# Patient Record
Sex: FEMALE | Race: WHITE | NOT HISPANIC OR LATINO | Employment: OTHER | URBAN - METROPOLITAN AREA
[De-identification: names, ages, dates, MRNs, and addresses within clinical notes are randomized per-mention and may not be internally consistent; named-entity substitution may affect disease eponyms.]

---

## 2024-01-01 ENCOUNTER — APPOINTMENT (INPATIENT)
Dept: RADIOLOGY | Facility: HOSPITAL | Age: 69
DRG: 023 | End: 2024-01-01
Payer: COMMERCIAL

## 2024-01-01 ENCOUNTER — APPOINTMENT (INPATIENT)
Dept: NON INVASIVE DIAGNOSTICS | Facility: HOSPITAL | Age: 69
DRG: 023 | End: 2024-01-01
Payer: COMMERCIAL

## 2024-01-01 ENCOUNTER — APPOINTMENT (INPATIENT)
Dept: NEUROLOGY | Facility: CLINIC | Age: 69
DRG: 023 | End: 2024-01-01
Payer: COMMERCIAL

## 2024-01-01 ENCOUNTER — HOSPITAL ENCOUNTER (INPATIENT)
Dept: RADIOLOGY | Facility: HOSPITAL | Age: 69
Discharge: HOME | DRG: 023 | End: 2024-12-06
Payer: COMMERCIAL

## 2024-01-01 ENCOUNTER — HOSPITAL ENCOUNTER (INPATIENT)
Dept: RADIOLOGY | Facility: HOSPITAL | Age: 69
LOS: 23 days | DRG: 023 | End: 2024-12-30
Attending: RADIOLOGY | Admitting: EMERGENCY MEDICINE
Payer: COMMERCIAL

## 2024-01-01 ENCOUNTER — APPOINTMENT (INPATIENT)
Dept: GASTROENTEROLOGY | Facility: HOSPITAL | Age: 69
DRG: 023 | End: 2024-01-01
Payer: COMMERCIAL

## 2024-01-01 VITALS
WEIGHT: 110 LBS | DIASTOLIC BLOOD PRESSURE: 67 MMHG | OXYGEN SATURATION: 98 % | BODY MASS INDEX: 18.78 KG/M2 | SYSTOLIC BLOOD PRESSURE: 117 MMHG | HEIGHT: 64 IN | HEART RATE: 24 BPM | TEMPERATURE: 95.7 F

## 2024-01-01 DIAGNOSIS — I63.411 CEREBROVASCULAR ACCIDENT (CVA) DUE TO EMBOLISM OF RIGHT MIDDLE CEREBRAL ARTERY (HCC): Primary | ICD-10-CM

## 2024-01-01 DIAGNOSIS — G93.40 ENCEPHALOPATHY, UNSPECIFIED TYPE: ICD-10-CM

## 2024-01-01 DIAGNOSIS — I63.9 STROKE (HCC): ICD-10-CM

## 2024-01-01 DIAGNOSIS — E87.20 ACIDOSIS: ICD-10-CM

## 2024-01-01 DIAGNOSIS — I46.9 CARDIAC ARREST (HCC): ICD-10-CM

## 2024-01-01 DIAGNOSIS — I63.9 CEREBROVASCULAR ACCIDENT (CVA), UNSPECIFIED MECHANISM (HCC): ICD-10-CM

## 2024-01-01 DIAGNOSIS — J90 PLEURAL EFFUSION: ICD-10-CM

## 2024-01-01 DIAGNOSIS — R79.89 ELEVATED TROPONIN: ICD-10-CM

## 2024-01-01 DIAGNOSIS — J96.01 ACUTE RESPIRATORY FAILURE WITH HYPOXIA (HCC): ICD-10-CM

## 2024-01-01 DIAGNOSIS — R50.9 FEVER: ICD-10-CM

## 2024-01-01 DIAGNOSIS — I26.99 ACUTE PULMONARY EMBOLISM (HCC): ICD-10-CM

## 2024-01-01 DIAGNOSIS — R94.31 EKG ABNORMALITY: ICD-10-CM

## 2024-01-01 LAB
2HR DELTA HS TROPONIN: -194 NG/L
2HR DELTA HS TROPONIN: -66 NG/L
2HR DELTA HS TROPONIN: 2345 NG/L
2HR DELTA HS TROPONIN: 28 NG/L
2HR DELTA HS TROPONIN: 37 NG/L
4HR DELTA HS TROPONIN: -44 NG/L
4HR DELTA HS TROPONIN: -48 NG/L
4HR DELTA HS TROPONIN: 424 NG/L
4HR DELTA HS TROPONIN: 4269 NG/L
4HR DELTA HS TROPONIN: 631 NG/L
4HR DELTA HS TROPONIN: 741 NG/L
ABO GROUP BLD BPU: NORMAL
ABO GROUP BLD: NORMAL
ALBUMIN SERPL BCG-MCNC: 2.3 G/DL (ref 3.5–5)
ALBUMIN SERPL BCG-MCNC: 2.6 G/DL (ref 3.5–5)
ALBUMIN SERPL BCG-MCNC: 2.7 G/DL (ref 3.5–5)
ALBUMIN SERPL BCG-MCNC: 2.8 G/DL (ref 3.5–5)
ALBUMIN SERPL BCG-MCNC: 2.9 G/DL (ref 3.5–5)
ALBUMIN SERPL BCG-MCNC: 3 G/DL (ref 3.5–5)
ALBUMIN SERPL BCG-MCNC: 3.1 G/DL (ref 3.5–5)
ALBUMIN SERPL BCG-MCNC: 3.2 G/DL (ref 3.5–5)
ALBUMIN SERPL BCG-MCNC: 3.3 G/DL (ref 3.5–5)
ALP SERPL-CCNC: 101 U/L (ref 34–104)
ALP SERPL-CCNC: 106 U/L (ref 34–104)
ALP SERPL-CCNC: 108 U/L (ref 34–104)
ALP SERPL-CCNC: 113 U/L (ref 34–104)
ALP SERPL-CCNC: 116 U/L (ref 34–104)
ALP SERPL-CCNC: 117 U/L (ref 34–104)
ALP SERPL-CCNC: 134 U/L (ref 34–104)
ALP SERPL-CCNC: 67 U/L (ref 34–104)
ALP SERPL-CCNC: 80 U/L (ref 34–104)
ALP SERPL-CCNC: 81 U/L (ref 34–104)
ALP SERPL-CCNC: 86 U/L (ref 34–104)
ALP SERPL-CCNC: 90 U/L (ref 34–104)
ALP SERPL-CCNC: 91 U/L (ref 34–104)
ALP SERPL-CCNC: 93 U/L (ref 34–104)
ALP SERPL-CCNC: 95 U/L (ref 34–104)
ALP SERPL-CCNC: 97 U/L (ref 34–104)
ALT SERPL W P-5'-P-CCNC: 163 U/L (ref 7–52)
ALT SERPL W P-5'-P-CCNC: 179 U/L (ref 7–52)
ALT SERPL W P-5'-P-CCNC: 226 U/L (ref 7–52)
ALT SERPL W P-5'-P-CCNC: 277 U/L (ref 7–52)
ALT SERPL W P-5'-P-CCNC: 302 U/L (ref 7–52)
ALT SERPL W P-5'-P-CCNC: 34 U/L (ref 7–52)
ALT SERPL W P-5'-P-CCNC: 363 U/L (ref 7–52)
ALT SERPL W P-5'-P-CCNC: 386 U/L (ref 7–52)
ALT SERPL W P-5'-P-CCNC: 41 U/L (ref 7–52)
ALT SERPL W P-5'-P-CCNC: 460 U/L (ref 7–52)
ALT SERPL W P-5'-P-CCNC: 511 U/L (ref 7–52)
ALT SERPL W P-5'-P-CCNC: 532 U/L (ref 7–52)
ALT SERPL W P-5'-P-CCNC: 705 U/L (ref 7–52)
ALT SERPL W P-5'-P-CCNC: 76 U/L (ref 7–52)
ALT SERPL W P-5'-P-CCNC: 92 U/L (ref 7–52)
ALT SERPL W P-5'-P-CCNC: 99 U/L (ref 7–52)
AMMONIA PLAS-SCNC: 33 UMOL/L (ref 18–72)
AMMONIA PLAS-SCNC: 35 UMOL/L (ref 18–72)
AMYLASE SERPL-CCNC: 26 IU/L (ref 29–103)
ANION GAP SERPL CALCULATED.3IONS-SCNC: 10 MMOL/L (ref 4–13)
ANION GAP SERPL CALCULATED.3IONS-SCNC: 10 MMOL/L (ref 4–13)
ANION GAP SERPL CALCULATED.3IONS-SCNC: 11 MMOL/L (ref 4–13)
ANION GAP SERPL CALCULATED.3IONS-SCNC: 12 MMOL/L (ref 4–13)
ANION GAP SERPL CALCULATED.3IONS-SCNC: 14 MMOL/L (ref 4–13)
ANION GAP SERPL CALCULATED.3IONS-SCNC: 15 MMOL/L (ref 4–13)
ANION GAP SERPL CALCULATED.3IONS-SCNC: 17 MMOL/L (ref 4–13)
ANION GAP SERPL CALCULATED.3IONS-SCNC: 3 MMOL/L (ref 4–13)
ANION GAP SERPL CALCULATED.3IONS-SCNC: 4 MMOL/L (ref 4–13)
ANION GAP SERPL CALCULATED.3IONS-SCNC: 5 MMOL/L (ref 4–13)
ANION GAP SERPL CALCULATED.3IONS-SCNC: 6 MMOL/L (ref 4–13)
ANION GAP SERPL CALCULATED.3IONS-SCNC: 7 MMOL/L (ref 4–13)
ANION GAP SERPL CALCULATED.3IONS-SCNC: 8 MMOL/L (ref 4–13)
ANION GAP SERPL CALCULATED.3IONS-SCNC: 9 MMOL/L (ref 4–13)
ANISOCYTOSIS BLD QL SMEAR: PRESENT
ANISOCYTOSIS BLD QL SMEAR: PRESENT
AORTIC ROOT: 3.1 CM
AORTIC VALVE MEAN VELOCITY: 8.4 M/S
APAP SERPL-MCNC: 4 UG/ML (ref 10–20)
APICAL FOUR CHAMBER EJECTION FRACTION: 48 %
APICAL FOUR CHAMBER EJECTION FRACTION: 54 %
APPEARANCE FLD: CLEAR
APTT PPP: 173 SECONDS (ref 23–34)
APTT PPP: 188 SECONDS (ref 23–34)
APTT PPP: 25 SECONDS (ref 23–34)
APTT PPP: 33 SECONDS (ref 23–34)
APTT PPP: 37 SECONDS (ref 23–34)
APTT PPP: 41 SECONDS (ref 23–34)
APTT PPP: 43 SECONDS (ref 23–34)
APTT PPP: 44 SECONDS (ref 23–34)
APTT PPP: 45 SECONDS (ref 23–34)
APTT PPP: 48 SECONDS (ref 23–34)
APTT PPP: 48 SECONDS (ref 23–34)
APTT PPP: 49 SECONDS (ref 23–34)
APTT PPP: 50 SECONDS (ref 23–34)
APTT PPP: 50 SECONDS (ref 23–34)
APTT PPP: 51 SECONDS (ref 23–34)
APTT PPP: 53 SECONDS (ref 23–34)
APTT PPP: 53 SECONDS (ref 23–34)
APTT PPP: 54 SECONDS (ref 23–34)
APTT PPP: 54 SECONDS (ref 23–34)
APTT PPP: 56 SECONDS (ref 23–34)
APTT PPP: 56 SECONDS (ref 23–34)
APTT PPP: 58 SECONDS (ref 23–34)
APTT PPP: 59 SECONDS (ref 23–34)
APTT PPP: 61 SECONDS (ref 23–34)
APTT PPP: 62 SECONDS (ref 23–34)
APTT PPP: 63 SECONDS (ref 23–34)
APTT PPP: 63 SECONDS (ref 23–34)
APTT PPP: 66 SECONDS (ref 23–34)
APTT PPP: 68 SECONDS (ref 23–34)
APTT PPP: 69 SECONDS (ref 23–34)
APTT PPP: 72 SECONDS (ref 23–34)
APTT PPP: 73 SECONDS (ref 23–34)
APTT PPP: 75 SECONDS (ref 23–34)
APTT PPP: 75 SECONDS (ref 23–34)
APTT PPP: 78 SECONDS (ref 23–34)
APTT PPP: 78 SECONDS (ref 23–34)
APTT PPP: 82 SECONDS (ref 23–34)
APTT PPP: 83 SECONDS (ref 23–34)
APTT PPP: 83 SECONDS (ref 23–34)
APTT PPP: 87 SECONDS (ref 23–34)
APTT PPP: 87 SECONDS (ref 23–34)
ARTERIAL PATENCY WRIST A: YES
ASCENDING AORTA: 2.8 CM
AST SERPL W P-5'-P-CCNC: 130 U/L (ref 13–39)
AST SERPL W P-5'-P-CCNC: 144 U/L (ref 13–39)
AST SERPL W P-5'-P-CCNC: 189 U/L (ref 13–39)
AST SERPL W P-5'-P-CCNC: 232 U/L (ref 13–39)
AST SERPL W P-5'-P-CCNC: 25 U/L (ref 13–39)
AST SERPL W P-5'-P-CCNC: 266 U/L (ref 13–39)
AST SERPL W P-5'-P-CCNC: 32 U/L (ref 13–39)
AST SERPL W P-5'-P-CCNC: 33 U/L (ref 13–39)
AST SERPL W P-5'-P-CCNC: 39 U/L (ref 13–39)
AST SERPL W P-5'-P-CCNC: 419 U/L (ref 13–39)
AST SERPL W P-5'-P-CCNC: 49 U/L (ref 13–39)
AST SERPL W P-5'-P-CCNC: 491 U/L (ref 13–39)
AST SERPL W P-5'-P-CCNC: 50 U/L (ref 13–39)
AST SERPL W P-5'-P-CCNC: 52 U/L (ref 13–39)
AST SERPL W P-5'-P-CCNC: 572 U/L (ref 13–39)
AST SERPL W P-5'-P-CCNC: 70 U/L (ref 13–39)
ATRIAL RATE: 107 BPM
ATRIAL RATE: 108 BPM
ATRIAL RATE: 112 BPM
ATRIAL RATE: 113 BPM
ATRIAL RATE: 119 BPM
ATRIAL RATE: 133 BPM
ATRIAL RATE: 133 BPM
ATRIAL RATE: 69 BPM
ATRIAL RATE: 70 BPM
ATRIAL RATE: 84 BPM
ATRIAL RATE: 86 BPM
ATRIAL RATE: 89 BPM
AV LVOT MEAN GRADIENT: 2 MMHG
AV LVOT PEAK GRADIENT: 3 MMHG
AV MEAN GRADIENT: 3 MMHG
AV PEAK GRADIENT: 7 MMHG
AV VELOCITY RATIO: 0.72
B-OH-BUTYR SERPL-MCNC: 0.63 MMOL/L (ref 0.02–0.27)
BACTERIA BLD CULT: NORMAL
BACTERIA BRONCH AEROBE CULT: ABNORMAL
BACTERIA SPEC BFLD CULT: NO GROWTH
BACTERIA SPT RESP CULT: ABNORMAL
BACTERIA SPT RESP CULT: ABNORMAL
BACTERIA UR QL AUTO: ABNORMAL /HPF
BASE EX.OXY STD BLDV CALC-SCNC: 63.5 % (ref 60–80)
BASE EXCESS BLDA CALC-SCNC: -0.1 MMOL/L
BASE EXCESS BLDA CALC-SCNC: -0.7 MMOL/L
BASE EXCESS BLDA CALC-SCNC: -1 MMOL/L
BASE EXCESS BLDA CALC-SCNC: -1.6 MMOL/L
BASE EXCESS BLDA CALC-SCNC: -1.7 MMOL/L
BASE EXCESS BLDA CALC-SCNC: -1.7 MMOL/L
BASE EXCESS BLDA CALC-SCNC: -2.2 MMOL/L
BASE EXCESS BLDA CALC-SCNC: -2.9 MMOL/L
BASE EXCESS BLDA CALC-SCNC: -3.6 MMOL/L
BASE EXCESS BLDA CALC-SCNC: -3.6 MMOL/L
BASE EXCESS BLDA CALC-SCNC: -4.1 MMOL/L
BASE EXCESS BLDA CALC-SCNC: -5.5 MMOL/L
BASE EXCESS BLDA CALC-SCNC: -5.6 MMOL/L
BASE EXCESS BLDA CALC-SCNC: -5.7 MMOL/L
BASE EXCESS BLDA CALC-SCNC: -5.7 MMOL/L
BASE EXCESS BLDA CALC-SCNC: -6 MMOL/L (ref -2–3)
BASE EXCESS BLDA CALC-SCNC: -6 MMOL/L (ref -2–3)
BASE EXCESS BLDA CALC-SCNC: -6.7 MMOL/L
BASE EXCESS BLDA CALC-SCNC: -7.2 MMOL/L
BASE EXCESS BLDA CALC-SCNC: 0.4 MMOL/L
BASE EXCESS BLDA CALC-SCNC: 0.7 MMOL/L
BASE EXCESS BLDA CALC-SCNC: 1 MMOL/L
BASE EXCESS BLDA CALC-SCNC: 1.9 MMOL/L
BASE EXCESS BLDA CALC-SCNC: 2 MMOL/L (ref -2–3)
BASE EXCESS BLDA CALC-SCNC: 2.2 MMOL/L
BASE EXCESS BLDA CALC-SCNC: 2.2 MMOL/L
BASE EXCESS BLDA CALC-SCNC: 2.9 MMOL/L
BASE EXCESS BLDV CALC-SCNC: 3.2 MMOL/L
BASOPHILS # BLD AUTO: 0.01 THOUSANDS/ÂΜL (ref 0–0.1)
BASOPHILS # BLD AUTO: 0.02 THOUSANDS/ÂΜL (ref 0–0.1)
BASOPHILS # BLD AUTO: 0.03 THOUSANDS/ÂΜL (ref 0–0.1)
BASOPHILS # BLD MANUAL: 0 THOUSAND/UL (ref 0–0.1)
BASOPHILS # BLD MANUAL: 0 THOUSAND/UL (ref 0–0.1)
BASOPHILS NFR BLD AUTO: 0 % (ref 0–1)
BASOPHILS NFR MAR MANUAL: 0 % (ref 0–1)
BASOPHILS NFR MAR MANUAL: 0 % (ref 0–1)
BILIRUB DIRECT SERPL-MCNC: 0.03 MG/DL (ref 0–0.2)
BILIRUB DIRECT SERPL-MCNC: 0.04 MG/DL (ref 0–0.2)
BILIRUB DIRECT SERPL-MCNC: 0.05 MG/DL (ref 0–0.2)
BILIRUB DIRECT SERPL-MCNC: 0.11 MG/DL (ref 0–0.2)
BILIRUB DIRECT SERPL-MCNC: 0.22 MG/DL (ref 0–0.2)
BILIRUB SERPL-MCNC: 0.34 MG/DL (ref 0.2–1)
BILIRUB SERPL-MCNC: 0.35 MG/DL (ref 0.2–1)
BILIRUB SERPL-MCNC: 0.38 MG/DL (ref 0.2–1)
BILIRUB SERPL-MCNC: 0.38 MG/DL (ref 0.2–1)
BILIRUB SERPL-MCNC: 0.39 MG/DL (ref 0.2–1)
BILIRUB SERPL-MCNC: 0.4 MG/DL (ref 0.2–1)
BILIRUB SERPL-MCNC: 0.42 MG/DL (ref 0.2–1)
BILIRUB SERPL-MCNC: 0.44 MG/DL (ref 0.2–1)
BILIRUB SERPL-MCNC: 0.44 MG/DL (ref 0.2–1)
BILIRUB SERPL-MCNC: 0.51 MG/DL (ref 0.2–1)
BILIRUB SERPL-MCNC: 0.54 MG/DL (ref 0.2–1)
BILIRUB SERPL-MCNC: 0.56 MG/DL (ref 0.2–1)
BILIRUB SERPL-MCNC: 0.59 MG/DL (ref 0.2–1)
BILIRUB SERPL-MCNC: 0.78 MG/DL (ref 0.2–1)
BILIRUB UR QL STRIP: NEGATIVE
BLD GP AB SCN SERPL QL: NEGATIVE
BNP SERPL-MCNC: 1164 PG/ML (ref 0–100)
BODY TEMPERATURE: 100.2 DEGREES FEHRENHEIT
BODY TEMPERATURE: 100.4 DEGREES FEHRENHEIT
BODY TEMPERATURE: 101.4 DEGREES FEHRENHEIT
BODY TEMPERATURE: 102.3 DEGREES FEHRENHEIT
BODY TEMPERATURE: 94.5 DEGREES FEHRENHEIT
BODY TEMPERATURE: 98.6 DEGREES FEHRENHEIT
BODY TEMPERATURE: 99 DEGREES FEHRENHEIT
BPU ID: NORMAL
BSA FOR ECHO PROCEDURE: 1.52 M2
BSA FOR ECHO PROCEDURE: 1.59 M2
BSA FOR ECHO PROCEDURE: 1.59 M2
BUDDING YEAST: PRESENT
BUDDING YEAST: PRESENT
BUN SERPL-MCNC: 11 MG/DL (ref 5–25)
BUN SERPL-MCNC: 12 MG/DL (ref 5–25)
BUN SERPL-MCNC: 15 MG/DL (ref 5–25)
BUN SERPL-MCNC: 16 MG/DL (ref 5–25)
BUN SERPL-MCNC: 17 MG/DL (ref 5–25)
BUN SERPL-MCNC: 19 MG/DL (ref 5–25)
BUN SERPL-MCNC: 19 MG/DL (ref 5–25)
BUN SERPL-MCNC: 20 MG/DL (ref 5–25)
BUN SERPL-MCNC: 21 MG/DL (ref 5–25)
BUN SERPL-MCNC: 22 MG/DL (ref 5–25)
BUN SERPL-MCNC: 23 MG/DL (ref 5–25)
BUN SERPL-MCNC: 24 MG/DL (ref 5–25)
BUN SERPL-MCNC: 24 MG/DL (ref 5–25)
BUN SERPL-MCNC: 25 MG/DL (ref 5–25)
BUN SERPL-MCNC: 25 MG/DL (ref 5–25)
BUN SERPL-MCNC: 26 MG/DL (ref 5–25)
BUN SERPL-MCNC: 27 MG/DL (ref 5–25)
BUN SERPL-MCNC: 27 MG/DL (ref 5–25)
BUN SERPL-MCNC: 32 MG/DL (ref 5–25)
BUN SERPL-MCNC: 33 MG/DL (ref 5–25)
BUN SERPL-MCNC: 33 MG/DL (ref 5–25)
BUN SERPL-MCNC: 35 MG/DL (ref 5–25)
BUN SERPL-MCNC: 38 MG/DL (ref 5–25)
BUN SERPL-MCNC: 40 MG/DL (ref 5–25)
BUN SERPL-MCNC: 43 MG/DL (ref 5–25)
BUN SERPL-MCNC: 45 MG/DL (ref 5–25)
BUN SERPL-MCNC: 45 MG/DL (ref 5–25)
BUN SERPL-MCNC: 5 MG/DL (ref 5–25)
BUN SERPL-MCNC: 50 MG/DL (ref 5–25)
BUN SERPL-MCNC: 51 MG/DL (ref 5–25)
BUN SERPL-MCNC: 52 MG/DL (ref 5–25)
BUN SERPL-MCNC: 54 MG/DL (ref 5–25)
BUN SERPL-MCNC: 55 MG/DL (ref 5–25)
BUN SERPL-MCNC: 57 MG/DL (ref 5–25)
BUN SERPL-MCNC: 58 MG/DL (ref 5–25)
BUN SERPL-MCNC: 6 MG/DL (ref 5–25)
BUN SERPL-MCNC: 6 MG/DL (ref 5–25)
BUN SERPL-MCNC: 61 MG/DL (ref 5–25)
BUN SERPL-MCNC: 65 MG/DL (ref 5–25)
BUN SERPL-MCNC: 65 MG/DL (ref 5–25)
BUN SERPL-MCNC: 7 MG/DL (ref 5–25)
BUN SERPL-MCNC: 7 MG/DL (ref 5–25)
CA-I BLD-SCNC: 0.99 MMOL/L (ref 1.12–1.32)
CA-I BLD-SCNC: 0.99 MMOL/L (ref 1.12–1.32)
CA-I BLD-SCNC: 1.01 MMOL/L (ref 1.12–1.32)
CA-I BLD-SCNC: 1.01 MMOL/L (ref 1.12–1.32)
CA-I BLD-SCNC: 1.05 MMOL/L (ref 1.12–1.32)
CA-I BLD-SCNC: 1.07 MMOL/L (ref 1.12–1.32)
CA-I BLD-SCNC: 1.12 MMOL/L (ref 1.12–1.32)
CA-I BLD-SCNC: 1.12 MMOL/L (ref 1.12–1.32)
CA-I BLD-SCNC: 1.15 MMOL/L (ref 1.12–1.32)
CA-I BLD-SCNC: 1.15 MMOL/L (ref 1.12–1.32)
CA-I BLD-SCNC: 1.16 MMOL/L (ref 1.12–1.32)
CA-I BLD-SCNC: 1.17 MMOL/L (ref 1.12–1.32)
CA-I BLD-SCNC: 1.29 MMOL/L (ref 1.12–1.32)
CA-I BLD-SCNC: 1.32 MMOL/L (ref 1.12–1.32)
CA-I BLD-SCNC: 1.55 MMOL/L (ref 1.12–1.32)
CA-I BLD-SCNC: 1.78 MMOL/L (ref 1.12–1.32)
CA-I BLD-SCNC: >2.2 MMOL/L (ref 1.12–1.32)
CALCIUM ALBUM COR SERPL-MCNC: 11.8 MG/DL (ref 8.3–10.1)
CALCIUM ALBUM COR SERPL-MCNC: 13.7 MG/DL (ref 8.3–10.1)
CALCIUM ALBUM COR SERPL-MCNC: 8.6 MG/DL (ref 8.3–10.1)
CALCIUM ALBUM COR SERPL-MCNC: 8.7 MG/DL (ref 8.3–10.1)
CALCIUM ALBUM COR SERPL-MCNC: 9.1 MG/DL (ref 8.3–10.1)
CALCIUM ALBUM COR SERPL-MCNC: 9.3 MG/DL (ref 8.3–10.1)
CALCIUM ALBUM COR SERPL-MCNC: 9.4 MG/DL (ref 8.3–10.1)
CALCIUM ALBUM COR SERPL-MCNC: 9.5 MG/DL (ref 8.3–10.1)
CALCIUM ALBUM COR SERPL-MCNC: 9.5 MG/DL (ref 8.3–10.1)
CALCIUM SERPL-MCNC: 11.1 MG/DL (ref 8.4–10.2)
CALCIUM SERPL-MCNC: 12.3 MG/DL (ref 8.4–10.2)
CALCIUM SERPL-MCNC: 7.2 MG/DL (ref 8.4–10.2)
CALCIUM SERPL-MCNC: 7.5 MG/DL (ref 8.4–10.2)
CALCIUM SERPL-MCNC: 7.6 MG/DL (ref 8.4–10.2)
CALCIUM SERPL-MCNC: 7.7 MG/DL (ref 8.4–10.2)
CALCIUM SERPL-MCNC: 7.8 MG/DL (ref 8.4–10.2)
CALCIUM SERPL-MCNC: 7.9 MG/DL (ref 8.4–10.2)
CALCIUM SERPL-MCNC: 8 MG/DL (ref 8.4–10.2)
CALCIUM SERPL-MCNC: 8.1 MG/DL (ref 8.4–10.2)
CALCIUM SERPL-MCNC: 8.2 MG/DL (ref 8.4–10.2)
CALCIUM SERPL-MCNC: 8.3 MG/DL (ref 8.4–10.2)
CALCIUM SERPL-MCNC: 8.5 MG/DL (ref 8.4–10.2)
CALCIUM SERPL-MCNC: 8.5 MG/DL (ref 8.4–10.2)
CALCIUM SERPL-MCNC: 8.6 MG/DL (ref 8.4–10.2)
CALCIUM SERPL-MCNC: 8.7 MG/DL (ref 8.4–10.2)
CALCIUM SERPL-MCNC: 8.7 MG/DL (ref 8.4–10.2)
CALCIUM SERPL-MCNC: 8.8 MG/DL (ref 8.4–10.2)
CALCIUM SERPL-MCNC: 8.8 MG/DL (ref 8.4–10.2)
CALCIUM SERPL-MCNC: 8.9 MG/DL (ref 8.4–10.2)
CALCIUM SERPL-MCNC: 9.1 MG/DL (ref 8.4–10.2)
CALCIUM SERPL-MCNC: 9.2 MG/DL (ref 8.4–10.2)
CALCIUM SERPL-MCNC: 9.3 MG/DL (ref 8.4–10.2)
CARDIAC TROPONIN I PNL SERPL HS: 1034 NG/L (ref ?–50)
CARDIAC TROPONIN I PNL SERPL HS: 1052 NG/L (ref ?–50)
CARDIAC TROPONIN I PNL SERPL HS: 1100 NG/L (ref ?–50)
CARDIAC TROPONIN I PNL SERPL HS: 169 NG/L (ref ?–50)
CARDIAC TROPONIN I PNL SERPL HS: 1733 NG/L (ref ?–50)
CARDIAC TROPONIN I PNL SERPL HS: 1761 NG/L (ref ?–50)
CARDIAC TROPONIN I PNL SERPL HS: 213 NG/L (ref ?–50)
CARDIAC TROPONIN I PNL SERPL HS: 2157 NG/L (ref ?–50)
CARDIAC TROPONIN I PNL SERPL HS: 250 NG/L (ref ?–50)
CARDIAC TROPONIN I PNL SERPL HS: 5149 NG/L (ref ?–50)
CARDIAC TROPONIN I PNL SERPL HS: 5343 NG/L (ref ?–50)
CARDIAC TROPONIN I PNL SERPL HS: 5481 NG/L (ref 8–18)
CARDIAC TROPONIN I PNL SERPL HS: 6084 NG/L (ref ?–50)
CARDIAC TROPONIN I PNL SERPL HS: 8519 NG/L (ref ?–50)
CARDIAC TROPONIN I PNL SERPL HS: 9109 NG/L (ref ?–50)
CARDIAC TROPONIN I PNL SERPL HS: 9740 NG/L (ref ?–50)
CARDIAC TROPONIN I PNL SERPL HS: ABNORMAL NG/L (ref 8–18)
CARDIAC TROPONIN I PNL SERPL HS: ABNORMAL NG/L (ref ?–50)
CARDIAC TROPONIN I PNL SERPL HS: ABNORMAL NG/L (ref ?–50)
CHLORIDE SERPL-SCNC: 106 MMOL/L (ref 96–108)
CHLORIDE SERPL-SCNC: 107 MMOL/L (ref 96–108)
CHLORIDE SERPL-SCNC: 109 MMOL/L (ref 96–108)
CHLORIDE SERPL-SCNC: 110 MMOL/L (ref 96–108)
CHLORIDE SERPL-SCNC: 111 MMOL/L (ref 96–108)
CHLORIDE SERPL-SCNC: 113 MMOL/L (ref 96–108)
CHLORIDE SERPL-SCNC: 114 MMOL/L (ref 96–108)
CHLORIDE SERPL-SCNC: 114 MMOL/L (ref 96–108)
CHLORIDE SERPL-SCNC: 115 MMOL/L (ref 96–108)
CHLORIDE SERPL-SCNC: 116 MMOL/L (ref 96–108)
CHLORIDE SERPL-SCNC: 117 MMOL/L (ref 96–108)
CHLORIDE SERPL-SCNC: 118 MMOL/L (ref 96–108)
CHLORIDE SERPL-SCNC: 119 MMOL/L (ref 96–108)
CHLORIDE SERPL-SCNC: 119 MMOL/L (ref 96–108)
CHLORIDE SERPL-SCNC: 120 MMOL/L (ref 96–108)
CHLORIDE SERPL-SCNC: 121 MMOL/L (ref 96–108)
CHLORIDE SERPL-SCNC: 123 MMOL/L (ref 96–108)
CHLORIDE SERPL-SCNC: 123 MMOL/L (ref 96–108)
CHLORIDE SERPL-SCNC: 124 MMOL/L (ref 96–108)
CHLORIDE SERPL-SCNC: 125 MMOL/L (ref 96–108)
CHLORIDE SERPL-SCNC: 126 MMOL/L (ref 96–108)
CHLORIDE SERPL-SCNC: 127 MMOL/L (ref 96–108)
CHLORIDE SERPL-SCNC: 128 MMOL/L (ref 96–108)
CHLORIDE SERPL-SCNC: 128 MMOL/L (ref 96–108)
CHLORIDE SERPL-SCNC: 130 MMOL/L (ref 96–108)
CHLORIDE SERPL-SCNC: 130 MMOL/L (ref 96–108)
CHLORIDE SERPL-SCNC: 131 MMOL/L (ref 96–108)
CHLORIDE SERPL-SCNC: 132 MMOL/L (ref 96–108)
CHLORIDE SERPL-SCNC: 134 MMOL/L (ref 96–108)
CHOLEST SERPL-MCNC: 140 MG/DL (ref ?–200)
CK SERPL-CCNC: 698 U/L (ref 26–192)
CLARITY UR: ABNORMAL
CLARITY UR: ABNORMAL
CLARITY UR: CLEAR
CO2 SERPL-SCNC: 18 MMOL/L (ref 21–32)
CO2 SERPL-SCNC: 19 MMOL/L (ref 21–32)
CO2 SERPL-SCNC: 20 MMOL/L (ref 21–32)
CO2 SERPL-SCNC: 21 MMOL/L (ref 21–32)
CO2 SERPL-SCNC: 22 MMOL/L (ref 21–32)
CO2 SERPL-SCNC: 23 MMOL/L (ref 21–32)
CO2 SERPL-SCNC: 24 MMOL/L (ref 21–32)
CO2 SERPL-SCNC: 25 MMOL/L (ref 21–32)
CO2 SERPL-SCNC: 26 MMOL/L (ref 21–32)
CO2 SERPL-SCNC: 26 MMOL/L (ref 21–32)
CO2 SERPL-SCNC: 27 MMOL/L (ref 21–32)
CO2 SERPL-SCNC: 27 MMOL/L (ref 21–32)
CO2 SERPL-SCNC: 28 MMOL/L (ref 21–32)
CO2 SERPL-SCNC: 30 MMOL/L (ref 21–32)
CO2 SERPL-SCNC: 31 MMOL/L (ref 21–32)
CO2 SERPL-SCNC: 32 MMOL/L (ref 21–32)
CO2 SERPL-SCNC: 32 MMOL/L (ref 21–32)
COLOR FLD: YELLOW
COLOR UR: ABNORMAL
COLOR UR: ABNORMAL
COLOR UR: YELLOW
CREAT SERPL-MCNC: 0.39 MG/DL (ref 0.6–1.3)
CREAT SERPL-MCNC: 0.4 MG/DL (ref 0.6–1.3)
CREAT SERPL-MCNC: 0.41 MG/DL (ref 0.6–1.3)
CREAT SERPL-MCNC: 0.43 MG/DL (ref 0.6–1.3)
CREAT SERPL-MCNC: 0.45 MG/DL (ref 0.6–1.3)
CREAT SERPL-MCNC: 0.46 MG/DL (ref 0.6–1.3)
CREAT SERPL-MCNC: 0.46 MG/DL (ref 0.6–1.3)
CREAT SERPL-MCNC: 0.48 MG/DL (ref 0.6–1.3)
CREAT SERPL-MCNC: 0.48 MG/DL (ref 0.6–1.3)
CREAT SERPL-MCNC: 0.49 MG/DL (ref 0.6–1.3)
CREAT SERPL-MCNC: 0.51 MG/DL (ref 0.6–1.3)
CREAT SERPL-MCNC: 0.54 MG/DL (ref 0.6–1.3)
CREAT SERPL-MCNC: 0.54 MG/DL (ref 0.6–1.3)
CREAT SERPL-MCNC: 0.57 MG/DL (ref 0.6–1.3)
CREAT SERPL-MCNC: 0.57 MG/DL (ref 0.6–1.3)
CREAT SERPL-MCNC: 0.58 MG/DL (ref 0.6–1.3)
CREAT SERPL-MCNC: 0.59 MG/DL (ref 0.6–1.3)
CREAT SERPL-MCNC: 0.6 MG/DL (ref 0.6–1.3)
CREAT SERPL-MCNC: 0.6 MG/DL (ref 0.6–1.3)
CREAT SERPL-MCNC: 0.61 MG/DL (ref 0.6–1.3)
CREAT SERPL-MCNC: 0.62 MG/DL (ref 0.6–1.3)
CREAT SERPL-MCNC: 0.63 MG/DL (ref 0.6–1.3)
CREAT SERPL-MCNC: 0.64 MG/DL (ref 0.6–1.3)
CREAT SERPL-MCNC: 0.64 MG/DL (ref 0.6–1.3)
CREAT SERPL-MCNC: 0.65 MG/DL (ref 0.6–1.3)
CREAT SERPL-MCNC: 0.66 MG/DL (ref 0.6–1.3)
CREAT SERPL-MCNC: 0.66 MG/DL (ref 0.6–1.3)
CREAT SERPL-MCNC: 0.68 MG/DL (ref 0.6–1.3)
CREAT SERPL-MCNC: 0.72 MG/DL (ref 0.6–1.3)
CREAT SERPL-MCNC: 0.75 MG/DL (ref 0.6–1.3)
CREAT SERPL-MCNC: 0.76 MG/DL (ref 0.6–1.3)
CREAT SERPL-MCNC: 0.76 MG/DL (ref 0.6–1.3)
CREAT SERPL-MCNC: 0.77 MG/DL (ref 0.6–1.3)
CREAT SERPL-MCNC: 0.77 MG/DL (ref 0.6–1.3)
CREAT SERPL-MCNC: 0.81 MG/DL (ref 0.6–1.3)
CREAT SERPL-MCNC: 0.86 MG/DL (ref 0.6–1.3)
CREAT SERPL-MCNC: 0.89 MG/DL (ref 0.6–1.3)
CREAT SERPL-MCNC: 0.92 MG/DL (ref 0.6–1.3)
CREAT SERPL-MCNC: 0.94 MG/DL (ref 0.6–1.3)
CREAT SERPL-MCNC: 1.05 MG/DL (ref 0.6–1.3)
CREAT SERPL-MCNC: 1.05 MG/DL (ref 0.6–1.3)
CREAT SERPL-MCNC: 1.09 MG/DL (ref 0.6–1.3)
CREAT SERPL-MCNC: 1.13 MG/DL (ref 0.6–1.3)
CREAT SERPL-MCNC: 1.13 MG/DL (ref 0.6–1.3)
CREAT SERPL-MCNC: 1.16 MG/DL (ref 0.6–1.3)
CREAT SERPL-MCNC: 1.16 MG/DL (ref 0.6–1.3)
CREAT SERPL-MCNC: 1.17 MG/DL (ref 0.6–1.3)
CREAT SERPL-MCNC: 1.21 MG/DL (ref 0.6–1.3)
CROSSMATCH: NORMAL
DOP CALC AO PEAK VEL: 1.28 M/S
DOP CALC AO VTI: 27.74 CM
DOP CALC LVOT PEAK VEL VTI: 21.27 CM
DOP CALC LVOT PEAK VEL: 0.92 M/S
E WAVE DECELERATION TIME: 132 MS
E WAVE DECELERATION TIME: 211 MS
E/A RATIO: 0.75
E/A RATIO: 1.15
EOSINOPHIL # BLD AUTO: 0 THOUSAND/ÂΜL (ref 0–0.61)
EOSINOPHIL # BLD AUTO: 0.01 THOUSAND/ÂΜL (ref 0–0.61)
EOSINOPHIL # BLD AUTO: 0.01 THOUSAND/ÂΜL (ref 0–0.61)
EOSINOPHIL # BLD AUTO: 0.02 THOUSAND/ÂΜL (ref 0–0.61)
EOSINOPHIL # BLD AUTO: 0.02 THOUSAND/ÂΜL (ref 0–0.61)
EOSINOPHIL # BLD AUTO: 0.03 THOUSAND/ÂΜL (ref 0–0.61)
EOSINOPHIL # BLD AUTO: 0.03 THOUSAND/ÂΜL (ref 0–0.61)
EOSINOPHIL # BLD AUTO: 0.05 THOUSAND/ÂΜL (ref 0–0.61)
EOSINOPHIL # BLD AUTO: 0.07 THOUSAND/ÂΜL (ref 0–0.61)
EOSINOPHIL # BLD AUTO: 0.09 THOUSAND/ÂΜL (ref 0–0.61)
EOSINOPHIL # BLD AUTO: 0.09 THOUSAND/ÂΜL (ref 0–0.61)
EOSINOPHIL # BLD AUTO: 0.11 THOUSAND/ÂΜL (ref 0–0.61)
EOSINOPHIL # BLD AUTO: 0.12 THOUSAND/ÂΜL (ref 0–0.61)
EOSINOPHIL # BLD AUTO: 0.15 THOUSAND/ÂΜL (ref 0–0.61)
EOSINOPHIL # BLD AUTO: 0.16 THOUSAND/ÂΜL (ref 0–0.61)
EOSINOPHIL # BLD AUTO: 0.24 THOUSAND/ÂΜL (ref 0–0.61)
EOSINOPHIL # BLD MANUAL: 0 THOUSAND/UL (ref 0–0.4)
EOSINOPHIL # BLD MANUAL: 0.56 THOUSAND/UL (ref 0–0.4)
EOSINOPHIL NFR BLD AUTO: 0 % (ref 0–6)
EOSINOPHIL NFR BLD AUTO: 1 % (ref 0–6)
EOSINOPHIL NFR BLD AUTO: 2 % (ref 0–6)
EOSINOPHIL NFR BLD MANUAL: 0 % (ref 0–6)
EOSINOPHIL NFR BLD MANUAL: 3 % (ref 0–6)
ERYTHROCYTE [DISTWIDTH] IN BLOOD BY AUTOMATED COUNT: 13.8 % (ref 11.6–15.1)
ERYTHROCYTE [DISTWIDTH] IN BLOOD BY AUTOMATED COUNT: 14.4 % (ref 11.6–15.1)
ERYTHROCYTE [DISTWIDTH] IN BLOOD BY AUTOMATED COUNT: 14.4 % (ref 11.6–15.1)
ERYTHROCYTE [DISTWIDTH] IN BLOOD BY AUTOMATED COUNT: 14.5 % (ref 11.6–15.1)
ERYTHROCYTE [DISTWIDTH] IN BLOOD BY AUTOMATED COUNT: 14.6 % (ref 11.6–15.1)
ERYTHROCYTE [DISTWIDTH] IN BLOOD BY AUTOMATED COUNT: 14.7 % (ref 11.6–15.1)
ERYTHROCYTE [DISTWIDTH] IN BLOOD BY AUTOMATED COUNT: 15 % (ref 11.6–15.1)
ERYTHROCYTE [DISTWIDTH] IN BLOOD BY AUTOMATED COUNT: 15.9 % (ref 11.6–15.1)
ERYTHROCYTE [DISTWIDTH] IN BLOOD BY AUTOMATED COUNT: 15.9 % (ref 11.6–15.1)
ERYTHROCYTE [DISTWIDTH] IN BLOOD BY AUTOMATED COUNT: 16 % (ref 11.6–15.1)
ERYTHROCYTE [DISTWIDTH] IN BLOOD BY AUTOMATED COUNT: 16 % (ref 11.6–15.1)
ERYTHROCYTE [DISTWIDTH] IN BLOOD BY AUTOMATED COUNT: 18.3 % (ref 11.6–15.1)
ERYTHROCYTE [DISTWIDTH] IN BLOOD BY AUTOMATED COUNT: 18.3 % (ref 11.6–15.1)
ERYTHROCYTE [DISTWIDTH] IN BLOOD BY AUTOMATED COUNT: 18.9 % (ref 11.6–15.1)
ERYTHROCYTE [DISTWIDTH] IN BLOOD BY AUTOMATED COUNT: 19.2 % (ref 11.6–15.1)
ERYTHROCYTE [DISTWIDTH] IN BLOOD BY AUTOMATED COUNT: 19.6 % (ref 11.6–15.1)
ERYTHROCYTE [DISTWIDTH] IN BLOOD BY AUTOMATED COUNT: 20.3 % (ref 11.6–15.1)
EST. AVERAGE GLUCOSE BLD GHB EST-MCNC: 105 MG/DL
FIBRINOGEN PPP-MCNC: 440 MG/DL (ref 206–523)
FIO2 GAS DIL.REBREATH: 100 L
FIO2 GAS DIL.REBREATH: 100 L
FIO2 GAS DIL.REBREATH: 44 L
FLUAV RNA RESP QL NAA+PROBE: NEGATIVE
FLUBV RNA RESP QL NAA+PROBE: NEGATIVE
FRACTIONAL SHORTENING: 20 (ref 28–44)
GFR SERPL CREATININE-BSD FRML MDRD: 101 ML/MIN/1.73SQ M
GFR SERPL CREATININE-BSD FRML MDRD: 103 ML/MIN/1.73SQ M
GFR SERPL CREATININE-BSD FRML MDRD: 103 ML/MIN/1.73SQ M
GFR SERPL CREATININE-BSD FRML MDRD: 104 ML/MIN/1.73SQ M
GFR SERPL CREATININE-BSD FRML MDRD: 105 ML/MIN/1.73SQ M
GFR SERPL CREATININE-BSD FRML MDRD: 107 ML/MIN/1.73SQ M
GFR SERPL CREATININE-BSD FRML MDRD: 108 ML/MIN/1.73SQ M
GFR SERPL CREATININE-BSD FRML MDRD: 109 ML/MIN/1.73SQ M
GFR SERPL CREATININE-BSD FRML MDRD: 46 ML/MIN/1.73SQ M
GFR SERPL CREATININE-BSD FRML MDRD: 48 ML/MIN/1.73SQ M
GFR SERPL CREATININE-BSD FRML MDRD: 50 ML/MIN/1.73SQ M
GFR SERPL CREATININE-BSD FRML MDRD: 50 ML/MIN/1.73SQ M
GFR SERPL CREATININE-BSD FRML MDRD: 52 ML/MIN/1.73SQ M
GFR SERPL CREATININE-BSD FRML MDRD: 55 ML/MIN/1.73SQ M
GFR SERPL CREATININE-BSD FRML MDRD: 55 ML/MIN/1.73SQ M
GFR SERPL CREATININE-BSD FRML MDRD: 62 ML/MIN/1.73SQ M
GFR SERPL CREATININE-BSD FRML MDRD: 64 ML/MIN/1.73SQ M
GFR SERPL CREATININE-BSD FRML MDRD: 67 ML/MIN/1.73SQ M
GFR SERPL CREATININE-BSD FRML MDRD: 70 ML/MIN/1.73SQ M
GFR SERPL CREATININE-BSD FRML MDRD: 75 ML/MIN/1.73SQ M
GFR SERPL CREATININE-BSD FRML MDRD: 80 ML/MIN/1.73SQ M
GFR SERPL CREATININE-BSD FRML MDRD: 80 ML/MIN/1.73SQ M
GFR SERPL CREATININE-BSD FRML MDRD: 81 ML/MIN/1.73SQ M
GFR SERPL CREATININE-BSD FRML MDRD: 81 ML/MIN/1.73SQ M
GFR SERPL CREATININE-BSD FRML MDRD: 82 ML/MIN/1.73SQ M
GFR SERPL CREATININE-BSD FRML MDRD: 86 ML/MIN/1.73SQ M
GFR SERPL CREATININE-BSD FRML MDRD: 90 ML/MIN/1.73SQ M
GFR SERPL CREATININE-BSD FRML MDRD: 91 ML/MIN/1.73SQ M
GFR SERPL CREATININE-BSD FRML MDRD: 91 ML/MIN/1.73SQ M
GFR SERPL CREATININE-BSD FRML MDRD: 92 ML/MIN/1.73SQ M
GFR SERPL CREATININE-BSD FRML MDRD: 93 ML/MIN/1.73SQ M
GFR SERPL CREATININE-BSD FRML MDRD: 94 ML/MIN/1.73SQ M
GFR SERPL CREATININE-BSD FRML MDRD: 95 ML/MIN/1.73SQ M
GFR SERPL CREATININE-BSD FRML MDRD: 95 ML/MIN/1.73SQ M
GFR SERPL CREATININE-BSD FRML MDRD: 96 ML/MIN/1.73SQ M
GFR SERPL CREATININE-BSD FRML MDRD: 96 ML/MIN/1.73SQ M
GFR SERPL CREATININE-BSD FRML MDRD: 97 ML/MIN/1.73SQ M
GFR SERPL CREATININE-BSD FRML MDRD: 97 ML/MIN/1.73SQ M
GFR SERPL CREATININE-BSD FRML MDRD: 99 ML/MIN/1.73SQ M
GGT SERPL-CCNC: 76 U/L (ref 9–64)
GLUCOSE FLD-MCNC: 184 MG/DL
GLUCOSE SERPL-MCNC: 100 MG/DL (ref 65–140)
GLUCOSE SERPL-MCNC: 101 MG/DL (ref 65–140)
GLUCOSE SERPL-MCNC: 104 MG/DL (ref 65–140)
GLUCOSE SERPL-MCNC: 106 MG/DL (ref 65–140)
GLUCOSE SERPL-MCNC: 106 MG/DL (ref 65–140)
GLUCOSE SERPL-MCNC: 107 MG/DL (ref 65–140)
GLUCOSE SERPL-MCNC: 110 MG/DL (ref 65–140)
GLUCOSE SERPL-MCNC: 110 MG/DL (ref 65–140)
GLUCOSE SERPL-MCNC: 112 MG/DL (ref 65–140)
GLUCOSE SERPL-MCNC: 114 MG/DL (ref 65–140)
GLUCOSE SERPL-MCNC: 116 MG/DL (ref 65–140)
GLUCOSE SERPL-MCNC: 116 MG/DL (ref 65–140)
GLUCOSE SERPL-MCNC: 117 MG/DL (ref 65–140)
GLUCOSE SERPL-MCNC: 118 MG/DL (ref 65–140)
GLUCOSE SERPL-MCNC: 119 MG/DL (ref 65–140)
GLUCOSE SERPL-MCNC: 120 MG/DL (ref 65–140)
GLUCOSE SERPL-MCNC: 120 MG/DL (ref 65–140)
GLUCOSE SERPL-MCNC: 121 MG/DL (ref 65–140)
GLUCOSE SERPL-MCNC: 124 MG/DL (ref 65–140)
GLUCOSE SERPL-MCNC: 125 MG/DL (ref 65–140)
GLUCOSE SERPL-MCNC: 126 MG/DL (ref 65–140)
GLUCOSE SERPL-MCNC: 127 MG/DL (ref 65–140)
GLUCOSE SERPL-MCNC: 128 MG/DL (ref 65–140)
GLUCOSE SERPL-MCNC: 129 MG/DL (ref 65–140)
GLUCOSE SERPL-MCNC: 129 MG/DL (ref 65–140)
GLUCOSE SERPL-MCNC: 131 MG/DL (ref 65–140)
GLUCOSE SERPL-MCNC: 132 MG/DL (ref 65–140)
GLUCOSE SERPL-MCNC: 133 MG/DL (ref 65–140)
GLUCOSE SERPL-MCNC: 133 MG/DL (ref 65–140)
GLUCOSE SERPL-MCNC: 134 MG/DL (ref 65–140)
GLUCOSE SERPL-MCNC: 134 MG/DL (ref 65–140)
GLUCOSE SERPL-MCNC: 135 MG/DL (ref 65–140)
GLUCOSE SERPL-MCNC: 136 MG/DL (ref 65–140)
GLUCOSE SERPL-MCNC: 136 MG/DL (ref 65–140)
GLUCOSE SERPL-MCNC: 137 MG/DL (ref 65–140)
GLUCOSE SERPL-MCNC: 138 MG/DL (ref 65–140)
GLUCOSE SERPL-MCNC: 139 MG/DL (ref 65–140)
GLUCOSE SERPL-MCNC: 140 MG/DL (ref 65–140)
GLUCOSE SERPL-MCNC: 140 MG/DL (ref 65–140)
GLUCOSE SERPL-MCNC: 141 MG/DL (ref 65–140)
GLUCOSE SERPL-MCNC: 142 MG/DL (ref 65–140)
GLUCOSE SERPL-MCNC: 142 MG/DL (ref 65–140)
GLUCOSE SERPL-MCNC: 143 MG/DL (ref 65–140)
GLUCOSE SERPL-MCNC: 143 MG/DL (ref 65–140)
GLUCOSE SERPL-MCNC: 144 MG/DL (ref 65–140)
GLUCOSE SERPL-MCNC: 145 MG/DL (ref 65–140)
GLUCOSE SERPL-MCNC: 146 MG/DL (ref 65–140)
GLUCOSE SERPL-MCNC: 147 MG/DL (ref 65–140)
GLUCOSE SERPL-MCNC: 147 MG/DL (ref 65–140)
GLUCOSE SERPL-MCNC: 148 MG/DL (ref 65–140)
GLUCOSE SERPL-MCNC: 149 MG/DL (ref 65–140)
GLUCOSE SERPL-MCNC: 149 MG/DL (ref 65–140)
GLUCOSE SERPL-MCNC: 150 MG/DL (ref 65–140)
GLUCOSE SERPL-MCNC: 151 MG/DL (ref 65–140)
GLUCOSE SERPL-MCNC: 151 MG/DL (ref 65–140)
GLUCOSE SERPL-MCNC: 152 MG/DL (ref 65–140)
GLUCOSE SERPL-MCNC: 154 MG/DL (ref 65–140)
GLUCOSE SERPL-MCNC: 154 MG/DL (ref 65–140)
GLUCOSE SERPL-MCNC: 156 MG/DL (ref 65–140)
GLUCOSE SERPL-MCNC: 156 MG/DL (ref 65–140)
GLUCOSE SERPL-MCNC: 157 MG/DL (ref 65–140)
GLUCOSE SERPL-MCNC: 157 MG/DL (ref 65–140)
GLUCOSE SERPL-MCNC: 159 MG/DL (ref 65–140)
GLUCOSE SERPL-MCNC: 159 MG/DL (ref 65–140)
GLUCOSE SERPL-MCNC: 160 MG/DL (ref 65–140)
GLUCOSE SERPL-MCNC: 160 MG/DL (ref 65–140)
GLUCOSE SERPL-MCNC: 161 MG/DL (ref 65–140)
GLUCOSE SERPL-MCNC: 161 MG/DL (ref 65–140)
GLUCOSE SERPL-MCNC: 162 MG/DL (ref 65–140)
GLUCOSE SERPL-MCNC: 163 MG/DL (ref 65–140)
GLUCOSE SERPL-MCNC: 165 MG/DL (ref 65–140)
GLUCOSE SERPL-MCNC: 168 MG/DL (ref 65–140)
GLUCOSE SERPL-MCNC: 168 MG/DL (ref 65–140)
GLUCOSE SERPL-MCNC: 169 MG/DL (ref 65–140)
GLUCOSE SERPL-MCNC: 170 MG/DL (ref 65–140)
GLUCOSE SERPL-MCNC: 172 MG/DL (ref 65–140)
GLUCOSE SERPL-MCNC: 173 MG/DL (ref 65–140)
GLUCOSE SERPL-MCNC: 176 MG/DL (ref 65–140)
GLUCOSE SERPL-MCNC: 178 MG/DL (ref 65–140)
GLUCOSE SERPL-MCNC: 179 MG/DL (ref 65–140)
GLUCOSE SERPL-MCNC: 180 MG/DL (ref 65–140)
GLUCOSE SERPL-MCNC: 184 MG/DL (ref 65–140)
GLUCOSE SERPL-MCNC: 185 MG/DL (ref 65–140)
GLUCOSE SERPL-MCNC: 186 MG/DL (ref 65–140)
GLUCOSE SERPL-MCNC: 186 MG/DL (ref 65–140)
GLUCOSE SERPL-MCNC: 187 MG/DL (ref 65–140)
GLUCOSE SERPL-MCNC: 188 MG/DL (ref 65–140)
GLUCOSE SERPL-MCNC: 195 MG/DL (ref 65–140)
GLUCOSE SERPL-MCNC: 196 MG/DL (ref 65–140)
GLUCOSE SERPL-MCNC: 196 MG/DL (ref 65–140)
GLUCOSE SERPL-MCNC: 197 MG/DL (ref 65–140)
GLUCOSE SERPL-MCNC: 203 MG/DL (ref 65–140)
GLUCOSE SERPL-MCNC: 208 MG/DL (ref 65–140)
GLUCOSE SERPL-MCNC: 212 MG/DL (ref 65–140)
GLUCOSE SERPL-MCNC: 221 MG/DL (ref 65–140)
GLUCOSE SERPL-MCNC: 222 MG/DL (ref 65–140)
GLUCOSE SERPL-MCNC: 223 MG/DL (ref 65–140)
GLUCOSE SERPL-MCNC: 229 MG/DL (ref 65–140)
GLUCOSE SERPL-MCNC: 237 MG/DL (ref 65–140)
GLUCOSE SERPL-MCNC: 342 MG/DL (ref 65–140)
GLUCOSE SERPL-MCNC: 498 MG/DL (ref 65–140)
GLUCOSE SERPL-MCNC: 91 MG/DL (ref 65–140)
GLUCOSE SERPL-MCNC: 92 MG/DL (ref 65–140)
GLUCOSE SERPL-MCNC: 95 MG/DL (ref 65–140)
GLUCOSE SERPL-MCNC: 99 MG/DL (ref 65–140)
GLUCOSE UR STRIP-MCNC: NEGATIVE MG/DL
GRAM STN SPEC: ABNORMAL
GRAM STN SPEC: NORMAL
GRAM STN SPEC: NORMAL
HBA1C MFR BLD: 5.3 %
HCO3 BLDA-SCNC: 16 MMOL/L (ref 22–28)
HCO3 BLDA-SCNC: 16.7 MMOL/L (ref 22–28)
HCO3 BLDA-SCNC: 17.6 MMOL/L (ref 22–28)
HCO3 BLDA-SCNC: 18.1 MMOL/L (ref 22–28)
HCO3 BLDA-SCNC: 18.3 MMOL/L (ref 22–28)
HCO3 BLDA-SCNC: 19.3 MMOL/L (ref 22–28)
HCO3 BLDA-SCNC: 19.4 MMOL/L (ref 22–28)
HCO3 BLDA-SCNC: 19.7 MMOL/L (ref 22–28)
HCO3 BLDA-SCNC: 20.3 MMOL/L (ref 22–28)
HCO3 BLDA-SCNC: 20.9 MMOL/L (ref 22–28)
HCO3 BLDA-SCNC: 21.4 MMOL/L (ref 22–28)
HCO3 BLDA-SCNC: 21.4 MMOL/L (ref 22–28)
HCO3 BLDA-SCNC: 21.7 MMOL/L (ref 22–28)
HCO3 BLDA-SCNC: 22 MMOL/L (ref 22–28)
HCO3 BLDA-SCNC: 22.3 MMOL/L (ref 22–28)
HCO3 BLDA-SCNC: 22.6 MMOL/L (ref 22–28)
HCO3 BLDA-SCNC: 22.8 MMOL/L (ref 22–28)
HCO3 BLDA-SCNC: 23 MMOL/L (ref 22–28)
HCO3 BLDA-SCNC: 23.2 MMOL/L (ref 22–28)
HCO3 BLDA-SCNC: 23.4 MMOL/L (ref 22–28)
HCO3 BLDA-SCNC: 23.8 MMOL/L (ref 22–28)
HCO3 BLDA-SCNC: 24.8 MMOL/L (ref 22–28)
HCO3 BLDA-SCNC: 26 MMOL/L (ref 22–28)
HCO3 BLDA-SCNC: 27.2 MMOL/L (ref 22–28)
HCO3 BLDA-SCNC: 29.6 MMOL/L (ref 24–30)
HCO3 BLDV-SCNC: 27.5 MMOL/L (ref 24–30)
HCT VFR BLD AUTO: 21.8 % (ref 34.8–46.1)
HCT VFR BLD AUTO: 22.5 % (ref 34.8–46.1)
HCT VFR BLD AUTO: 22.6 % (ref 34.8–46.1)
HCT VFR BLD AUTO: 22.9 % (ref 34.8–46.1)
HCT VFR BLD AUTO: 24.4 % (ref 34.8–46.1)
HCT VFR BLD AUTO: 25.2 % (ref 34.8–46.1)
HCT VFR BLD AUTO: 25.6 % (ref 34.8–46.1)
HCT VFR BLD AUTO: 25.7 % (ref 34.8–46.1)
HCT VFR BLD AUTO: 26 % (ref 34.8–46.1)
HCT VFR BLD AUTO: 26.1 % (ref 34.8–46.1)
HCT VFR BLD AUTO: 26.5 % (ref 34.8–46.1)
HCT VFR BLD AUTO: 26.8 % (ref 34.8–46.1)
HCT VFR BLD AUTO: 27 % (ref 34.8–46.1)
HCT VFR BLD AUTO: 27.1 % (ref 34.8–46.1)
HCT VFR BLD AUTO: 27.1 % (ref 34.8–46.1)
HCT VFR BLD AUTO: 27.5 % (ref 34.8–46.1)
HCT VFR BLD AUTO: 28.6 % (ref 34.8–46.1)
HCT VFR BLD AUTO: 28.9 % (ref 34.8–46.1)
HCT VFR BLD AUTO: 29 % (ref 34.8–46.1)
HCT VFR BLD AUTO: 29.4 % (ref 34.8–46.1)
HCT VFR BLD AUTO: 29.6 % (ref 34.8–46.1)
HCT VFR BLD AUTO: 29.9 % (ref 34.8–46.1)
HCT VFR BLD AUTO: 30.2 % (ref 34.8–46.1)
HCT VFR BLD AUTO: 32 % (ref 34.8–46.1)
HCT VFR BLD AUTO: 32.7 % (ref 34.8–46.1)
HCT VFR BLD AUTO: 34 % (ref 34.8–46.1)
HCT VFR BLD AUTO: 34.3 % (ref 34.8–46.1)
HCT VFR BLD CALC: 20 % (ref 34.8–46.1)
HCT VFR BLD CALC: 22 % (ref 34.8–46.1)
HCT VFR BLD CALC: 23 % (ref 34.8–46.1)
HCT VFR BLD CALC: 26 % (ref 34.8–46.1)
HDLC SERPL-MCNC: 42 MG/DL
HELMET CELLS BLD QL SMEAR: PRESENT
HGB BLD-MCNC: 10.2 G/DL (ref 11.5–15.4)
HGB BLD-MCNC: 10.3 G/DL (ref 11.5–15.4)
HGB BLD-MCNC: 10.9 G/DL (ref 11.5–15.4)
HGB BLD-MCNC: 11.1 G/DL (ref 11.5–15.4)
HGB BLD-MCNC: 7 G/DL (ref 11.5–15.4)
HGB BLD-MCNC: 7.4 G/DL (ref 11.5–15.4)
HGB BLD-MCNC: 7.7 G/DL (ref 11.5–15.4)
HGB BLD-MCNC: 7.9 G/DL (ref 11.5–15.4)
HGB BLD-MCNC: 8 G/DL (ref 11.5–15.4)
HGB BLD-MCNC: 8.3 G/DL (ref 11.5–15.4)
HGB BLD-MCNC: 8.4 G/DL (ref 11.5–15.4)
HGB BLD-MCNC: 8.5 G/DL (ref 11.5–15.4)
HGB BLD-MCNC: 8.6 G/DL (ref 11.5–15.4)
HGB BLD-MCNC: 8.7 G/DL (ref 11.5–15.4)
HGB BLD-MCNC: 8.7 G/DL (ref 11.5–15.4)
HGB BLD-MCNC: 8.8 G/DL (ref 11.5–15.4)
HGB BLD-MCNC: 8.9 G/DL (ref 11.5–15.4)
HGB BLD-MCNC: 9.2 G/DL (ref 11.5–15.4)
HGB BLD-MCNC: 9.2 G/DL (ref 11.5–15.4)
HGB BLD-MCNC: 9.4 G/DL (ref 11.5–15.4)
HGB BLD-MCNC: 9.7 G/DL (ref 11.5–15.4)
HGB BLDA-MCNC: 6.8 G/DL (ref 11.5–15.4)
HGB BLDA-MCNC: 7.5 G/DL (ref 11.5–15.4)
HGB BLDA-MCNC: 7.8 G/DL (ref 11.5–15.4)
HGB BLDA-MCNC: 8.8 G/DL (ref 11.5–15.4)
HGB UR QL STRIP.AUTO: ABNORMAL
HISTIOCYTES NFR FLD: 6 %
HIV 1+2 AB+HIV1 P24 AG SERPL QL IA: NORMAL
HIV 2 AB SERPL QL IA: NORMAL
HIV1 AB SERPL QL IA: NORMAL
HIV1 P24 AG SERPL QL IA: NORMAL
HOROWITZ INDEX BLDA+IHG-RTO: 100 MM[HG]
HOROWITZ INDEX BLDA+IHG-RTO: 40 MM[HG]
HOROWITZ INDEX BLDA+IHG-RTO: 40 MM[HG]
HOROWITZ INDEX BLDA+IHG-RTO: 45 MM[HG]
HOROWITZ INDEX BLDA+IHG-RTO: 50 MM[HG]
IMM GRANULOCYTES # BLD AUTO: 0.06 THOUSAND/UL (ref 0–0.2)
IMM GRANULOCYTES # BLD AUTO: 0.08 THOUSAND/UL (ref 0–0.2)
IMM GRANULOCYTES # BLD AUTO: 0.08 THOUSAND/UL (ref 0–0.2)
IMM GRANULOCYTES # BLD AUTO: 0.1 THOUSAND/UL (ref 0–0.2)
IMM GRANULOCYTES # BLD AUTO: 0.12 THOUSAND/UL (ref 0–0.2)
IMM GRANULOCYTES # BLD AUTO: 0.12 THOUSAND/UL (ref 0–0.2)
IMM GRANULOCYTES # BLD AUTO: 0.13 THOUSAND/UL (ref 0–0.2)
IMM GRANULOCYTES # BLD AUTO: 0.16 THOUSAND/UL (ref 0–0.2)
IMM GRANULOCYTES # BLD AUTO: 0.17 THOUSAND/UL (ref 0–0.2)
IMM GRANULOCYTES # BLD AUTO: 0.18 THOUSAND/UL (ref 0–0.2)
IMM GRANULOCYTES # BLD AUTO: 0.19 THOUSAND/UL (ref 0–0.2)
IMM GRANULOCYTES # BLD AUTO: 0.2 THOUSAND/UL (ref 0–0.2)
IMM GRANULOCYTES # BLD AUTO: 0.23 THOUSAND/UL (ref 0–0.2)
IMM GRANULOCYTES # BLD AUTO: 0.27 THOUSAND/UL (ref 0–0.2)
IMM GRANULOCYTES # BLD AUTO: 0.33 THOUSAND/UL (ref 0–0.2)
IMM GRANULOCYTES # BLD AUTO: 0.38 THOUSAND/UL (ref 0–0.2)
IMM GRANULOCYTES NFR BLD AUTO: 1 % (ref 0–2)
IMM GRANULOCYTES NFR BLD AUTO: 2 % (ref 0–2)
INR PPP: 1.2 (ref 0.85–1.19)
INR PPP: 1.63 (ref 0.85–1.19)
INR PPP: 1.66 (ref 0.85–1.19)
INTERVENTRICULAR SEPTUM IN DIASTOLE (PARASTERNAL SHORT AXIS VIEW): 0.9 CM
INTERVENTRICULAR SEPTUM: 0.9 CM (ref 0.6–1.1)
KETONES UR STRIP-MCNC: ABNORMAL MG/DL
KETONES UR STRIP-MCNC: NEGATIVE MG/DL
KETONES UR STRIP-MCNC: NEGATIVE MG/DL
LAAS-AP2: 16.6 CM2
LAAS-AP4: 17.5 CM2
LACTATE SERPL-SCNC: 1.6 MMOL/L (ref 0.5–2)
LACTATE SERPL-SCNC: 13 MMOL/L (ref 0.5–2)
LACTATE SERPL-SCNC: 5.4 MMOL/L (ref 0.5–2)
LACTATE SERPL-SCNC: 5.9 MMOL/L (ref 0.5–2)
LACTATE SERPL-SCNC: 6.6 MMOL/L (ref 0.5–2)
LDH FLD L TO P-CCNC: 141 U/L
LDLC SERPL CALC-MCNC: 76 MG/DL (ref 0–100)
LEFT ATRIUM SIZE: 3.2 CM
LEFT ATRIUM VOLUME (MOD BIPLANE): 46 ML
LEFT ATRIUM VOLUME INDEX (MOD BIPLANE): 28.8 ML/M2
LEFT INTERNAL DIMENSION IN SYSTOLE: 3.5 CM (ref 2.1–4)
LEFT VENTRICULAR INTERNAL DIMENSION IN DIASTOLE: 4.4 CM (ref 3.5–6)
LEFT VENTRICULAR POSTERIOR WALL IN END DIASTOLE: 0.9 CM
LEFT VENTRICULAR STROKE VOLUME: 34 ML
LEUKOCYTE ESTERASE UR QL STRIP: ABNORMAL
LEUKOCYTE ESTERASE UR QL STRIP: ABNORMAL
LEUKOCYTE ESTERASE UR QL STRIP: NEGATIVE
LEVETIRACETAM SERPL-MCNC: 19.8 UG/ML (ref 12–46)
LG PLATELETS BLD QL SMEAR: PRESENT
LIPASE SERPL-CCNC: 18 U/L (ref 11–82)
LIPASE SERPL-CCNC: 71 U/L (ref 11–82)
LVSV (TEICH): 34 ML
LYMPHOCYTES # BLD AUTO: 0.99 THOUSANDS/ÂΜL (ref 0.6–4.47)
LYMPHOCYTES # BLD AUTO: 1.03 THOUSANDS/ÂΜL (ref 0.6–4.47)
LYMPHOCYTES # BLD AUTO: 1.23 THOUSANDS/ÂΜL (ref 0.6–4.47)
LYMPHOCYTES # BLD AUTO: 1.36 THOUSANDS/ÂΜL (ref 0.6–4.47)
LYMPHOCYTES # BLD AUTO: 1.37 THOUSANDS/ÂΜL (ref 0.6–4.47)
LYMPHOCYTES # BLD AUTO: 1.39 THOUSANDS/ÂΜL (ref 0.6–4.47)
LYMPHOCYTES # BLD AUTO: 1.43 THOUSANDS/ÂΜL (ref 0.6–4.47)
LYMPHOCYTES # BLD AUTO: 1.45 THOUSANDS/ÂΜL (ref 0.6–4.47)
LYMPHOCYTES # BLD AUTO: 1.46 THOUSANDS/ÂΜL (ref 0.6–4.47)
LYMPHOCYTES # BLD AUTO: 1.56 THOUSANDS/ÂΜL (ref 0.6–4.47)
LYMPHOCYTES # BLD AUTO: 1.61 THOUSANDS/ÂΜL (ref 0.6–4.47)
LYMPHOCYTES # BLD AUTO: 1.71 THOUSANDS/ÂΜL (ref 0.6–4.47)
LYMPHOCYTES # BLD AUTO: 1.94 THOUSANDS/ÂΜL (ref 0.6–4.47)
LYMPHOCYTES # BLD AUTO: 1.96 THOUSANDS/ÂΜL (ref 0.6–4.47)
LYMPHOCYTES # BLD AUTO: 2.52 THOUSAND/UL (ref 0.6–4.47)
LYMPHOCYTES # BLD AUTO: 2.61 THOUSANDS/ÂΜL (ref 0.6–4.47)
LYMPHOCYTES # BLD AUTO: 37 % (ref 14–44)
LYMPHOCYTES # BLD AUTO: 4 % (ref 14–44)
LYMPHOCYTES # BLD AUTO: 6.88 THOUSAND/UL (ref 0.6–4.47)
LYMPHOCYTES NFR BLD AUTO: 10 % (ref 14–44)
LYMPHOCYTES NFR BLD AUTO: 10 % (ref 14–44)
LYMPHOCYTES NFR BLD AUTO: 11 % (ref 14–44)
LYMPHOCYTES NFR BLD AUTO: 11 % (ref 14–44)
LYMPHOCYTES NFR BLD AUTO: 38 %
LYMPHOCYTES NFR BLD AUTO: 5 % (ref 14–44)
LYMPHOCYTES NFR BLD AUTO: 6 % (ref 14–44)
LYMPHOCYTES NFR BLD AUTO: 6 % (ref 14–44)
LYMPHOCYTES NFR BLD AUTO: 7 % (ref 14–44)
LYMPHOCYTES NFR BLD AUTO: 8 % (ref 14–44)
LYMPHOCYTES NFR BLD AUTO: 9 % (ref 14–44)
MACROCYTES BLD QL AUTO: PRESENT
MAGNESIUM SERPL-MCNC: 1.7 MG/DL (ref 1.9–2.7)
MAGNESIUM SERPL-MCNC: 1.9 MG/DL (ref 1.9–2.7)
MAGNESIUM SERPL-MCNC: 2 MG/DL (ref 1.9–2.7)
MAGNESIUM SERPL-MCNC: 2.1 MG/DL (ref 1.9–2.7)
MAGNESIUM SERPL-MCNC: 2.3 MG/DL (ref 1.9–2.7)
MAGNESIUM SERPL-MCNC: 2.4 MG/DL (ref 1.9–2.7)
MAGNESIUM SERPL-MCNC: 2.4 MG/DL (ref 1.9–2.7)
MAGNESIUM SERPL-MCNC: 2.5 MG/DL (ref 1.9–2.7)
MAGNESIUM SERPL-MCNC: 2.6 MG/DL (ref 1.9–2.7)
MAGNESIUM SERPL-MCNC: 2.6 MG/DL (ref 1.9–2.7)
MAGNESIUM SERPL-MCNC: 2.8 MG/DL (ref 1.9–2.7)
MAGNESIUM SERPL-MCNC: 3.3 MG/DL (ref 1.9–2.7)
MAGNESIUM SERPL-MCNC: 3.7 MG/DL (ref 1.9–2.7)
MCH RBC QN AUTO: 30.6 PG (ref 26.8–34.3)
MCH RBC QN AUTO: 30.7 PG (ref 26.8–34.3)
MCH RBC QN AUTO: 30.7 PG (ref 26.8–34.3)
MCH RBC QN AUTO: 30.9 PG (ref 26.8–34.3)
MCH RBC QN AUTO: 31 PG (ref 26.8–34.3)
MCH RBC QN AUTO: 31.1 PG (ref 26.8–34.3)
MCH RBC QN AUTO: 31.2 PG (ref 26.8–34.3)
MCH RBC QN AUTO: 31.3 PG (ref 26.8–34.3)
MCH RBC QN AUTO: 31.4 PG (ref 26.8–34.3)
MCH RBC QN AUTO: 31.4 PG (ref 26.8–34.3)
MCH RBC QN AUTO: 31.5 PG (ref 26.8–34.3)
MCH RBC QN AUTO: 31.6 PG (ref 26.8–34.3)
MCH RBC QN AUTO: 31.6 PG (ref 26.8–34.3)
MCH RBC QN AUTO: 31.7 PG (ref 26.8–34.3)
MCH RBC QN AUTO: 32 PG (ref 26.8–34.3)
MCHC RBC AUTO-ENTMCNC: 29.5 G/DL (ref 31.4–37.4)
MCHC RBC AUTO-ENTMCNC: 29.5 G/DL (ref 31.4–37.4)
MCHC RBC AUTO-ENTMCNC: 29.7 G/DL (ref 31.4–37.4)
MCHC RBC AUTO-ENTMCNC: 30.4 G/DL (ref 31.4–37.4)
MCHC RBC AUTO-ENTMCNC: 30.9 G/DL (ref 31.4–37.4)
MCHC RBC AUTO-ENTMCNC: 31 G/DL (ref 31.4–37.4)
MCHC RBC AUTO-ENTMCNC: 31.1 G/DL (ref 31.4–37.4)
MCHC RBC AUTO-ENTMCNC: 31.3 G/DL (ref 31.4–37.4)
MCHC RBC AUTO-ENTMCNC: 31.7 G/DL (ref 31.4–37.4)
MCHC RBC AUTO-ENTMCNC: 32 G/DL (ref 31.4–37.4)
MCHC RBC AUTO-ENTMCNC: 32.1 G/DL (ref 31.4–37.4)
MCHC RBC AUTO-ENTMCNC: 32.2 G/DL (ref 31.4–37.4)
MCHC RBC AUTO-ENTMCNC: 32.2 G/DL (ref 31.4–37.4)
MCHC RBC AUTO-ENTMCNC: 32.3 G/DL (ref 31.4–37.4)
MCHC RBC AUTO-ENTMCNC: 32.3 G/DL (ref 31.4–37.4)
MCHC RBC AUTO-ENTMCNC: 32.4 G/DL (ref 31.4–37.4)
MCHC RBC AUTO-ENTMCNC: 32.4 G/DL (ref 31.4–37.4)
MCHC RBC AUTO-ENTMCNC: 32.5 G/DL (ref 31.4–37.4)
MCHC RBC AUTO-ENTMCNC: 32.8 G/DL (ref 31.4–37.4)
MCHC RBC AUTO-ENTMCNC: 33.1 G/DL (ref 31.4–37.4)
MCHC RBC AUTO-ENTMCNC: 33.2 G/DL (ref 31.4–37.4)
MCHC RBC AUTO-ENTMCNC: 34.1 G/DL (ref 31.4–37.4)
MCV RBC AUTO: 101 FL (ref 82–98)
MCV RBC AUTO: 102 FL (ref 82–98)
MCV RBC AUTO: 103 FL (ref 82–98)
MCV RBC AUTO: 104 FL (ref 82–98)
MCV RBC AUTO: 105 FL (ref 82–98)
MCV RBC AUTO: 108 FL (ref 82–98)
MCV RBC AUTO: 91 FL (ref 82–98)
MCV RBC AUTO: 94 FL (ref 82–98)
MCV RBC AUTO: 95 FL (ref 82–98)
MCV RBC AUTO: 95 FL (ref 82–98)
MCV RBC AUTO: 96 FL (ref 82–98)
MCV RBC AUTO: 97 FL (ref 82–98)
MCV RBC AUTO: 98 FL (ref 82–98)
MCV RBC AUTO: 99 FL (ref 82–98)
MCV RBC AUTO: 99 FL (ref 82–98)
METAMYELOCYTE ABSOLUTE CT: 0.37 THOUSAND/UL (ref 0–0.1)
METAMYELOCYTES NFR BLD MANUAL: 2 % (ref 0–1)
MONO+MESO NFR FLD MANUAL: 32 %
MONOCYTES # BLD AUTO: 0.62 THOUSAND/ÂΜL (ref 0.17–1.22)
MONOCYTES # BLD AUTO: 0.74 THOUSAND/UL (ref 0–1.22)
MONOCYTES # BLD AUTO: 0.9 THOUSAND/ÂΜL (ref 0.17–1.22)
MONOCYTES # BLD AUTO: 0.98 THOUSAND/ÂΜL (ref 0.17–1.22)
MONOCYTES # BLD AUTO: 1.05 THOUSAND/ÂΜL (ref 0.17–1.22)
MONOCYTES # BLD AUTO: 1.06 THOUSAND/ÂΜL (ref 0.17–1.22)
MONOCYTES # BLD AUTO: 1.07 THOUSAND/ÂΜL (ref 0.17–1.22)
MONOCYTES # BLD AUTO: 1.08 THOUSAND/ÂΜL (ref 0.17–1.22)
MONOCYTES # BLD AUTO: 1.13 THOUSAND/ÂΜL (ref 0.17–1.22)
MONOCYTES # BLD AUTO: 1.16 THOUSAND/ÂΜL (ref 0.17–1.22)
MONOCYTES # BLD AUTO: 1.29 THOUSAND/ÂΜL (ref 0.17–1.22)
MONOCYTES # BLD AUTO: 1.31 THOUSAND/ÂΜL (ref 0.17–1.22)
MONOCYTES # BLD AUTO: 1.32 THOUSAND/ÂΜL (ref 0.17–1.22)
MONOCYTES # BLD AUTO: 1.33 THOUSAND/ÂΜL (ref 0.17–1.22)
MONOCYTES # BLD AUTO: 1.35 THOUSAND/ÂΜL (ref 0.17–1.22)
MONOCYTES # BLD AUTO: 1.58 THOUSAND/ÂΜL (ref 0.17–1.22)
MONOCYTES # BLD AUTO: 1.61 THOUSAND/ÂΜL (ref 0.17–1.22)
MONOCYTES # BLD AUTO: 1.62 THOUSAND/ÂΜL (ref 0.17–1.22)
MONOCYTES # BLD AUTO: 1.63 THOUSAND/ÂΜL (ref 0.17–1.22)
MONOCYTES # BLD AUTO: 2.52 THOUSAND/UL (ref 0–1.22)
MONOCYTES NFR BLD AUTO: 10 % (ref 4–12)
MONOCYTES NFR BLD AUTO: 4 % (ref 4–12)
MONOCYTES NFR BLD AUTO: 4 % (ref 4–12)
MONOCYTES NFR BLD AUTO: 5 % (ref 4–12)
MONOCYTES NFR BLD AUTO: 5 % (ref 4–12)
MONOCYTES NFR BLD AUTO: 6 % (ref 4–12)
MONOCYTES NFR BLD AUTO: 7 % (ref 4–12)
MONOCYTES NFR BLD AUTO: 8 % (ref 4–12)
MONOCYTES NFR BLD AUTO: 8 % (ref 4–12)
MONOCYTES NFR BLD AUTO: 9 % (ref 4–12)
MONOCYTES NFR BLD: 4 % (ref 4–12)
MONOCYTES NFR BLD: 6 % (ref 4–12)
MRSA NOSE QL CULT: NORMAL
MUCOUS THREADS UR QL AUTO: ABNORMAL
MV E'TISSUE VEL-SEP: 5 CM/S
MV E'TISSUE VEL-SEP: 8 CM/S
MV PEAK A VEL: 0.73 M/S
MV PEAK A VEL: 0.95 M/S
MV PEAK E VEL: 109 CM/S
MV PEAK E VEL: 55 CM/S
MV STENOSIS PRESSURE HALF TIME: 38 MS
MV STENOSIS PRESSURE HALF TIME: 61 MS
MV VALVE AREA P 1/2 METHOD: 3.6
MV VALVE AREA P 1/2 METHOD: 5.79
NASAL CANNULA: 3
NEUTROPHILS # BLD AUTO: 10.04 THOUSANDS/ÂΜL (ref 1.85–7.62)
NEUTROPHILS # BLD AUTO: 11.05 THOUSANDS/ÂΜL (ref 1.85–7.62)
NEUTROPHILS # BLD AUTO: 11.77 THOUSANDS/ÂΜL (ref 1.85–7.62)
NEUTROPHILS # BLD AUTO: 12.19 THOUSANDS/ÂΜL (ref 1.85–7.62)
NEUTROPHILS # BLD AUTO: 13.01 THOUSANDS/ÂΜL (ref 1.85–7.62)
NEUTROPHILS # BLD AUTO: 13.22 THOUSANDS/ÂΜL (ref 1.85–7.62)
NEUTROPHILS # BLD AUTO: 15.15 THOUSANDS/ÂΜL (ref 1.85–7.62)
NEUTROPHILS # BLD AUTO: 15.64 THOUSANDS/ÂΜL (ref 1.85–7.62)
NEUTROPHILS # BLD AUTO: 15.68 THOUSANDS/ÂΜL (ref 1.85–7.62)
NEUTROPHILS # BLD AUTO: 16.05 THOUSANDS/ÂΜL (ref 1.85–7.62)
NEUTROPHILS # BLD AUTO: 16.13 THOUSANDS/ÂΜL (ref 1.85–7.62)
NEUTROPHILS # BLD AUTO: 16.58 THOUSANDS/ÂΜL (ref 1.85–7.62)
NEUTROPHILS # BLD AUTO: 17.06 THOUSANDS/ÂΜL (ref 1.85–7.62)
NEUTROPHILS # BLD AUTO: 19.94 THOUSANDS/ÂΜL (ref 1.85–7.62)
NEUTROPHILS # BLD AUTO: 20.01 THOUSANDS/ÂΜL (ref 1.85–7.62)
NEUTROPHILS # BLD AUTO: 20.31 THOUSANDS/ÂΜL (ref 1.85–7.62)
NEUTROPHILS # BLD AUTO: 21.51 THOUSANDS/ÂΜL (ref 1.85–7.62)
NEUTROPHILS # BLD AUTO: 22.6 THOUSANDS/ÂΜL (ref 1.85–7.62)
NEUTROPHILS # BLD MANUAL: 10.04 THOUSAND/UL (ref 1.85–7.62)
NEUTROPHILS # BLD MANUAL: 37.02 THOUSAND/UL (ref 1.85–7.62)
NEUTS BAND NFR BLD MANUAL: 2 % (ref 0–8)
NEUTS BAND NFR BLD MANUAL: 5 % (ref 0–8)
NEUTS SEG NFR BLD AUTO: 24 %
NEUTS SEG NFR BLD AUTO: 52 % (ref 43–75)
NEUTS SEG NFR BLD AUTO: 80 % (ref 43–75)
NEUTS SEG NFR BLD AUTO: 81 % (ref 43–75)
NEUTS SEG NFR BLD AUTO: 82 % (ref 43–75)
NEUTS SEG NFR BLD AUTO: 83 % (ref 43–75)
NEUTS SEG NFR BLD AUTO: 84 % (ref 43–75)
NEUTS SEG NFR BLD AUTO: 84 % (ref 43–75)
NEUTS SEG NFR BLD AUTO: 85 % (ref 43–75)
NEUTS SEG NFR BLD AUTO: 85 % (ref 43–75)
NEUTS SEG NFR BLD AUTO: 86 % (ref 43–75)
NEUTS SEG NFR BLD AUTO: 88 % (ref 43–75)
NEUTS SEG NFR BLD AUTO: 88 % (ref 43–75)
NITRITE UR QL STRIP: NEGATIVE
NON VENT- BIPAP: ABNORMAL
NON-SQ EPI CELLS URNS QL MICRO: ABNORMAL /HPF
NRBC BLD AUTO-RTO: 0 /100 WBCS
NRBC BLD AUTO-RTO: 2 /100 WBC (ref 0–2)
O2 CT BLDA-SCNC: 10.7 ML/DL (ref 16–23)
O2 CT BLDA-SCNC: 10.8 ML/DL (ref 16–23)
O2 CT BLDA-SCNC: 10.9 ML/DL (ref 16–23)
O2 CT BLDA-SCNC: 11 ML/DL (ref 16–23)
O2 CT BLDA-SCNC: 11.2 ML/DL (ref 16–23)
O2 CT BLDA-SCNC: 11.5 ML/DL (ref 16–23)
O2 CT BLDA-SCNC: 11.5 ML/DL (ref 16–23)
O2 CT BLDA-SCNC: 11.7 ML/DL (ref 16–23)
O2 CT BLDA-SCNC: 11.7 ML/DL (ref 16–23)
O2 CT BLDA-SCNC: 11.8 ML/DL (ref 16–23)
O2 CT BLDA-SCNC: 11.9 ML/DL (ref 16–23)
O2 CT BLDA-SCNC: 11.9 ML/DL (ref 16–23)
O2 CT BLDA-SCNC: 12.1 ML/DL (ref 16–23)
O2 CT BLDA-SCNC: 12.6 ML/DL (ref 16–23)
O2 CT BLDA-SCNC: 12.6 ML/DL (ref 16–23)
O2 CT BLDA-SCNC: 12.7 ML/DL (ref 16–23)
O2 CT BLDA-SCNC: 12.9 ML/DL (ref 16–23)
O2 CT BLDA-SCNC: 13.1 ML/DL (ref 16–23)
O2 CT BLDA-SCNC: 14.2 ML/DL (ref 16–23)
O2 CT BLDA-SCNC: 14.3 ML/DL (ref 16–23)
O2 CT BLDA-SCNC: 15.8 ML/DL (ref 16–23)
O2 CT BLDA-SCNC: 17.3 ML/DL (ref 16–23)
O2 CT BLDV-SCNC: 6.7 ML/DL
OSMOLALITY UR/SERPL-RTO: 302 MMOL/KG (ref 282–298)
OSMOLALITY UR/SERPL-RTO: 302 MMOL/KG (ref 282–298)
OSMOLALITY UR/SERPL-RTO: 309 MMOL/KG (ref 282–298)
OSMOLALITY UR/SERPL-RTO: 316 MMOL/KG (ref 282–298)
OSMOLALITY UR/SERPL-RTO: 324 MMOL/KG (ref 282–298)
OSMOLALITY UR/SERPL-RTO: 329 MMOL/KG (ref 282–298)
OSMOLALITY UR/SERPL-RTO: 329 MMOL/KG (ref 282–298)
OSMOLALITY UR/SERPL-RTO: 330 MMOL/KG (ref 282–298)
OSMOLALITY UR/SERPL-RTO: 330 MMOL/KG (ref 282–298)
OSMOLALITY UR/SERPL-RTO: 331 MMOL/KG (ref 282–298)
OSMOLALITY UR/SERPL-RTO: 332 MMOL/KG (ref 282–298)
OSMOLALITY UR/SERPL-RTO: 332 MMOL/KG (ref 282–298)
OSMOLALITY UR/SERPL-RTO: 333 MMOL/KG (ref 282–298)
OSMOLALITY UR/SERPL-RTO: 333 MMOL/KG (ref 282–298)
OSMOLALITY UR/SERPL-RTO: 335 MMOL/KG (ref 282–298)
OSMOLALITY UR/SERPL-RTO: 336 MMOL/KG (ref 282–298)
OSMOLALITY UR/SERPL-RTO: 336 MMOL/KG (ref 282–298)
OSMOLALITY UR/SERPL-RTO: 337 MMOL/KG (ref 282–298)
OSMOLALITY UR/SERPL-RTO: 337 MMOL/KG (ref 282–298)
OSMOLALITY UR/SERPL-RTO: 338 MMOL/KG (ref 282–298)
OSMOLALITY UR/SERPL-RTO: 339 MMOL/KG (ref 282–298)
OSMOLALITY UR/SERPL-RTO: 340 MMOL/KG (ref 282–298)
OSMOLALITY UR/SERPL-RTO: 342 MMOL/KG (ref 282–298)
OSMOLALITY UR/SERPL-RTO: 343 MMOL/KG (ref 282–298)
OSMOLALITY UR/SERPL-RTO: 344 MMOL/KG (ref 282–298)
OSMOLALITY UR/SERPL-RTO: 347 MMOL/KG (ref 282–298)
OSMOLALITY UR: 328 MMOL/KG (ref 250–900)
OXYHGB MFR BLDA: 92.1 % (ref 94–97)
OXYHGB MFR BLDA: 93.5 % (ref 94–97)
OXYHGB MFR BLDA: 94.1 % (ref 94–97)
OXYHGB MFR BLDA: 94.3 % (ref 94–97)
OXYHGB MFR BLDA: 94.9 % (ref 94–97)
OXYHGB MFR BLDA: 95.4 % (ref 94–97)
OXYHGB MFR BLDA: 95.6 % (ref 94–97)
OXYHGB MFR BLDA: 96.9 % (ref 94–97)
OXYHGB MFR BLDA: 97.2 % (ref 94–97)
OXYHGB MFR BLDA: 97.3 % (ref 94–97)
OXYHGB MFR BLDA: 97.7 % (ref 94–97)
OXYHGB MFR BLDA: 97.7 % (ref 94–97)
OXYHGB MFR BLDA: 97.8 % (ref 94–97)
OXYHGB MFR BLDA: 97.8 % (ref 94–97)
OXYHGB MFR BLDA: 97.9 % (ref 94–97)
OXYHGB MFR BLDA: 98 % (ref 94–97)
OXYHGB MFR BLDA: 98.2 % (ref 94–97)
OXYHGB MFR BLDA: 98.3 % (ref 94–97)
OXYHGB MFR BLDA: 98.4 % (ref 94–97)
OXYHGB MFR BLDA: 98.5 % (ref 94–97)
OXYHGB MFR BLDA: 98.8 % (ref 94–97)
P AXIS: 62 DEGREES
P AXIS: 62 DEGREES
P AXIS: 66 DEGREES
P AXIS: 72 DEGREES
P AXIS: 72 DEGREES
P AXIS: 74 DEGREES
P AXIS: 76 DEGREES
P AXIS: 81 DEGREES
P AXIS: 88 DEGREES
PCO2 BLD: 21 MMOL/L (ref 21–32)
PCO2 BLD: 25 MMOL/L (ref 21–32)
PCO2 BLD: 32 MMOL/L (ref 21–32)
PCO2 BLD: 332 MM HG
PCO2 BLD: 39 MM HG (ref 36–44)
PCO2 BLD: 65.6 MM HG (ref 36–44)
PCO2 BLD: 71.6 MM HG (ref 42–50)
PCO2 BLD: >103 MM HG (ref 42–50)
PCO2 BLDA: 24.2 MM HG (ref 36–44)
PCO2 BLDA: 24.7 MM HG (ref 36–44)
PCO2 BLDA: 25.5 MM HG (ref 36–44)
PCO2 BLDA: 26.1 MM HG (ref 36–44)
PCO2 BLDA: 26.6 MM HG (ref 36–44)
PCO2 BLDA: 26.9 MM HG (ref 36–44)
PCO2 BLDA: 28.5 MM HG (ref 36–44)
PCO2 BLDA: 28.8 MM HG (ref 36–44)
PCO2 BLDA: 29.1 MM HG (ref 36–44)
PCO2 BLDA: 29.4 MM HG (ref 36–44)
PCO2 BLDA: 30 MM HG (ref 36–44)
PCO2 BLDA: 30.2 MM HG (ref 36–44)
PCO2 BLDA: 30.8 MM HG (ref 36–44)
PCO2 BLDA: 31.4 MM HG (ref 36–44)
PCO2 BLDA: 31.5 MM HG (ref 36–44)
PCO2 BLDA: 32.6 MM HG (ref 36–44)
PCO2 BLDA: 33.2 MM HG (ref 36–44)
PCO2 BLDA: 34 MM HG (ref 36–44)
PCO2 BLDA: 35.2 MM HG (ref 36–44)
PCO2 BLDA: 37.5 MM HG (ref 36–44)
PCO2 BLDA: 38.5 MM HG (ref 36–44)
PCO2 BLDA: 39 MM HG
PCO2 BLDA: 45.9 MM HG (ref 36–44)
PCO2 BLDA: 46.5 MM HG (ref 36–44)
PCO2 BLDA: 50.1 MM HG (ref 36–44)
PCO2 BLDV: 40.7 MM HG (ref 42–50)
PCO2 TEMP ADJ BLDA: 27.1 MM HG (ref 36–44)
PCO2 TEMP ADJ BLDA: 30 MM HG (ref 36–44)
PCO2 TEMP ADJ BLDA: 35.5 MM HG (ref 36–44)
PCO2 TEMP ADJ BLDA: 50.1 MM HG (ref 36–44)
PEEP RESPIRATORY: 8 CM[H2O]
PH BLD: 6.96 [PH] (ref 7.3–7.4)
PH BLD: 7.15 [PH] (ref 7.35–7.45)
PH BLD: 7.22 [PH] (ref 7.3–7.4)
PH BLD: 7.25 [PH] (ref 7.35–7.45)
PH BLD: 7.3 [PH]
PH BLD: 7.3 [PH] (ref 7.35–7.45)
PH BLD: 7.42 [PH] (ref 7.35–7.45)
PH BLD: 7.43 [PH] (ref 7.35–7.45)
PH BLD: 7.44 [PH] (ref 7.35–7.45)
PH BLDA: 7.25 [PH] (ref 7.35–7.45)
PH BLDA: 7.27 [PH] (ref 7.35–7.45)
PH BLDA: 7.39 [PH] (ref 7.35–7.45)
PH BLDA: 7.39 [PH] (ref 7.35–7.45)
PH BLDA: 7.4 [PH] (ref 7.35–7.45)
PH BLDA: 7.4 [PH] (ref 7.35–7.45)
PH BLDA: 7.42 [PH] (ref 7.35–7.45)
PH BLDA: 7.43 [PH] (ref 7.35–7.45)
PH BLDA: 7.44 [PH] (ref 7.35–7.45)
PH BLDA: 7.44 [PH] (ref 7.35–7.45)
PH BLDA: 7.45 [PH] (ref 7.35–7.45)
PH BLDA: 7.45 [PH] (ref 7.35–7.45)
PH BLDA: 7.46 [PH] (ref 7.35–7.45)
PH BLDA: 7.47 [PH] (ref 7.35–7.45)
PH BLDA: 7.49 [PH] (ref 7.35–7.45)
PH BLDA: 7.5 [PH] (ref 7.35–7.45)
PH BLDA: 7.5 [PH] (ref 7.35–7.45)
PH BLDA: 7.51 [PH] (ref 7.35–7.45)
PH BLDA: 7.51 [PH] (ref 7.35–7.45)
PH BLDA: 7.53 [PH] (ref 7.35–7.45)
PH BLDA: 7.57 [PH] (ref 7.35–7.45)
PH BLDA: 7.57 [PH] (ref 7.35–7.45)
PH BLDV: 7.45 [PH] (ref 7.3–7.4)
PH BODY FLUID: 7.5
PH UR STRIP.AUTO: 5 [PH]
PH UR STRIP.AUTO: 6 [PH]
PH UR STRIP.AUTO: 6.5 [PH]
PHOSPHATE SERPL-MCNC: 1.8 MG/DL (ref 2.3–4.1)
PHOSPHATE SERPL-MCNC: 2.2 MG/DL (ref 2.3–4.1)
PHOSPHATE SERPL-MCNC: 2.3 MG/DL (ref 2.3–4.1)
PHOSPHATE SERPL-MCNC: 2.3 MG/DL (ref 2.3–4.1)
PHOSPHATE SERPL-MCNC: 2.5 MG/DL (ref 2.3–4.1)
PHOSPHATE SERPL-MCNC: 2.7 MG/DL (ref 2.3–4.1)
PHOSPHATE SERPL-MCNC: 2.8 MG/DL (ref 2.3–4.1)
PHOSPHATE SERPL-MCNC: 2.8 MG/DL (ref 2.3–4.1)
PHOSPHATE SERPL-MCNC: 2.9 MG/DL (ref 2.3–4.1)
PHOSPHATE SERPL-MCNC: 2.9 MG/DL (ref 2.3–4.1)
PHOSPHATE SERPL-MCNC: 3.2 MG/DL (ref 2.3–4.1)
PHOSPHATE SERPL-MCNC: 3.3 MG/DL (ref 2.3–4.1)
PHOSPHATE SERPL-MCNC: 3.5 MG/DL (ref 2.3–4.1)
PHOSPHATE SERPL-MCNC: 3.5 MG/DL (ref 2.3–4.1)
PHOSPHATE SERPL-MCNC: 3.7 MG/DL (ref 2.3–4.1)
PHOSPHATE SERPL-MCNC: 3.8 MG/DL (ref 2.3–4.1)
PHOSPHATE SERPL-MCNC: 3.9 MG/DL (ref 2.3–4.1)
PHOSPHATE SERPL-MCNC: 4.1 MG/DL (ref 2.3–4.1)
PHOSPHATE SERPL-MCNC: 4.2 MG/DL (ref 2.3–4.1)
PHOSPHATE SERPL-MCNC: 5.3 MG/DL (ref 2.3–4.1)
PHOSPHATE SERPL-MCNC: 5.7 MG/DL (ref 2.3–4.1)
PHOSPHATE SERPL-MCNC: 5.7 MG/DL (ref 2.3–4.1)
PHOSPHATE SERPL-MCNC: 5.8 MG/DL (ref 2.3–4.1)
PHOSPHATE SERPL-MCNC: 6.1 MG/DL (ref 2.3–4.1)
PHOSPHATE SERPL-MCNC: 6.1 MG/DL (ref 2.3–4.1)
PHOSPHATE SERPL-MCNC: 6.4 MG/DL (ref 2.3–4.1)
PHOSPHATE SERPL-MCNC: 6.9 MG/DL (ref 2.3–4.1)
PHOSPHATE SERPL-MCNC: 7 MG/DL (ref 2.3–4.1)
PHOSPHATE SERPL-MCNC: 9.8 MG/DL (ref 2.3–4.1)
PLATELET # BLD AUTO: 146 THOUSANDS/UL (ref 149–390)
PLATELET # BLD AUTO: 157 THOUSANDS/UL (ref 149–390)
PLATELET # BLD AUTO: 158 THOUSANDS/UL (ref 149–390)
PLATELET # BLD AUTO: 159 THOUSANDS/UL (ref 149–390)
PLATELET # BLD AUTO: 159 THOUSANDS/UL (ref 149–390)
PLATELET # BLD AUTO: 168 THOUSANDS/UL (ref 149–390)
PLATELET # BLD AUTO: 170 THOUSANDS/UL (ref 149–390)
PLATELET # BLD AUTO: 172 THOUSANDS/UL (ref 149–390)
PLATELET # BLD AUTO: 176 THOUSANDS/UL (ref 149–390)
PLATELET # BLD AUTO: 180 THOUSANDS/UL (ref 149–390)
PLATELET # BLD AUTO: 182 THOUSANDS/UL (ref 149–390)
PLATELET # BLD AUTO: 183 THOUSANDS/UL (ref 149–390)
PLATELET # BLD AUTO: 189 THOUSANDS/UL (ref 149–390)
PLATELET # BLD AUTO: 193 THOUSANDS/UL (ref 149–390)
PLATELET # BLD AUTO: 194 THOUSANDS/UL (ref 149–390)
PLATELET # BLD AUTO: 195 THOUSANDS/UL (ref 149–390)
PLATELET # BLD AUTO: 197 THOUSANDS/UL (ref 149–390)
PLATELET # BLD AUTO: 198 THOUSANDS/UL (ref 149–390)
PLATELET # BLD AUTO: 198 THOUSANDS/UL (ref 149–390)
PLATELET # BLD AUTO: 200 THOUSANDS/UL (ref 149–390)
PLATELET # BLD AUTO: 210 THOUSANDS/UL (ref 149–390)
PLATELET # BLD AUTO: 220 THOUSANDS/UL (ref 149–390)
PLATELET # BLD AUTO: 221 THOUSANDS/UL (ref 149–390)
PLATELET # BLD AUTO: 232 THOUSANDS/UL (ref 149–390)
PLATELET # BLD AUTO: 276 THOUSANDS/UL (ref 149–390)
PLATELET # BLD AUTO: 278 THOUSANDS/UL (ref 149–390)
PLATELET BLD QL SMEAR: ADEQUATE
PLATELET BLD QL SMEAR: ADEQUATE
PMV BLD AUTO: 11.3 FL (ref 8.9–12.7)
PMV BLD AUTO: 11.5 FL (ref 8.9–12.7)
PMV BLD AUTO: 11.9 FL (ref 8.9–12.7)
PMV BLD AUTO: 12.1 FL (ref 8.9–12.7)
PMV BLD AUTO: 12.4 FL (ref 8.9–12.7)
PMV BLD AUTO: 12.5 FL (ref 8.9–12.7)
PMV BLD AUTO: 12.5 FL (ref 8.9–12.7)
PMV BLD AUTO: 12.6 FL (ref 8.9–12.7)
PMV BLD AUTO: 12.7 FL (ref 8.9–12.7)
PMV BLD AUTO: 12.9 FL (ref 8.9–12.7)
PMV BLD AUTO: 12.9 FL (ref 8.9–12.7)
PMV BLD AUTO: 13 FL (ref 8.9–12.7)
PMV BLD AUTO: 13 FL (ref 8.9–12.7)
PMV BLD AUTO: 13.1 FL (ref 8.9–12.7)
PMV BLD AUTO: 13.1 FL (ref 8.9–12.7)
PMV BLD AUTO: 13.3 FL (ref 8.9–12.7)
PMV BLD AUTO: 13.3 FL (ref 8.9–12.7)
PMV BLD AUTO: 13.4 FL (ref 8.9–12.7)
PMV BLD AUTO: 13.5 FL (ref 8.9–12.7)
PMV BLD AUTO: 13.6 FL (ref 8.9–12.7)
PMV BLD AUTO: 13.8 FL (ref 8.9–12.7)
PMV BLD AUTO: 14.4 FL (ref 8.9–12.7)
PO2 BLD: 113.2 MM HG (ref 75–129)
PO2 BLD: 129.4 MM HG (ref 75–129)
PO2 BLD: 141.8 MM HG (ref 75–129)
PO2 BLD: 332 MM HG (ref 75–129)
PO2 BLD: 46 MM HG (ref 35–45)
PO2 BLD: 60 MM HG (ref 35–45)
PO2 BLD: 60 MM HG (ref 75–129)
PO2 BLD: 86.2 MM HG (ref 75–129)
PO2 BLDA: 103.5 MM HG (ref 75–129)
PO2 BLDA: 113.2 MM HG (ref 75–129)
PO2 BLDA: 115.9 MM HG (ref 75–129)
PO2 BLDA: 121.2 MM HG (ref 75–129)
PO2 BLDA: 123.7 MM HG (ref 75–129)
PO2 BLDA: 124.5 MM HG (ref 75–129)
PO2 BLDA: 135.5 MM HG (ref 75–129)
PO2 BLDA: 136.5 MM HG (ref 75–129)
PO2 BLDA: 138 MM HG (ref 75–129)
PO2 BLDA: 138.5 MM HG (ref 75–129)
PO2 BLDA: 139.8 MM HG (ref 75–129)
PO2 BLDA: 140.3 MM HG (ref 75–129)
PO2 BLDA: 141.9 MM HG (ref 75–129)
PO2 BLDA: 143.9 MM HG (ref 75–129)
PO2 BLDA: 146.8 MM HG (ref 75–129)
PO2 BLDA: 158 MM HG (ref 75–129)
PO2 BLDA: 185.6 MM HG (ref 75–129)
PO2 BLDA: 287.5 MM HG (ref 75–129)
PO2 BLDA: 69.2 MM HG (ref 75–129)
PO2 BLDA: 72.2 MM HG (ref 75–129)
PO2 BLDA: 74.6 MM HG (ref 75–129)
PO2 BLDA: 76.6 MM HG (ref 75–129)
PO2 BLDA: 84.1 MM HG (ref 75–129)
PO2 BLDA: 85.1 MM HG (ref 75–129)
PO2 BLDV: 36 MM HG (ref 35–45)
POIKILOCYTOSIS BLD QL SMEAR: PRESENT
POLYCHROMASIA BLD QL SMEAR: PRESENT
POTASSIUM BLD-SCNC: 3.3 MMOL/L (ref 3.5–5.3)
POTASSIUM BLD-SCNC: 4.2 MMOL/L (ref 3.5–5.3)
POTASSIUM BLD-SCNC: 4.4 MMOL/L (ref 3.5–5.3)
POTASSIUM BLD-SCNC: 7.8 MMOL/L (ref 3.5–5.3)
POTASSIUM SERPL-SCNC: 2.8 MMOL/L (ref 3.5–5.3)
POTASSIUM SERPL-SCNC: 2.9 MMOL/L (ref 3.5–5.3)
POTASSIUM SERPL-SCNC: 2.9 MMOL/L (ref 3.5–5.3)
POTASSIUM SERPL-SCNC: 3.2 MMOL/L (ref 3.5–5.3)
POTASSIUM SERPL-SCNC: 3.3 MMOL/L (ref 3.5–5.3)
POTASSIUM SERPL-SCNC: 3.4 MMOL/L (ref 3.5–5.3)
POTASSIUM SERPL-SCNC: 3.5 MMOL/L (ref 3.5–5.3)
POTASSIUM SERPL-SCNC: 3.6 MMOL/L (ref 3.5–5.3)
POTASSIUM SERPL-SCNC: 3.7 MMOL/L (ref 3.5–5.3)
POTASSIUM SERPL-SCNC: 3.8 MMOL/L (ref 3.5–5.3)
POTASSIUM SERPL-SCNC: 3.9 MMOL/L (ref 3.5–5.3)
POTASSIUM SERPL-SCNC: 4 MMOL/L (ref 3.5–5.3)
POTASSIUM SERPL-SCNC: 4.2 MMOL/L (ref 3.5–5.3)
POTASSIUM SERPL-SCNC: 4.3 MMOL/L (ref 3.5–5.3)
POTASSIUM SERPL-SCNC: 4.3 MMOL/L (ref 3.5–5.3)
POTASSIUM SERPL-SCNC: 4.4 MMOL/L (ref 3.5–5.3)
POTASSIUM SERPL-SCNC: 5.2 MMOL/L (ref 3.5–5.3)
POTASSIUM SERPL-SCNC: 5.4 MMOL/L (ref 3.5–5.3)
PR INTERVAL: 0 MS
PR INTERVAL: 0 MS
PR INTERVAL: 108 MS
PR INTERVAL: 108 MS
PR INTERVAL: 117 MS
PR INTERVAL: 125 MS
PR INTERVAL: 125 MS
PR INTERVAL: 129 MS
PR INTERVAL: 129 MS
PR INTERVAL: 138 MS
PR INTERVAL: 142 MS
PR INTERVAL: 703 MS
PRESSURE CONTROL: 18
PROCALCITONIN SERPL-MCNC: 0.07 NG/ML
PROCALCITONIN SERPL-MCNC: 0.1 NG/ML
PROT FLD-MCNC: <3 G/DL
PROT SERPL-MCNC: 5.2 G/DL (ref 6.4–8.4)
PROT SERPL-MCNC: 5.3 G/DL (ref 6.4–8.4)
PROT SERPL-MCNC: 5.3 G/DL (ref 6.4–8.4)
PROT SERPL-MCNC: 5.4 G/DL (ref 6.4–8.4)
PROT SERPL-MCNC: 5.5 G/DL (ref 6.4–8.4)
PROT SERPL-MCNC: 5.8 G/DL (ref 6.4–8.4)
PROT SERPL-MCNC: 5.8 G/DL (ref 6.4–8.4)
PROT SERPL-MCNC: 5.9 G/DL (ref 6.4–8.4)
PROT SERPL-MCNC: 6 G/DL (ref 6.4–8.4)
PROT SERPL-MCNC: 6.1 G/DL (ref 6.4–8.4)
PROT SERPL-MCNC: 6.8 G/DL (ref 6.4–8.4)
PROT SERPL-MCNC: 7 G/DL (ref 6.4–8.4)
PROT UR STRIP-MCNC: ABNORMAL MG/DL
PROT UR STRIP-MCNC: ABNORMAL MG/DL
PROT UR STRIP-MCNC: NEGATIVE MG/DL
PROTHROMBIN TIME: 15.4 SECONDS (ref 12.3–15)
PROTHROMBIN TIME: 19.5 SECONDS (ref 12.3–15)
PROTHROMBIN TIME: 19.8 SECONDS (ref 12.3–15)
QRS AXIS: -2 DEGREES
QRS AXIS: 113 DEGREES
QRS AXIS: 32 DEGREES
QRS AXIS: 50 DEGREES
QRS AXIS: 64 DEGREES
QRS AXIS: 65 DEGREES
QRS AXIS: 69 DEGREES
QRS AXIS: 69 DEGREES
QRS AXIS: 72 DEGREES
QRS AXIS: 87 DEGREES
QRS AXIS: 89 DEGREES
QRS AXIS: 89 DEGREES
QRSD INTERVAL: 75 MS
QRSD INTERVAL: 75 MS
QRSD INTERVAL: 79 MS
QRSD INTERVAL: 83 MS
QRSD INTERVAL: 92 MS
QT INTERVAL: 279 MS
QT INTERVAL: 283 MS
QT INTERVAL: 304 MS
QT INTERVAL: 317 MS
QT INTERVAL: 333 MS
QT INTERVAL: 346 MS
QT INTERVAL: 367 MS
QT INTERVAL: 379 MS
QT INTERVAL: 392 MS
QT INTERVAL: 408 MS
QT INTERVAL: 463 MS
QT INTERVAL: 513 MS
QTC INTERVAL: 388 MS
QTC INTERVAL: 406 MS
QTC INTERVAL: 406 MS
QTC INTERVAL: 415 MS
QTC INTERVAL: 425 MS
QTC INTERVAL: 464 MS
QTC INTERVAL: 473 MS
QTC INTERVAL: 488 MS
QTC INTERVAL: 497 MS
QTC INTERVAL: 517 MS
QTC INTERVAL: 520 MS
QTC INTERVAL: 554 MS
RA PRESSURE ESTIMATED: 5 MMHG
RA PRESSURE ESTIMATED: 8 MMHG
RBC # BLD AUTO: 2.22 MILLION/UL (ref 3.81–5.12)
RBC # BLD AUTO: 2.23 MILLION/UL (ref 3.81–5.12)
RBC # BLD AUTO: 2.28 MILLION/UL (ref 3.81–5.12)
RBC # BLD AUTO: 2.37 MILLION/UL (ref 3.81–5.12)
RBC # BLD AUTO: 2.49 MILLION/UL (ref 3.81–5.12)
RBC # BLD AUTO: 2.5 MILLION/UL (ref 3.81–5.12)
RBC # BLD AUTO: 2.51 MILLION/UL (ref 3.81–5.12)
RBC # BLD AUTO: 2.54 MILLION/UL (ref 3.81–5.12)
RBC # BLD AUTO: 2.55 MILLION/UL (ref 3.81–5.12)
RBC # BLD AUTO: 2.63 MILLION/UL (ref 3.81–5.12)
RBC # BLD AUTO: 2.7 MILLION/UL (ref 3.81–5.12)
RBC # BLD AUTO: 2.72 MILLION/UL (ref 3.81–5.12)
RBC # BLD AUTO: 2.76 MILLION/UL (ref 3.81–5.12)
RBC # BLD AUTO: 2.79 MILLION/UL (ref 3.81–5.12)
RBC # BLD AUTO: 2.82 MILLION/UL (ref 3.81–5.12)
RBC # BLD AUTO: 2.84 MILLION/UL (ref 3.81–5.12)
RBC # BLD AUTO: 3 MILLION/UL (ref 3.81–5.12)
RBC # BLD AUTO: 3.01 MILLION/UL (ref 3.81–5.12)
RBC # BLD AUTO: 3.04 MILLION/UL (ref 3.81–5.12)
RBC # BLD AUTO: 3.07 MILLION/UL (ref 3.81–5.12)
RBC # BLD AUTO: 3.12 MILLION/UL (ref 3.81–5.12)
RBC # BLD AUTO: 3.12 MILLION/UL (ref 3.81–5.12)
RBC # BLD AUTO: 3.28 MILLION/UL (ref 3.81–5.12)
RBC # BLD AUTO: 3.3 MILLION/UL (ref 3.81–5.12)
RBC # BLD AUTO: 3.44 MILLION/UL (ref 3.81–5.12)
RBC # BLD AUTO: 3.57 MILLION/UL (ref 3.81–5.12)
RBC #/AREA URNS AUTO: ABNORMAL /HPF
RBC MORPH BLD: PRESENT
RBC MORPH BLD: PRESENT
RH BLD: POSITIVE
RIGHT ATRIAL 2D VOLUME: 39 ML
RIGHT ATRIUM AREA SYSTOLE A4C: 15.7 CM2
RIGHT VENTRICLE ID DIMENSION: 3 CM
RIGHT VENTRICLE ID DIMENSION: 3.7 CM
RSV RNA RESP QL NAA+PROBE: NEGATIVE
RV PSP: 46 MMHG
S PNEUM AG UR QL: NEGATIVE
SAO2 % BLD FROM PO2: 100 % (ref 60–85)
SAO2 % BLD FROM PO2: 82 % (ref 60–85)
SAO2 % BLD FROM PO2: 84 % (ref 60–85)
SARS-COV-2 RNA RESP QL NAA+PROBE: NEGATIVE
SITE: NORMAL
SL CV LEFT ATRIUM LENGTH A2C: 4.9 CM
SL CV LV EF: 50
SL CV LV EF: 55
SL CV PED ECHO LEFT VENTRICLE DIASTOLIC VOLUME (MOD BIPLANE) 2D: 87 ML
SL CV PED ECHO LEFT VENTRICLE SYSTOLIC VOLUME (MOD BIPLANE) 2D: 52 ML
SODIUM BLD-SCNC: 142 MMOL/L (ref 136–145)
SODIUM BLD-SCNC: 142 MMOL/L (ref 136–145)
SODIUM BLD-SCNC: 147 MMOL/L (ref 136–145)
SODIUM BLD-SCNC: 148 MMOL/L (ref 136–145)
SODIUM SERPL-SCNC: 139 MMOL/L (ref 135–147)
SODIUM SERPL-SCNC: 140 MMOL/L (ref 135–147)
SODIUM SERPL-SCNC: 141 MMOL/L (ref 135–147)
SODIUM SERPL-SCNC: 143 MMOL/L (ref 135–147)
SODIUM SERPL-SCNC: 144 MMOL/L (ref 135–147)
SODIUM SERPL-SCNC: 145 MMOL/L (ref 135–147)
SODIUM SERPL-SCNC: 145 MMOL/L (ref 135–147)
SODIUM SERPL-SCNC: 146 MMOL/L (ref 135–147)
SODIUM SERPL-SCNC: 146 MMOL/L (ref 135–147)
SODIUM SERPL-SCNC: 147 MMOL/L (ref 135–147)
SODIUM SERPL-SCNC: 147 MMOL/L (ref 135–147)
SODIUM SERPL-SCNC: 148 MMOL/L (ref 135–147)
SODIUM SERPL-SCNC: 149 MMOL/L (ref 135–147)
SODIUM SERPL-SCNC: 150 MMOL/L (ref 135–147)
SODIUM SERPL-SCNC: 150 MMOL/L (ref 135–147)
SODIUM SERPL-SCNC: 151 MMOL/L (ref 135–147)
SODIUM SERPL-SCNC: 152 MMOL/L (ref 135–147)
SODIUM SERPL-SCNC: 153 MMOL/L (ref 135–147)
SODIUM SERPL-SCNC: 153 MMOL/L (ref 135–147)
SODIUM SERPL-SCNC: 154 MMOL/L (ref 135–147)
SODIUM SERPL-SCNC: 155 MMOL/L (ref 135–147)
SODIUM SERPL-SCNC: 156 MMOL/L (ref 135–147)
SODIUM SERPL-SCNC: 157 MMOL/L (ref 135–147)
SODIUM SERPL-SCNC: 158 MMOL/L (ref 135–147)
SODIUM SERPL-SCNC: 158 MMOL/L (ref 135–147)
SODIUM SERPL-SCNC: 160 MMOL/L (ref 135–147)
SODIUM SERPL-SCNC: 160 MMOL/L (ref 135–147)
SODIUM SERPL-SCNC: 162 MMOL/L (ref 135–147)
SP GR UR STRIP.AUTO: 1.01 (ref 1–1.03)
SP GR UR STRIP.AUTO: 1.03 (ref 1–1.03)
SP GR UR STRIP.AUTO: >=1.05 (ref 1–1.03)
SPECIMEN EXPIRATION DATE: NORMAL
SPECIMEN SOURCE: ABNORMAL
T WAVE AXIS: -14 DEGREES
T WAVE AXIS: -39 DEGREES
T WAVE AXIS: 151 DEGREES
T WAVE AXIS: 164 DEGREES
T WAVE AXIS: 175 DEGREES
T WAVE AXIS: 22 DEGREES
T WAVE AXIS: 229 DEGREES
T WAVE AXIS: 43 DEGREES
T WAVE AXIS: 63 DEGREES
T WAVE AXIS: 64 DEGREES
T WAVE AXIS: 70 DEGREES
T WAVE AXIS: 96 DEGREES
TOTAL CELLS COUNTED SPEC: 100
TOTAL NUCLEATED CELL COUNT: 81 /UL
TOTAL PROTEIN FLUID: 2.1 G/DL
TR MAX PG: 30 MMHG
TR MAX PG: 38 MMHG
TR PEAK VELOCITY: 2.7 M/S
TR PEAK VELOCITY: 3.1 M/S
TRANS CELLS #/AREA URNS HPF: PRESENT /[HPF]
TRICUSPID ANNULAR PLANE SYSTOLIC EXCURSION: 1.9 CM
TRICUSPID ANNULAR PLANE SYSTOLIC EXCURSION: 2.2 CM
TRICUSPID VALVE PEAK REGURGITATION VELOCITY: 2.73 M/S
TRICUSPID VALVE PEAK REGURGITATION VELOCITY: 3.07 M/S
TRIGL FLD-MCNC: 34 MG/DL
TRIGL SERPL-MCNC: 110 MG/DL (ref ?–150)
UNIT DISPENSE STATUS: NORMAL
UNIT PRODUCT CODE: NORMAL
UNIT PRODUCT VOLUME: 350 ML
UNIT RH: NORMAL
URATE CRY URNS QL MICRO: ABNORMAL /HPF
UROBILINOGEN UR STRIP-ACNC: 2 MG/DL
UROBILINOGEN UR STRIP-ACNC: <2 MG/DL
UROBILINOGEN UR STRIP-ACNC: <2 MG/DL
VANCOMYCIN SERPL-MCNC: 21.3 UG/ML (ref 10–20)
VANCOMYCIN SERPL-MCNC: 24 UG/ML (ref 10–20)
VANCOMYCIN SERPL-MCNC: 28.8 UG/ML (ref 10–20)
VANCOMYCIN SERPL-MCNC: 9 UG/ML (ref 10–20)
VANCOMYCIN SERPL-MCNC: 9.2 UG/ML (ref 10–20)
VANCOMYCIN SERPL-MCNC: 9.7 UG/ML (ref 10–20)
VANCOMYCIN SERPL-MCNC: 9.9 UG/ML (ref 10–20)
VARIANT LYMPHS # BLD AUTO: 2 %
VENT AC: 12
VENT AC: 20
VENT AC: 26
VENT BIPAP FIO2: 40 %
VENT- AC: AC
VENTRICULAR RATE: 107 BPM
VENTRICULAR RATE: 108 BPM
VENTRICULAR RATE: 112 BPM
VENTRICULAR RATE: 113 BPM
VENTRICULAR RATE: 113 BPM
VENTRICULAR RATE: 119 BPM
VENTRICULAR RATE: 133 BPM
VENTRICULAR RATE: 69 BPM
VENTRICULAR RATE: 70 BPM
VENTRICULAR RATE: 84 BPM
VENTRICULAR RATE: 86 BPM
VENTRICULAR RATE: 89 BPM
VT SETTING VENT: 400 ML
WBC # BLD AUTO: 10.01 THOUSAND/UL (ref 4.31–10.16)
WBC # BLD AUTO: 12.51 THOUSAND/UL (ref 4.31–10.16)
WBC # BLD AUTO: 13.63 THOUSAND/UL (ref 4.31–10.16)
WBC # BLD AUTO: 14.56 THOUSAND/UL (ref 4.31–10.16)
WBC # BLD AUTO: 14.85 THOUSAND/UL (ref 4.31–10.16)
WBC # BLD AUTO: 15.41 THOUSAND/UL (ref 4.31–10.16)
WBC # BLD AUTO: 16.02 THOUSAND/UL (ref 4.31–10.16)
WBC # BLD AUTO: 18.18 THOUSAND/UL (ref 4.31–10.16)
WBC # BLD AUTO: 18.31 THOUSAND/UL (ref 4.31–10.16)
WBC # BLD AUTO: 18.33 THOUSAND/UL (ref 4.31–10.16)
WBC # BLD AUTO: 18.56 THOUSAND/UL (ref 4.31–10.16)
WBC # BLD AUTO: 18.59 THOUSAND/UL (ref 4.31–10.16)
WBC # BLD AUTO: 18.66 THOUSAND/UL (ref 4.31–10.16)
WBC # BLD AUTO: 19.28 THOUSAND/UL (ref 4.31–10.16)
WBC # BLD AUTO: 19.51 THOUSAND/UL (ref 4.31–10.16)
WBC # BLD AUTO: 19.75 THOUSAND/UL (ref 4.31–10.16)
WBC # BLD AUTO: 20.64 THOUSAND/UL (ref 4.31–10.16)
WBC # BLD AUTO: 23.27 THOUSAND/UL (ref 4.31–10.16)
WBC # BLD AUTO: 23.49 THOUSAND/UL (ref 4.31–10.16)
WBC # BLD AUTO: 24.57 THOUSAND/UL (ref 4.31–10.16)
WBC # BLD AUTO: 24.75 THOUSAND/UL (ref 4.31–10.16)
WBC # BLD AUTO: 26.55 THOUSAND/UL (ref 4.31–10.16)
WBC # BLD AUTO: 29.14 THOUSAND/UL (ref 4.31–10.16)
WBC # BLD AUTO: 30.02 THOUSAND/UL (ref 4.31–10.16)
WBC # BLD AUTO: 33.78 THOUSAND/UL (ref 4.31–10.16)
WBC # BLD AUTO: 42.07 THOUSAND/UL (ref 4.31–10.16)
WBC #/AREA URNS AUTO: ABNORMAL /HPF

## 2024-01-01 PROCEDURE — 93010 ELECTROCARDIOGRAM REPORT: CPT | Performed by: INTERNAL MEDICINE

## 2024-01-01 PROCEDURE — 93970 EXTREMITY STUDY: CPT

## 2024-01-01 PROCEDURE — 94669 MECHANICAL CHEST WALL OSCILL: CPT

## 2024-01-01 PROCEDURE — 94664 DEMO&/EVAL PT USE INHALER: CPT | Performed by: SOCIAL WORKER

## 2024-01-01 PROCEDURE — 93325 DOPPLER ECHO COLOR FLOW MAPG: CPT | Performed by: INTERNAL MEDICINE

## 2024-01-01 PROCEDURE — 95715 VEEG EA 12-26HR INTMT MNTR: CPT

## 2024-01-01 PROCEDURE — 85007 BL SMEAR W/DIFF WBC COUNT: CPT | Performed by: INTERNAL MEDICINE

## 2024-01-01 PROCEDURE — 82330 ASSAY OF CALCIUM: CPT

## 2024-01-01 PROCEDURE — 82330 ASSAY OF CALCIUM: CPT | Performed by: STUDENT IN AN ORGANIZED HEALTH CARE EDUCATION/TRAINING PROGRAM

## 2024-01-01 PROCEDURE — 94640 AIRWAY INHALATION TREATMENT: CPT

## 2024-01-01 PROCEDURE — 94760 N-INVAS EAR/PLS OXIMETRY 1: CPT

## 2024-01-01 PROCEDURE — 80053 COMPREHEN METABOLIC PANEL: CPT | Performed by: STUDENT IN AN ORGANIZED HEALTH CARE EDUCATION/TRAINING PROGRAM

## 2024-01-01 PROCEDURE — 82805 BLOOD GASES W/O2 SATURATION: CPT | Performed by: PSYCHIATRY & NEUROLOGY

## 2024-01-01 PROCEDURE — 84100 ASSAY OF PHOSPHORUS: CPT

## 2024-01-01 PROCEDURE — 83930 ASSAY OF BLOOD OSMOLALITY: CPT

## 2024-01-01 PROCEDURE — 82805 BLOOD GASES W/O2 SATURATION: CPT

## 2024-01-01 PROCEDURE — 85730 THROMBOPLASTIN TIME PARTIAL: CPT | Performed by: STUDENT IN AN ORGANIZED HEALTH CARE EDUCATION/TRAINING PROGRAM

## 2024-01-01 PROCEDURE — 94664 DEMO&/EVAL PT USE INHALER: CPT

## 2024-01-01 PROCEDURE — 36620 INSERTION CATHETER ARTERY: CPT | Performed by: INTERNAL MEDICINE

## 2024-01-01 PROCEDURE — 80048 BASIC METABOLIC PNL TOTAL CA: CPT

## 2024-01-01 PROCEDURE — 99291 CRITICAL CARE FIRST HOUR: CPT | Performed by: INTERNAL MEDICINE

## 2024-01-01 PROCEDURE — 85730 THROMBOPLASTIN TIME PARTIAL: CPT | Performed by: INTERNAL MEDICINE

## 2024-01-01 PROCEDURE — 93010 ELECTROCARDIOGRAM REPORT: CPT | Performed by: STUDENT IN AN ORGANIZED HEALTH CARE EDUCATION/TRAINING PROGRAM

## 2024-01-01 PROCEDURE — 99291 CRITICAL CARE FIRST HOUR: CPT | Performed by: EMERGENCY MEDICINE

## 2024-01-01 PROCEDURE — 94669 MECHANICAL CHEST WALL OSCILL: CPT | Performed by: SOCIAL WORKER

## 2024-01-01 PROCEDURE — 99024 POSTOP FOLLOW-UP VISIT: CPT | Performed by: RADIOLOGY

## 2024-01-01 PROCEDURE — 94003 VENT MGMT INPAT SUBQ DAY: CPT

## 2024-01-01 PROCEDURE — 85730 THROMBOPLASTIN TIME PARTIAL: CPT | Performed by: EMERGENCY MEDICINE

## 2024-01-01 PROCEDURE — 83735 ASSAY OF MAGNESIUM: CPT

## 2024-01-01 PROCEDURE — 85025 COMPLETE CBC W/AUTO DIFF WBC: CPT

## 2024-01-01 PROCEDURE — 81001 URINALYSIS AUTO W/SCOPE: CPT

## 2024-01-01 PROCEDURE — 80143 DRUG ASSAY ACETAMINOPHEN: CPT

## 2024-01-01 PROCEDURE — 93308 TTE F-UP OR LMTD: CPT

## 2024-01-01 PROCEDURE — 71045 X-RAY EXAM CHEST 1 VIEW: CPT

## 2024-01-01 PROCEDURE — B41FYZZ FLUOROSCOPY OF RIGHT LOWER EXTREMITY ARTERIES USING OTHER CONTRAST: ICD-10-PCS | Performed by: RADIOLOGY

## 2024-01-01 PROCEDURE — 82948 REAGENT STRIP/BLOOD GLUCOSE: CPT

## 2024-01-01 PROCEDURE — 82805 BLOOD GASES W/O2 SATURATION: CPT | Performed by: PHYSICIAN ASSISTANT

## 2024-01-01 PROCEDURE — NC001 PR NO CHARGE: Performed by: PSYCHIATRY & NEUROLOGY

## 2024-01-01 PROCEDURE — 85610 PROTHROMBIN TIME: CPT

## 2024-01-01 PROCEDURE — 85025 COMPLETE CBC W/AUTO DIFF WBC: CPT | Performed by: STUDENT IN AN ORGANIZED HEALTH CARE EDUCATION/TRAINING PROGRAM

## 2024-01-01 PROCEDURE — 80048 BASIC METABOLIC PNL TOTAL CA: CPT | Performed by: STUDENT IN AN ORGANIZED HEALTH CARE EDUCATION/TRAINING PROGRAM

## 2024-01-01 PROCEDURE — 93306 TTE W/DOPPLER COMPLETE: CPT

## 2024-01-01 PROCEDURE — 80053 COMPREHEN METABOLIC PANEL: CPT

## 2024-01-01 PROCEDURE — 97112 NEUROMUSCULAR REEDUCATION: CPT

## 2024-01-01 PROCEDURE — 70450 CT HEAD/BRAIN W/O DYE: CPT

## 2024-01-01 PROCEDURE — 82140 ASSAY OF AMMONIA: CPT

## 2024-01-01 PROCEDURE — 95720 EEG PHY/QHP EA INCR W/VEEG: CPT | Performed by: PSYCHIATRY & NEUROLOGY

## 2024-01-01 PROCEDURE — 85730 THROMBOPLASTIN TIME PARTIAL: CPT | Performed by: FAMILY MEDICINE

## 2024-01-01 PROCEDURE — 86850 RBC ANTIBODY SCREEN: CPT

## 2024-01-01 PROCEDURE — 80053 COMPREHEN METABOLIC PANEL: CPT | Performed by: INTERNAL MEDICINE

## 2024-01-01 PROCEDURE — 80202 ASSAY OF VANCOMYCIN: CPT | Performed by: EMERGENCY MEDICINE

## 2024-01-01 PROCEDURE — 86850 RBC ANTIBODY SCREEN: CPT | Performed by: EMERGENCY MEDICINE

## 2024-01-01 PROCEDURE — 83735 ASSAY OF MAGNESIUM: CPT | Performed by: INTERNAL MEDICINE

## 2024-01-01 PROCEDURE — 03HY32Z INSERTION OF MONITORING DEVICE INTO UPPER ARTERY, PERCUTANEOUS APPROACH: ICD-10-PCS | Performed by: ANESTHESIOLOGY

## 2024-01-01 PROCEDURE — 93321 DOPPLER ECHO F-UP/LMTD STD: CPT | Performed by: INTERNAL MEDICINE

## 2024-01-01 PROCEDURE — 85730 THROMBOPLASTIN TIME PARTIAL: CPT

## 2024-01-01 PROCEDURE — 05HY33Z INSERTION OF INFUSION DEVICE INTO UPPER VEIN, PERCUTANEOUS APPROACH: ICD-10-PCS | Performed by: INTERNAL MEDICINE

## 2024-01-01 PROCEDURE — 93005 ELECTROCARDIOGRAM TRACING: CPT

## 2024-01-01 PROCEDURE — 99221 1ST HOSP IP/OBS SF/LOW 40: CPT

## 2024-01-01 PROCEDURE — 4A133J1 MONITORING OF ARTERIAL PULSE, PERIPHERAL, PERCUTANEOUS APPROACH: ICD-10-PCS | Performed by: INTERNAL MEDICINE

## 2024-01-01 PROCEDURE — 0BH17EZ INSERTION OF ENDOTRACHEAL AIRWAY INTO TRACHEA, VIA NATURAL OR ARTIFICIAL OPENING: ICD-10-PCS | Performed by: INTERNAL MEDICINE

## 2024-01-01 PROCEDURE — 80202 ASSAY OF VANCOMYCIN: CPT | Performed by: INTERNAL MEDICINE

## 2024-01-01 PROCEDURE — 87040 BLOOD CULTURE FOR BACTERIA: CPT | Performed by: PSYCHIATRY & NEUROLOGY

## 2024-01-01 PROCEDURE — 85027 COMPLETE CBC AUTOMATED: CPT | Performed by: INTERNAL MEDICINE

## 2024-01-01 PROCEDURE — 85014 HEMATOCRIT: CPT

## 2024-01-01 PROCEDURE — 37215 TRANSCATH STENT CCA W/EPS: CPT

## 2024-01-01 PROCEDURE — 93970 EXTREMITY STUDY: CPT | Performed by: SURGERY

## 2024-01-01 PROCEDURE — 95720 EEG PHY/QHP EA INCR W/VEEG: CPT | Performed by: STUDENT IN AN ORGANIZED HEALTH CARE EDUCATION/TRAINING PROGRAM

## 2024-01-01 PROCEDURE — 99233 SBSQ HOSP IP/OBS HIGH 50: CPT | Performed by: STUDENT IN AN ORGANIZED HEALTH CARE EDUCATION/TRAINING PROGRAM

## 2024-01-01 PROCEDURE — 88305 TISSUE EXAM BY PATHOLOGIST: CPT | Performed by: STUDENT IN AN ORGANIZED HEALTH CARE EDUCATION/TRAINING PROGRAM

## 2024-01-01 PROCEDURE — 02HV33Z INSERTION OF INFUSION DEVICE INTO SUPERIOR VENA CAVA, PERCUTANEOUS APPROACH: ICD-10-PCS | Performed by: RADIOLOGY

## 2024-01-01 PROCEDURE — 82805 BLOOD GASES W/O2 SATURATION: CPT | Performed by: STUDENT IN AN ORGANIZED HEALTH CARE EDUCATION/TRAINING PROGRAM

## 2024-01-01 PROCEDURE — NC001 PR NO CHARGE

## 2024-01-01 PROCEDURE — 83690 ASSAY OF LIPASE: CPT | Performed by: PSYCHIATRY & NEUROLOGY

## 2024-01-01 PROCEDURE — 84484 ASSAY OF TROPONIN QUANT: CPT | Performed by: PSYCHIATRY & NEUROLOGY

## 2024-01-01 PROCEDURE — 84132 ASSAY OF SERUM POTASSIUM: CPT

## 2024-01-01 PROCEDURE — 84478 ASSAY OF TRIGLYCERIDES: CPT

## 2024-01-01 PROCEDURE — 99223 1ST HOSP IP/OBS HIGH 75: CPT | Performed by: PSYCHIATRY & NEUROLOGY

## 2024-01-01 PROCEDURE — 99233 SBSQ HOSP IP/OBS HIGH 50: CPT | Performed by: PSYCHIATRY & NEUROLOGY

## 2024-01-01 PROCEDURE — NC001 PR NO CHARGE: Performed by: NURSE PRACTITIONER

## 2024-01-01 PROCEDURE — B316YZZ FLUOROSCOPY OF RIGHT INTERNAL CAROTID ARTERY USING OTHER CONTRAST: ICD-10-PCS | Performed by: RADIOLOGY

## 2024-01-01 PROCEDURE — 0B9B8ZX DRAINAGE OF LEFT LOWER LOBE BRONCHUS, VIA NATURAL OR ARTIFICIAL OPENING ENDOSCOPIC, DIAGNOSTIC: ICD-10-PCS | Performed by: EMERGENCY MEDICINE

## 2024-01-01 PROCEDURE — 36223 PLACE CATH CAROTID/INOM ART: CPT

## 2024-01-01 PROCEDURE — 84484 ASSAY OF TROPONIN QUANT: CPT

## 2024-01-01 PROCEDURE — 76937 US GUIDE VASCULAR ACCESS: CPT | Performed by: RADIOLOGY

## 2024-01-01 PROCEDURE — 83986 ASSAY PH BODY FLUID NOS: CPT

## 2024-01-01 PROCEDURE — 97530 THERAPEUTIC ACTIVITIES: CPT

## 2024-01-01 PROCEDURE — 97535 SELF CARE MNGMENT TRAINING: CPT

## 2024-01-01 PROCEDURE — 93325 DOPPLER ECHO COLOR FLOW MAPG: CPT

## 2024-01-01 PROCEDURE — 05PYX3Z REMOVAL OF INFUSION DEVICE FROM UPPER VEIN, EXTERNAL APPROACH: ICD-10-PCS | Performed by: RADIOLOGY

## 2024-01-01 PROCEDURE — 82803 BLOOD GASES ANY COMBINATION: CPT

## 2024-01-01 PROCEDURE — C1887 CATHETER, GUIDING: HCPCS

## 2024-01-01 PROCEDURE — 94640 AIRWAY INHALATION TREATMENT: CPT | Performed by: SOCIAL WORKER

## 2024-01-01 PROCEDURE — 99223 1ST HOSP IP/OBS HIGH 75: CPT | Performed by: RADIOLOGY

## 2024-01-01 PROCEDURE — C1894 INTRO/SHEATH, NON-LASER: HCPCS

## 2024-01-01 PROCEDURE — 82977 ASSAY OF GGT: CPT

## 2024-01-01 PROCEDURE — 85027 COMPLETE CBC AUTOMATED: CPT

## 2024-01-01 PROCEDURE — 74177 CT ABD & PELVIS W/CONTRAST: CPT

## 2024-01-01 PROCEDURE — 83520 IMMUNOASSAY QUANT NOS NONAB: CPT

## 2024-01-01 PROCEDURE — 84157 ASSAY OF PROTEIN OTHER: CPT

## 2024-01-01 PROCEDURE — 86923 COMPATIBILITY TEST ELECTRIC: CPT

## 2024-01-01 PROCEDURE — 83935 ASSAY OF URINE OSMOLALITY: CPT | Performed by: EMERGENCY MEDICINE

## 2024-01-01 PROCEDURE — 85384 FIBRINOGEN ACTIVITY: CPT | Performed by: INTERNAL MEDICINE

## 2024-01-01 PROCEDURE — 82140 ASSAY OF AMMONIA: CPT | Performed by: PSYCHIATRY & NEUROLOGY

## 2024-01-01 PROCEDURE — 4A133J1 MONITORING OF ARTERIAL PULSE, PERIPHERAL, PERCUTANEOUS APPROACH: ICD-10-PCS | Performed by: ANESTHESIOLOGY

## 2024-01-01 PROCEDURE — 99233 SBSQ HOSP IP/OBS HIGH 50: CPT | Performed by: INTERNAL MEDICINE

## 2024-01-01 PROCEDURE — 99232 SBSQ HOSP IP/OBS MODERATE 35: CPT | Performed by: STUDENT IN AN ORGANIZED HEALTH CARE EDUCATION/TRAINING PROGRAM

## 2024-01-01 PROCEDURE — 87205 SMEAR GRAM STAIN: CPT | Performed by: INTERNAL MEDICINE

## 2024-01-01 PROCEDURE — 74176 CT ABD & PELVIS W/O CONTRAST: CPT

## 2024-01-01 PROCEDURE — 99232 SBSQ HOSP IP/OBS MODERATE 35: CPT | Performed by: FAMILY MEDICINE

## 2024-01-01 PROCEDURE — 87040 BLOOD CULTURE FOR BACTERIA: CPT

## 2024-01-01 PROCEDURE — NC001 PR NO CHARGE: Performed by: EMERGENCY MEDICINE

## 2024-01-01 PROCEDURE — 87070 CULTURE OTHR SPECIMN AEROBIC: CPT | Performed by: INTERNAL MEDICINE

## 2024-01-01 PROCEDURE — 92526 ORAL FUNCTION THERAPY: CPT

## 2024-01-01 PROCEDURE — 82805 BLOOD GASES W/O2 SATURATION: CPT | Performed by: NURSE PRACTITIONER

## 2024-01-01 PROCEDURE — 84295 ASSAY OF SERUM SODIUM: CPT

## 2024-01-01 PROCEDURE — 30233N1 TRANSFUSION OF NONAUTOLOGOUS RED BLOOD CELLS INTO PERIPHERAL VEIN, PERCUTANEOUS APPROACH: ICD-10-PCS | Performed by: PSYCHIATRY & NEUROLOGY

## 2024-01-01 PROCEDURE — 86901 BLOOD TYPING SEROLOGIC RH(D): CPT

## 2024-01-01 PROCEDURE — 32555 ASPIRATE PLEURA W/ IMAGING: CPT | Performed by: PSYCHIATRY & NEUROLOGY

## 2024-01-01 PROCEDURE — 83930 ASSAY OF BLOOD OSMOLALITY: CPT | Performed by: ANESTHESIOLOGY

## 2024-01-01 PROCEDURE — 83690 ASSAY OF LIPASE: CPT

## 2024-01-01 PROCEDURE — NC001 PR NO CHARGE: Performed by: REGISTERED NURSE

## 2024-01-01 PROCEDURE — 99291 CRITICAL CARE FIRST HOUR: CPT | Performed by: PSYCHIATRY & NEUROLOGY

## 2024-01-01 PROCEDURE — 80202 ASSAY OF VANCOMYCIN: CPT

## 2024-01-01 PROCEDURE — 87205 SMEAR GRAM STAIN: CPT

## 2024-01-01 PROCEDURE — 99233 SBSQ HOSP IP/OBS HIGH 50: CPT | Performed by: RADIOLOGY

## 2024-01-01 PROCEDURE — 82550 ASSAY OF CK (CPK): CPT

## 2024-01-01 PROCEDURE — 037H3DZ DILATION OF RIGHT COMMON CAROTID ARTERY WITH INTRALUMINAL DEVICE, PERCUTANEOUS APPROACH: ICD-10-PCS | Performed by: RADIOLOGY

## 2024-01-01 PROCEDURE — 83735 ASSAY OF MAGNESIUM: CPT | Performed by: STUDENT IN AN ORGANIZED HEALTH CARE EDUCATION/TRAINING PROGRAM

## 2024-01-01 PROCEDURE — 93308 TTE F-UP OR LMTD: CPT | Performed by: INTERNAL MEDICINE

## 2024-01-01 PROCEDURE — 82150 ASSAY OF AMYLASE: CPT | Performed by: PSYCHIATRY & NEUROLOGY

## 2024-01-01 PROCEDURE — 83930 ASSAY OF BLOOD OSMOLALITY: CPT | Performed by: EMERGENCY MEDICINE

## 2024-01-01 PROCEDURE — 76937 US GUIDE VASCULAR ACCESS: CPT | Performed by: INTERNAL MEDICINE

## 2024-01-01 PROCEDURE — 82805 BLOOD GASES W/O2 SATURATION: CPT | Performed by: INTERNAL MEDICINE

## 2024-01-01 PROCEDURE — C1769 GUIDE WIRE: HCPCS

## 2024-01-01 PROCEDURE — NC001 PR NO CHARGE: Performed by: ANESTHESIOLOGY

## 2024-01-01 PROCEDURE — 36556 INSERT NON-TUNNEL CV CATH: CPT | Performed by: INTERNAL MEDICINE

## 2024-01-01 PROCEDURE — 86900 BLOOD TYPING SEROLOGIC ABO: CPT | Performed by: EMERGENCY MEDICINE

## 2024-01-01 PROCEDURE — 80076 HEPATIC FUNCTION PANEL: CPT

## 2024-01-01 PROCEDURE — 83880 ASSAY OF NATRIURETIC PEPTIDE: CPT

## 2024-01-01 PROCEDURE — NC001 PR NO CHARGE: Performed by: RADIOLOGY

## 2024-01-01 PROCEDURE — 82945 GLUCOSE OTHER FLUID: CPT

## 2024-01-01 PROCEDURE — 82330 ASSAY OF CALCIUM: CPT | Performed by: INTERNAL MEDICINE

## 2024-01-01 PROCEDURE — 85730 THROMBOPLASTIN TIME PARTIAL: CPT | Performed by: NURSE PRACTITIONER

## 2024-01-01 PROCEDURE — 83036 HEMOGLOBIN GLYCOSYLATED A1C: CPT

## 2024-01-01 PROCEDURE — 81001 URINALYSIS AUTO W/SCOPE: CPT | Performed by: PSYCHIATRY & NEUROLOGY

## 2024-01-01 PROCEDURE — 36584 COMPL RPLCMT PICC RS&I: CPT | Performed by: RADIOLOGY

## 2024-01-01 PROCEDURE — 95700 EEG CONT REC W/VID EEG TECH: CPT

## 2024-01-01 PROCEDURE — 74230 X-RAY XM SWLNG FUNCJ C+: CPT

## 2024-01-01 PROCEDURE — P9016 RBC LEUKOCYTES REDUCED: HCPCS

## 2024-01-01 PROCEDURE — 99223 1ST HOSP IP/OBS HIGH 75: CPT

## 2024-01-01 PROCEDURE — 93306 TTE W/DOPPLER COMPLETE: CPT | Performed by: INTERNAL MEDICINE

## 2024-01-01 PROCEDURE — 88112 CYTOPATH CELL ENHANCE TECH: CPT | Performed by: STUDENT IN AN ORGANIZED HEALTH CARE EDUCATION/TRAINING PROGRAM

## 2024-01-01 PROCEDURE — 85730 THROMBOPLASTIN TIME PARTIAL: CPT | Performed by: PHYSICIAN ASSISTANT

## 2024-01-01 PROCEDURE — 37216 TRANSCATH STENT CCA W/O EPS: CPT | Performed by: RADIOLOGY

## 2024-01-01 PROCEDURE — 70551 MRI BRAIN STEM W/O DYE: CPT

## 2024-01-01 PROCEDURE — 84100 ASSAY OF PHOSPHORUS: CPT | Performed by: INTERNAL MEDICINE

## 2024-01-01 PROCEDURE — 30233N1 TRANSFUSION OF NONAUTOLOGOUS RED BLOOD CELLS INTO PERIPHERAL VEIN, PERCUTANEOUS APPROACH: ICD-10-PCS | Performed by: EMERGENCY MEDICINE

## 2024-01-01 PROCEDURE — 83605 ASSAY OF LACTIC ACID: CPT

## 2024-01-01 PROCEDURE — 99232 SBSQ HOSP IP/OBS MODERATE 35: CPT | Performed by: INTERNAL MEDICINE

## 2024-01-01 PROCEDURE — 82947 ASSAY GLUCOSE BLOOD QUANT: CPT

## 2024-01-01 PROCEDURE — 84145 PROCALCITONIN (PCT): CPT

## 2024-01-01 PROCEDURE — 93321 DOPPLER ECHO F-UP/LMTD STD: CPT

## 2024-01-01 PROCEDURE — 5A12012 PERFORMANCE OF CARDIAC OUTPUT, SINGLE, MANUAL: ICD-10-PCS | Performed by: INTERNAL MEDICINE

## 2024-01-01 PROCEDURE — 36215 PLACE CATHETER IN ARTERY: CPT

## 2024-01-01 PROCEDURE — 89051 BODY FLUID CELL COUNT: CPT

## 2024-01-01 PROCEDURE — 4A133B1 MONITORING OF ARTERIAL PRESSURE, PERIPHERAL, PERCUTANEOUS APPROACH: ICD-10-PCS | Performed by: INTERNAL MEDICINE

## 2024-01-01 PROCEDURE — 80202 ASSAY OF VANCOMYCIN: CPT | Performed by: PSYCHIATRY & NEUROLOGY

## 2024-01-01 PROCEDURE — 82010 KETONE BODYS QUAN: CPT

## 2024-01-01 PROCEDURE — 87186 SC STD MICRODIL/AGAR DIL: CPT | Performed by: PSYCHIATRY & NEUROLOGY

## 2024-01-01 PROCEDURE — B315YZZ FLUOROSCOPY OF BILATERAL COMMON CAROTID ARTERIES USING OTHER CONTRAST: ICD-10-PCS | Performed by: RADIOLOGY

## 2024-01-01 PROCEDURE — 85027 COMPLETE CBC AUTOMATED: CPT | Performed by: EMERGENCY MEDICINE

## 2024-01-01 PROCEDURE — 94002 VENT MGMT INPAT INIT DAY: CPT

## 2024-01-01 PROCEDURE — 83605 ASSAY OF LACTIC ACID: CPT | Performed by: INTERNAL MEDICINE

## 2024-01-01 PROCEDURE — 97163 PT EVAL HIGH COMPLEX 45 MIN: CPT

## 2024-01-01 PROCEDURE — 94668 MNPJ CHEST WALL SBSQ: CPT

## 2024-01-01 PROCEDURE — 0042T HB CT PERFUSION W/CONTRAST CBF: CPT

## 2024-01-01 PROCEDURE — C1751 CATH, INF, PER/CENT/MIDLINE: HCPCS

## 2024-01-01 PROCEDURE — 86901 BLOOD TYPING SEROLOGIC RH(D): CPT | Performed by: EMERGENCY MEDICINE

## 2024-01-01 PROCEDURE — 93308 TTE F-UP OR LMTD: CPT | Performed by: EMERGENCY MEDICINE

## 2024-01-01 PROCEDURE — 84484 ASSAY OF TROPONIN QUANT: CPT | Performed by: REGISTERED NURSE

## 2024-01-01 PROCEDURE — 84145 PROCALCITONIN (PCT): CPT | Performed by: FAMILY MEDICINE

## 2024-01-01 PROCEDURE — 87205 SMEAR GRAM STAIN: CPT | Performed by: PSYCHIATRY & NEUROLOGY

## 2024-01-01 PROCEDURE — 03CG3ZZ EXTIRPATION OF MATTER FROM INTRACRANIAL ARTERY, PERCUTANEOUS APPROACH: ICD-10-PCS | Performed by: RADIOLOGY

## 2024-01-01 PROCEDURE — 87106 FUNGI IDENTIFICATION YEAST: CPT | Performed by: INTERNAL MEDICINE

## 2024-01-01 PROCEDURE — 37216 TRANSCATH STENT CCA W/O EPS: CPT

## 2024-01-01 PROCEDURE — 87081 CULTURE SCREEN ONLY: CPT

## 2024-01-01 PROCEDURE — C1760 CLOSURE DEV, VASC: HCPCS

## 2024-01-01 PROCEDURE — 80061 LIPID PANEL: CPT

## 2024-01-01 PROCEDURE — 86900 BLOOD TYPING SEROLOGIC ABO: CPT

## 2024-01-01 PROCEDURE — 03HY32Z INSERTION OF MONITORING DEVICE INTO UPPER ARTERY, PERCUTANEOUS APPROACH: ICD-10-PCS | Performed by: INTERNAL MEDICINE

## 2024-01-01 PROCEDURE — 85018 HEMOGLOBIN: CPT

## 2024-01-01 PROCEDURE — 92610 EVALUATE SWALLOWING FUNCTION: CPT

## 2024-01-01 PROCEDURE — 80048 BASIC METABOLIC PNL TOTAL CA: CPT | Performed by: EMERGENCY MEDICINE

## 2024-01-01 PROCEDURE — 80048 BASIC METABOLIC PNL TOTAL CA: CPT | Performed by: FAMILY MEDICINE

## 2024-01-01 PROCEDURE — 04HY32Z INSERTION OF MONITORING DEVICE INTO LOWER ARTERY, PERCUTANEOUS APPROACH: ICD-10-PCS | Performed by: INTERNAL MEDICINE

## 2024-01-01 PROCEDURE — 92611 MOTION FLUOROSCOPY/SWALLOW: CPT

## 2024-01-01 PROCEDURE — 36569 INSJ PICC 5 YR+ W/O IMAGING: CPT

## 2024-01-01 PROCEDURE — 71250 CT THORAX DX C-: CPT

## 2024-01-01 PROCEDURE — 78601 BRAIN IMAGE W/FLOW < 4 VIEWS: CPT

## 2024-01-01 PROCEDURE — 81001 URINALYSIS AUTO W/SCOPE: CPT | Performed by: EMERGENCY MEDICINE

## 2024-01-01 PROCEDURE — 05HY33Z INSERTION OF INFUSION DEVICE INTO UPPER VEIN, PERCUTANEOUS APPROACH: ICD-10-PCS | Performed by: ANESTHESIOLOGY

## 2024-01-01 PROCEDURE — 4A133B1 MONITORING OF ARTERIAL PRESSURE, PERIPHERAL, PERCUTANEOUS APPROACH: ICD-10-PCS | Performed by: ANESTHESIOLOGY

## 2024-01-01 PROCEDURE — 70496 CT ANGIOGRAPHY HEAD: CPT

## 2024-01-01 PROCEDURE — 94660 CPAP INITIATION&MGMT: CPT

## 2024-01-01 PROCEDURE — 0W993ZX DRAINAGE OF RIGHT PLEURAL CAVITY, PERCUTANEOUS APPROACH, DIAGNOSTIC: ICD-10-PCS | Performed by: PSYCHIATRY & NEUROLOGY

## 2024-01-01 PROCEDURE — 76937 US GUIDE VASCULAR ACCESS: CPT

## 2024-01-01 PROCEDURE — 36556 INSERT NON-TUNNEL CV CATH: CPT | Performed by: ANESTHESIOLOGY

## 2024-01-01 PROCEDURE — 31500 INSERT EMERGENCY AIRWAY: CPT | Performed by: INTERNAL MEDICINE

## 2024-01-01 PROCEDURE — 84100 ASSAY OF PHOSPHORUS: CPT | Performed by: STUDENT IN AN ORGANIZED HEALTH CARE EDUCATION/TRAINING PROGRAM

## 2024-01-01 PROCEDURE — 0241U HB NFCT DS VIR RESP RNA 4 TRGT: CPT | Performed by: PSYCHIATRY & NEUROLOGY

## 2024-01-01 PROCEDURE — 5A1955Z RESPIRATORY VENTILATION, GREATER THAN 96 CONSECUTIVE HOURS: ICD-10-PCS | Performed by: INTERNAL MEDICINE

## 2024-01-01 PROCEDURE — 71260 CT THORAX DX C+: CPT

## 2024-01-01 PROCEDURE — 85025 COMPLETE CBC W/AUTO DIFF WBC: CPT | Performed by: FAMILY MEDICINE

## 2024-01-01 PROCEDURE — 87070 CULTURE OTHR SPECIMN AEROBIC: CPT

## 2024-01-01 PROCEDURE — 84484 ASSAY OF TROPONIN QUANT: CPT | Performed by: PHYSICIAN ASSISTANT

## 2024-01-01 PROCEDURE — 83615 LACTATE (LD) (LDH) ENZYME: CPT

## 2024-01-01 PROCEDURE — 99223 1ST HOSP IP/OBS HIGH 75: CPT | Performed by: INTERNAL MEDICINE

## 2024-01-01 PROCEDURE — 85610 PROTHROMBIN TIME: CPT | Performed by: INTERNAL MEDICINE

## 2024-01-01 PROCEDURE — NC001 PR NO CHARGE: Performed by: INTERNAL MEDICINE

## 2024-01-01 PROCEDURE — 99291 CRITICAL CARE FIRST HOUR: CPT | Performed by: ANESTHESIOLOGY

## 2024-01-01 PROCEDURE — 02HV33Z INSERTION OF INFUSION DEVICE INTO SUPERIOR VENA CAVA, PERCUTANEOUS APPROACH: ICD-10-PCS | Performed by: PSYCHIATRY & NEUROLOGY

## 2024-01-01 PROCEDURE — 87070 CULTURE OTHR SPECIMN AEROBIC: CPT | Performed by: PSYCHIATRY & NEUROLOGY

## 2024-01-01 PROCEDURE — 31645 BRNCHSC W/THER ASPIR 1ST: CPT | Performed by: EMERGENCY MEDICINE

## 2024-01-01 PROCEDURE — NC001 PR NO CHARGE: Performed by: PHYSICIAN ASSISTANT

## 2024-01-01 PROCEDURE — 80048 BASIC METABOLIC PNL TOTAL CA: CPT | Performed by: INTERNAL MEDICINE

## 2024-01-01 PROCEDURE — 70498 CT ANGIOGRAPHY NECK: CPT

## 2024-01-01 PROCEDURE — 71275 CT ANGIOGRAPHY CHEST: CPT

## 2024-01-01 PROCEDURE — 84484 ASSAY OF TROPONIN QUANT: CPT | Performed by: INTERNAL MEDICINE

## 2024-01-01 PROCEDURE — 87147 CULTURE TYPE IMMUNOLOGIC: CPT | Performed by: PSYCHIATRY & NEUROLOGY

## 2024-01-01 PROCEDURE — 02HV33Z INSERTION OF INFUSION DEVICE INTO SUPERIOR VENA CAVA, PERCUTANEOUS APPROACH: ICD-10-PCS | Performed by: INTERNAL MEDICINE

## 2024-01-01 PROCEDURE — 87389 HIV-1 AG W/HIV-1&-2 AB AG IA: CPT

## 2024-01-01 PROCEDURE — A9521 TC99M EXAMETAZIME: HCPCS

## 2024-01-01 PROCEDURE — 97167 OT EVAL HIGH COMPLEX 60 MIN: CPT

## 2024-01-01 PROCEDURE — 99238 HOSP IP/OBS DSCHRG MGMT 30/<: CPT | Performed by: NURSE PRACTITIONER

## 2024-01-01 PROCEDURE — 80076 HEPATIC FUNCTION PANEL: CPT | Performed by: EMERGENCY MEDICINE

## 2024-01-01 PROCEDURE — 61645 PERQ ART M-THROMBECT &/NFS: CPT | Performed by: RADIOLOGY

## 2024-01-01 PROCEDURE — C1876 STENT, NON-COA/NON-COV W/DEL: HCPCS

## 2024-01-01 PROCEDURE — 87449 NOS EACH ORGANISM AG IA: CPT | Performed by: INTERNAL MEDICINE

## 2024-01-01 PROCEDURE — 76512 OPH US DX B-SCAN: CPT | Performed by: EMERGENCY MEDICINE

## 2024-01-01 PROCEDURE — 06HY33Z INSERTION OF INFUSION DEVICE INTO LOWER VEIN, PERCUTANEOUS APPROACH: ICD-10-PCS | Performed by: INTERNAL MEDICINE

## 2024-01-01 RX ORDER — FENTANYL CITRATE/PF 50 MCG/ML
SYRINGE (ML) INJECTION
Status: COMPLETED
Start: 2024-01-01 | End: 2024-01-01

## 2024-01-01 RX ORDER — POTASSIUM CHLORIDE 20MEQ/15ML
40 LIQUID (ML) ORAL ONCE
Status: DISCONTINUED | OUTPATIENT
Start: 2024-01-01 | End: 2024-01-01

## 2024-01-01 RX ORDER — ATORVASTATIN CALCIUM 40 MG/1
40 TABLET, FILM COATED ORAL EVERY EVENING
Status: DISCONTINUED | OUTPATIENT
Start: 2024-01-01 | End: 2024-01-01

## 2024-01-01 RX ORDER — SODIUM CHLORIDE, SODIUM GLUCONATE, SODIUM ACETATE, POTASSIUM CHLORIDE, MAGNESIUM CHLORIDE, SODIUM PHOSPHATE, DIBASIC, AND POTASSIUM PHOSPHATE .53; .5; .37; .037; .03; .012; .00082 G/100ML; G/100ML; G/100ML; G/100ML; G/100ML; G/100ML; G/100ML
1000 INJECTION, SOLUTION INTRAVENOUS ONCE
Status: COMPLETED | OUTPATIENT
Start: 2024-01-01 | End: 2024-01-01

## 2024-01-01 RX ORDER — BUSPIRONE HYDROCHLORIDE 5 MG/1
7.5 TABLET ORAL 2 TIMES DAILY PRN
Status: DISCONTINUED | OUTPATIENT
Start: 2024-01-01 | End: 2024-01-01

## 2024-01-01 RX ORDER — POTASSIUM CHLORIDE 14.9 MG/ML
20 INJECTION INTRAVENOUS
Status: DISCONTINUED | OUTPATIENT
Start: 2024-01-01 | End: 2024-01-01

## 2024-01-01 RX ORDER — ACETAMINOPHEN 10 MG/ML
1000 INJECTION, SOLUTION INTRAVENOUS ONCE
Status: COMPLETED | OUTPATIENT
Start: 2024-01-01 | End: 2024-01-01

## 2024-01-01 RX ORDER — METOPROLOL TARTRATE 1 MG/ML
10 INJECTION, SOLUTION INTRAVENOUS ONCE
Status: COMPLETED | OUTPATIENT
Start: 2024-01-01 | End: 2024-01-01

## 2024-01-01 RX ORDER — VANCOMYCIN HYDROCHLORIDE 1 G/200ML
20 INJECTION, SOLUTION INTRAVENOUS EVERY 8 HOURS
Status: DISCONTINUED | OUTPATIENT
Start: 2024-01-01 | End: 2024-01-01

## 2024-01-01 RX ORDER — PHENYLEPHRINE HYDROCHLORIDE 10 MG/ML
INJECTION INTRAVENOUS
Status: DISPENSED
Start: 2024-01-01 | End: 2024-01-01

## 2024-01-01 RX ORDER — VANCOMYCIN HYDROCHLORIDE 750 MG/150ML
750 INJECTION, SOLUTION INTRAVENOUS EVERY 12 HOURS
Status: DISCONTINUED | OUTPATIENT
Start: 2024-01-01 | End: 2024-01-01

## 2024-01-01 RX ORDER — IPRATROPIUM BROMIDE AND ALBUTEROL SULFATE 2.5; .5 MG/3ML; MG/3ML
3 SOLUTION RESPIRATORY (INHALATION)
Status: DISCONTINUED | OUTPATIENT
Start: 2024-01-01 | End: 2024-01-01

## 2024-01-01 RX ORDER — MAGNESIUM SULFATE HEPTAHYDRATE 40 MG/ML
2 INJECTION, SOLUTION INTRAVENOUS ONCE
Status: COMPLETED | OUTPATIENT
Start: 2024-01-01 | End: 2024-01-01

## 2024-01-01 RX ORDER — PROPOFOL 10 MG/ML
5-50 INJECTION, EMULSION INTRAVENOUS
Status: DISCONTINUED | OUTPATIENT
Start: 2024-01-01 | End: 2024-01-01

## 2024-01-01 RX ORDER — ATORVASTATIN CALCIUM 20 MG/1
20 TABLET, FILM COATED ORAL EVERY EVENING
Status: DISCONTINUED | OUTPATIENT
Start: 2024-01-01 | End: 2024-01-01

## 2024-01-01 RX ORDER — LORAZEPAM 2 MG/ML
INJECTION INTRAMUSCULAR
Status: COMPLETED
Start: 2024-01-01 | End: 2024-01-01

## 2024-01-01 RX ORDER — ACETAMINOPHEN 160 MG/5ML
650 SUSPENSION ORAL ONCE
Status: COMPLETED | OUTPATIENT
Start: 2024-01-01 | End: 2024-01-01

## 2024-01-01 RX ORDER — POTASSIUM CHLORIDE 20MEQ/15ML
40 LIQUID (ML) ORAL ONCE
Status: COMPLETED | OUTPATIENT
Start: 2024-01-01 | End: 2024-01-01

## 2024-01-01 RX ORDER — DEXTROSE MONOHYDRATE 25 G/50ML
25 INJECTION, SOLUTION INTRAVENOUS ONCE
Status: COMPLETED | OUTPATIENT
Start: 2024-01-01 | End: 2024-01-01

## 2024-01-01 RX ORDER — EPTIFIBATIDE 0.75 MG/ML
1 INJECTION, SOLUTION INTRAVENOUS CONTINUOUS
Status: DISCONTINUED | OUTPATIENT
Start: 2024-01-01 | End: 2024-01-01

## 2024-01-01 RX ORDER — HEPARIN SODIUM 10000 [USP'U]/100ML
3-20 INJECTION, SOLUTION INTRAVENOUS
Status: DISCONTINUED | OUTPATIENT
Start: 2024-01-01 | End: 2024-01-01

## 2024-01-01 RX ORDER — NOREPINEPHRINE BITARTRATE 1 MG/ML
INJECTION, SOLUTION INTRAVENOUS
Status: COMPLETED
Start: 2024-01-01 | End: 2024-01-01

## 2024-01-01 RX ORDER — DIVALPROEX SODIUM 500 MG/1
500 TABLET, DELAYED RELEASE ORAL EVERY 12 HOURS SCHEDULED
Status: DISCONTINUED | OUTPATIENT
Start: 2024-01-01 | End: 2024-01-01

## 2024-01-01 RX ORDER — VANCOMYCIN HYDROCHLORIDE 750 MG/150ML
750 INJECTION, SOLUTION INTRAVENOUS ONCE
Status: COMPLETED | OUTPATIENT
Start: 2024-01-01 | End: 2024-01-01

## 2024-01-01 RX ORDER — FENTANYL CITRATE 50 UG/ML
25 INJECTION, SOLUTION INTRAMUSCULAR; INTRAVENOUS ONCE
Refills: 0 | Status: COMPLETED | OUTPATIENT
Start: 2024-01-01 | End: 2024-01-01

## 2024-01-01 RX ORDER — FUROSEMIDE 10 MG/ML
40 INJECTION INTRAMUSCULAR; INTRAVENOUS ONCE
Status: COMPLETED | OUTPATIENT
Start: 2024-01-01 | End: 2024-01-01

## 2024-01-01 RX ORDER — ACETAMINOPHEN 160 MG/5ML
650 SUSPENSION ORAL EVERY 6 HOURS PRN
Status: DISCONTINUED | OUTPATIENT
Start: 2024-01-01 | End: 2024-01-01

## 2024-01-01 RX ORDER — POTASSIUM CHLORIDE 14.9 MG/ML
20 INJECTION INTRAVENOUS ONCE
Status: COMPLETED | OUTPATIENT
Start: 2024-01-01 | End: 2024-01-01

## 2024-01-01 RX ORDER — LORAZEPAM 2 MG/ML
2 INJECTION INTRAMUSCULAR ONCE
Status: COMPLETED | OUTPATIENT
Start: 2024-01-01 | End: 2024-01-01

## 2024-01-01 RX ORDER — AMOXICILLIN 250 MG
2 CAPSULE ORAL 2 TIMES DAILY
Status: DISCONTINUED | OUTPATIENT
Start: 2024-01-01 | End: 2024-01-01

## 2024-01-01 RX ORDER — FENTANYL CITRATE 50 UG/ML
100 INJECTION, SOLUTION INTRAMUSCULAR; INTRAVENOUS ONCE
Refills: 0 | Status: COMPLETED | OUTPATIENT
Start: 2024-01-01 | End: 2024-01-01

## 2024-01-01 RX ORDER — DESMOPRESSIN ACETATE 4 UG/ML
1 INJECTION, SOLUTION INTRAVENOUS; SUBCUTANEOUS ONCE
Status: COMPLETED | OUTPATIENT
Start: 2024-01-01 | End: 2024-01-01

## 2024-01-01 RX ORDER — ACETAMINOPHEN 160 MG/5ML
650 SUSPENSION ORAL ONCE
Status: DISCONTINUED | OUTPATIENT
Start: 2024-01-01 | End: 2024-01-01

## 2024-01-01 RX ORDER — HEPARIN SODIUM 5000 [USP'U]/ML
5000 INJECTION, SOLUTION INTRAVENOUS; SUBCUTANEOUS EVERY 8 HOURS SCHEDULED
Status: DISCONTINUED | OUTPATIENT
Start: 2024-01-01 | End: 2024-01-01

## 2024-01-01 RX ORDER — LEVETIRACETAM 500 MG/5ML
1500 INJECTION, SOLUTION, CONCENTRATE INTRAVENOUS EVERY 12 HOURS SCHEDULED
Status: DISCONTINUED | OUTPATIENT
Start: 2024-01-01 | End: 2024-01-01

## 2024-01-01 RX ORDER — POTASSIUM CHLORIDE 14.9 MG/ML
20 INJECTION INTRAVENOUS ONCE
Status: DISCONTINUED | OUTPATIENT
Start: 2024-01-01 | End: 2024-01-01

## 2024-01-01 RX ORDER — QUETIAPINE FUMARATE 25 MG/1
50 TABLET, FILM COATED ORAL 2 TIMES DAILY
Status: DISCONTINUED | OUTPATIENT
Start: 2024-01-01 | End: 2024-01-01

## 2024-01-01 RX ORDER — EPTIFIBATIDE 2 MG/ML
INJECTION, SOLUTION INTRAVENOUS
Status: COMPLETED | OUTPATIENT
Start: 2024-01-01 | End: 2024-01-01

## 2024-01-01 RX ORDER — DEXAMETHASONE SODIUM PHOSPHATE 10 MG/ML
10 INJECTION, SOLUTION INTRAMUSCULAR; INTRAVENOUS ONCE
Status: COMPLETED | OUTPATIENT
Start: 2024-01-01 | End: 2024-01-01

## 2024-01-01 RX ORDER — MODAFINIL 100 MG/1
100 TABLET ORAL DAILY
Status: DISCONTINUED | OUTPATIENT
Start: 2024-01-01 | End: 2024-01-01

## 2024-01-01 RX ORDER — POTASSIUM CHLORIDE 20MEQ/15ML
40 LIQUID (ML) ORAL EVERY 6 HOURS
Status: COMPLETED | OUTPATIENT
Start: 2024-01-01 | End: 2024-01-01

## 2024-01-01 RX ORDER — FUROSEMIDE 10 MG/ML
40 INJECTION INTRAMUSCULAR; INTRAVENOUS
Status: DISCONTINUED | OUTPATIENT
Start: 2024-01-01 | End: 2024-01-01

## 2024-01-01 RX ORDER — LABETALOL HYDROCHLORIDE 5 MG/ML
INJECTION, SOLUTION INTRAVENOUS
Status: COMPLETED
Start: 2024-01-01 | End: 2024-01-01

## 2024-01-01 RX ORDER — LEVETIRACETAM 500 MG/1
500 TABLET ORAL EVERY 12 HOURS SCHEDULED
Status: DISCONTINUED | OUTPATIENT
Start: 2024-01-01 | End: 2024-01-01

## 2024-01-01 RX ORDER — LEVETIRACETAM 500 MG/5ML
500 INJECTION, SOLUTION, CONCENTRATE INTRAVENOUS EVERY 12 HOURS SCHEDULED
Status: DISCONTINUED | OUTPATIENT
Start: 2024-01-01 | End: 2024-01-01

## 2024-01-01 RX ORDER — VANCOMYCIN HYDROCHLORIDE 750 MG/150ML
15 INJECTION, SOLUTION INTRAVENOUS DAILY PRN
Status: DISCONTINUED | OUTPATIENT
Start: 2024-01-01 | End: 2024-01-01

## 2024-01-01 RX ORDER — INSULIN LISPRO 100 [IU]/ML
1-5 INJECTION, SOLUTION INTRAVENOUS; SUBCUTANEOUS EVERY 6 HOURS SCHEDULED
Status: DISCONTINUED | OUTPATIENT
Start: 2024-01-01 | End: 2024-01-01

## 2024-01-01 RX ORDER — ALBUTEROL SULFATE 0.83 MG/ML
2.5 SOLUTION RESPIRATORY (INHALATION) EVERY 4 HOURS PRN
Status: DISCONTINUED | OUTPATIENT
Start: 2024-01-01 | End: 2024-01-01

## 2024-01-01 RX ORDER — MIDODRINE HYDROCHLORIDE 5 MG/1
5 TABLET ORAL
Status: DISCONTINUED | OUTPATIENT
Start: 2024-01-01 | End: 2024-01-01

## 2024-01-01 RX ORDER — LEVETIRACETAM 500 MG/5ML
1000 INJECTION, SOLUTION, CONCENTRATE INTRAVENOUS EVERY 12 HOURS SCHEDULED
Status: DISCONTINUED | OUTPATIENT
Start: 2024-01-01 | End: 2024-01-01

## 2024-01-01 RX ORDER — FUROSEMIDE 10 MG/ML
40 INJECTION INTRAMUSCULAR; INTRAVENOUS ONCE
Status: DISCONTINUED | OUTPATIENT
Start: 2024-01-01 | End: 2024-01-01

## 2024-01-01 RX ORDER — VANCOMYCIN HYDROCHLORIDE 750 MG/150ML
15 INJECTION, SOLUTION INTRAVENOUS ONCE
Status: COMPLETED | OUTPATIENT
Start: 2024-01-01 | End: 2024-01-01

## 2024-01-01 RX ORDER — SERTRALINE HYDROCHLORIDE 20 MG/ML
50 SOLUTION ORAL DAILY
Status: DISCONTINUED | OUTPATIENT
Start: 2024-01-01 | End: 2024-01-01

## 2024-01-01 RX ORDER — QUETIAPINE FUMARATE 25 MG/1
25 TABLET, FILM COATED ORAL 2 TIMES DAILY
Status: DISCONTINUED | OUTPATIENT
Start: 2024-01-01 | End: 2024-01-01

## 2024-01-01 RX ORDER — CLONAZEPAM 0.5 MG/1
0.5 TABLET ORAL DAILY PRN
Status: DISCONTINUED | OUTPATIENT
Start: 2024-01-01 | End: 2024-01-01

## 2024-01-01 RX ORDER — HEPARIN SODIUM 10000 [USP'U]/100ML
3-24 INJECTION, SOLUTION INTRAVENOUS
Status: DISCONTINUED | OUTPATIENT
Start: 2024-01-01 | End: 2024-01-01

## 2024-01-01 RX ORDER — DEXMEDETOMIDINE HYDROCHLORIDE 4 UG/ML
.1-.7 INJECTION, SOLUTION INTRAVENOUS
Status: DISCONTINUED | OUTPATIENT
Start: 2024-01-01 | End: 2024-01-01

## 2024-01-01 RX ORDER — EPTIFIBATIDE 0.75 MG/ML
INJECTION, SOLUTION INTRAVENOUS
Status: COMPLETED | OUTPATIENT
Start: 2024-01-01 | End: 2024-01-01

## 2024-01-01 RX ORDER — DEXTROSE MONOHYDRATE 50 MG/ML
100 INJECTION, SOLUTION INTRAVENOUS CONTINUOUS
Status: DISCONTINUED | OUTPATIENT
Start: 2024-01-01 | End: 2024-01-01

## 2024-01-01 RX ORDER — BROMOCRIPTINE MESYLATE 2.5 MG/1
2.5 TABLET ORAL 2 TIMES DAILY
Status: DISCONTINUED | OUTPATIENT
Start: 2024-01-01 | End: 2024-01-01

## 2024-01-01 RX ORDER — FENTANYL CITRATE 50 UG/ML
50 INJECTION, SOLUTION INTRAMUSCULAR; INTRAVENOUS ONCE
Refills: 0 | Status: COMPLETED | OUTPATIENT
Start: 2024-01-01 | End: 2024-01-01

## 2024-01-01 RX ORDER — HYDROMORPHONE HCL/PF 1 MG/ML
1 SYRINGE (ML) INJECTION ONCE
Status: COMPLETED | OUTPATIENT
Start: 2024-01-01 | End: 2024-01-01

## 2024-01-01 RX ORDER — AMIODARONE HYDROCHLORIDE 150 MG/3ML
INJECTION, SOLUTION INTRAVENOUS CODE/TRAUMA/SEDATION MEDICATION
Status: COMPLETED | OUTPATIENT
Start: 2024-01-01 | End: 2024-01-01

## 2024-01-01 RX ORDER — ASPIRIN 81 MG/1
81 TABLET, CHEWABLE ORAL DAILY
Status: DISCONTINUED | OUTPATIENT
Start: 2024-01-01 | End: 2024-01-01

## 2024-01-01 RX ORDER — FENTANYL CITRATE 50 UG/ML
50 INJECTION, SOLUTION INTRAMUSCULAR; INTRAVENOUS EVERY 4 HOURS PRN
Refills: 0 | Status: DISCONTINUED | OUTPATIENT
Start: 2024-01-01 | End: 2024-01-01

## 2024-01-01 RX ORDER — FENTANYL CITRATE 50 UG/ML
50 INJECTION, SOLUTION INTRAMUSCULAR; INTRAVENOUS
Refills: 0 | Status: DISCONTINUED | OUTPATIENT
Start: 2024-01-01 | End: 2024-01-01

## 2024-01-01 RX ORDER — SODIUM CHLORIDE, SODIUM GLUCONATE, SODIUM ACETATE, POTASSIUM CHLORIDE, MAGNESIUM CHLORIDE, SODIUM PHOSPHATE, DIBASIC, AND POTASSIUM PHOSPHATE .53; .5; .37; .037; .03; .012; .00082 G/100ML; G/100ML; G/100ML; G/100ML; G/100ML; G/100ML; G/100ML
500 INJECTION, SOLUTION INTRAVENOUS ONCE
Status: DISCONTINUED | OUTPATIENT
Start: 2024-01-01 | End: 2024-01-01

## 2024-01-01 RX ORDER — CALCIUM CHLORIDE 100 MG/ML
SYRINGE (ML) INTRAVENOUS CODE/TRAUMA/SEDATION MEDICATION
Status: COMPLETED | OUTPATIENT
Start: 2024-01-01 | End: 2024-01-01

## 2024-01-01 RX ORDER — IODIXANOL 320 MG/ML
200 INJECTION, SOLUTION INTRAVASCULAR
Status: COMPLETED | OUTPATIENT
Start: 2024-01-01 | End: 2024-01-01

## 2024-01-01 RX ORDER — ROCURONIUM BROMIDE 10 MG/ML
50 INJECTION, SOLUTION INTRAVENOUS ONCE
Status: COMPLETED | OUTPATIENT
Start: 2024-01-01 | End: 2024-01-01

## 2024-01-01 RX ORDER — BROMOCRIPTINE MESYLATE 2.5 MG/1
5 TABLET ORAL EVERY 6 HOURS
Status: DISCONTINUED | OUTPATIENT
Start: 2024-01-01 | End: 2024-01-01

## 2024-01-01 RX ORDER — LABETALOL HYDROCHLORIDE 5 MG/ML
10 INJECTION, SOLUTION INTRAVENOUS EVERY 4 HOURS
Status: DISCONTINUED | OUTPATIENT
Start: 2024-01-01 | End: 2024-01-01

## 2024-01-01 RX ORDER — SODIUM CHLORIDE 9 MG/ML
100 INJECTION, SOLUTION INTRAVENOUS CONTINUOUS
Status: DISCONTINUED | OUTPATIENT
Start: 2024-01-01 | End: 2024-01-01

## 2024-01-01 RX ORDER — HEPARIN SODIUM 10000 [USP'U]/100ML
3-15 INJECTION, SOLUTION INTRAVENOUS
Status: DISCONTINUED | OUTPATIENT
Start: 2024-01-01 | End: 2024-01-01

## 2024-01-01 RX ORDER — FUROSEMIDE 40 MG/1
40 TABLET ORAL DAILY PRN
Status: DISCONTINUED | OUTPATIENT
Start: 2024-01-01 | End: 2024-01-01

## 2024-01-01 RX ORDER — CEFAZOLIN SODIUM 2 G/50ML
2000 SOLUTION INTRAVENOUS EVERY 8 HOURS
Status: DISCONTINUED | OUTPATIENT
Start: 2024-01-01 | End: 2024-01-01

## 2024-01-01 RX ORDER — LEVALBUTEROL INHALATION SOLUTION 1.25 MG/3ML
1.25 SOLUTION RESPIRATORY (INHALATION)
Status: DISCONTINUED | OUTPATIENT
Start: 2024-01-01 | End: 2024-01-01

## 2024-01-01 RX ORDER — ACETAMINOPHEN 160 MG/5ML
650 SUSPENSION ORAL EVERY 6 HOURS
Status: DISCONTINUED | OUTPATIENT
Start: 2024-01-01 | End: 2024-01-01

## 2024-01-01 RX ORDER — HYDRALAZINE HYDROCHLORIDE 20 MG/ML
10 INJECTION INTRAMUSCULAR; INTRAVENOUS EVERY 4 HOURS PRN
Status: DISCONTINUED | OUTPATIENT
Start: 2024-01-01 | End: 2024-01-01

## 2024-01-01 RX ORDER — POTASSIUM CHLORIDE 20MEQ/15ML
40 LIQUID (ML) ORAL 2 TIMES DAILY
Status: COMPLETED | OUTPATIENT
Start: 2024-01-01 | End: 2024-01-01

## 2024-01-01 RX ORDER — BROMOCRIPTINE MESYLATE 2.5 MG/1
7.5 TABLET ORAL EVERY 6 HOURS
Status: DISCONTINUED | OUTPATIENT
Start: 2024-01-01 | End: 2024-01-01

## 2024-01-01 RX ORDER — SODIUM CHLORIDE, SODIUM GLUCONATE, SODIUM ACETATE, POTASSIUM CHLORIDE, MAGNESIUM CHLORIDE, SODIUM PHOSPHATE, DIBASIC, AND POTASSIUM PHOSPHATE .53; .5; .37; .037; .03; .012; .00082 G/100ML; G/100ML; G/100ML; G/100ML; G/100ML; G/100ML; G/100ML
500 INJECTION, SOLUTION INTRAVENOUS ONCE
Status: COMPLETED | OUTPATIENT
Start: 2024-01-01 | End: 2024-01-01

## 2024-01-01 RX ORDER — DEXMEDETOMIDINE HYDROCHLORIDE 4 UG/ML
.1-.4 INJECTION, SOLUTION INTRAVENOUS
Status: DISCONTINUED | OUTPATIENT
Start: 2024-01-01 | End: 2024-01-01

## 2024-01-01 RX ORDER — 3% SODIUM CHLORIDE 3 G/100ML
30 INJECTION, SOLUTION INTRAVENOUS CONTINUOUS
Status: DISCONTINUED | OUTPATIENT
Start: 2024-01-01 | End: 2024-01-01

## 2024-01-01 RX ORDER — POLYETHYLENE GLYCOL 3350 17 G/17G
17 POWDER, FOR SOLUTION ORAL DAILY
Status: DISCONTINUED | OUTPATIENT
Start: 2024-01-01 | End: 2024-01-01

## 2024-01-01 RX ORDER — DEXTROSE MONOHYDRATE 25 G/50ML
INJECTION, SOLUTION INTRAVENOUS
Status: COMPLETED
Start: 2024-01-01 | End: 2024-01-01

## 2024-01-01 RX ORDER — POTASSIUM CHLORIDE 29.8 MG/ML
40 INJECTION INTRAVENOUS ONCE
Status: COMPLETED | OUTPATIENT
Start: 2024-01-01 | End: 2024-01-01

## 2024-01-01 RX ORDER — MODAFINIL 100 MG/1
200 TABLET ORAL DAILY
Status: COMPLETED | OUTPATIENT
Start: 2024-01-01 | End: 2024-01-01

## 2024-01-01 RX ORDER — BISACODYL 10 MG
10 SUPPOSITORY, RECTAL RECTAL ONCE
Status: DISCONTINUED | OUTPATIENT
Start: 2024-01-01 | End: 2024-01-01

## 2024-01-01 RX ORDER — NOREPINEPHRINE BITARTRATE 1 MG/ML
INJECTION, SOLUTION INTRAVENOUS
Status: DISPENSED
Start: 2024-01-01 | End: 2024-01-01

## 2024-01-01 RX ORDER — CALCIUM CHLORIDE 100 MG/ML
INJECTION INTRAVENOUS; INTRAVENTRICULAR
Status: DISPENSED
Start: 2024-01-01 | End: 2024-01-01

## 2024-01-01 RX ORDER — ALBUMIN HUMAN 50 G/1000ML
SOLUTION INTRAVENOUS
Status: COMPLETED
Start: 2024-01-01 | End: 2024-01-01

## 2024-01-01 RX ORDER — ALBUMIN HUMAN 50 G/1000ML
25 SOLUTION INTRAVENOUS ONCE
Status: COMPLETED | OUTPATIENT
Start: 2024-01-01 | End: 2024-01-01

## 2024-01-01 RX ORDER — FUROSEMIDE 40 MG/1
40 TABLET ORAL DAILY
Status: DISCONTINUED | OUTPATIENT
Start: 2024-01-01 | End: 2024-01-01

## 2024-01-01 RX ORDER — EPINEPHRINE 0.1 MG/ML
INJECTION INTRAVENOUS CODE/TRAUMA/SEDATION MEDICATION
Status: COMPLETED | OUTPATIENT
Start: 2024-01-01 | End: 2024-01-01

## 2024-01-01 RX ORDER — LEVETIRACETAM 500 MG/5ML
500 INJECTION, SOLUTION, CONCENTRATE INTRAVENOUS ONCE
Status: COMPLETED | OUTPATIENT
Start: 2024-01-01 | End: 2024-01-01

## 2024-01-01 RX ORDER — SODIUM CHLORIDE 3 G/100ML
250 INJECTION, SOLUTION INTRAVENOUS ONCE
Status: COMPLETED | OUTPATIENT
Start: 2024-01-01 | End: 2024-01-01

## 2024-01-01 RX ORDER — FUROSEMIDE 10 MG/ML
40 INJECTION INTRAMUSCULAR; INTRAVENOUS EVERY 12 HOURS
Status: DISCONTINUED | OUTPATIENT
Start: 2024-01-01 | End: 2024-01-01

## 2024-01-01 RX ORDER — ALBUMIN HUMAN 50 G/1000ML
12.5 SOLUTION INTRAVENOUS ONCE
Status: COMPLETED | OUTPATIENT
Start: 2024-01-01 | End: 2024-01-01

## 2024-01-01 RX ORDER — SODIUM CHLORIDE 234 MG/ML
30 INJECTION, SOLUTION INTRAVENOUS ONCE
Status: COMPLETED | OUTPATIENT
Start: 2024-01-01 | End: 2024-01-01

## 2024-01-01 RX ORDER — ACETAMINOPHEN 325 MG/1
975 TABLET ORAL EVERY 6 HOURS PRN
Status: DISCONTINUED | OUTPATIENT
Start: 2024-01-01 | End: 2024-01-01

## 2024-01-01 RX ORDER — NICARDIPINE HYDROCHLORIDE 2.5 MG/ML
INJECTION INTRAVENOUS
Status: DISPENSED
Start: 2024-01-01 | End: 2024-01-01

## 2024-01-01 RX ORDER — SEVELAMER HYDROCHLORIDE 800 MG/1
800 TABLET, FILM COATED ORAL ONCE
Status: DISCONTINUED | OUTPATIENT
Start: 2024-01-01 | End: 2024-01-01

## 2024-01-01 RX ORDER — CHLORHEXIDINE GLUCONATE ORAL RINSE 1.2 MG/ML
15 SOLUTION DENTAL EVERY 12 HOURS SCHEDULED
Status: DISCONTINUED | OUTPATIENT
Start: 2024-01-01 | End: 2024-01-01

## 2024-01-01 RX ORDER — METOPROLOL TARTRATE 1 MG/ML
5 INJECTION, SOLUTION INTRAVENOUS EVERY 8 HOURS
Status: CANCELLED | OUTPATIENT
Start: 2024-01-01

## 2024-01-01 RX ORDER — MIDAZOLAM HYDROCHLORIDE 2 MG/2ML
2 INJECTION, SOLUTION INTRAMUSCULAR; INTRAVENOUS ONCE
Status: DISCONTINUED | OUTPATIENT
Start: 2024-01-01 | End: 2024-01-01

## 2024-01-01 RX ORDER — POTASSIUM CHLORIDE 1500 MG/1
40 TABLET, EXTENDED RELEASE ORAL ONCE
Status: DISCONTINUED | OUTPATIENT
Start: 2024-01-01 | End: 2024-01-01

## 2024-01-01 RX ORDER — HYDRALAZINE HYDROCHLORIDE 20 MG/ML
5 INJECTION INTRAMUSCULAR; INTRAVENOUS EVERY 4 HOURS PRN
Status: DISCONTINUED | OUTPATIENT
Start: 2024-01-01 | End: 2024-01-01

## 2024-01-01 RX ORDER — MAGNESIUM SULFATE HEPTAHYDRATE 40 MG/ML
4 INJECTION, SOLUTION INTRAVENOUS ONCE
Status: COMPLETED | OUTPATIENT
Start: 2024-01-01 | End: 2024-01-01

## 2024-01-01 RX ORDER — SODIUM CHLORIDE FOR INHALATION 3 %
4 VIAL, NEBULIZER (ML) INHALATION
Status: DISCONTINUED | OUTPATIENT
Start: 2024-01-01 | End: 2024-01-01

## 2024-01-01 RX ORDER — INSULIN LISPRO 100 [IU]/ML
1-6 INJECTION, SOLUTION INTRAVENOUS; SUBCUTANEOUS EVERY 6 HOURS SCHEDULED
Status: DISCONTINUED | OUTPATIENT
Start: 2024-01-01 | End: 2024-01-01

## 2024-01-01 RX ORDER — LABETALOL HYDROCHLORIDE 5 MG/ML
10 INJECTION, SOLUTION INTRAVENOUS EVERY 4 HOURS PRN
Status: DISCONTINUED | OUTPATIENT
Start: 2024-01-01 | End: 2024-01-01

## 2024-01-01 RX ORDER — ACETAMINOPHEN 650 MG/1
650 SUPPOSITORY RECTAL ONCE
Status: COMPLETED | OUTPATIENT
Start: 2024-01-01 | End: 2024-01-01

## 2024-01-01 RX ORDER — METOPROLOL TARTRATE 1 MG/ML
INJECTION, SOLUTION INTRAVENOUS
Status: COMPLETED
Start: 2024-01-01 | End: 2024-01-01

## 2024-01-01 RX ORDER — POTASSIUM CHLORIDE 29.8 MG/ML
40 INJECTION INTRAVENOUS EVERY 4 HOURS
Status: COMPLETED | OUTPATIENT
Start: 2024-01-01 | End: 2024-01-01

## 2024-01-01 RX ORDER — LEVETIRACETAM 500 MG/5ML
2000 INJECTION, SOLUTION, CONCENTRATE INTRAVENOUS ONCE
Status: COMPLETED | OUTPATIENT
Start: 2024-01-01 | End: 2024-01-01

## 2024-01-01 RX ORDER — LEVETIRACETAM 100 MG/ML
1000 SOLUTION ORAL EVERY 12 HOURS SCHEDULED
Status: DISCONTINUED | OUTPATIENT
Start: 2024-01-01 | End: 2024-01-01

## 2024-01-01 RX ORDER — VANCOMYCIN HYDROCHLORIDE 1 G/200ML
1000 INJECTION, SOLUTION INTRAVENOUS EVERY 12 HOURS
Status: DISCONTINUED | OUTPATIENT
Start: 2024-01-01 | End: 2024-01-01

## 2024-01-01 RX ORDER — SODIUM CHLORIDE FOR INHALATION 0.9 %
3 VIAL, NEBULIZER (ML) INHALATION ONCE
Status: COMPLETED | OUTPATIENT
Start: 2024-01-01 | End: 2024-01-01

## 2024-01-01 RX ORDER — FUROSEMIDE 10 MG/ML
40 INJECTION INTRAMUSCULAR; INTRAVENOUS DAILY
Status: DISCONTINUED | OUTPATIENT
Start: 2024-01-01 | End: 2024-01-01

## 2024-01-01 RX ORDER — POTASSIUM CHLORIDE 14.9 MG/ML
20 INJECTION INTRAVENOUS 2 TIMES DAILY
Status: DISPENSED | OUTPATIENT
Start: 2024-01-01 | End: 2024-01-01

## 2024-01-01 RX ORDER — MINERAL OIL AND PETROLATUM 150; 830 MG/G; MG/G
OINTMENT OPHTHALMIC
Status: DISCONTINUED | OUTPATIENT
Start: 2024-01-01 | End: 2024-01-01

## 2024-01-01 RX ORDER — SODIUM CHLORIDE, SODIUM GLUCONATE, SODIUM ACETATE, POTASSIUM CHLORIDE, MAGNESIUM CHLORIDE, SODIUM PHOSPHATE, DIBASIC, AND POTASSIUM PHOSPHATE .53; .5; .37; .037; .03; .012; .00082 G/100ML; G/100ML; G/100ML; G/100ML; G/100ML; G/100ML; G/100ML
1000 INJECTION, SOLUTION INTRAVENOUS ONCE
Status: DISCONTINUED | OUTPATIENT
Start: 2024-01-01 | End: 2024-01-01

## 2024-01-01 RX ORDER — SODIUM BICARBONATE 84 MG/ML
INJECTION, SOLUTION INTRAVENOUS CODE/TRAUMA/SEDATION MEDICATION
Status: COMPLETED | OUTPATIENT
Start: 2024-01-01 | End: 2024-01-01

## 2024-01-01 RX ORDER — BISACODYL 10 MG
10 SUPPOSITORY, RECTAL RECTAL DAILY PRN
Status: DISCONTINUED | OUTPATIENT
Start: 2024-01-01 | End: 2024-01-01

## 2024-01-01 RX ORDER — FENTANYL CITRATE 50 UG/ML
25 INJECTION, SOLUTION INTRAMUSCULAR; INTRAVENOUS EVERY 2 HOUR PRN
Refills: 0 | Status: DISCONTINUED | OUTPATIENT
Start: 2024-01-01 | End: 2024-01-01

## 2024-01-01 RX ORDER — CALCIUM GLUCONATE 20 MG/ML
2 INJECTION, SOLUTION INTRAVENOUS ONCE
Status: COMPLETED | OUTPATIENT
Start: 2024-01-01 | End: 2024-01-01

## 2024-01-01 RX ORDER — FENTANYL CITRATE 50 UG/ML
50 INJECTION, SOLUTION INTRAMUSCULAR; INTRAVENOUS EVERY 2 HOUR PRN
Refills: 0 | Status: DISCONTINUED | OUTPATIENT
Start: 2024-01-01 | End: 2024-01-01

## 2024-01-01 RX ORDER — AMOXICILLIN 250 MG
1 CAPSULE ORAL
Status: DISCONTINUED | OUTPATIENT
Start: 2024-01-01 | End: 2024-01-01

## 2024-01-01 RX ORDER — POTASSIUM CHLORIDE 14.9 MG/ML
20 INJECTION INTRAVENOUS
Status: COMPLETED | OUTPATIENT
Start: 2024-01-01 | End: 2024-01-01

## 2024-01-01 RX ORDER — VALPROIC ACID 250 MG/5ML
250 SOLUTION ORAL EVERY 6 HOURS SCHEDULED
Status: COMPLETED | OUTPATIENT
Start: 2024-01-01 | End: 2024-01-01

## 2024-01-01 RX ORDER — LORAZEPAM 2 MG/ML
0.5 INJECTION INTRAMUSCULAR ONCE
Status: COMPLETED | OUTPATIENT
Start: 2024-01-01 | End: 2024-01-01

## 2024-01-01 RX ORDER — ACETAMINOPHEN 325 MG/1
975 TABLET ORAL EVERY 6 HOURS
Status: DISCONTINUED | OUTPATIENT
Start: 2024-01-01 | End: 2024-01-01

## 2024-01-01 RX ORDER — DEXTROSE MONOHYDRATE AND SODIUM CHLORIDE 5; .45 G/100ML; G/100ML
50 INJECTION, SOLUTION INTRAVENOUS CONTINUOUS
Status: DISCONTINUED | OUTPATIENT
Start: 2024-01-01 | End: 2024-01-01

## 2024-01-01 RX ORDER — MIDODRINE HYDROCHLORIDE 5 MG/1
5 TABLET ORAL EVERY 8 HOURS
Status: DISCONTINUED | OUTPATIENT
Start: 2024-01-01 | End: 2024-01-01

## 2024-01-01 RX ADMIN — VANCOMYCIN HYDROCHLORIDE 750 MG: 750 INJECTION, SOLUTION INTRAVENOUS at 09:02

## 2024-01-01 RX ADMIN — SEVELAMER HYDROCHLORIDE 800 MG: 800 TABLET ORAL at 00:52

## 2024-01-01 RX ADMIN — SODIUM BICARBONATE 50 MEQ: 84 INJECTION, SOLUTION INTRAVENOUS at 13:29

## 2024-01-01 RX ADMIN — SENNOSIDES AND DOCUSATE SODIUM 2 TABLET: 50; 8.6 TABLET ORAL at 17:33

## 2024-01-01 RX ADMIN — CHLORHEXIDINE GLUCONATE 0.12% ORAL RINSE 15 ML: 1.2 LIQUID ORAL at 20:10

## 2024-01-01 RX ADMIN — CEFAZOLIN SODIUM 2000 MG: 2 SOLUTION INTRAVENOUS at 17:06

## 2024-01-01 RX ADMIN — CEFAZOLIN SODIUM 2000 MG: 2 SOLUTION INTRAVENOUS at 09:10

## 2024-01-01 RX ADMIN — SODIUM CHLORIDE 3 UNITS/HR: 9 INJECTION, SOLUTION INTRAVENOUS at 18:22

## 2024-01-01 RX ADMIN — PHENYLEPHRINE HYDROCHLORIDE 110 MCG/MIN: 50 INJECTION INTRAVENOUS at 16:33

## 2024-01-01 RX ADMIN — FUROSEMIDE 40 MG: 10 INJECTION, SOLUTION INTRAMUSCULAR; INTRAVENOUS at 09:28

## 2024-01-01 RX ADMIN — SENNOSIDES AND DOCUSATE SODIUM 2 TABLET: 50; 8.6 TABLET ORAL at 18:18

## 2024-01-01 RX ADMIN — HYDROMORPHONE HYDROCHLORIDE 1 MG: 1 INJECTION, SOLUTION INTRAMUSCULAR; INTRAVENOUS; SUBCUTANEOUS at 16:32

## 2024-01-01 RX ADMIN — ISODIUM CHLORIDE 3 ML: 0.03 SOLUTION RESPIRATORY (INHALATION) at 10:09

## 2024-01-01 RX ADMIN — MINERAL OIL AND WHITE PETROLATUM: 150; 830 OINTMENT OPHTHALMIC at 21:56

## 2024-01-01 RX ADMIN — CHLORHEXIDINE GLUCONATE 0.12% ORAL RINSE 15 ML: 1.2 LIQUID ORAL at 20:13

## 2024-01-01 RX ADMIN — MODAFINIL 100 MG: 100 TABLET ORAL at 09:09

## 2024-01-01 RX ADMIN — HEPARIN SODIUM 18 UNITS/KG/HR: 10000 INJECTION, SOLUTION INTRAVENOUS at 21:20

## 2024-01-01 RX ADMIN — CHLORHEXIDINE GLUCONATE 0.12% ORAL RINSE 15 ML: 1.2 LIQUID ORAL at 20:31

## 2024-01-01 RX ADMIN — SODIUM CHLORIDE SOLN NEBU 3% 4 ML: 3 NEBU SOLN at 07:59

## 2024-01-01 RX ADMIN — SENNOSIDES AND DOCUSATE SODIUM 2 TABLET: 50; 8.6 TABLET ORAL at 17:19

## 2024-01-01 RX ADMIN — HEPARIN SODIUM 18 UNITS/KG/HR: 10000 INJECTION, SOLUTION INTRAVENOUS at 03:02

## 2024-01-01 RX ADMIN — Medication 6 MG: at 21:00

## 2024-01-01 RX ADMIN — VALPROIC ACID 250 MG: 500 SOLUTION ORAL at 17:06

## 2024-01-01 RX ADMIN — SODIUM CHLORIDE, SODIUM GLUCONATE, SODIUM ACETATE, POTASSIUM CHLORIDE, MAGNESIUM CHLORIDE, SODIUM PHOSPHATE, DIBASIC, AND POTASSIUM PHOSPHATE 1000 ML: .53; .5; .37; .037; .03; .012; .00082 INJECTION, SOLUTION INTRAVENOUS at 19:25

## 2024-01-01 RX ADMIN — IPRATROPIUM BROMIDE 0.5 MG: 0.5 SOLUTION RESPIRATORY (INHALATION) at 07:41

## 2024-01-01 RX ADMIN — HEPARIN SODIUM 19 UNITS/KG/HR: 10000 INJECTION, SOLUTION INTRAVENOUS at 15:28

## 2024-01-01 RX ADMIN — ATORVASTATIN CALCIUM 20 MG: 20 TABLET, FILM COATED ORAL at 17:33

## 2024-01-01 RX ADMIN — PIPERACILLIN SODIUM AND TAZOBACTAM SODIUM 4.5 G: 36; 4.5 INJECTION, POWDER, LYOPHILIZED, FOR SOLUTION INTRAVENOUS at 22:11

## 2024-01-01 RX ADMIN — MODAFINIL 100 MG: 100 TABLET ORAL at 09:10

## 2024-01-01 RX ADMIN — PIPERACILLIN SODIUM AND TAZOBACTAM SODIUM 4.5 G: 36; 4.5 INJECTION, POWDER, LYOPHILIZED, FOR SOLUTION INTRAVENOUS at 17:50

## 2024-01-01 RX ADMIN — HEPARIN SODIUM 15 UNITS/KG/HR: 10000 INJECTION, SOLUTION INTRAVENOUS at 17:30

## 2024-01-01 RX ADMIN — DEXMEDETOMIDINE HYDROCHLORIDE 0.5 MCG/KG/HR: 400 INJECTION INTRAVENOUS at 01:52

## 2024-01-01 RX ADMIN — DEXMEDETOMIDINE HYDROCHLORIDE 0.5 MCG/KG/HR: 400 INJECTION INTRAVENOUS at 12:21

## 2024-01-01 RX ADMIN — IOHEXOL 85 ML: 350 INJECTION, SOLUTION INTRAVENOUS at 09:26

## 2024-01-01 RX ADMIN — CALCIUM CHLORIDE 1 G: 100 INJECTION INTRAVENOUS; INTRAVENTRICULAR at 13:56

## 2024-01-01 RX ADMIN — SERTRALINE HYDROCHLORIDE 50 MG: 50 TABLET ORAL at 08:25

## 2024-01-01 RX ADMIN — LEVALBUTEROL HYDROCHLORIDE 1.25 MG: 1.25 SOLUTION RESPIRATORY (INHALATION) at 19:57

## 2024-01-01 RX ADMIN — POTASSIUM CHLORIDE 40 MEQ: 20 SOLUTION ORAL at 17:07

## 2024-01-01 RX ADMIN — ACETAMINOPHEN 650 MG: 650 SUSPENSION ORAL at 21:15

## 2024-01-01 RX ADMIN — LORAZEPAM 2 MG: 2 INJECTION INTRAMUSCULAR; INTRAVENOUS at 12:52

## 2024-01-01 RX ADMIN — LEVALBUTEROL HYDROCHLORIDE 1.25 MG: 1.25 SOLUTION RESPIRATORY (INHALATION) at 14:23

## 2024-01-01 RX ADMIN — LEVALBUTEROL HYDROCHLORIDE 1.25 MG: 1.25 SOLUTION RESPIRATORY (INHALATION) at 13:01

## 2024-01-01 RX ADMIN — FENTANYL CITRATE 25 MCG: 50 INJECTION INTRAMUSCULAR; INTRAVENOUS at 14:26

## 2024-01-01 RX ADMIN — QUETIAPINE FUMARATE 50 MG: 25 TABLET ORAL at 18:18

## 2024-01-01 RX ADMIN — VASOPRESSIN 0.04 UNITS/MIN: 20 INJECTION INTRAVENOUS at 23:07

## 2024-01-01 RX ADMIN — FENTANYL CITRATE 50 MCG: 50 INJECTION INTRAMUSCULAR; INTRAVENOUS at 18:22

## 2024-01-01 RX ADMIN — POTASSIUM CHLORIDE 40 MEQ: 20 SOLUTION ORAL at 15:53

## 2024-01-01 RX ADMIN — Medication 6 MG: at 21:18

## 2024-01-01 RX ADMIN — CALCIUM GLUCONATE 3 G: 98 INJECTION, SOLUTION INTRAVENOUS at 12:00

## 2024-01-01 RX ADMIN — VALPROIC ACID 250 MG: 500 SOLUTION ORAL at 00:13

## 2024-01-01 RX ADMIN — POTASSIUM CHLORIDE 40 MEQ: 20 SOLUTION ORAL at 12:01

## 2024-01-01 RX ADMIN — POTASSIUM & SODIUM PHOSPHATES POWDER PACK 280-160-250 MG 2 PACKET: 280-160-250 PACK at 08:12

## 2024-01-01 RX ADMIN — FENTANYL CITRATE 25 MCG: 50 INJECTION, SOLUTION INTRAMUSCULAR; INTRAVENOUS at 13:09

## 2024-01-01 RX ADMIN — FUROSEMIDE 40 MG: 10 INJECTION, SOLUTION INTRAMUSCULAR; INTRAVENOUS at 20:41

## 2024-01-01 RX ADMIN — CHLORHEXIDINE GLUCONATE 0.12% ORAL RINSE 15 ML: 1.2 LIQUID ORAL at 08:03

## 2024-01-01 RX ADMIN — PIPERACILLIN SODIUM AND TAZOBACTAM SODIUM 4.5 G: 36; 4.5 INJECTION, POWDER, LYOPHILIZED, FOR SOLUTION INTRAVENOUS at 12:12

## 2024-01-01 RX ADMIN — SENNOSIDES AND DOCUSATE SODIUM 2 TABLET: 50; 8.6 TABLET ORAL at 10:31

## 2024-01-01 RX ADMIN — PIPERACILLIN SODIUM AND TAZOBACTAM SODIUM 4.5 G: 36; 4.5 INJECTION, POWDER, LYOPHILIZED, FOR SOLUTION INTRAVENOUS at 02:16

## 2024-01-01 RX ADMIN — LEVALBUTEROL HYDROCHLORIDE 1.25 MG: 1.25 SOLUTION RESPIRATORY (INHALATION) at 20:14

## 2024-01-01 RX ADMIN — PHENYLEPHRINE HYDROCHLORIDE 50 MCG/MIN: 50 INJECTION INTRAVENOUS at 21:31

## 2024-01-01 RX ADMIN — POTASSIUM CHLORIDE 40 MEQ: 29.8 INJECTION, SOLUTION INTRAVENOUS at 05:29

## 2024-01-01 RX ADMIN — SODIUM CHLORIDE SOLN NEBU 3% 4 ML: 3 NEBU SOLN at 01:03

## 2024-01-01 RX ADMIN — ATORVASTATIN CALCIUM 20 MG: 20 TABLET, FILM COATED ORAL at 17:15

## 2024-01-01 RX ADMIN — FENTANYL CITRATE 50 MCG: 50 INJECTION INTRAMUSCULAR; INTRAVENOUS at 16:07

## 2024-01-01 RX ADMIN — MAGNESIUM SULFATE HEPTAHYDRATE 2 G: 40 INJECTION, SOLUTION INTRAVENOUS at 11:24

## 2024-01-01 RX ADMIN — MIDODRINE HYDROCHLORIDE 5 MG: 5 TABLET ORAL at 16:05

## 2024-01-01 RX ADMIN — ALBUTEROL SULFATE 2.5 MG: 2.5 SOLUTION RESPIRATORY (INHALATION) at 00:10

## 2024-01-01 RX ADMIN — POLYETHYLENE GLYCOL 3350 17 G: 17 POWDER, FOR SOLUTION ORAL at 08:08

## 2024-01-01 RX ADMIN — Medication 12.5 MG: at 08:31

## 2024-01-01 RX ADMIN — Medication 750 MG: at 08:58

## 2024-01-01 RX ADMIN — PIPERACILLIN SODIUM AND TAZOBACTAM SODIUM 4.5 G: 36; 4.5 INJECTION, POWDER, LYOPHILIZED, FOR SOLUTION INTRAVENOUS at 17:20

## 2024-01-01 RX ADMIN — POTASSIUM CHLORIDE 40 MEQ: 20 SOLUTION ORAL at 21:14

## 2024-01-01 RX ADMIN — EPINEPHRINE 5 MCG/MIN: 1 INJECTION INTRAMUSCULAR; INTRAVENOUS; SUBCUTANEOUS at 03:02

## 2024-01-01 RX ADMIN — CHLORHEXIDINE GLUCONATE 0.12% ORAL RINSE 15 ML: 1.2 LIQUID ORAL at 08:06

## 2024-01-01 RX ADMIN — LEVETIRACETAM 1000 MG: 100 INJECTION, SOLUTION INTRAVENOUS at 08:03

## 2024-01-01 RX ADMIN — HEPARIN SODIUM 8 UNITS/KG/HR: 10000 INJECTION, SOLUTION INTRAVENOUS at 10:46

## 2024-01-01 RX ADMIN — ACETAMINOPHEN 650 MG: 650 SUSPENSION ORAL at 05:00

## 2024-01-01 RX ADMIN — FENTANYL CITRATE 50 MCG: 50 INJECTION, SOLUTION INTRAMUSCULAR; INTRAVENOUS at 16:07

## 2024-01-01 RX ADMIN — PROPOFOL 15 MCG/KG/MIN: 10 INJECTION, EMULSION INTRAVENOUS at 01:47

## 2024-01-01 RX ADMIN — CHLORHEXIDINE GLUCONATE 0.12% ORAL RINSE 15 ML: 1.2 LIQUID ORAL at 21:38

## 2024-01-01 RX ADMIN — EPINEPHRINE 1 MG: 0.1 INJECTION INTRAVENOUS at 13:30

## 2024-01-01 RX ADMIN — LEVETIRACETAM 500 MG: 100 INJECTION, SOLUTION INTRAVENOUS at 15:52

## 2024-01-01 RX ADMIN — MINERAL OIL AND WHITE PETROLATUM: 150; 830 OINTMENT OPHTHALMIC at 20:32

## 2024-01-01 RX ADMIN — LEVALBUTEROL HYDROCHLORIDE 1.25 MG: 1.25 SOLUTION RESPIRATORY (INHALATION) at 07:09

## 2024-01-01 RX ADMIN — SODIUM CHLORIDE 100 ML/HR: 0.9 INJECTION, SOLUTION INTRAVENOUS at 02:44

## 2024-01-01 RX ADMIN — ATORVASTATIN CALCIUM 20 MG: 20 TABLET, FILM COATED ORAL at 18:22

## 2024-01-01 RX ADMIN — CHLORHEXIDINE GLUCONATE 0.12% ORAL RINSE 15 ML: 1.2 LIQUID ORAL at 08:07

## 2024-01-01 RX ADMIN — SENNOSIDES AND DOCUSATE SODIUM 2 TABLET: 50; 8.6 TABLET ORAL at 08:38

## 2024-01-01 RX ADMIN — LEVETIRACETAM 500 MG: 100 INJECTION, SOLUTION INTRAVENOUS at 09:40

## 2024-01-01 RX ADMIN — MIDAZOLAM 4 MG/HR: 5 INJECTION INTRAMUSCULAR; INTRAVENOUS at 03:06

## 2024-01-01 RX ADMIN — BROMOCRIPTINE MESYLATE 5 MG: 2.5 TABLET ORAL at 00:45

## 2024-01-01 RX ADMIN — SODIUM PHOSPHATE, MONOBASIC, MONOHYDRATE AND SODIUM PHOSPHATE, DIBASIC, ANHYDROUS 12 MMOL: 142; 276 INJECTION, SOLUTION INTRAVENOUS at 11:52

## 2024-01-01 RX ADMIN — POTASSIUM CHLORIDE 40 MEQ: 20 SOLUTION ORAL at 10:47

## 2024-01-01 RX ADMIN — SERTRALINE HYDROCHLORIDE 50 MG: 20 SOLUTION ORAL at 09:26

## 2024-01-01 RX ADMIN — HEPARIN SODIUM 22 UNITS/KG/HR: 10000 INJECTION, SOLUTION INTRAVENOUS at 10:42

## 2024-01-01 RX ADMIN — CEFAZOLIN SODIUM 2000 MG: 2 SOLUTION INTRAVENOUS at 23:36

## 2024-01-01 RX ADMIN — EPINEPHRINE 1 MG: 0.1 INJECTION INTRAVENOUS at 13:26

## 2024-01-01 RX ADMIN — CHLORHEXIDINE GLUCONATE 0.12% ORAL RINSE 15 ML: 1.2 LIQUID ORAL at 15:33

## 2024-01-01 RX ADMIN — VANCOMYCIN HYDROCHLORIDE 1250 MG: 10 INJECTION, POWDER, LYOPHILIZED, FOR SOLUTION INTRAVENOUS at 09:26

## 2024-01-01 RX ADMIN — HEPARIN SODIUM 5000 UNITS: 5000 INJECTION, SOLUTION INTRAVENOUS; SUBCUTANEOUS at 05:31

## 2024-01-01 RX ADMIN — NOREPINEPHRINE BITARTRATE 7 MCG/MIN: 1 INJECTION, SOLUTION, CONCENTRATE INTRAVENOUS at 11:16

## 2024-01-01 RX ADMIN — CALCIUM CHLORIDE 1 G: 100 INJECTION INTRAVENOUS; INTRAVENTRICULAR at 14:05

## 2024-01-01 RX ADMIN — FUROSEMIDE 40 MG: 10 INJECTION, SOLUTION INTRAMUSCULAR; INTRAVENOUS at 00:47

## 2024-01-01 RX ADMIN — PHENYLEPHRINE HYDROCHLORIDE 40 MCG/MIN: 50 INJECTION INTRAVENOUS at 05:05

## 2024-01-01 RX ADMIN — POLYETHYLENE GLYCOL 3350 17 G: 17 POWDER, FOR SOLUTION ORAL at 08:22

## 2024-01-01 RX ADMIN — EPINEPHRINE 1 MG: 0.1 INJECTION INTRAVENOUS at 13:33

## 2024-01-01 RX ADMIN — VALPROIC ACID 250 MG: 500 SOLUTION ORAL at 05:43

## 2024-01-01 RX ADMIN — BROMOCRIPTINE MESYLATE 2.5 MG: 2.5 TABLET ORAL at 17:44

## 2024-01-01 RX ADMIN — PROPOFOL 30 MCG/KG/MIN: 10 INJECTION, EMULSION INTRAVENOUS at 10:10

## 2024-01-01 RX ADMIN — IPRATROPIUM BROMIDE 0.5 MG: 0.5 SOLUTION RESPIRATORY (INHALATION) at 07:11

## 2024-01-01 RX ADMIN — SENNOSIDES AND DOCUSATE SODIUM 2 TABLET: 50; 8.6 TABLET ORAL at 08:09

## 2024-01-01 RX ADMIN — SODIUM CHLORIDE SOLN NEBU 3% 4 ML: 3 NEBU SOLN at 13:15

## 2024-01-01 RX ADMIN — VASOPRESSIN 0.04 UNITS/MIN: 20 INJECTION INTRAVENOUS at 21:30

## 2024-01-01 RX ADMIN — SODIUM CHLORIDE SOLN NEBU 3% 4 ML: 3 NEBU SOLN at 07:24

## 2024-01-01 RX ADMIN — NOREPINEPHRINE BITARTRATE 7 MCG/MIN: 1 INJECTION, SOLUTION, CONCENTRATE INTRAVENOUS at 04:47

## 2024-01-01 RX ADMIN — ACETAMINOPHEN 650 MG: 650 SUPPOSITORY RECTAL at 00:51

## 2024-01-01 RX ADMIN — FUROSEMIDE 40 MG: 10 INJECTION, SOLUTION INTRAMUSCULAR; INTRAVENOUS at 16:34

## 2024-01-01 RX ADMIN — LEVETIRACETAM 500 MG: 100 INJECTION, SOLUTION INTRAVENOUS at 20:59

## 2024-01-01 RX ADMIN — RACEPINEPHRINE HYDROCHLORIDE 0.5 ML: 11.25 SOLUTION RESPIRATORY (INHALATION) at 10:08

## 2024-01-01 RX ADMIN — NOREPINEPHRINE BITARTRATE 10 MCG/MIN: 1 INJECTION, SOLUTION, CONCENTRATE INTRAVENOUS at 19:15

## 2024-01-01 RX ADMIN — ASPIRIN 81 MG CHEWABLE TABLET 81 MG: 81 TABLET CHEWABLE at 08:02

## 2024-01-01 RX ADMIN — ATORVASTATIN CALCIUM 20 MG: 20 TABLET, FILM COATED ORAL at 18:33

## 2024-01-01 RX ADMIN — ASPIRIN 81 MG CHEWABLE TABLET 81 MG: 81 TABLET CHEWABLE at 08:05

## 2024-01-01 RX ADMIN — ASPIRIN 81 MG CHEWABLE TABLET 81 MG: 81 TABLET CHEWABLE at 08:38

## 2024-01-01 RX ADMIN — PIPERACILLIN SODIUM AND TAZOBACTAM SODIUM 4.5 G: 36; 4.5 INJECTION, POWDER, LYOPHILIZED, FOR SOLUTION INTRAVENOUS at 21:03

## 2024-01-01 RX ADMIN — MINERAL OIL AND WHITE PETROLATUM: 150; 830 OINTMENT OPHTHALMIC at 22:27

## 2024-01-01 RX ADMIN — SODIUM CHLORIDE 30 ML/HR: 3 INJECTION, SOLUTION INTRAVENOUS at 20:01

## 2024-01-01 RX ADMIN — SODIUM CHLORIDE, SODIUM GLUCONATE, SODIUM ACETATE, POTASSIUM CHLORIDE, MAGNESIUM CHLORIDE, SODIUM PHOSPHATE, DIBASIC, AND POTASSIUM PHOSPHATE 1000 ML: .53; .5; .37; .037; .03; .012; .00082 INJECTION, SOLUTION INTRAVENOUS at 18:40

## 2024-01-01 RX ADMIN — IPRATROPIUM BROMIDE 0.5 MG: 0.5 SOLUTION RESPIRATORY (INHALATION) at 20:14

## 2024-01-01 RX ADMIN — FUROSEMIDE 40 MG: 10 INJECTION, SOLUTION INTRAMUSCULAR; INTRAVENOUS at 14:23

## 2024-01-01 RX ADMIN — POTASSIUM CHLORIDE 40 MEQ: 20 SOLUTION ORAL at 08:12

## 2024-01-01 RX ADMIN — MIDODRINE HYDROCHLORIDE 5 MG: 5 TABLET ORAL at 01:34

## 2024-01-01 RX ADMIN — ACETAMINOPHEN 650 MG: 650 SUSPENSION ORAL at 08:09

## 2024-01-01 RX ADMIN — SODIUM BICARBONATE 100 MEQ: 84 INJECTION, SOLUTION INTRAVENOUS at 13:52

## 2024-01-01 RX ADMIN — DEXMEDETOMIDINE HYDROCHLORIDE 0.2 MCG/KG/HR: 400 INJECTION INTRAVENOUS at 08:26

## 2024-01-01 RX ADMIN — INSULIN LISPRO 1 UNITS: 100 INJECTION, SOLUTION INTRAVENOUS; SUBCUTANEOUS at 11:40

## 2024-01-01 RX ADMIN — VASOPRESSIN 0.04 UNITS/MIN: 20 INJECTION INTRAVENOUS at 06:56

## 2024-01-01 RX ADMIN — FENTANYL CITRATE 50 MCG: 50 INJECTION INTRAMUSCULAR; INTRAVENOUS at 04:19

## 2024-01-01 RX ADMIN — SODIUM CHLORIDE SOLN NEBU 3% 4 ML: 3 NEBU SOLN at 02:57

## 2024-01-01 RX ADMIN — Medication 750 MG: at 03:59

## 2024-01-01 RX ADMIN — SERTRALINE HYDROCHLORIDE 50 MG: 50 TABLET ORAL at 09:10

## 2024-01-01 RX ADMIN — SODIUM CHLORIDE SOLN NEBU 3% 4 ML: 3 NEBU SOLN at 08:11

## 2024-01-01 RX ADMIN — EPINEPHRINE 1 MG: 0.1 INJECTION INTRAVENOUS at 13:36

## 2024-01-01 RX ADMIN — VALPROIC ACID 250 MG: 500 SOLUTION ORAL at 11:09

## 2024-01-01 RX ADMIN — SENNOSIDES AND DOCUSATE SODIUM 2 TABLET: 50; 8.6 TABLET ORAL at 09:11

## 2024-01-01 RX ADMIN — MIDODRINE HYDROCHLORIDE 5 MG: 5 TABLET ORAL at 10:47

## 2024-01-01 RX ADMIN — SENNOSIDES AND DOCUSATE SODIUM 1 TABLET: 50; 8.6 TABLET ORAL at 21:27

## 2024-01-01 RX ADMIN — PIPERACILLIN SODIUM AND TAZOBACTAM SODIUM 4.5 G: 36; 4.5 INJECTION, POWDER, LYOPHILIZED, FOR SOLUTION INTRAVENOUS at 21:24

## 2024-01-01 RX ADMIN — SERTRALINE HYDROCHLORIDE 50 MG: 50 TABLET ORAL at 10:30

## 2024-01-01 RX ADMIN — ATORVASTATIN CALCIUM 40 MG: 40 TABLET, FILM COATED ORAL at 17:49

## 2024-01-01 RX ADMIN — MINERAL OIL AND WHITE PETROLATUM: 150; 830 OINTMENT OPHTHALMIC at 21:19

## 2024-01-01 RX ADMIN — CHLORHEXIDINE GLUCONATE 0.12% ORAL RINSE 15 ML: 1.2 LIQUID ORAL at 08:02

## 2024-01-01 RX ADMIN — CALCIUM GLUCONATE 3 G: 98 INJECTION, SOLUTION INTRAVENOUS at 13:56

## 2024-01-01 RX ADMIN — DEXTROSE 150 MG: 50 INJECTION, SOLUTION INTRAVENOUS at 20:41

## 2024-01-01 RX ADMIN — DEXMEDETOMIDINE HYDROCHLORIDE 0.7 MCG/KG/HR: 400 INJECTION INTRAVENOUS at 04:47

## 2024-01-01 RX ADMIN — CHLORHEXIDINE GLUCONATE 0.12% ORAL RINSE 15 ML: 1.2 LIQUID ORAL at 21:47

## 2024-01-01 RX ADMIN — NOREPINEPHRINE BITARTRATE 18 MCG/MIN: 1 INJECTION, SOLUTION, CONCENTRATE INTRAVENOUS at 05:15

## 2024-01-01 RX ADMIN — ASPIRIN 81 MG CHEWABLE TABLET 81 MG: 81 TABLET CHEWABLE at 09:10

## 2024-01-01 RX ADMIN — SODIUM BICARBONATE 100 MEQ: 84 INJECTION, SOLUTION INTRAVENOUS at 13:37

## 2024-01-01 RX ADMIN — LEVALBUTEROL HYDROCHLORIDE 1.25 MG: 1.25 SOLUTION RESPIRATORY (INHALATION) at 19:07

## 2024-01-01 RX ADMIN — LORAZEPAM 2 MG: 2 INJECTION INTRAMUSCULAR; INTRAVENOUS at 15:28

## 2024-01-01 RX ADMIN — SODIUM CHLORIDE 50 ML/HR: 3 INJECTION, SOLUTION INTRAVENOUS at 09:45

## 2024-01-01 RX ADMIN — PROPOFOL 45 MCG/KG/MIN: 10 INJECTION, EMULSION INTRAVENOUS at 05:36

## 2024-01-01 RX ADMIN — IPRATROPIUM BROMIDE 0.5 MG: 0.5 SOLUTION RESPIRATORY (INHALATION) at 07:10

## 2024-01-01 RX ADMIN — SODIUM CHLORIDE SOLN NEBU 3% 4 ML: 3 NEBU SOLN at 14:48

## 2024-01-01 RX ADMIN — MIDODRINE HYDROCHLORIDE 5 MG: 5 TABLET ORAL at 10:45

## 2024-01-01 RX ADMIN — ALBUTEROL SULFATE 2.5 MG: 2.5 SOLUTION RESPIRATORY (INHALATION) at 02:57

## 2024-01-01 RX ADMIN — PIPERACILLIN SODIUM AND TAZOBACTAM SODIUM 4.5 G: 36; 4.5 INJECTION, POWDER, LYOPHILIZED, FOR SOLUTION INTRAVENOUS at 05:11

## 2024-01-01 RX ADMIN — Medication 6 MG: at 21:50

## 2024-01-01 RX ADMIN — INSULIN LISPRO 1 UNITS: 100 INJECTION, SOLUTION INTRAVENOUS; SUBCUTANEOUS at 01:52

## 2024-01-01 RX ADMIN — FENTANYL CITRATE 100 MCG: 50 INJECTION INTRAMUSCULAR; INTRAVENOUS at 17:32

## 2024-01-01 RX ADMIN — IPRATROPIUM BROMIDE 0.5 MG: 0.5 SOLUTION RESPIRATORY (INHALATION) at 13:28

## 2024-01-01 RX ADMIN — SODIUM CHLORIDE 30 ML: 4 INJECTION, SOLUTION, CONCENTRATE INTRAVENOUS at 18:26

## 2024-01-01 RX ADMIN — ASPIRIN 81 MG CHEWABLE TABLET 81 MG: 81 TABLET CHEWABLE at 08:03

## 2024-01-01 RX ADMIN — INSULIN LISPRO 1 UNITS: 100 INJECTION, SOLUTION INTRAVENOUS; SUBCUTANEOUS at 18:06

## 2024-01-01 RX ADMIN — POTASSIUM CHLORIDE 40 MEQ: 29.8 INJECTION, SOLUTION INTRAVENOUS at 15:10

## 2024-01-01 RX ADMIN — PIPERACILLIN SODIUM AND TAZOBACTAM SODIUM 4.5 G: 36; 4.5 INJECTION, POWDER, LYOPHILIZED, FOR SOLUTION INTRAVENOUS at 18:25

## 2024-01-01 RX ADMIN — NOREPINEPHRINE BITARTRATE 13 MCG/MIN: 1 INJECTION, SOLUTION, CONCENTRATE INTRAVENOUS at 23:57

## 2024-01-01 RX ADMIN — LEVETIRACETAM 1000 MG: 100 SOLUTION ORAL at 09:24

## 2024-01-01 RX ADMIN — BROMOCRIPTINE MESYLATE 7.5 MG: 2.5 TABLET ORAL at 01:03

## 2024-01-01 RX ADMIN — LORAZEPAM 2 MG: 2 INJECTION INTRAMUSCULAR at 14:00

## 2024-01-01 RX ADMIN — BROMOCRIPTINE MESYLATE 7.5 MG: 2.5 TABLET ORAL at 06:06

## 2024-01-01 RX ADMIN — INSULIN LISPRO 1 UNITS: 100 INJECTION, SOLUTION INTRAVENOUS; SUBCUTANEOUS at 12:09

## 2024-01-01 RX ADMIN — CHLORHEXIDINE GLUCONATE 0.12% ORAL RINSE 15 ML: 1.2 LIQUID ORAL at 21:19

## 2024-01-01 RX ADMIN — MINERAL OIL AND WHITE PETROLATUM: 150; 830 OINTMENT OPHTHALMIC at 20:41

## 2024-01-01 RX ADMIN — SENNOSIDES AND DOCUSATE SODIUM 2 TABLET: 50; 8.6 TABLET ORAL at 09:10

## 2024-01-01 RX ADMIN — MIDODRINE HYDROCHLORIDE 5 MG: 5 TABLET ORAL at 23:36

## 2024-01-01 RX ADMIN — CHLORHEXIDINE GLUCONATE 0.12% ORAL RINSE 15 ML: 1.2 LIQUID ORAL at 22:26

## 2024-01-01 RX ADMIN — NOREPINEPHRINE BITARTRATE 17 MCG/MIN: 1 INJECTION, SOLUTION, CONCENTRATE INTRAVENOUS at 12:02

## 2024-01-01 RX ADMIN — HEPARIN SODIUM 18 UNITS/KG/HR: 10000 INJECTION, SOLUTION INTRAVENOUS at 01:28

## 2024-01-01 RX ADMIN — ASPIRIN 81 MG CHEWABLE TABLET 81 MG: 81 TABLET CHEWABLE at 08:34

## 2024-01-01 RX ADMIN — PIPERACILLIN SODIUM AND TAZOBACTAM SODIUM 4.5 G: 36; 4.5 INJECTION, POWDER, LYOPHILIZED, FOR SOLUTION INTRAVENOUS at 10:38

## 2024-01-01 RX ADMIN — LEVALBUTEROL HYDROCHLORIDE 1.25 MG: 1.25 SOLUTION RESPIRATORY (INHALATION) at 13:49

## 2024-01-01 RX ADMIN — SENNOSIDES AND DOCUSATE SODIUM 2 TABLET: 50; 8.6 TABLET ORAL at 09:09

## 2024-01-01 RX ADMIN — DEXMEDETOMIDINE HYDROCHLORIDE 0.2 MCG/KG/HR: 400 INJECTION INTRAVENOUS at 16:49

## 2024-01-01 RX ADMIN — NOREPINEPHRINE BITARTRATE 4 MG: 1 INJECTION, SOLUTION, CONCENTRATE INTRAVENOUS at 14:56

## 2024-01-01 RX ADMIN — FENTANYL CITRATE 25 MCG: 50 INJECTION INTRAMUSCULAR; INTRAVENOUS at 12:15

## 2024-01-01 RX ADMIN — ROCURONIUM 50 MG: 50 INJECTION, SOLUTION INTRAVENOUS at 14:37

## 2024-01-01 RX ADMIN — CEFAZOLIN SODIUM 2000 MG: 2 SOLUTION INTRAVENOUS at 17:07

## 2024-01-01 RX ADMIN — INSULIN LISPRO 1 UNITS: 100 INJECTION, SOLUTION INTRAVENOUS; SUBCUTANEOUS at 11:32

## 2024-01-01 RX ADMIN — ATORVASTATIN CALCIUM 20 MG: 20 TABLET, FILM COATED ORAL at 18:18

## 2024-01-01 RX ADMIN — LEVETIRACETAM 500 MG: 100 INJECTION, SOLUTION INTRAVENOUS at 20:43

## 2024-01-01 RX ADMIN — DEXMEDETOMIDINE HYDROCHLORIDE 0.5 MCG/KG/HR: 400 INJECTION INTRAVENOUS at 14:37

## 2024-01-01 RX ADMIN — BROMOCRIPTINE MESYLATE 5 MG: 2.5 TABLET ORAL at 12:55

## 2024-01-01 RX ADMIN — VALPROIC ACID 250 MG: 500 SOLUTION ORAL at 00:18

## 2024-01-01 RX ADMIN — POTASSIUM CHLORIDE 20 MEQ: 14.9 INJECTION, SOLUTION INTRAVENOUS at 15:44

## 2024-01-01 RX ADMIN — IPRATROPIUM BROMIDE 0.5 MG: 0.5 SOLUTION RESPIRATORY (INHALATION) at 19:57

## 2024-01-01 RX ADMIN — IPRATROPIUM BROMIDE 0.5 MG: 0.5 SOLUTION RESPIRATORY (INHALATION) at 07:55

## 2024-01-01 RX ADMIN — ASPIRIN 81 MG CHEWABLE TABLET 81 MG: 81 TABLET CHEWABLE at 08:09

## 2024-01-01 RX ADMIN — SODIUM CHLORIDE SOLN NEBU 3% 4 ML: 3 NEBU SOLN at 14:00

## 2024-01-01 RX ADMIN — VASOPRESSIN 0.04 UNITS/MIN: 20 INJECTION INTRAVENOUS at 14:18

## 2024-01-01 RX ADMIN — NOREPINEPHRINE BITARTRATE 18 MCG/MIN: 1 INJECTION, SOLUTION, CONCENTRATE INTRAVENOUS at 20:10

## 2024-01-01 RX ADMIN — CHLORHEXIDINE GLUCONATE 0.12% ORAL RINSE 15 ML: 1.2 LIQUID ORAL at 08:26

## 2024-01-01 RX ADMIN — Medication 750 MG: at 15:38

## 2024-01-01 RX ADMIN — SODIUM CHLORIDE SOLN NEBU 3% 4 ML: 3 NEBU SOLN at 19:22

## 2024-01-01 RX ADMIN — POTASSIUM CHLORIDE 40 MEQ: 20 SOLUTION ORAL at 08:26

## 2024-01-01 RX ADMIN — PIPERACILLIN SODIUM AND TAZOBACTAM SODIUM 4.5 G: 36; 4.5 INJECTION, POWDER, LYOPHILIZED, FOR SOLUTION INTRAVENOUS at 05:43

## 2024-01-01 RX ADMIN — LEVETIRACETAM 1500 MG: 100 INJECTION, SOLUTION INTRAVENOUS at 21:17

## 2024-01-01 RX ADMIN — DEXTROSE MONOHYDRATE 50 ML: 25 INJECTION, SOLUTION INTRAVENOUS at 14:11

## 2024-01-01 RX ADMIN — SODIUM CHLORIDE SOLN NEBU 3% 4 ML: 3 NEBU SOLN at 14:04

## 2024-01-01 RX ADMIN — SODIUM CHLORIDE SOLN NEBU 3% 4 ML: 3 NEBU SOLN at 13:56

## 2024-01-01 RX ADMIN — SODIUM CHLORIDE SOLN NEBU 3% 4 ML: 3 NEBU SOLN at 01:01

## 2024-01-01 RX ADMIN — MIDAZOLAM 1 MG/HR: 5 INJECTION INTRAMUSCULAR; INTRAVENOUS at 09:55

## 2024-01-01 RX ADMIN — ACETAMINOPHEN 650 MG: 650 SUSPENSION ORAL at 10:31

## 2024-01-01 RX ADMIN — LORAZEPAM 2 MG: 2 INJECTION INTRAMUSCULAR; INTRAVENOUS at 00:55

## 2024-01-01 RX ADMIN — POTASSIUM CHLORIDE 40 MEQ: 20 SOLUTION ORAL at 11:24

## 2024-01-01 RX ADMIN — LORAZEPAM 2 MG: 2 INJECTION INTRAMUSCULAR at 09:20

## 2024-01-01 RX ADMIN — ATORVASTATIN CALCIUM 20 MG: 20 TABLET, FILM COATED ORAL at 17:07

## 2024-01-01 RX ADMIN — PROPOFOL 5 MCG/KG/MIN: 10 INJECTION, EMULSION INTRAVENOUS at 21:50

## 2024-01-01 RX ADMIN — DESMOPRESSIN ACETATE 1 MCG: 4 SOLUTION INTRAVENOUS at 13:36

## 2024-01-01 RX ADMIN — NOREPINEPHRINE BITARTRATE 5 MCG/MIN: 1 INJECTION, SOLUTION, CONCENTRATE INTRAVENOUS at 22:23

## 2024-01-01 RX ADMIN — EPINEPHRINE 2 MCG/MIN: 1 INJECTION INTRAMUSCULAR; INTRAVENOUS; SUBCUTANEOUS at 16:47

## 2024-01-01 RX ADMIN — LEVETIRACETAM 500 MG: 100 INJECTION, SOLUTION INTRAVENOUS at 20:42

## 2024-01-01 RX ADMIN — ATORVASTATIN CALCIUM 20 MG: 20 TABLET, FILM COATED ORAL at 17:55

## 2024-01-01 RX ADMIN — HEPARIN SODIUM 17 UNITS/KG/HR: 10000 INJECTION, SOLUTION INTRAVENOUS at 21:00

## 2024-01-01 RX ADMIN — ALBUTEROL SULFATE 2.5 MG: 2.5 SOLUTION RESPIRATORY (INHALATION) at 19:51

## 2024-01-01 RX ADMIN — SERTRALINE HYDROCHLORIDE 50 MG: 50 TABLET ORAL at 08:09

## 2024-01-01 RX ADMIN — NOREPINEPHRINE BITARTRATE 5 MCG/MIN: 1 INJECTION, SOLUTION, CONCENTRATE INTRAVENOUS at 21:55

## 2024-01-01 RX ADMIN — IOHEXOL 75 ML: 350 INJECTION, SOLUTION INTRAVENOUS at 13:06

## 2024-01-01 RX ADMIN — SODIUM CHLORIDE 50 ML/HR: 3 INJECTION, SOLUTION INTRAVENOUS at 23:24

## 2024-01-01 RX ADMIN — HEPARIN SODIUM 5000 UNITS: 5000 INJECTION, SOLUTION INTRAVENOUS; SUBCUTANEOUS at 11:17

## 2024-01-01 RX ADMIN — SENNOSIDES AND DOCUSATE SODIUM 2 TABLET: 50; 8.6 TABLET ORAL at 08:03

## 2024-01-01 RX ADMIN — INSULIN LISPRO 1 UNITS: 100 INJECTION, SOLUTION INTRAVENOUS; SUBCUTANEOUS at 00:24

## 2024-01-01 RX ADMIN — POTASSIUM CHLORIDE 40 MEQ: 20 SOLUTION ORAL at 11:12

## 2024-01-01 RX ADMIN — LORAZEPAM 2 MG: 2 INJECTION INTRAMUSCULAR; INTRAVENOUS at 09:45

## 2024-01-01 RX ADMIN — ALBUMIN HUMAN 25 G: 50 SOLUTION INTRAVENOUS at 15:20

## 2024-01-01 RX ADMIN — PIPERACILLIN SODIUM AND TAZOBACTAM SODIUM 4.5 G: 36; 4.5 INJECTION, POWDER, LYOPHILIZED, FOR SOLUTION INTRAVENOUS at 05:07

## 2024-01-01 RX ADMIN — POTASSIUM CHLORIDE 20 MEQ: 14.9 INJECTION, SOLUTION INTRAVENOUS at 08:22

## 2024-01-01 RX ADMIN — ROCURONIUM 50 MG: 50 INJECTION, SOLUTION INTRAVENOUS at 17:32

## 2024-01-01 RX ADMIN — SODIUM CHLORIDE SOLN NEBU 3% 4 ML: 3 NEBU SOLN at 20:30

## 2024-01-01 RX ADMIN — FUROSEMIDE 40 MG: 10 INJECTION, SOLUTION INTRAMUSCULAR; INTRAVENOUS at 11:58

## 2024-01-01 RX ADMIN — SEVELAMER HYDROCHLORIDE 800 MG: 800 TABLET ORAL at 09:48

## 2024-01-01 RX ADMIN — BROMOCRIPTINE MESYLATE 5 MG: 2.5 TABLET ORAL at 12:38

## 2024-01-01 RX ADMIN — POLYETHYLENE GLYCOL 3350 17 G: 17 POWDER, FOR SOLUTION ORAL at 09:10

## 2024-01-01 RX ADMIN — HEPARIN SODIUM 17 UNITS/KG/HR: 10000 INJECTION, SOLUTION INTRAVENOUS at 13:30

## 2024-01-01 RX ADMIN — INSULIN LISPRO 1 UNITS: 100 INJECTION, SOLUTION INTRAVENOUS; SUBCUTANEOUS at 00:13

## 2024-01-01 RX ADMIN — LORAZEPAM 2 MG: 2 INJECTION INTRAMUSCULAR; INTRAVENOUS at 04:26

## 2024-01-01 RX ADMIN — CHLORHEXIDINE GLUCONATE 0.12% ORAL RINSE 15 ML: 1.2 LIQUID ORAL at 22:21

## 2024-01-01 RX ADMIN — ACETAMINOPHEN 650 MG: 650 SUSPENSION ORAL at 14:44

## 2024-01-01 RX ADMIN — CHLORHEXIDINE GLUCONATE 0.12% ORAL RINSE 15 ML: 1.2 LIQUID ORAL at 08:09

## 2024-01-01 RX ADMIN — CHLORHEXIDINE GLUCONATE 0.12% ORAL RINSE 15 ML: 1.2 LIQUID ORAL at 21:27

## 2024-01-01 RX ADMIN — CHLORHEXIDINE GLUCONATE 0.12% ORAL RINSE 15 ML: 1.2 LIQUID ORAL at 09:24

## 2024-01-01 RX ADMIN — CHLORHEXIDINE GLUCONATE 0.12% ORAL RINSE 15 ML: 1.2 LIQUID ORAL at 08:25

## 2024-01-01 RX ADMIN — NOREPINEPHRINE BITARTRATE 5 MCG/MIN: 1 INJECTION, SOLUTION, CONCENTRATE INTRAVENOUS at 23:07

## 2024-01-01 RX ADMIN — CHLORHEXIDINE GLUCONATE 0.12% ORAL RINSE 15 ML: 1.2 LIQUID ORAL at 08:05

## 2024-01-01 RX ADMIN — BROMOCRIPTINE MESYLATE 7.5 MG: 2.5 TABLET ORAL at 00:44

## 2024-01-01 RX ADMIN — CHLORHEXIDINE GLUCONATE 0.12% ORAL RINSE 15 ML: 1.2 LIQUID ORAL at 23:33

## 2024-01-01 RX ADMIN — SODIUM CHLORIDE SOLN NEBU 3% 4 ML: 3 NEBU SOLN at 19:55

## 2024-01-01 RX ADMIN — SERTRALINE HYDROCHLORIDE 50 MG: 50 TABLET ORAL at 09:09

## 2024-01-01 RX ADMIN — LEVETIRACETAM 1000 MG: 100 INJECTION, SOLUTION INTRAVENOUS at 09:24

## 2024-01-01 RX ADMIN — SERTRALINE HYDROCHLORIDE 50 MG: 50 TABLET ORAL at 08:31

## 2024-01-01 RX ADMIN — VASOPRESSIN 0.04 UNITS/MIN: 20 INJECTION INTRAVENOUS at 17:48

## 2024-01-01 RX ADMIN — CHLORHEXIDINE GLUCONATE 0.12% ORAL RINSE 15 ML: 1.2 LIQUID ORAL at 08:12

## 2024-01-01 RX ADMIN — AMIODARONE HYDROCHLORIDE 0.5 MG/MIN: 50 INJECTION, SOLUTION INTRAVENOUS at 02:53

## 2024-01-01 RX ADMIN — ALBUTEROL SULFATE 2.5 MG: 2.5 SOLUTION RESPIRATORY (INHALATION) at 00:59

## 2024-01-01 RX ADMIN — Medication 12.5 MG: at 09:09

## 2024-01-01 RX ADMIN — ACETAMINOPHEN 650 MG: 650 SUSPENSION ORAL at 03:15

## 2024-01-01 RX ADMIN — PIPERACILLIN SODIUM AND TAZOBACTAM SODIUM 4.5 G: 36; 4.5 INJECTION, POWDER, LYOPHILIZED, FOR SOLUTION INTRAVENOUS at 23:39

## 2024-01-01 RX ADMIN — ATORVASTATIN CALCIUM 20 MG: 20 TABLET, FILM COATED ORAL at 17:19

## 2024-01-01 RX ADMIN — SODIUM CHLORIDE SOLN NEBU 3% 4 ML: 3 NEBU SOLN at 20:41

## 2024-01-01 RX ADMIN — HEPARIN SODIUM 18 UNITS/KG/HR: 10000 INJECTION, SOLUTION INTRAVENOUS at 20:43

## 2024-01-01 RX ADMIN — SERTRALINE HYDROCHLORIDE 50 MG: 50 TABLET ORAL at 08:38

## 2024-01-01 RX ADMIN — IPRATROPIUM BROMIDE 0.5 MG: 0.5 SOLUTION RESPIRATORY (INHALATION) at 01:02

## 2024-01-01 RX ADMIN — LORAZEPAM 2 MG: 2 INJECTION INTRAMUSCULAR at 09:34

## 2024-01-01 RX ADMIN — INSULIN LISPRO 1 UNITS: 100 INJECTION, SOLUTION INTRAVENOUS; SUBCUTANEOUS at 05:58

## 2024-01-01 RX ADMIN — SODIUM CHLORIDE 50 ML/HR: 3 INJECTION, SOLUTION INTRAVENOUS at 15:35

## 2024-01-01 RX ADMIN — HEPARIN SODIUM 15 UNITS/KG/HR: 10000 INJECTION, SOLUTION INTRAVENOUS at 09:26

## 2024-01-01 RX ADMIN — LEVETIRACETAM 1000 MG: 100 INJECTION, SOLUTION INTRAVENOUS at 20:31

## 2024-01-01 RX ADMIN — ASPIRIN 81 MG CHEWABLE TABLET 81 MG: 81 TABLET CHEWABLE at 08:21

## 2024-01-01 RX ADMIN — VALPROIC ACID 250 MG: 500 SOLUTION ORAL at 17:18

## 2024-01-01 RX ADMIN — NOREPINEPHRINE BITARTRATE 5 MCG/MIN: 1 INJECTION, SOLUTION, CONCENTRATE INTRAVENOUS at 11:31

## 2024-01-01 RX ADMIN — Medication 6 MG: at 20:10

## 2024-01-01 RX ADMIN — SENNOSIDES AND DOCUSATE SODIUM 2 TABLET: 50; 8.6 TABLET ORAL at 10:30

## 2024-01-01 RX ADMIN — ACETAMINOPHEN 1000 MG: 10 INJECTION INTRAVENOUS at 00:46

## 2024-01-01 RX ADMIN — MIDODRINE HYDROCHLORIDE 5 MG: 5 TABLET ORAL at 10:37

## 2024-01-01 RX ADMIN — EPTIFIBATIDE 1 MCG/KG/MIN: 0.75 INJECTION INTRAVENOUS at 01:37

## 2024-01-01 RX ADMIN — EPTIFIBATIDE 4842 MCG: 2 INJECTION INTRAVENOUS at 22:53

## 2024-01-01 RX ADMIN — IPRATROPIUM BROMIDE 0.5 MG: 0.5 SOLUTION RESPIRATORY (INHALATION) at 13:49

## 2024-01-01 RX ADMIN — FENTANYL CITRATE 25 MCG: 50 INJECTION INTRAMUSCULAR; INTRAVENOUS at 13:09

## 2024-01-01 RX ADMIN — POTASSIUM CHLORIDE 40 MEQ: 20 SOLUTION ORAL at 08:25

## 2024-01-01 RX ADMIN — VANCOMYCIN HYDROCHLORIDE 1000 MG: 1 INJECTION, SOLUTION INTRAVENOUS at 20:27

## 2024-01-01 RX ADMIN — LEVETIRACETAM 500 MG: 100 INJECTION, SOLUTION INTRAVENOUS at 22:26

## 2024-01-01 RX ADMIN — DESMOPRESSIN ACETATE 21.6 MCG: 4 SOLUTION INTRAVENOUS at 10:28

## 2024-01-01 RX ADMIN — SERTRALINE HYDROCHLORIDE 50 MG: 50 TABLET ORAL at 09:28

## 2024-01-01 RX ADMIN — LORAZEPAM 2 MG: 2 INJECTION INTRAMUSCULAR at 00:55

## 2024-01-01 RX ADMIN — DEXTROSE AND SODIUM CHLORIDE 100 ML/HR: 5; .45 INJECTION, SOLUTION INTRAVENOUS at 00:17

## 2024-01-01 RX ADMIN — INSULIN HUMAN 10 UNITS: 100 INJECTION, SOLUTION PARENTERAL at 14:11

## 2024-01-01 RX ADMIN — CHLORHEXIDINE GLUCONATE 0.12% ORAL RINSE 15 ML: 1.2 LIQUID ORAL at 08:21

## 2024-01-01 RX ADMIN — DEXTROSE 100 ML/HR: 5 SOLUTION INTRAVENOUS at 13:24

## 2024-01-01 RX ADMIN — LEVETIRACETAM 1000 MG: 100 SOLUTION ORAL at 07:41

## 2024-01-01 RX ADMIN — HEPARIN SODIUM 18 UNITS/KG/HR: 10000 INJECTION, SOLUTION INTRAVENOUS at 21:31

## 2024-01-01 RX ADMIN — RACEPINEPHRINE HYDROCHLORIDE: 11.25 SOLUTION RESPIRATORY (INHALATION) at 10:07

## 2024-01-01 RX ADMIN — NOREPINEPHRINE BITARTRATE: 1 INJECTION, SOLUTION, CONCENTRATE INTRAVENOUS at 01:26

## 2024-01-01 RX ADMIN — LEVETIRACETAM 1000 MG: 100 SOLUTION ORAL at 22:00

## 2024-01-01 RX ADMIN — LORAZEPAM 0.5 MG: 2 INJECTION INTRAMUSCULAR; INTRAVENOUS at 04:45

## 2024-01-01 RX ADMIN — ACETAMINOPHEN 650 MG: 650 SUSPENSION ORAL at 18:35

## 2024-01-01 RX ADMIN — POTASSIUM CHLORIDE 40 MEQ: 20 SOLUTION ORAL at 13:07

## 2024-01-01 RX ADMIN — PIPERACILLIN SODIUM AND TAZOBACTAM SODIUM 4.5 G: 36; 4.5 INJECTION, POWDER, LYOPHILIZED, FOR SOLUTION INTRAVENOUS at 09:48

## 2024-01-01 RX ADMIN — MIDODRINE HYDROCHLORIDE 5 MG: 5 TABLET ORAL at 17:07

## 2024-01-01 RX ADMIN — SENNOSIDES AND DOCUSATE SODIUM 2 TABLET: 50; 8.6 TABLET ORAL at 17:15

## 2024-01-01 RX ADMIN — POTASSIUM CHLORIDE 40 MEQ: 20 SOLUTION ORAL at 09:30

## 2024-01-01 RX ADMIN — SODIUM CHLORIDE 250 ML: 3 INJECTION, SOLUTION INTRAVENOUS at 10:42

## 2024-01-01 RX ADMIN — IPRATROPIUM BROMIDE AND ALBUTEROL SULFATE 3 ML: .5; 3 SOLUTION RESPIRATORY (INHALATION) at 19:36

## 2024-01-01 RX ADMIN — MIDODRINE HYDROCHLORIDE 5 MG: 5 TABLET ORAL at 09:29

## 2024-01-01 RX ADMIN — LEVALBUTEROL HYDROCHLORIDE 1.25 MG: 1.25 SOLUTION RESPIRATORY (INHALATION) at 19:28

## 2024-01-01 RX ADMIN — VASOPRESSIN 0.04 UNITS/MIN: 20 INJECTION INTRAVENOUS at 16:05

## 2024-01-01 RX ADMIN — FENTANYL CITRATE 25 MCG: 50 INJECTION INTRAMUSCULAR; INTRAVENOUS at 20:42

## 2024-01-01 RX ADMIN — ASPIRIN 81 MG CHEWABLE TABLET 81 MG: 81 TABLET CHEWABLE at 08:31

## 2024-01-01 RX ADMIN — IPRATROPIUM BROMIDE 0.5 MG: 0.5 SOLUTION RESPIRATORY (INHALATION) at 13:31

## 2024-01-01 RX ADMIN — SODIUM BICARBONATE 50 MEQ: 84 INJECTION, SOLUTION INTRAVENOUS at 14:06

## 2024-01-01 RX ADMIN — SENNOSIDES AND DOCUSATE SODIUM 2 TABLET: 50; 8.6 TABLET ORAL at 17:07

## 2024-01-01 RX ADMIN — POLYETHYLENE GLYCOL 3350 17 G: 17 POWDER, FOR SOLUTION ORAL at 08:03

## 2024-01-01 RX ADMIN — MODAFINIL 100 MG: 100 TABLET ORAL at 09:11

## 2024-01-01 RX ADMIN — POTASSIUM CHLORIDE 40 MEQ: 20 SOLUTION ORAL at 14:49

## 2024-01-01 RX ADMIN — FENTANYL CITRATE 25 MCG: 50 INJECTION INTRAMUSCULAR; INTRAVENOUS at 03:45

## 2024-01-01 RX ADMIN — BROMOCRIPTINE MESYLATE 7.5 MG: 2.5 TABLET ORAL at 18:00

## 2024-01-01 RX ADMIN — NOREPINEPHRINE BITARTRATE 5 MCG/MIN: 1 INJECTION, SOLUTION, CONCENTRATE INTRAVENOUS at 02:42

## 2024-01-01 RX ADMIN — SERTRALINE HYDROCHLORIDE 50 MG: 20 SOLUTION ORAL at 08:07

## 2024-01-01 RX ADMIN — CEFAZOLIN SODIUM 2000 MG: 2 SOLUTION INTRAVENOUS at 09:28

## 2024-01-01 RX ADMIN — CALCIUM CHLORIDE 1 G: 100 INJECTION INTRAVENOUS; INTRAVENTRICULAR at 13:29

## 2024-01-01 RX ADMIN — FENTANYL CITRATE 50 MCG: 50 INJECTION INTRAMUSCULAR; INTRAVENOUS at 17:31

## 2024-01-01 RX ADMIN — CHLORHEXIDINE GLUCONATE 0.12% ORAL RINSE 15 ML: 1.2 LIQUID ORAL at 21:55

## 2024-01-01 RX ADMIN — MINERAL OIL AND WHITE PETROLATUM: 150; 830 OINTMENT OPHTHALMIC at 22:21

## 2024-01-01 RX ADMIN — MINERAL OIL AND WHITE PETROLATUM 1 APPLICATION: 150; 830 OINTMENT OPHTHALMIC at 21:38

## 2024-01-01 RX ADMIN — PIPERACILLIN SODIUM AND TAZOBACTAM SODIUM 4.5 G: 36; 4.5 INJECTION, POWDER, LYOPHILIZED, FOR SOLUTION INTRAVENOUS at 14:23

## 2024-01-01 RX ADMIN — SENNOSIDES AND DOCUSATE SODIUM 2 TABLET: 50; 8.6 TABLET ORAL at 08:02

## 2024-01-01 RX ADMIN — ALBUTEROL SULFATE 2.5 MG: 2.5 SOLUTION RESPIRATORY (INHALATION) at 19:22

## 2024-01-01 RX ADMIN — SERTRALINE HYDROCHLORIDE 50 MG: 50 TABLET ORAL at 08:06

## 2024-01-01 RX ADMIN — CHLORHEXIDINE GLUCONATE 0.12% ORAL RINSE 15 ML: 1.2 LIQUID ORAL at 08:38

## 2024-01-01 RX ADMIN — SODIUM CHLORIDE SOLN NEBU 3% 4 ML: 3 NEBU SOLN at 14:31

## 2024-01-01 RX ADMIN — PIPERACILLIN SODIUM AND TAZOBACTAM SODIUM 4.5 G: 36; 4.5 INJECTION, POWDER, LYOPHILIZED, FOR SOLUTION INTRAVENOUS at 02:07

## 2024-01-01 RX ADMIN — FENTANYL CITRATE 50 MCG: 50 INJECTION, SOLUTION INTRAMUSCULAR; INTRAVENOUS at 14:58

## 2024-01-01 RX ADMIN — SODIUM CHLORIDE SOLN NEBU 3% 4 ML: 3 NEBU SOLN at 07:41

## 2024-01-01 RX ADMIN — LORAZEPAM 2 MG: 2 INJECTION INTRAMUSCULAR; INTRAVENOUS at 07:27

## 2024-01-01 RX ADMIN — CEFAZOLIN SODIUM 2000 MG: 2 SOLUTION INTRAVENOUS at 01:34

## 2024-01-01 RX ADMIN — MAGNESIUM SULFATE HEPTAHYDRATE 4 G: 40 INJECTION, SOLUTION INTRAVENOUS at 08:26

## 2024-01-01 RX ADMIN — IOHEXOL 65 ML: 350 INJECTION, SOLUTION INTRAVENOUS at 04:28

## 2024-01-01 RX ADMIN — CHLORHEXIDINE GLUCONATE 0.12% ORAL RINSE 15 ML: 1.2 LIQUID ORAL at 07:40

## 2024-01-01 RX ADMIN — NOREPINEPHRINE BITARTRATE 22 MCG/MIN: 1 INJECTION, SOLUTION, CONCENTRATE INTRAVENOUS at 16:57

## 2024-01-01 RX ADMIN — IPRATROPIUM BROMIDE 0.5 MG: 0.5 SOLUTION RESPIRATORY (INHALATION) at 14:23

## 2024-01-01 RX ADMIN — LEVETIRACETAM 500 MG: 100 INJECTION, SOLUTION INTRAVENOUS at 08:02

## 2024-01-01 RX ADMIN — SERTRALINE HYDROCHLORIDE 50 MG: 20 SOLUTION ORAL at 08:03

## 2024-01-01 RX ADMIN — LORAZEPAM 2 MG: 2 INJECTION INTRAMUSCULAR; INTRAVENOUS at 15:30

## 2024-01-01 RX ADMIN — POLYETHYLENE GLYCOL 3350 17 G: 17 POWDER, FOR SOLUTION ORAL at 09:09

## 2024-01-01 RX ADMIN — DEXTROSE 300 MG: 50 INJECTION, SOLUTION INTRAVENOUS at 18:47

## 2024-01-01 RX ADMIN — NOREPINEPHRINE BITARTRATE 18 MCG/MIN: 1 INJECTION, SOLUTION, CONCENTRATE INTRAVENOUS at 21:53

## 2024-01-01 RX ADMIN — PIPERACILLIN SODIUM AND TAZOBACTAM SODIUM 4.5 G: 36; 4.5 INJECTION, POWDER, LYOPHILIZED, FOR SOLUTION INTRAVENOUS at 05:58

## 2024-01-01 RX ADMIN — CHLORHEXIDINE GLUCONATE 0.12% ORAL RINSE 15 ML: 1.2 LIQUID ORAL at 21:09

## 2024-01-01 RX ADMIN — LEVETIRACETAM 500 MG: 100 INJECTION, SOLUTION INTRAVENOUS at 08:07

## 2024-01-01 RX ADMIN — ASPIRIN 81 MG CHEWABLE TABLET 81 MG: 81 TABLET CHEWABLE at 15:33

## 2024-01-01 RX ADMIN — SODIUM CHLORIDE 500 ML: 0.9 INJECTION, SOLUTION INTRAVENOUS at 16:14

## 2024-01-01 RX ADMIN — CHLORHEXIDINE GLUCONATE 0.12% ORAL RINSE 15 ML: 1.2 LIQUID ORAL at 08:34

## 2024-01-01 RX ADMIN — NOREPINEPHRINE BITARTRATE 7 MCG/MIN: 1 INJECTION, SOLUTION, CONCENTRATE INTRAVENOUS at 01:32

## 2024-01-01 RX ADMIN — DEXMEDETOMIDINE HYDROCHLORIDE 0.7 MCG/KG/HR: 400 INJECTION INTRAVENOUS at 20:27

## 2024-01-01 RX ADMIN — POTASSIUM CHLORIDE 40 MEQ: 29.8 INJECTION, SOLUTION INTRAVENOUS at 20:13

## 2024-01-01 RX ADMIN — NOREPINEPHRINE BITARTRATE 20 MCG/MIN: 1 INJECTION, SOLUTION, CONCENTRATE INTRAVENOUS at 01:26

## 2024-01-01 RX ADMIN — LEVALBUTEROL HYDROCHLORIDE 1.25 MG: 1.25 SOLUTION RESPIRATORY (INHALATION) at 01:02

## 2024-01-01 RX ADMIN — HEPARIN SODIUM 22 UNITS/KG/HR: 10000 INJECTION, SOLUTION INTRAVENOUS at 18:35

## 2024-01-01 RX ADMIN — BROMOCRIPTINE MESYLATE 5 MG: 2.5 TABLET ORAL at 18:13

## 2024-01-01 RX ADMIN — ACETAMINOPHEN 650 MG: 650 SUSPENSION ORAL at 04:40

## 2024-01-01 RX ADMIN — ASPIRIN 81 MG CHEWABLE TABLET 81 MG: 81 TABLET CHEWABLE at 09:28

## 2024-01-01 RX ADMIN — ACETAMINOPHEN 650 MG: 650 SUSPENSION ORAL at 09:01

## 2024-01-01 RX ADMIN — MIDODRINE HYDROCHLORIDE 5 MG: 5 TABLET ORAL at 08:25

## 2024-01-01 RX ADMIN — LORAZEPAM 2 MG: 2 INJECTION INTRAMUSCULAR; INTRAVENOUS at 09:34

## 2024-01-01 RX ADMIN — PIPERACILLIN SODIUM AND TAZOBACTAM SODIUM 4.5 G: 36; 4.5 INJECTION, POWDER, LYOPHILIZED, FOR SOLUTION INTRAVENOUS at 18:35

## 2024-01-01 RX ADMIN — CHLORHEXIDINE GLUCONATE 0.12% ORAL RINSE 15 ML: 1.2 LIQUID ORAL at 09:22

## 2024-01-01 RX ADMIN — SODIUM CHLORIDE, SODIUM GLUCONATE, SODIUM ACETATE, POTASSIUM CHLORIDE, MAGNESIUM CHLORIDE, SODIUM PHOSPHATE, DIBASIC, AND POTASSIUM PHOSPHATE 500 ML: .53; .5; .37; .037; .03; .012; .00082 INJECTION, SOLUTION INTRAVENOUS at 14:26

## 2024-01-01 RX ADMIN — SODIUM CHLORIDE SOLN NEBU 3% 4 ML: 3 NEBU SOLN at 14:02

## 2024-01-01 RX ADMIN — METOPROLOL TARTRATE 10 MG: 1 INJECTION, SOLUTION INTRAVENOUS at 16:53

## 2024-01-01 RX ADMIN — MINERAL OIL AND WHITE PETROLATUM: 150; 830 OINTMENT OPHTHALMIC at 21:17

## 2024-01-01 RX ADMIN — QUETIAPINE FUMARATE 50 MG: 25 TABLET ORAL at 08:07

## 2024-01-01 RX ADMIN — HEPARIN SODIUM 19 UNITS/KG/HR: 10000 INJECTION, SOLUTION INTRAVENOUS at 15:20

## 2024-01-01 RX ADMIN — BROMOCRIPTINE MESYLATE 5 MG: 2.5 TABLET ORAL at 00:07

## 2024-01-01 RX ADMIN — LEVETIRACETAM 500 MG: 100 INJECTION, SOLUTION INTRAVENOUS at 09:55

## 2024-01-01 RX ADMIN — CHLORHEXIDINE GLUCONATE 0.12% ORAL RINSE 15 ML: 1.2 LIQUID ORAL at 09:11

## 2024-01-01 RX ADMIN — NOREPINEPHRINE BITARTRATE 10 MCG/MIN: 1 INJECTION, SOLUTION, CONCENTRATE INTRAVENOUS at 09:44

## 2024-01-01 RX ADMIN — NOREPINEPHRINE BITARTRATE 4 MG: 1 INJECTION, SOLUTION, CONCENTRATE INTRAVENOUS at 04:40

## 2024-01-01 RX ADMIN — DEXMEDETOMIDINE HYDROCHLORIDE 0.2 MCG/KG/HR: 400 INJECTION INTRAVENOUS at 05:10

## 2024-01-01 RX ADMIN — VANCOMYCIN HYDROCHLORIDE 750 MG: 750 INJECTION, SOLUTION INTRAVENOUS at 20:31

## 2024-01-01 RX ADMIN — FUROSEMIDE 40 MG: 10 INJECTION, SOLUTION INTRAMUSCULAR; INTRAVENOUS at 13:34

## 2024-01-01 RX ADMIN — LEVALBUTEROL HYDROCHLORIDE 1.25 MG: 1.25 SOLUTION RESPIRATORY (INHALATION) at 19:37

## 2024-01-01 RX ADMIN — SEVELAMER HYDROCHLORIDE 800 MG: 800 TABLET ORAL at 03:00

## 2024-01-01 RX ADMIN — POTASSIUM CHLORIDE 20 MEQ: 14.9 INJECTION, SOLUTION INTRAVENOUS at 02:48

## 2024-01-01 RX ADMIN — BROMOCRIPTINE MESYLATE 5 MG: 2.5 TABLET ORAL at 06:15

## 2024-01-01 RX ADMIN — NOREPINEPHRINE BITARTRATE 7 MCG/MIN: 1 INJECTION, SOLUTION, CONCENTRATE INTRAVENOUS at 04:46

## 2024-01-01 RX ADMIN — SODIUM CHLORIDE SOLN NEBU 3% 4 ML: 3 NEBU SOLN at 00:35

## 2024-01-01 RX ADMIN — BROMOCRIPTINE MESYLATE 7.5 MG: 2.5 TABLET ORAL at 11:12

## 2024-01-01 RX ADMIN — NOREPINEPHRINE BITARTRATE 3 MCG/MIN: 1 INJECTION, SOLUTION, CONCENTRATE INTRAVENOUS at 18:02

## 2024-01-01 RX ADMIN — VALPROIC ACID 250 MG: 500 SOLUTION ORAL at 10:45

## 2024-01-01 RX ADMIN — ASPIRIN 81 MG CHEWABLE TABLET 81 MG: 81 TABLET CHEWABLE at 08:25

## 2024-01-01 RX ADMIN — HEPARIN SODIUM 15 UNITS/KG/HR: 10000 INJECTION, SOLUTION INTRAVENOUS at 17:31

## 2024-01-01 RX ADMIN — Medication 12.5 MG: at 21:48

## 2024-01-01 RX ADMIN — SODIUM CHLORIDE SOLN NEBU 3% 4 ML: 3 NEBU SOLN at 20:00

## 2024-01-01 RX ADMIN — LEVALBUTEROL HYDROCHLORIDE 1.25 MG: 1.25 SOLUTION RESPIRATORY (INHALATION) at 13:31

## 2024-01-01 RX ADMIN — SODIUM CHLORIDE 30 ML/HR: 3 INJECTION, SOLUTION INTRAVENOUS at 11:11

## 2024-01-01 RX ADMIN — NOREPINEPHRINE BITARTRATE 15 MCG/MIN: 1 INJECTION, SOLUTION, CONCENTRATE INTRAVENOUS at 14:07

## 2024-01-01 RX ADMIN — LORAZEPAM 2 MG: 2 INJECTION INTRAMUSCULAR at 09:45

## 2024-01-01 RX ADMIN — SODIUM CHLORIDE SOLN NEBU 3% 4 ML: 3 NEBU SOLN at 13:22

## 2024-01-01 RX ADMIN — BROMOCRIPTINE MESYLATE 7.5 MG: 2.5 TABLET ORAL at 06:23

## 2024-01-01 RX ADMIN — VASOPRESSIN 0.04 UNITS/MIN: 20 INJECTION INTRAVENOUS at 01:12

## 2024-01-01 RX ADMIN — FENTANYL CITRATE 25 MCG: 50 INJECTION INTRAMUSCULAR; INTRAVENOUS at 02:01

## 2024-01-01 RX ADMIN — MAGNESIUM SULFATE HEPTAHYDRATE 2 G: 40 INJECTION, SOLUTION INTRAVENOUS at 10:31

## 2024-01-01 RX ADMIN — SODIUM CHLORIDE SOLN NEBU 3% 4 ML: 3 NEBU SOLN at 19:23

## 2024-01-01 RX ADMIN — HEPARIN SODIUM 16 UNITS/KG/HR: 10000 INJECTION, SOLUTION INTRAVENOUS at 19:44

## 2024-01-01 RX ADMIN — ACETAMINOPHEN 1000 MG: 10 INJECTION INTRAVENOUS at 18:03

## 2024-01-01 RX ADMIN — POTASSIUM CHLORIDE 20 MEQ: 14.9 INJECTION, SOLUTION INTRAVENOUS at 10:14

## 2024-01-01 RX ADMIN — SODIUM CHLORIDE SOLN NEBU 3% 4 ML: 3 NEBU SOLN at 03:04

## 2024-01-01 RX ADMIN — BROMOCRIPTINE MESYLATE 7.5 MG: 2.5 TABLET ORAL at 17:46

## 2024-01-01 RX ADMIN — PIPERACILLIN SODIUM AND TAZOBACTAM SODIUM 4.5 G: 36; 4.5 INJECTION, POWDER, LYOPHILIZED, FOR SOLUTION INTRAVENOUS at 21:12

## 2024-01-01 RX ADMIN — DEXAMETHASONE SODIUM PHOSPHATE 10 MG: 10 INJECTION, SOLUTION INTRAMUSCULAR; INTRAVENOUS at 12:49

## 2024-01-01 RX ADMIN — SERTRALINE HYDROCHLORIDE 50 MG: 20 SOLUTION ORAL at 12:12

## 2024-01-01 RX ADMIN — ACETAMINOPHEN 650 MG: 650 SUSPENSION ORAL at 21:02

## 2024-01-01 RX ADMIN — PIPERACILLIN SODIUM AND TAZOBACTAM SODIUM 4.5 G: 36; 4.5 INJECTION, POWDER, LYOPHILIZED, FOR SOLUTION INTRAVENOUS at 18:05

## 2024-01-01 RX ADMIN — ACETAMINOPHEN 1000 MG: 1000 INJECTION, SOLUTION INTRAVENOUS at 14:13

## 2024-01-01 RX ADMIN — LEVALBUTEROL HYDROCHLORIDE 1.25 MG: 1.25 SOLUTION RESPIRATORY (INHALATION) at 07:11

## 2024-01-01 RX ADMIN — ALBUMIN (HUMAN) 12.5 G: 12.5 INJECTION, SOLUTION INTRAVENOUS at 02:33

## 2024-01-01 RX ADMIN — NOREPINEPHRINE BITARTRATE 6 MCG/MIN: 1 INJECTION, SOLUTION, CONCENTRATE INTRAVENOUS at 16:29

## 2024-01-01 RX ADMIN — POTASSIUM CHLORIDE 40 MEQ: 20 SOLUTION ORAL at 01:49

## 2024-01-01 RX ADMIN — Medication 6 MG: at 21:02

## 2024-01-01 RX ADMIN — POTASSIUM CHLORIDE 20 MEQ: 14.9 INJECTION, SOLUTION INTRAVENOUS at 14:49

## 2024-01-01 RX ADMIN — SODIUM CHLORIDE 30 ML: 4 INJECTION, SOLUTION, CONCENTRATE INTRAVENOUS at 20:07

## 2024-01-01 RX ADMIN — INSULIN LISPRO 1 UNITS: 100 INJECTION, SOLUTION INTRAVENOUS; SUBCUTANEOUS at 17:54

## 2024-01-01 RX ADMIN — LEVETIRACETAM 1500 MG: 100 INJECTION, SOLUTION INTRAVENOUS at 21:55

## 2024-01-01 RX ADMIN — PHENYLEPHRINE HYDROCHLORIDE 70 MCG/MIN: 50 INJECTION INTRAVENOUS at 15:45

## 2024-01-01 RX ADMIN — FENTANYL CITRATE 50 MCG: 50 INJECTION INTRAMUSCULAR; INTRAVENOUS at 10:18

## 2024-01-01 RX ADMIN — CHLORHEXIDINE GLUCONATE 0.12% ORAL RINSE 15 ML: 1.2 LIQUID ORAL at 09:29

## 2024-01-01 RX ADMIN — VALPROIC ACID 250 MG: 500 SOLUTION ORAL at 17:59

## 2024-01-01 RX ADMIN — LEVETIRACETAM 1000 MG: 100 SOLUTION ORAL at 21:38

## 2024-01-01 RX ADMIN — PIPERACILLIN SODIUM AND TAZOBACTAM SODIUM 4.5 G: 36; 4.5 INJECTION, POWDER, LYOPHILIZED, FOR SOLUTION INTRAVENOUS at 11:24

## 2024-01-01 RX ADMIN — SODIUM BICARBONATE 50 MEQ: 84 INJECTION INTRAVENOUS at 17:31

## 2024-01-01 RX ADMIN — NOREPINEPHRINE BITARTRATE 16 MCG/MIN: 1 INJECTION, SOLUTION, CONCENTRATE INTRAVENOUS at 10:01

## 2024-01-01 RX ADMIN — HEPARIN SODIUM 5000 UNITS: 5000 INJECTION, SOLUTION INTRAVENOUS; SUBCUTANEOUS at 05:54

## 2024-01-01 RX ADMIN — ALBUMIN (HUMAN) 25 G: 12.5 INJECTION, SOLUTION INTRAVENOUS at 15:20

## 2024-01-01 RX ADMIN — MODAFINIL 100 MG: 100 TABLET ORAL at 08:31

## 2024-01-01 RX ADMIN — SODIUM CHLORIDE SOLN NEBU 3% 4 ML: 3 NEBU SOLN at 14:25

## 2024-01-01 RX ADMIN — POTASSIUM CHLORIDE 40 MEQ: 20 SOLUTION ORAL at 09:23

## 2024-01-01 RX ADMIN — INSULIN LISPRO 1 UNITS: 100 INJECTION, SOLUTION INTRAVENOUS; SUBCUTANEOUS at 12:29

## 2024-01-01 RX ADMIN — CHLORHEXIDINE GLUCONATE 0.12% ORAL RINSE 15 ML: 1.2 LIQUID ORAL at 20:41

## 2024-01-01 RX ADMIN — SERTRALINE HYDROCHLORIDE 50 MG: 20 SOLUTION ORAL at 08:12

## 2024-01-01 RX ADMIN — CHLORHEXIDINE GLUCONATE 0.12% ORAL RINSE 15 ML: 1.2 LIQUID ORAL at 09:10

## 2024-01-01 RX ADMIN — LORAZEPAM 0.5 MG: 2 INJECTION INTRAMUSCULAR at 04:45

## 2024-01-01 RX ADMIN — CHLORHEXIDINE GLUCONATE 0.12% ORAL RINSE 15 ML: 1.2 LIQUID ORAL at 20:32

## 2024-01-01 RX ADMIN — POTASSIUM CHLORIDE 40 MEQ: 29.8 INJECTION, SOLUTION INTRAVENOUS at 18:29

## 2024-01-01 RX ADMIN — BROMOCRIPTINE MESYLATE 7.5 MG: 2.5 TABLET ORAL at 00:42

## 2024-01-01 RX ADMIN — VANCOMYCIN HYDROCHLORIDE 750 MG: 750 INJECTION, SOLUTION INTRAVENOUS at 09:28

## 2024-01-01 RX ADMIN — MAGNESIUM SULFATE HEPTAHYDRATE 2 G: 40 INJECTION, SOLUTION INTRAVENOUS at 10:16

## 2024-01-01 RX ADMIN — ATORVASTATIN CALCIUM 20 MG: 20 TABLET, FILM COATED ORAL at 17:06

## 2024-01-01 RX ADMIN — LEVETIRACETAM 500 MG: 100 INJECTION, SOLUTION INTRAVENOUS at 08:38

## 2024-01-01 RX ADMIN — CHLORHEXIDINE GLUCONATE 0.12% ORAL RINSE 15 ML: 1.2 LIQUID ORAL at 21:01

## 2024-01-01 RX ADMIN — CALCIUM GLUCONATE 2 G: 20 INJECTION, SOLUTION INTRAVENOUS at 08:12

## 2024-01-01 RX ADMIN — MIDODRINE HYDROCHLORIDE 5 MG: 5 TABLET ORAL at 17:06

## 2024-01-01 RX ADMIN — EPINEPHRINE 10 MCG/MIN: 1 INJECTION INTRAMUSCULAR; INTRAVENOUS; SUBCUTANEOUS at 14:05

## 2024-01-01 RX ADMIN — HEPARIN SODIUM 5000 UNITS: 5000 INJECTION, SOLUTION INTRAVENOUS; SUBCUTANEOUS at 22:12

## 2024-01-01 RX ADMIN — VANCOMYCIN HYDROCHLORIDE 1500 MG: 10 INJECTION, POWDER, LYOPHILIZED, FOR SOLUTION INTRAVENOUS at 19:22

## 2024-01-01 RX ADMIN — NOREPINEPHRINE BITARTRATE 15 MCG/MIN: 1 INJECTION, SOLUTION, CONCENTRATE INTRAVENOUS at 00:10

## 2024-01-01 RX ADMIN — SENNOSIDES AND DOCUSATE SODIUM 2 TABLET: 50; 8.6 TABLET ORAL at 17:55

## 2024-01-01 RX ADMIN — LORAZEPAM 2 MG: 2 INJECTION INTRAMUSCULAR at 15:30

## 2024-01-01 RX ADMIN — ACETAMINOPHEN 650 MG: 650 SUSPENSION ORAL at 00:05

## 2024-01-01 RX ADMIN — SODIUM CHLORIDE SOLN NEBU 3% 4 ML: 3 NEBU SOLN at 09:05

## 2024-01-01 RX ADMIN — LABETALOL HYDROCHLORIDE 10 MG: 5 INJECTION, SOLUTION INTRAVENOUS at 00:53

## 2024-01-01 RX ADMIN — BROMOCRIPTINE MESYLATE 5 MG: 2.5 TABLET ORAL at 05:45

## 2024-01-01 RX ADMIN — POTASSIUM CHLORIDE 20 MEQ: 14.9 INJECTION, SOLUTION INTRAVENOUS at 16:58

## 2024-01-01 RX ADMIN — IPRATROPIUM BROMIDE 0.5 MG: 0.5 SOLUTION RESPIRATORY (INHALATION) at 19:37

## 2024-01-01 RX ADMIN — POTASSIUM CHLORIDE 40 MEQ: 29.8 INJECTION, SOLUTION INTRAVENOUS at 21:29

## 2024-01-01 RX ADMIN — FUROSEMIDE 40 MG: 10 INJECTION, SOLUTION INTRAMUSCULAR; INTRAVENOUS at 08:25

## 2024-01-01 RX ADMIN — Medication 6 MG: at 20:13

## 2024-01-01 RX ADMIN — MINERAL OIL AND WHITE PETROLATUM: 150; 830 OINTMENT OPHTHALMIC at 21:27

## 2024-01-01 RX ADMIN — DEXMEDETOMIDINE HYDROCHLORIDE 0.3 MCG/KG/HR: 400 INJECTION INTRAVENOUS at 11:15

## 2024-01-01 RX ADMIN — MIDAZOLAM 4 MG/HR: 5 INJECTION INTRAMUSCULAR; INTRAVENOUS at 14:38

## 2024-01-01 RX ADMIN — CHLORHEXIDINE GLUCONATE 0.12% ORAL RINSE 15 ML: 1.2 LIQUID ORAL at 21:50

## 2024-01-01 RX ADMIN — POTASSIUM CHLORIDE 40 MEQ: 20 SOLUTION ORAL at 09:28

## 2024-01-01 RX ADMIN — VALPROIC ACID 250 MG: 500 SOLUTION ORAL at 12:01

## 2024-01-01 RX ADMIN — POTASSIUM CHLORIDE 40 MEQ: 20 SOLUTION ORAL at 05:00

## 2024-01-01 RX ADMIN — LORAZEPAM 2 MG: 2 INJECTION INTRAMUSCULAR at 07:27

## 2024-01-01 RX ADMIN — LEVETIRACETAM 500 MG: 500 TABLET, FILM COATED ORAL at 20:31

## 2024-01-01 RX ADMIN — HEPARIN SODIUM 12 UNITS/KG/HR: 10000 INJECTION, SOLUTION INTRAVENOUS at 05:40

## 2024-01-01 RX ADMIN — QUETIAPINE 25 MG: 25 TABLET ORAL at 10:30

## 2024-01-01 RX ADMIN — VALPROIC ACID 250 MG: 500 SOLUTION ORAL at 05:57

## 2024-01-01 RX ADMIN — Medication 40 MEQ: at 03:10

## 2024-01-01 RX ADMIN — PIPERACILLIN SODIUM AND TAZOBACTAM SODIUM 4.5 G: 36; 4.5 INJECTION, POWDER, LYOPHILIZED, FOR SOLUTION INTRAVENOUS at 19:59

## 2024-01-01 RX ADMIN — IPRATROPIUM BROMIDE 0.5 MG: 0.5 SOLUTION RESPIRATORY (INHALATION) at 19:07

## 2024-01-01 RX ADMIN — SODIUM PHOSPHATE, MONOBASIC, MONOHYDRATE AND SODIUM PHOSPHATE, DIBASIC, ANHYDROUS 21 MMOL: 142; 276 INJECTION, SOLUTION INTRAVENOUS at 12:24

## 2024-01-01 RX ADMIN — NOREPINEPHRINE BITARTRATE 6 MCG/MIN: 1 INJECTION, SOLUTION, CONCENTRATE INTRAVENOUS at 12:57

## 2024-01-01 RX ADMIN — Medication 750 MG: at 21:28

## 2024-01-01 RX ADMIN — AMIODARONE HYDROCHLORIDE 300 MG: 50 INJECTION, SOLUTION INTRAVENOUS at 13:33

## 2024-01-01 RX ADMIN — LEVETIRACETAM 1500 MG: 100 INJECTION, SOLUTION INTRAVENOUS at 08:05

## 2024-01-01 RX ADMIN — VASOPRESSIN 0.04 UNITS/MIN: 20 INJECTION INTRAVENOUS at 05:52

## 2024-01-01 RX ADMIN — VASOPRESSIN 0.04 UNITS/MIN: 20 INJECTION INTRAVENOUS at 14:12

## 2024-01-01 RX ADMIN — ACETAMINOPHEN 650 MG: 650 SUSPENSION ORAL at 16:16

## 2024-01-01 RX ADMIN — IPRATROPIUM BROMIDE 0.5 MG: 0.5 SOLUTION RESPIRATORY (INHALATION) at 13:01

## 2024-01-01 RX ADMIN — PIPERACILLIN SODIUM AND TAZOBACTAM SODIUM 4.5 G: 36; 4.5 INJECTION, POWDER, LYOPHILIZED, FOR SOLUTION INTRAVENOUS at 18:18

## 2024-01-01 RX ADMIN — ATORVASTATIN CALCIUM 20 MG: 20 TABLET, FILM COATED ORAL at 17:57

## 2024-01-01 RX ADMIN — PIPERACILLIN SODIUM AND TAZOBACTAM SODIUM 4.5 G: 36; 4.5 INJECTION, POWDER, LYOPHILIZED, FOR SOLUTION INTRAVENOUS at 01:55

## 2024-01-01 RX ADMIN — CHLORHEXIDINE GLUCONATE 0.12% ORAL RINSE 15 ML: 1.2 LIQUID ORAL at 20:44

## 2024-01-01 RX ADMIN — POTASSIUM CHLORIDE 20 MEQ: 14.9 INJECTION, SOLUTION INTRAVENOUS at 20:25

## 2024-01-01 RX ADMIN — CHLORHEXIDINE GLUCONATE 0.12% ORAL RINSE 15 ML: 1.2 LIQUID ORAL at 08:31

## 2024-01-01 RX ADMIN — DEXMEDETOMIDINE HYDROCHLORIDE 0.7 MCG/KG/HR: 400 INJECTION INTRAVENOUS at 19:33

## 2024-01-01 RX ADMIN — MODAFINIL 200 MG: 100 TABLET ORAL at 10:36

## 2024-01-01 RX ADMIN — ATORVASTATIN CALCIUM 20 MG: 20 TABLET, FILM COATED ORAL at 18:35

## 2024-01-01 RX ADMIN — ALBUMIN (HUMAN) 25 G: 12.5 INJECTION, SOLUTION INTRAVENOUS at 10:43

## 2024-01-01 RX ADMIN — VANCOMYCIN HYDROCHLORIDE 1000 MG: 1 INJECTION, SOLUTION INTRAVENOUS at 09:01

## 2024-01-01 RX ADMIN — CHLORHEXIDINE GLUCONATE 0.12% ORAL RINSE 15 ML: 1.2 LIQUID ORAL at 11:45

## 2024-01-01 RX ADMIN — SODIUM CHLORIDE SOLN NEBU 3% 4 ML: 3 NEBU SOLN at 07:20

## 2024-01-01 RX ADMIN — VANCOMYCIN HYDROCHLORIDE 750 MG: 750 INJECTION, SOLUTION INTRAVENOUS at 08:15

## 2024-01-01 RX ADMIN — SODIUM CHLORIDE SOLN NEBU 3% 4 ML: 3 NEBU SOLN at 00:13

## 2024-01-01 RX ADMIN — EPTIFIBATIDE 1 MCG/KG/MIN: 0.75 INJECTION INTRAVENOUS at 22:53

## 2024-01-01 RX ADMIN — SERTRALINE HYDROCHLORIDE 50 MG: 50 TABLET ORAL at 09:11

## 2024-01-01 RX ADMIN — PROPOFOL 50 MCG/KG/MIN: 10 INJECTION, EMULSION INTRAVENOUS at 13:47

## 2024-01-01 RX ADMIN — MIDODRINE HYDROCHLORIDE 5 MG: 5 TABLET ORAL at 16:34

## 2024-01-01 RX ADMIN — SODIUM CHLORIDE SOLN NEBU 3% 4 ML: 3 NEBU SOLN at 00:11

## 2024-01-01 RX ADMIN — NOREPINEPHRINE BITARTRATE 19 MCG/MIN: 1 INJECTION, SOLUTION, CONCENTRATE INTRAVENOUS at 15:42

## 2024-01-01 RX ADMIN — CHLORHEXIDINE GLUCONATE 0.12% ORAL RINSE 15 ML: 1.2 LIQUID ORAL at 21:29

## 2024-01-01 RX ADMIN — FUROSEMIDE 40 MG: 10 INJECTION, SOLUTION INTRAMUSCULAR; INTRAVENOUS at 09:58

## 2024-01-01 RX ADMIN — LEVETIRACETAM 2000 MG: 100 INJECTION, SOLUTION INTRAVENOUS at 15:38

## 2024-01-01 RX ADMIN — LEVETIRACETAM 1000 MG: 100 SOLUTION ORAL at 21:27

## 2024-01-01 RX ADMIN — ASPIRIN 81 MG CHEWABLE TABLET 81 MG: 81 TABLET CHEWABLE at 09:09

## 2024-01-01 RX ADMIN — FUROSEMIDE 40 MG: 10 INJECTION, SOLUTION INTRAMUSCULAR; INTRAVENOUS at 09:11

## 2024-01-01 RX ADMIN — LORAZEPAM 2 MG: 2 INJECTION INTRAMUSCULAR; INTRAVENOUS at 09:20

## 2024-01-01 RX ADMIN — ASPIRIN 81 MG CHEWABLE TABLET 81 MG: 81 TABLET CHEWABLE at 09:22

## 2024-01-01 RX ADMIN — ASPIRIN 81 MG CHEWABLE TABLET 81 MG: 81 TABLET CHEWABLE at 09:11

## 2024-01-01 RX ADMIN — POTASSIUM CHLORIDE 20 MEQ: 14.9 INJECTION, SOLUTION INTRAVENOUS at 17:16

## 2024-01-01 RX ADMIN — LEVETIRACETAM 1500 MG: 100 INJECTION, SOLUTION INTRAVENOUS at 08:26

## 2024-01-01 RX ADMIN — LEVETIRACETAM 500 MG: 100 INJECTION, SOLUTION INTRAVENOUS at 08:34

## 2024-01-01 RX ADMIN — HEPARIN SODIUM 11 UNITS/KG/HR: 10000 INJECTION, SOLUTION INTRAVENOUS at 06:08

## 2024-01-01 RX ADMIN — VANCOMYCIN HYDROCHLORIDE 750 MG: 750 INJECTION, SOLUTION INTRAVENOUS at 01:31

## 2024-01-01 RX ADMIN — DEXMEDETOMIDINE HYDROCHLORIDE 0.7 MCG/KG/HR: 400 INJECTION INTRAVENOUS at 05:41

## 2024-01-01 RX ADMIN — POTASSIUM PHOSPHATE, MONOBASIC POTASSIUM PHOSPHATE, DIBASIC 30 MMOL: 224; 236 INJECTION, SOLUTION, CONCENTRATE INTRAVENOUS at 11:15

## 2024-01-01 RX ADMIN — CHLORHEXIDINE GLUCONATE 0.12% ORAL RINSE 15 ML: 1.2 LIQUID ORAL at 20:42

## 2024-01-01 RX ADMIN — METOROPROLOL TARTRATE 10 MG: 5 INJECTION, SOLUTION INTRAVENOUS at 16:53

## 2024-01-01 RX ADMIN — IPRATROPIUM BROMIDE 0.5 MG: 0.5 SOLUTION RESPIRATORY (INHALATION) at 07:09

## 2024-01-01 RX ADMIN — SODIUM CHLORIDE SOLN NEBU 3% 4 ML: 3 NEBU SOLN at 19:51

## 2024-01-01 RX ADMIN — VALPROIC ACID 250 MG: 500 SOLUTION ORAL at 23:37

## 2024-01-01 RX ADMIN — EPINEPHRINE 1 MG: 0.1 INJECTION INTRAVENOUS at 14:04

## 2024-01-01 RX ADMIN — SENNOSIDES AND DOCUSATE SODIUM 2 TABLET: 50; 8.6 TABLET ORAL at 17:49

## 2024-01-01 RX ADMIN — Medication 12.5 MG: at 21:18

## 2024-01-01 RX ADMIN — PIPERACILLIN SODIUM AND TAZOBACTAM SODIUM 4.5 G: 36; 4.5 INJECTION, POWDER, LYOPHILIZED, FOR SOLUTION INTRAVENOUS at 14:24

## 2024-01-01 RX ADMIN — CALCIUM GLUCONATE 3 G: 98 INJECTION, SOLUTION INTRAVENOUS at 17:42

## 2024-01-01 RX ADMIN — ATORVASTATIN CALCIUM 20 MG: 20 TABLET, FILM COATED ORAL at 18:28

## 2024-01-01 RX ADMIN — POTASSIUM CHLORIDE 40 MEQ: 20 SOLUTION ORAL at 09:16

## 2024-01-01 RX ADMIN — VASOPRESSIN 0.04 UNITS/MIN: 20 INJECTION INTRAVENOUS at 09:41

## 2024-01-01 RX ADMIN — ACETAMINOPHEN 650 MG: 650 SUSPENSION ORAL at 11:46

## 2024-01-01 RX ADMIN — NOREPINEPHRINE BITARTRATE 16 MCG/MIN: 1 INJECTION, SOLUTION, CONCENTRATE INTRAVENOUS at 05:52

## 2024-01-01 RX ADMIN — CHLORHEXIDINE GLUCONATE 0.12% ORAL RINSE 15 ML: 1.2 LIQUID ORAL at 20:59

## 2024-01-01 RX ADMIN — CHLORHEXIDINE GLUCONATE 0.12% ORAL RINSE 15 ML: 1.2 LIQUID ORAL at 09:09

## 2024-01-01 RX ADMIN — NOREPINEPHRINE BITARTRATE 15 MCG/MIN: 1 INJECTION, SOLUTION, CONCENTRATE INTRAVENOUS at 07:33

## 2024-01-01 RX ADMIN — ACETAMINOPHEN 650 MG: 650 SUSPENSION ORAL at 22:26

## 2024-01-01 RX ADMIN — BROMOCRIPTINE MESYLATE 7.5 MG: 2.5 TABLET ORAL at 17:06

## 2024-01-01 RX ADMIN — BROMOCRIPTINE MESYLATE 7.5 MG: 2.5 TABLET ORAL at 13:08

## 2024-01-01 RX ADMIN — LORAZEPAM 2 MG: 2 INJECTION INTRAMUSCULAR at 04:26

## 2024-01-01 RX ADMIN — FENTANYL CITRATE 50 MCG: 50 INJECTION INTRAMUSCULAR; INTRAVENOUS at 16:16

## 2024-01-01 RX ADMIN — NOREPINEPHRINE BITARTRATE 4 MG: 1 INJECTION, SOLUTION, CONCENTRATE INTRAVENOUS at 19:16

## 2024-01-01 RX ADMIN — SODIUM CHLORIDE SOLN NEBU 3% 4 ML: 3 NEBU SOLN at 01:16

## 2024-01-01 RX ADMIN — LORAZEPAM 2 MG: 2 INJECTION INTRAMUSCULAR; INTRAVENOUS at 14:00

## 2024-01-01 RX ADMIN — SODIUM CHLORIDE SOLN NEBU 3% 4 ML: 3 NEBU SOLN at 01:22

## 2024-01-01 RX ADMIN — INSULIN LISPRO 1 UNITS: 100 INJECTION, SOLUTION INTRAVENOUS; SUBCUTANEOUS at 01:35

## 2024-01-01 RX ADMIN — Medication 750 MG: at 16:00

## 2024-01-01 RX ADMIN — HEPARIN SODIUM 5000 UNITS: 5000 INJECTION, SOLUTION INTRAVENOUS; SUBCUTANEOUS at 21:02

## 2024-01-01 RX ADMIN — PIPERACILLIN SODIUM AND TAZOBACTAM SODIUM 4.5 G: 36; 4.5 INJECTION, POWDER, LYOPHILIZED, FOR SOLUTION INTRAVENOUS at 05:52

## 2024-01-01 RX ADMIN — VALPROIC ACID 250 MG: 500 SOLUTION ORAL at 05:31

## 2024-01-01 RX ADMIN — FENTANYL CITRATE 25 MCG: 50 INJECTION INTRAMUSCULAR; INTRAVENOUS at 05:58

## 2024-01-01 RX ADMIN — VANCOMYCIN HYDROCHLORIDE 1250 MG: 10 INJECTION, POWDER, LYOPHILIZED, FOR SOLUTION INTRAVENOUS at 19:00

## 2024-01-01 RX ADMIN — CLONAZEPAM 0.5 MG: 0.5 TABLET ORAL at 08:38

## 2024-01-01 RX ADMIN — PROPOFOL 45 MCG/KG/MIN: 10 INJECTION, EMULSION INTRAVENOUS at 20:12

## 2024-01-01 RX ADMIN — VASOPRESSIN 0.04 UNITS/MIN: 20 INJECTION INTRAVENOUS at 16:29

## 2024-01-01 RX ADMIN — PIPERACILLIN SODIUM AND TAZOBACTAM SODIUM 4.5 G: 36; 4.5 INJECTION, POWDER, LYOPHILIZED, FOR SOLUTION INTRAVENOUS at 02:00

## 2024-01-01 RX ADMIN — ACETAMINOPHEN 650 MG: 650 SUSPENSION ORAL at 17:13

## 2024-01-01 RX ADMIN — IODIXANOL 140 ML: 320 INJECTION, SOLUTION INTRAVASCULAR at 00:23

## 2024-01-01 RX ADMIN — POTASSIUM CHLORIDE 40 MEQ: 20 SOLUTION ORAL at 22:26

## 2024-01-01 RX ADMIN — FENTANYL CITRATE 50 MCG: 50 INJECTION INTRAMUSCULAR; INTRAVENOUS at 03:43

## 2024-01-01 RX ADMIN — SENNOSIDES AND DOCUSATE SODIUM 2 TABLET: 50; 8.6 TABLET ORAL at 08:21

## 2024-01-01 RX ADMIN — CHLORHEXIDINE GLUCONATE 0.12% ORAL RINSE 15 ML: 1.2 LIQUID ORAL at 08:22

## 2024-01-01 RX ADMIN — ACETAMINOPHEN 650 MG: 650 SUSPENSION ORAL at 23:00

## 2024-01-01 RX ADMIN — ASPIRIN 81 MG CHEWABLE TABLET 81 MG: 81 TABLET CHEWABLE at 08:26

## 2024-01-01 RX ADMIN — LEVETIRACETAM 500 MG: 500 TABLET, FILM COATED ORAL at 21:09

## 2024-01-01 RX ADMIN — LEVALBUTEROL HYDROCHLORIDE 1.25 MG: 1.25 SOLUTION RESPIRATORY (INHALATION) at 13:28

## 2024-01-01 RX ADMIN — POLYETHYLENE GLYCOL 3350 17 G: 17 POWDER, FOR SOLUTION ORAL at 09:11

## 2024-01-01 RX ADMIN — SODIUM CHLORIDE 100 ML/HR: 0.9 INJECTION, SOLUTION INTRAVENOUS at 02:48

## 2024-01-01 RX ADMIN — LEVALBUTEROL HYDROCHLORIDE 1.25 MG: 1.25 SOLUTION RESPIRATORY (INHALATION) at 07:41

## 2024-01-01 RX ADMIN — ASPIRIN 81 MG CHEWABLE TABLET 81 MG: 81 TABLET CHEWABLE at 10:30

## 2024-01-01 RX ADMIN — DEXTROSE 100 ML/HR: 5 SOLUTION INTRAVENOUS at 09:08

## 2024-01-01 RX ADMIN — BROMOCRIPTINE MESYLATE 5 MG: 2.5 TABLET ORAL at 12:42

## 2024-01-01 RX ADMIN — ASPIRIN 81 MG CHEWABLE TABLET 81 MG: 81 TABLET CHEWABLE at 09:25

## 2024-01-01 RX ADMIN — PIPERACILLIN SODIUM AND TAZOBACTAM SODIUM 4.5 G: 36; 4.5 INJECTION, POWDER, LYOPHILIZED, FOR SOLUTION INTRAVENOUS at 05:39

## 2024-01-01 RX ADMIN — LEVALBUTEROL HYDROCHLORIDE 1.25 MG: 1.25 SOLUTION RESPIRATORY (INHALATION) at 07:55

## 2024-01-01 RX ADMIN — MIDODRINE HYDROCHLORIDE 5 MG: 5 TABLET ORAL at 08:09

## 2024-01-01 RX ADMIN — EPINEPHRINE 1 MG: 0.1 INJECTION INTRAVENOUS at 13:54

## 2024-01-01 RX ADMIN — LEVETIRACETAM 1000 MG: 100 INJECTION, SOLUTION INTRAVENOUS at 22:21

## 2024-01-01 RX ADMIN — IPRATROPIUM BROMIDE AND ALBUTEROL SULFATE 3 ML: .5; 3 SOLUTION RESPIRATORY (INHALATION) at 01:47

## 2024-01-01 RX ADMIN — LEVETIRACETAM 1500 MG: 100 INJECTION, SOLUTION INTRAVENOUS at 08:12

## 2024-01-01 RX ADMIN — BROMOCRIPTINE MESYLATE 5 MG: 2.5 TABLET ORAL at 00:47

## 2024-01-01 RX ADMIN — MINERAL OIL AND WHITE PETROLATUM: 150; 830 OINTMENT OPHTHALMIC at 23:34

## 2024-01-01 RX ADMIN — IOHEXOL 85 ML: 350 INJECTION, SOLUTION INTRAVENOUS at 16:34

## 2024-01-01 RX ADMIN — PIPERACILLIN SODIUM AND TAZOBACTAM SODIUM 4.5 G: 36; 4.5 INJECTION, POWDER, LYOPHILIZED, FOR SOLUTION INTRAVENOUS at 10:30

## 2024-01-01 RX ADMIN — VANCOMYCIN HYDROCHLORIDE 1250 MG: 10 INJECTION, POWDER, LYOPHILIZED, FOR SOLUTION INTRAVENOUS at 08:34

## 2024-01-01 RX ADMIN — PIPERACILLIN SODIUM AND TAZOBACTAM SODIUM 4.5 G: 36; 4.5 INJECTION, POWDER, LYOPHILIZED, FOR SOLUTION INTRAVENOUS at 10:24

## 2024-01-01 RX ADMIN — POTASSIUM PHOSPHATE, MONOBASIC POTASSIUM PHOSPHATE, DIBASIC 21 MMOL: 224; 236 INJECTION, SOLUTION, CONCENTRATE INTRAVENOUS at 08:34

## 2024-01-01 RX ADMIN — SODIUM CHLORIDE SOLN NEBU 3% 4 ML: 3 NEBU SOLN at 19:16

## 2024-01-01 RX ADMIN — PROPOFOL 40 MCG/KG/MIN: 10 INJECTION, EMULSION INTRAVENOUS at 15:45

## 2024-01-01 RX ADMIN — BROMOCRIPTINE MESYLATE 5 MG: 2.5 TABLET ORAL at 18:05

## 2024-01-01 RX ADMIN — MAGNESIUM SULFATE HEPTAHYDRATE 2 G: 40 INJECTION, SOLUTION INTRAVENOUS at 01:50

## 2024-01-01 RX ADMIN — NOREPINEPHRINE BITARTRATE 10 MCG/MIN: 1 INJECTION, SOLUTION, CONCENTRATE INTRAVENOUS at 22:41

## 2024-01-01 RX ADMIN — ASPIRIN 81 MG CHEWABLE TABLET 81 MG: 81 TABLET CHEWABLE at 07:40

## 2024-01-01 RX ADMIN — LEVETIRACETAM 1500 MG: 100 INJECTION, SOLUTION INTRAVENOUS at 21:19

## 2024-01-01 RX ADMIN — IPRATROPIUM BROMIDE 0.5 MG: 0.5 SOLUTION RESPIRATORY (INHALATION) at 19:28

## 2024-01-01 RX ADMIN — CHLORHEXIDINE GLUCONATE 0.12% ORAL RINSE 15 ML: 1.2 LIQUID ORAL at 21:18

## 2024-01-01 RX ADMIN — ALBUTEROL SULFATE 2.5 MG: 2.5 SOLUTION RESPIRATORY (INHALATION) at 20:41

## 2024-01-01 RX ADMIN — CHLORHEXIDINE GLUCONATE 0.12% ORAL RINSE 15 ML: 1.2 LIQUID ORAL at 21:17

## 2024-01-01 RX ADMIN — FENTANYL CITRATE 50 MCG: 50 INJECTION INTRAMUSCULAR; INTRAVENOUS at 14:58

## 2024-01-01 RX ADMIN — SERTRALINE HYDROCHLORIDE 50 MG: 20 SOLUTION ORAL at 10:24

## 2024-01-01 RX ADMIN — SENNOSIDES AND DOCUSATE SODIUM 2 TABLET: 50; 8.6 TABLET ORAL at 08:25

## 2024-01-01 RX ADMIN — NOREPINEPHRINE BITARTRATE 15 MCG/MIN: 1 INJECTION, SOLUTION, CONCENTRATE INTRAVENOUS at 09:12

## 2024-01-01 RX ADMIN — MIDODRINE HYDROCHLORIDE 5 MG: 5 TABLET ORAL at 09:10

## 2024-01-01 RX ADMIN — LEVETIRACETAM 500 MG: 500 TABLET, FILM COATED ORAL at 10:30

## 2024-01-01 RX ADMIN — PIPERACILLIN SODIUM AND TAZOBACTAM SODIUM 4.5 G: 36; 4.5 INJECTION, POWDER, LYOPHILIZED, FOR SOLUTION INTRAVENOUS at 13:41

## 2024-01-01 RX ADMIN — Medication 6 MG: at 21:47

## 2024-01-01 RX ADMIN — ASPIRIN 81 MG CHEWABLE TABLET 81 MG: 81 TABLET CHEWABLE at 09:24

## 2024-01-01 RX ADMIN — ASPIRIN 81 MG CHEWABLE TABLET 81 MG: 81 TABLET CHEWABLE at 08:07

## 2024-01-01 RX ADMIN — SODIUM CHLORIDE SOLN NEBU 3% 4 ML: 3 NEBU SOLN at 07:31

## 2024-01-01 RX ADMIN — POLYETHYLENE GLYCOL 3350 17 G: 17 POWDER, FOR SOLUTION ORAL at 08:38

## 2024-01-01 RX ADMIN — POLYETHYLENE GLYCOL 3350 17 G: 17 POWDER, FOR SOLUTION ORAL at 08:25

## 2024-01-01 RX ADMIN — MODAFINIL 100 MG: 100 TABLET ORAL at 09:28

## 2024-01-01 RX ADMIN — PIPERACILLIN SODIUM AND TAZOBACTAM SODIUM 4.5 G: 36; 4.5 INJECTION, POWDER, LYOPHILIZED, FOR SOLUTION INTRAVENOUS at 02:23

## 2024-01-01 RX ADMIN — MINERAL OIL AND WHITE PETROLATUM: 150; 830 OINTMENT OPHTHALMIC at 21:00

## 2024-01-01 RX ADMIN — ATORVASTATIN CALCIUM 40 MG: 40 TABLET, FILM COATED ORAL at 02:08

## 2024-01-01 RX ADMIN — AMIODARONE HYDROCHLORIDE 1 MG/MIN: 50 INJECTION, SOLUTION INTRAVENOUS at 20:53

## 2024-01-01 RX ADMIN — QUETIAPINE 25 MG: 25 TABLET ORAL at 17:46

## 2024-01-01 RX ADMIN — LEVALBUTEROL HYDROCHLORIDE 1.25 MG: 1.25 SOLUTION RESPIRATORY (INHALATION) at 07:10

## 2024-01-01 RX ADMIN — FENTANYL CITRATE 50 MCG: 50 INJECTION INTRAMUSCULAR; INTRAVENOUS at 22:46

## 2024-01-01 RX ADMIN — SODIUM CHLORIDE SOLN NEBU 3% 4 ML: 3 NEBU SOLN at 05:49

## 2024-02-28 ENCOUNTER — OFFICE VISIT (OUTPATIENT)
Dept: FAMILY MEDICINE CLINIC | Facility: CLINIC | Age: 69
End: 2024-02-28
Payer: COMMERCIAL

## 2024-02-28 VITALS
TEMPERATURE: 97.6 F | DIASTOLIC BLOOD PRESSURE: 76 MMHG | BODY MASS INDEX: 21.68 KG/M2 | HEIGHT: 64 IN | OXYGEN SATURATION: 97 % | HEART RATE: 90 BPM | WEIGHT: 127 LBS | SYSTOLIC BLOOD PRESSURE: 128 MMHG

## 2024-02-28 DIAGNOSIS — Z78.0 ASYMPTOMATIC POSTMENOPAUSAL STATE: ICD-10-CM

## 2024-02-28 DIAGNOSIS — M54.50 CHRONIC MIDLINE LOW BACK PAIN WITHOUT SCIATICA: ICD-10-CM

## 2024-02-28 DIAGNOSIS — F17.210 SMOKING GREATER THAN 20 PACK YEARS: ICD-10-CM

## 2024-02-28 DIAGNOSIS — F41.9 ANXIETY AND DEPRESSION: Primary | ICD-10-CM

## 2024-02-28 DIAGNOSIS — Z12.31 ENCOUNTER FOR SCREENING MAMMOGRAM FOR MALIGNANT NEOPLASM OF BREAST: ICD-10-CM

## 2024-02-28 DIAGNOSIS — Z12.11 SCREENING FOR COLORECTAL CANCER: ICD-10-CM

## 2024-02-28 DIAGNOSIS — Z86.010 HISTORY OF COLONIC POLYPS: ICD-10-CM

## 2024-02-28 DIAGNOSIS — Z80.0 FAMILY HISTORY OF COLON CANCER IN FATHER: ICD-10-CM

## 2024-02-28 DIAGNOSIS — Z00.00 WELCOME TO MEDICARE PREVENTIVE VISIT: ICD-10-CM

## 2024-02-28 DIAGNOSIS — Z12.31 ENCOUNTER FOR SCREENING MAMMOGRAM FOR BREAST CANCER: ICD-10-CM

## 2024-02-28 DIAGNOSIS — Z78.9 LANGUAGE BARRIER: ICD-10-CM

## 2024-02-28 DIAGNOSIS — E78.2 MIXED HYPERLIPIDEMIA: ICD-10-CM

## 2024-02-28 DIAGNOSIS — H53.9 CHANGE IN VISION: ICD-10-CM

## 2024-02-28 DIAGNOSIS — I10 PRIMARY HYPERTENSION: ICD-10-CM

## 2024-02-28 DIAGNOSIS — Z12.12 SCREENING FOR COLORECTAL CANCER: ICD-10-CM

## 2024-02-28 DIAGNOSIS — G89.29 CHRONIC MIDLINE LOW BACK PAIN WITHOUT SCIATICA: ICD-10-CM

## 2024-02-28 DIAGNOSIS — F32.A ANXIETY AND DEPRESSION: Primary | ICD-10-CM

## 2024-02-28 DIAGNOSIS — F41.0 PANIC ATTACK: ICD-10-CM

## 2024-02-28 PROCEDURE — 99214 OFFICE O/P EST MOD 30 MIN: CPT | Performed by: STUDENT IN AN ORGANIZED HEALTH CARE EDUCATION/TRAINING PROGRAM

## 2024-02-28 PROCEDURE — G0402 INITIAL PREVENTIVE EXAM: HCPCS | Performed by: STUDENT IN AN ORGANIZED HEALTH CARE EDUCATION/TRAINING PROGRAM

## 2024-02-28 PROCEDURE — G0444 DEPRESSION SCREEN ANNUAL: HCPCS | Performed by: STUDENT IN AN ORGANIZED HEALTH CARE EDUCATION/TRAINING PROGRAM

## 2024-02-28 RX ORDER — LOSARTAN POTASSIUM 50 MG/1
50 TABLET ORAL DAILY
Qty: 90 TABLET | Refills: 3 | Status: SHIPPED | OUTPATIENT
Start: 2024-02-28

## 2024-02-28 RX ORDER — ATORVASTATIN CALCIUM 20 MG/1
20 TABLET, FILM COATED ORAL DAILY
COMMUNITY
Start: 2024-01-27 | End: 2024-02-28 | Stop reason: SDUPTHER

## 2024-02-28 RX ORDER — SERTRALINE HYDROCHLORIDE 100 MG/1
100 TABLET, FILM COATED ORAL DAILY
COMMUNITY
Start: 2024-01-27 | End: 2024-02-28 | Stop reason: SDUPTHER

## 2024-02-28 RX ORDER — LOSARTAN POTASSIUM 50 MG/1
50 TABLET ORAL DAILY
COMMUNITY
Start: 2024-01-26 | End: 2024-02-28 | Stop reason: SDUPTHER

## 2024-02-28 RX ORDER — ATORVASTATIN CALCIUM 20 MG/1
20 TABLET, FILM COATED ORAL DAILY
Qty: 90 TABLET | Refills: 3 | Status: SHIPPED | OUTPATIENT
Start: 2024-02-28

## 2024-02-28 RX ORDER — BUSPIRONE HYDROCHLORIDE 7.5 MG/1
7.5 TABLET ORAL 2 TIMES DAILY PRN
Qty: 60 TABLET | Refills: 1 | Status: SHIPPED | OUTPATIENT
Start: 2024-02-28

## 2024-02-28 RX ORDER — SERTRALINE HYDROCHLORIDE 100 MG/1
50 TABLET, FILM COATED ORAL DAILY
Qty: 90 TABLET | Refills: 1 | Status: SHIPPED | OUTPATIENT
Start: 2024-02-28

## 2024-02-28 NOTE — PROGRESS NOTES
Assessment and Plan:     Problem List Items Addressed This Visit    None  Visit Diagnoses     Anxiety and depression    -  Primary    Relevant Medications    sertraline (ZOLOFT) 100 mg tablet    busPIRone (BUSPAR) 7.5 mg tablet    Primary hypertension        Relevant Medications    losartan (COZAAR) 50 mg tablet    Mixed hyperlipidemia        Relevant Medications    atorvastatin (LIPITOR) 20 mg tablet    Welcome to Medicare preventive visit        Language barrier        Encounter for screening mammogram for malignant neoplasm of breast        Family history of colon cancer in father        History of colonic polyps        Encounter for screening mammogram for breast cancer        Relevant Orders    Mammo screening bilateral w 3d & cad    Screening for colorectal cancer        Relevant Orders    Ambulatory referral to Gastroenterology    Smoking greater than 20 pack years        Relevant Orders    CT lung screening program    Asymptomatic postmenopausal state        Relevant Orders    DXA bone density spine hip and pelvis    Chronic midline low back pain without sciatica        Relevant Orders    Ambulatory referral to Spine & Pain Management    Change in vision        Relevant Orders    Ambulatory Referral to Ophthalmology    Panic attack        Relevant Medications    busPIRone (BUSPAR) 7.5 mg tablet          I have spent a total time of 45 minutes on 02/28/24 in caring for this patient including Impressions, Counseling / Coordination of care, Documenting in the medical record, and Obtaining or reviewing history  .      Depression Screening and Follow-up Plan: Patient was screened for depression during today's encounter. They screened negative with a PHQ-2 score of 0.    Tobacco Cessation Counseling: Tobacco cessation counseling was not provided. The patient is sincerely urged to quit consumption of tobacco. She is not ready to quit tobacco.       Preventive health issues were discussed with patient, and age  appropriate screening tests were ordered as noted in patient's After Visit Summary.  Personalized health advice and appropriate referrals for health education or preventive services given if needed, as noted in patient's After Visit Summary.     History of Present Illness:     Patient presents for a Medicare Wellness Visit    HPI     [place] in [place] many years and has issues to discuss.    Does not speak english, speaks polish    Hx of htn    Hx of HLD    Hx of anxiety. Stable on zoloft 50mg daily. Has periods of panic attacks since she was very young leaving [place]. Takes a clonopin maybe once to 2x a month    Hx of colon polyp, last scope 5 years ago. Father diagnosed with colon cancer in his 70's    Tobacco use: long standing history. Most of her life was a pack per day, now 5 cigarettes a day. States she may have had lung cancer screening but not sure    \wants to see pain doctor,m gets a shot in her back every year. Disability x 5 years and stable with a yearly injection. Believes for arthritis, denies any trauma.    Has periods of left eye going blind., has seen 3 specialist and told nothing can be done. Was todl there is a yellow dot. Sister had an eye surgery (maybe cataract)    Patient Care Team:  Margarita Pedersen MD as PCP - General (Family Medicine)     Review of Systems:     Review of Systems   Constitutional:  Negative for chills, fatigue and fever.   HENT:  Negative for rhinorrhea and sore throat.    Eyes:  Negative for visual disturbance.   Respiratory:  Negative for cough and shortness of breath.    Cardiovascular:  Negative for chest pain and palpitations.   Gastrointestinal:  Negative for abdominal pain, constipation, diarrhea, nausea and vomiting.   Genitourinary:  Negative for difficulty urinating, dysuria and frequency.   Musculoskeletal:  Positive for back pain. Negative for arthralgias and myalgias.   Skin:  Negative for color change and rash.   Neurological:   Negative for weakness and headaches.   Psychiatric/Behavioral:  Negative for behavioral problems, confusion, decreased concentration, dysphoric mood, hallucinations, sleep disturbance and suicidal ideas. The patient is nervous/anxious. The patient is not hyperactive.         Problem List:     There is no problem list on file for this patient.     Past Medical and Surgical History:     Past Medical History:   Diagnosis Date   • Hypertension      History reviewed. No pertinent surgical history.   Family History:     History reviewed. No pertinent family history.   Social History:     Social History     Socioeconomic History   • Marital status:      Spouse name: None   • Number of children: None   • Years of education: None   • Highest education level: None   Occupational History   • None   Tobacco Use   • Smoking status: Every Day     Current packs/day: 1.00     Average packs/day: 1 pack/day for 46.0 years (46.0 ttl pk-yrs)     Types: Cigarettes     Start date: 2/28/1978   • Smokeless tobacco: Never   Vaping Use   • Vaping status: Never Used   Substance and Sexual Activity   • Alcohol use: Not Currently   • Drug use: Not Currently   • Sexual activity: None   Other Topics Concern   • None   Social History Narrative   • None     Social Determinants of Health     Financial Resource Strain: Low Risk  (2/28/2024)    Overall Financial Resource Strain (CARDIA)    • Difficulty of Paying Living Expenses: Not hard at all   Food Insecurity: Not on file   Transportation Needs: Unmet Transportation Needs (2/28/2024)    PRAPARE - Transportation    • Lack of Transportation (Medical): Yes    • Lack of Transportation (Non-Medical): No   Physical Activity: Not on file   Stress: Not on file   Social Connections: Not on file   Intimate Partner Violence: Not on file   Housing Stability: Not on file      Medications and Allergies:     Current Outpatient Medications   Medication Sig Dispense Refill   • atorvastatin (LIPITOR) 20 mg  tablet Take 1 tablet (20 mg total) by mouth daily 90 tablet 3   • busPIRone (BUSPAR) 7.5 mg tablet Take 1 tablet (7.5 mg total) by mouth 2 (two) times a day as needed (panic attacl) 60 tablet 1   • losartan (COZAAR) 50 mg tablet Take 1 tablet (50 mg total) by mouth daily 90 tablet 3   • sertraline (ZOLOFT) 100 mg tablet Take 0.5 tablets (50 mg total) by mouth daily 90 tablet 1     No current facility-administered medications for this visit.     No Known Allergies   Immunizations:       There is no immunization history on file for this patient.   Health Maintenance:         Topic Date Due   • Hepatitis C Screening  Never done   • Breast Cancer Screening: Mammogram  Never done   • Colorectal Cancer Screening  Never done         Topic Date Due   • Pneumococcal Vaccine: 65+ Years (1 of 1 - PCV) Never done   • Influenza Vaccine (1) Never done   • COVID-19 Vaccine (1 - 2023-24 season) Never done      Medicare Screening Tests and Risk Assessments:     Bren is here for her Subsequent Wellness visit. Last Medicare Wellness visit information reviewed, patient interviewed and updates made to the record today.      Health Risk Assessment:   Patient rates overall health as good. Patient feels that their physical health rating is same. Patient is satisfied with their life. Eyesight was rated as slightly worse. Hearing was rated as same. Patient feels that their emotional and mental health rating is same. Patients states they are never, rarely angry. Patient states they are never, rarely unusually tired/fatigued. Pain experienced in the last 7 days has been some. Patient's pain rating has been 6/10. Patient states that she has experienced no weight loss or gain in last 6 months.     Depression Screening:   PHQ-2 Score: 0      Fall Risk Screening:   In the past year, patient has experienced: no history of falling in past year      Urinary Incontinence Screening:   Patient has not leaked urine accidently in the last six months.  "    Home Safety:  Patient does not have trouble with stairs inside or outside of their home. Patient has working smoke alarms and has working carbon monoxide detector. Home safety hazards include: none.     Nutrition:   Current diet is Regular.     Medications:   Patient is currently taking over-the-counter supplements. OTC medications include: see medication list. Patient is able to manage medications.     Activities of Daily Living (ADLs)/Instrumental Activities of Daily Living (IADLs):   Walk and transfer into and out of bed and chair?: Yes  Dress and groom yourself?: Yes    Bathe or shower yourself?: Yes    Feed yourself? Yes  Do your laundry/housekeeping?: Yes  Manage your money, pay your bills and track your expenses?: Yes  Make your own meals?: Yes    Do your own shopping?: Yes    Previous Hospitalizations:   Any hospitalizations or ED visits within the last 12 months?: No      Advance Care Planning:   Living will: No    Durable POA for healthcare: No      PREVENTIVE SCREENINGS        Cervical Cancer Screening:    General: Screening Not Indicated      Lung Cancer Screening:     General: Screening Not Indicated    Screening, Brief Intervention, and Referral to Treatment (SBIRT)    Screening  Typical number of drinks in a day: 0  Typical number of drinks in a week: 0  Interpretation: Low risk drinking behavior.    Single Item Drug Screening:  How often have you used an illegal drug (including marijuana) or a prescription medication for non-medical reasons in the past year? never    Single Item Drug Screen Score: 0  Interpretation: Negative screen for possible drug use disorder    No results found.     Physical Exam:     /76   Pulse 90   Temp 97.6 °F (36.4 °C)   Ht 5' 4\" (1.626 m)   Wt 57.6 kg (127 lb)   SpO2 97%   BMI 21.80 kg/m²     Physical Exam  Constitutional:       General: She is not in acute distress.     Appearance: Normal appearance. She is not ill-appearing.   HENT:      Head: " Normocephalic and atraumatic.      Right Ear: Tympanic membrane, ear canal and external ear normal.      Left Ear: Tympanic membrane, ear canal and external ear normal.      Nose: Nose normal.      Mouth/Throat:      Mouth: Mucous membranes are moist.      Pharynx: Oropharynx is clear. No oropharyngeal exudate or posterior oropharyngeal erythema.   Eyes:      General: No scleral icterus.        Right eye: No discharge.         Left eye: No discharge.      Extraocular Movements: Extraocular movements intact.      Conjunctiva/sclera: Conjunctivae normal.      Pupils: Pupils are equal, round, and reactive to light.   Cardiovascular:      Rate and Rhythm: Normal rate and regular rhythm.      Pulses: Normal pulses.      Heart sounds: Normal heart sounds. No murmur heard.  Pulmonary:      Effort: Pulmonary effort is normal. No respiratory distress.      Breath sounds: Normal breath sounds.   Abdominal:      General: Bowel sounds are normal.      Palpations: Abdomen is soft.      Tenderness: There is no abdominal tenderness.   Musculoskeletal:      Cervical back: Normal range of motion and neck supple.      Lumbar back: Decreased range of motion.   Lymphadenopathy:      Cervical: No cervical adenopathy.   Skin:     General: Skin is warm and dry.      Capillary Refill: Capillary refill takes less than 2 seconds.   Neurological:      General: No focal deficit present.      Mental Status: She is alert and oriented to person, place, and time. Mental status is at baseline.      Cranial Nerves: No cranial nerve deficit.   Psychiatric:         Mood and Affect: Mood normal.          TONJA Medina discussed with her that she is a candidate for lung cancer CT screening.     The following Shared Decision-Making points were covered:  Benefits of screening were discussed, including the rates of reduction in death from lung cancer and other causes.  Harms of screening were reviewed, including false positive tests, radiation  exposure levels, risks of invasive procedures, risks of complications of screening, and risk of overdiagnosis.  I counseled on the importance of adherence to annual lung cancer LDCT screening, impact of co-morbidities, and ability or willingness to undergo diagnosis and treatment.  I counseled on the importance of maintaining abstinence as a former smoker or was counseled on the importance of smoking cessation if a current smoker    Review of Eligibility Criteria: She meets all of the criteria for Lung Cancer Screening.   She is 68 y.o.   She has 20 pack year tobacco history and is a current smoker or has quit within the past 15 years  She presents no signs or symptoms of lung cancer    After discussion, the patient decided to elect lung cancer screening.

## 2024-02-28 NOTE — PATIENT INSTRUCTIONS
Medicare Preventive Visit Patient Instructions  Thank you for completing your Welcome to Medicare Visit or Medicare Annual Wellness Visit today. Your next wellness visit will be due in one year (2/28/2025).  The screening/preventive services that you may require over the next 5-10 years are detailed below. Some tests may not apply to you based off risk factors and/or age. Screening tests ordered at today's visit but not completed yet may show as past due. Also, please note that scanned in results may not display below.  Preventive Screenings:  Service Recommendations Previous Testing/Comments   Colorectal Cancer Screening  * Colonoscopy    * Fecal Occult Blood Test (FOBT)/Fecal Immunochemical Test (FIT)  * Fecal DNA/Cologuard Test  * Flexible Sigmoidoscopy Age: 45-75 years old   Colonoscopy: every 10 years (may be performed more frequently if at higher risk)  OR  FOBT/FIT: every 1 year  OR  Cologuard: every 3 years  OR  Sigmoidoscopy: every 5 years  Screening may be recommended earlier than age 45 if at higher risk for colorectal cancer. Also, an individualized decision between you and your healthcare provider will decide whether screening between the ages of 76-85 would be appropriate. Colonoscopy: Not on file  FOBT/FIT: Not on file  Cologuard: Not on file  Sigmoidoscopy: Not on file          Breast Cancer Screening Age: 40+ years old  Frequency: every 1-2 years  Not required if history of left and right mastectomy Mammogram: Not on file        Cervical Cancer Screening Between the ages of 21-29, pap smear recommended once every 3 years.   Between the ages of 30-65, can perform pap smear with HPV co-testing every 5 years.   Recommendations may differ for women with a history of total hysterectomy, cervical cancer, or abnormal pap smears in past. Pap Smear: Not on file    Screening Not Indicated   Hepatitis C Screening Once for adults born between 1945 and 1965  More frequently in patients at high risk for Hepatitis  C Hep C Antibody: Not on file        Diabetes Screening 1-2 times per year if you're at risk for diabetes or have pre-diabetes Fasting glucose: No results in last 5 years (No results in last 5 years)  A1C: No results in last 5 years (No results in last 5 years)      Cholesterol Screening Once every 5 years if you don't have a lipid disorder. May order more often based on risk factors. Lipid panel: Not on file          Other Preventive Screenings Covered by Medicare:  Abdominal Aortic Aneurysm (AAA) Screening: covered once if your at risk. You're considered to be at risk if you have a family history of AAA.  Lung Cancer Screening: covers low dose CT scan once per year if you meet all of the following conditions: (1) Age 55-77; (2) No signs or symptoms of lung cancer; (3) Current smoker or have quit smoking within the last 15 years; (4) You have a tobacco smoking history of at least 20 pack years (packs per day multiplied by number of years you smoked); (5) You get a written order from a healthcare provider.  Glaucoma Screening: covered annually if you're considered high risk: (1) You have diabetes OR (2) Family history of glaucoma OR (3)  aged 50 and older OR (4)  American aged 65 and older  Osteoporosis Screening: covered every 2 years if you meet one of the following conditions: (1) You're estrogen deficient and at risk for osteoporosis based off medical history and other findings; (2) Have a vertebral abnormality; (3) On glucocorticoid therapy for more than 3 months; (4) Have primary hyperparathyroidism; (5) On osteoporosis medications and need to assess response to drug therapy.   Last bone density test (DXA Scan): Not on file.  HIV Screening: covered annually if you're between the age of 15-65. Also covered annually if you are younger than 15 and older than 65 with risk factors for HIV infection. For pregnant patients, it is covered up to 3 times per  pregnancy.    Immunizations:  Immunization Recommendations   Influenza Vaccine Annual influenza vaccination during flu season is recommended for all persons aged >= 6 months who do not have contraindications   Pneumococcal Vaccine   * Pneumococcal conjugate vaccine = PCV13 (Prevnar 13), PCV15 (Vaxneuvance), PCV20 (Prevnar 20)  * Pneumococcal polysaccharide vaccine = PPSV23 (Pneumovax) Adults 19-63 yo with certain risk factors or if 65+ yo  If never received any pneumonia vaccine: recommend Prevnar 20 (PCV20)  Give PCV20 if previously received 1 dose of PCV13 or PPSV23   Hepatitis B Vaccine 3 dose series if at intermediate or high risk (ex: diabetes, end stage renal disease, liver disease)   Respiratory syncytial virus (RSV) Vaccine - COVERED BY MEDICARE PART D  * RSVPreF3 (Arexvy) CDC recommends that adults 60 years of age and older may receive a single dose of RSV vaccine using shared clinical decision-making (SCDM)   Tetanus (Td) Vaccine - COST NOT COVERED BY MEDICARE PART B Following completion of primary series, a booster dose should be given every 10 years to maintain immunity against tetanus. Td may also be given as tetanus wound prophylaxis.   Tdap Vaccine - COST NOT COVERED BY MEDICARE PART B Recommended at least once for all adults. For pregnant patients, recommended with each pregnancy.   Shingles Vaccine (Shingrix) - COST NOT COVERED BY MEDICARE PART B  2 shot series recommended in those 19 years and older who have or will have weakened immune systems or those 50 years and older     Health Maintenance Due:      Topic Date Due   • Hepatitis C Screening  Never done   • Breast Cancer Screening: Mammogram  Never done   • Colorectal Cancer Screening  Never done     Immunizations Due:      Topic Date Due   • Pneumococcal Vaccine: 65+ Years (1 of 1 - PCV) Never done   • Influenza Vaccine (1) Never done   • COVID-19 Vaccine (1 - 2023-24 season) Never done     Advance Directives   What are advance directives?   Advance directives are legal documents that state your wishes and plans for medical care. These plans are made ahead of time in case you lose your ability to make decisions for yourself. Advance directives can apply to any medical decision, such as the treatments you want, and if you want to donate organs.   What are the types of advance directives?  There are many types of advance directives, and each state has rules about how to use them. You may choose a combination of any of the following:  Living will:  This is a written record of the treatment you want. You can also choose which treatments you do not want, which to limit, and which to stop at a certain time. This includes surgery, medicine, IV fluid, and tube feedings.   Durable power of  for healthcare (DPAHC):  This is a written record that states who you want to make healthcare choices for you when you are unable to make them for yourself. This person, called a proxy, is usually a family member or a friend. You may choose more than 1 proxy.  Do not resuscitate (DNR) order:  A DNR order is used in case your heart stops beating or you stop breathing. It is a request not to have certain forms of treatment, such as CPR. A DNR order may be included in other types of advance directives.  Medical directive:  This covers the care that you want if you are in a coma, near death, or unable to make decisions for yourself. You can list the treatments you want for each condition. Treatment may include pain medicine, surgery, blood transfusions, dialysis, IV or tube feedings, and a ventilator (breathing machine).  Values history:  This document has questions about your views, beliefs, and how you feel and think about life. This information can help others choose the care that you would choose.  Why are advance directives important?  An advance directive helps you control your care. Although spoken wishes may be used, it is better to have your wishes written down.  Spoken wishes can be misunderstood, or not followed. Treatments may be given even if you do not want them. An advance directive may make it easier for your family to make difficult choices about your care.   Cigarette Smoking and Your Health   Risks to your health if you smoke:  Nicotine and other chemicals found in tobacco damage every cell in your body. Even if you are a light smoker, you have an increased risk for cancer, heart disease, and lung disease. If you are pregnant or have diabetes, smoking increases your risk for complications.   Benefits to your health if you stop smoking:   You decrease respiratory symptoms such as coughing, wheezing, and shortness of breath.   You reduce your risk for cancers of the lung, mouth, throat, kidney, bladder, pancreas, stomach, and cervix. If you already have cancer, you increase the benefits of chemotherapy. You also reduce your risk for cancer returning or a second cancer from developing.   You reduce your risk for heart disease, blood clots, heart attack, and stroke.   You reduce your risk for lung infections, and diseases such as pneumonia, asthma, chronic bronchitis, and emphysema.  Your circulation improves. More oxygen can be delivered to your body. If you have diabetes, you lower your risk for complications, such as kidney, artery, and eye diseases. You also lower your risk for nerve damage. Nerve damage can lead to amputations, poor vision, and blindness.  You improve your body's ability to heal and to fight infections.  For more information and support to stop smoking:   Smokefree.gov  Phone: 8- 083 - 533-2319  Web Address: www.[employer]und[employer]     © Copyright [employer] 2018 Information is for End User's use only and may not be sold, redistributed or otherwise used for commercial purposes. All illustrations and images included in CareNotes® are the copyrighted property of A.[employer].A[employer]M., Inc. or [employer]      Medicare Preventive Visit Patient  Instructions  Thank you for completing your Welcome to Medicare Visit or Medicare Annual Wellness Visit today. Your next wellness visit will be due in one year (2/28/2025).  The screening/preventive services that you may require over the next 5-10 years are detailed below. Some tests may not apply to you based off risk factors and/or age. Screening tests ordered at today's visit but not completed yet may show as past due. Also, please note that scanned in results may not display below.  Preventive Screenings:  Service Recommendations Previous Testing/Comments   Colorectal Cancer Screening  * Colonoscopy    * Fecal Occult Blood Test (FOBT)/Fecal Immunochemical Test (FIT)  * Fecal DNA/Cologuard Test  * Flexible Sigmoidoscopy Age: 45-75 years old   Colonoscopy: every 10 years (may be performed more frequently if at higher risk)  OR  FOBT/FIT: every 1 year  OR  Cologuard: every 3 years  OR  Sigmoidoscopy: every 5 years  Screening may be recommended earlier than age 45 if at higher risk for colorectal cancer. Also, an individualized decision between you and your healthcare provider will decide whether screening between the ages of 76-85 would be appropriate. Colonoscopy: Not on file  FOBT/FIT: Not on file  Cologuard: Not on file  Sigmoidoscopy: Not on file          Breast Cancer Screening Age: 40+ years old  Frequency: every 1-2 years  Not required if history of left and right mastectomy Mammogram: Not on file        Cervical Cancer Screening Between the ages of 21-29, pap smear recommended once every 3 years.   Between the ages of 30-65, can perform pap smear with HPV co-testing every 5 years.   Recommendations may differ for women with a history of total hysterectomy, cervical cancer, or abnormal pap smears in past. Pap Smear: Not on file    Screening Not Indicated   Hepatitis C Screening Once for adults born between 1945 and 1965  More frequently in patients at high risk for Hepatitis C Hep C Antibody: Not on file         Diabetes Screening 1-2 times per year if you're at risk for diabetes or have pre-diabetes Fasting glucose: No results in last 5 years (No results in last 5 years)  A1C: No results in last 5 years (No results in last 5 years)      Cholesterol Screening Once every 5 years if you don't have a lipid disorder. May order more often based on risk factors. Lipid panel: Not on file          Other Preventive Screenings Covered by Medicare:  Abdominal Aortic Aneurysm (AAA) Screening: covered once if your at risk. You're considered to be at risk if you have a family history of AAA.  Lung Cancer Screening: covers low dose CT scan once per year if you meet all of the following conditions: (1) Age 55-77; (2) No signs or symptoms of lung cancer; (3) Current smoker or have quit smoking within the last 15 years; (4) You have a tobacco smoking history of at least 20 pack years (packs per day multiplied by number of years you smoked); (5) You get a written order from a healthcare provider.  Glaucoma Screening: covered annually if you're considered high risk: (1) You have diabetes OR (2) Family history of glaucoma OR (3)  aged 50 and older OR (4)  American aged 65 and older  Osteoporosis Screening: covered every 2 years if you meet one of the following conditions: (1) You're estrogen deficient and at risk for osteoporosis based off medical history and other findings; (2) Have a vertebral abnormality; (3) On glucocorticoid therapy for more than 3 months; (4) Have primary hyperparathyroidism; (5) On osteoporosis medications and need to assess response to drug therapy.   Last bone density test (DXA Scan): Not on file.  HIV Screening: covered annually if you're between the age of 15-65. Also covered annually if you are younger than 15 and older than 65 with risk factors for HIV infection. For pregnant patients, it is covered up to 3 times per pregnancy.    Immunizations:  Immunization Recommendations   Influenza  Vaccine Annual influenza vaccination during flu season is recommended for all persons aged >= 6 months who do not have contraindications   Pneumococcal Vaccine   * Pneumococcal conjugate vaccine = PCV13 (Prevnar 13), PCV15 (Vaxneuvance), PCV20 (Prevnar 20)  * Pneumococcal polysaccharide vaccine = PPSV23 (Pneumovax) Adults 19-63 yo with certain risk factors or if 65+ yo  If never received any pneumonia vaccine: recommend Prevnar 20 (PCV20)  Give PCV20 if previously received 1 dose of PCV13 or PPSV23   Hepatitis B Vaccine 3 dose series if at intermediate or high risk (ex: diabetes, end stage renal disease, liver disease)   Respiratory syncytial virus (RSV) Vaccine - COVERED BY MEDICARE PART D  * RSVPreF3 (Arexvy) CDC recommends that adults 60 years of age and older may receive a single dose of RSV vaccine using shared clinical decision-making (SCDM)   Tetanus (Td) Vaccine - COST NOT COVERED BY MEDICARE PART B Following completion of primary series, a booster dose should be given every 10 years to maintain immunity against tetanus. Td may also be given as tetanus wound prophylaxis.   Tdap Vaccine - COST NOT COVERED BY MEDICARE PART B Recommended at least once for all adults. For pregnant patients, recommended with each pregnancy.   Shingles Vaccine (Shingrix) - COST NOT COVERED BY MEDICARE PART B  2 shot series recommended in those 19 years and older who have or will have weakened immune systems or those 50 years and older     Health Maintenance Due:      Topic Date Due   • Hepatitis C Screening  Never done   • Breast Cancer Screening: Mammogram  Never done   • Colorectal Cancer Screening  Never done     Immunizations Due:      Topic Date Due   • Pneumococcal Vaccine: 65+ Years (1 of 1 - PCV) Never done   • Influenza Vaccine (1) Never done   • COVID-19 Vaccine (1 - 2023-24 season) Never done     Advance Directives   What are advance directives?  Advance directives are legal documents that state your wishes and plans  for medical care. These plans are made ahead of time in case you lose your ability to make decisions for yourself. Advance directives can apply to any medical decision, such as the treatments you want, and if you want to donate organs.   What are the types of advance directives?  There are many types of advance directives, and each state has rules about how to use them. You may choose a combination of any of the following:  Living will:  This is a written record of the treatment you want. You can also choose which treatments you do not want, which to limit, and which to stop at a certain time. This includes surgery, medicine, IV fluid, and tube feedings.   Durable power of  for healthcare (DPAHC):  This is a written record that states who you want to make healthcare choices for you when you are unable to make them for yourself. This person, called a proxy, is usually a family member or a friend. You may choose more than 1 proxy.  Do not resuscitate (DNR) order:  A DNR order is used in case your heart stops beating or you stop breathing. It is a request not to have certain forms of treatment, such as CPR. A DNR order may be included in other types of advance directives.  Medical directive:  This covers the care that you want if you are in a coma, near death, or unable to make decisions for yourself. You can list the treatments you want for each condition. Treatment may include pain medicine, surgery, blood transfusions, dialysis, IV or tube feedings, and a ventilator (breathing machine).  Values history:  This document has questions about your views, beliefs, and how you feel and think about life. This information can help others choose the care that you would choose.  Why are advance directives important?  An advance directive helps you control your care. Although spoken wishes may be used, it is better to have your wishes written down. Spoken wishes can be misunderstood, or not followed. Treatments may be  given even if you do not want them. An advance directive may make it easier for your family to make difficult choices about your care.   Cigarette Smoking and Your Health   Risks to your health if you smoke:  Nicotine and other chemicals found in tobacco damage every cell in your body. Even if you are a light smoker, you have an increased risk for cancer, heart disease, and lung disease. If you are pregnant or have diabetes, smoking increases your risk for complications.   Benefits to your health if you stop smoking:   You decrease respiratory symptoms such as coughing, wheezing, and shortness of breath.   You reduce your risk for cancers of the lung, mouth, throat, kidney, bladder, pancreas, stomach, and cervix. If you already have cancer, you increase the benefits of chemotherapy. You also reduce your risk for cancer returning or a second cancer from developing.   You reduce your risk for heart disease, blood clots, heart attack, and stroke.   You reduce your risk for lung infections, and diseases such as pneumonia, asthma, chronic bronchitis, and emphysema.  Your circulation improves. More oxygen can be delivered to your body. If you have diabetes, you lower your risk for complications, such as kidney, artery, and eye diseases. You also lower your risk for nerve damage. Nerve damage can lead to amputations, poor vision, and blindness.  You improve your body's ability to heal and to fight infections.  For more information and support to stop smoking:   Smokefree.gov  Phone: 7- 804 - 045-6011  Web Address: www.smokefree.gov     © Copyright [employer] 2018 Information is for End User's use only and may not be sold, redistributed or otherwise used for commercial purposes. All illustrations and images included in CareNotes® are the copyrighted property of A.D.A.M., Inc. or [employer]

## 2024-03-21 ENCOUNTER — TELEPHONE (OUTPATIENT)
Age: 69
End: 2024-03-21

## 2024-03-21 ENCOUNTER — PREP FOR PROCEDURE (OUTPATIENT)
Age: 69
End: 2024-03-21

## 2024-03-21 DIAGNOSIS — Z12.11 SCREENING FOR COLON CANCER: Primary | ICD-10-CM

## 2024-03-21 NOTE — TELEPHONE ENCOUNTER
Scheduled date of colonoscopy (as of today): 04/10/24  Physician performing colonoscopy: DHAVAL  Location of colonoscopy: [facility]  Bowel prep reviewed with patient: SAMEERA/AMARJIT  Instructions reviewed with patient by: EMAIL  Clearances: N/A  :BRAD

## 2024-05-01 DIAGNOSIS — F41.0 PANIC ATTACK: ICD-10-CM

## 2024-05-02 RX ORDER — BUSPIRONE HYDROCHLORIDE 7.5 MG/1
7.5 TABLET ORAL 2 TIMES DAILY PRN
Qty: 60 TABLET | Refills: 1 | Status: SHIPPED | OUTPATIENT
Start: 2024-05-02

## 2024-07-03 DIAGNOSIS — F41.0 PANIC ATTACK: ICD-10-CM

## 2024-07-03 RX ORDER — BUSPIRONE HYDROCHLORIDE 7.5 MG/1
7.5 TABLET ORAL 2 TIMES DAILY PRN
Qty: 60 TABLET | Refills: 5 | Status: SHIPPED | OUTPATIENT
Start: 2024-07-03

## 2024-09-17 ENCOUNTER — TELEPHONE (OUTPATIENT)
Age: 69
End: 2024-09-17

## 2024-09-17 NOTE — TELEPHONE ENCOUNTER
Guerline Decker from [employer] called to get patients last office notes faxed over to them. He said it was for braces. Call sounded like a scam. Please advise. Called from [phone]    Fax: [phone]

## 2024-09-18 NOTE — TELEPHONE ENCOUNTER
"Called- was put on hold by their \"answering service\" which was automated, then hung up on.    I do believe this to be a scam considering my outcome the past 2 days and 3 attempts to call back made. Closing encounter.  "

## 2024-09-19 NOTE — TELEPHONE ENCOUNTER
Jean-Claude, from [facility] called in stating he did not receive fax they requested. To please refax.    Due to previous notes, this is believed to be a scam.

## 2024-09-19 NOTE — TELEPHONE ENCOUNTER
Tried calling again to obtain a formal request for these records. Phone rang multiple times and hung up. Tried again, was put on hold by their automated service, again, hung up on.    Will NOT be sending these records as I am unable to verify the legitimacy of this request. Closing this encounter.

## 2024-09-25 ENCOUNTER — RA CDI HCC (OUTPATIENT)
Dept: OTHER | Facility: HOSPITAL | Age: 69
End: 2024-09-25

## 2024-09-25 ENCOUNTER — TELEPHONE (OUTPATIENT)
Age: 69
End: 2024-09-25

## 2024-09-25 NOTE — TELEPHONE ENCOUNTER
Zeeshan Decker called in and he did ID patient x 3-he just wanted to confirm if we received a fax from them.      His company is:  [employer] [id number]  Entity Type Organization  Organization Name [email or web address] [facility]  Provider Name [facility]  Practice Address [address] [address]  Practice Telephone [phone]    They provide durable medical equipment.    It is a patient verification form which it appears we have not received yet they will be faxing again.  We did review fax number as they may have had it wrong.

## 2024-10-02 ENCOUNTER — OFFICE VISIT (OUTPATIENT)
Dept: FAMILY MEDICINE CLINIC | Facility: CLINIC | Age: 69
End: 2024-10-02
Payer: COMMERCIAL

## 2024-10-02 ENCOUNTER — APPOINTMENT (OUTPATIENT)
Dept: LAB | Facility: HOSPITAL | Age: 69
End: 2024-10-02
Payer: COMMERCIAL

## 2024-10-02 VITALS
HEART RATE: 85 BPM | SYSTOLIC BLOOD PRESSURE: 102 MMHG | BODY MASS INDEX: 20.25 KG/M2 | HEIGHT: 64 IN | WEIGHT: 118.6 LBS | OXYGEN SATURATION: 99 % | DIASTOLIC BLOOD PRESSURE: 56 MMHG | TEMPERATURE: 97.6 F

## 2024-10-02 DIAGNOSIS — F41.0 PANIC ATTACK: ICD-10-CM

## 2024-10-02 DIAGNOSIS — R07.9 CHEST PAIN, UNSPECIFIED TYPE: ICD-10-CM

## 2024-10-02 DIAGNOSIS — R06.02 SOB (SHORTNESS OF BREATH) ON EXERTION: ICD-10-CM

## 2024-10-02 DIAGNOSIS — Z60.3 LANGUAGE BARRIER: ICD-10-CM

## 2024-10-02 DIAGNOSIS — Z75.8 LANGUAGE BARRIER: ICD-10-CM

## 2024-10-02 DIAGNOSIS — R07.9 CHEST PAIN, UNSPECIFIED TYPE: Primary | ICD-10-CM

## 2024-10-02 DIAGNOSIS — R20.0 ARM NUMBNESS: ICD-10-CM

## 2024-10-02 DIAGNOSIS — R68.89 DECREASED EXERCISE TOLERANCE: ICD-10-CM

## 2024-10-02 LAB
ALBUMIN SERPL BCG-MCNC: 4.1 G/DL (ref 3.5–5)
ALP SERPL-CCNC: 76 U/L (ref 34–104)
ALT SERPL W P-5'-P-CCNC: 13 U/L (ref 7–52)
ANION GAP SERPL CALCULATED.3IONS-SCNC: 4 MMOL/L (ref 4–13)
AST SERPL W P-5'-P-CCNC: 16 U/L (ref 13–39)
BASOPHILS # BLD AUTO: 0.01 THOUSANDS/ΜL (ref 0–0.1)
BASOPHILS NFR BLD AUTO: 0 % (ref 0–1)
BILIRUB SERPL-MCNC: 0.4 MG/DL (ref 0.2–1)
BUN SERPL-MCNC: 14 MG/DL (ref 5–25)
CALCIUM SERPL-MCNC: 9.1 MG/DL (ref 8.4–10.2)
CHLORIDE SERPL-SCNC: 105 MMOL/L (ref 96–108)
CHOLEST SERPL-MCNC: 190 MG/DL
CO2 SERPL-SCNC: 29 MMOL/L (ref 21–32)
CREAT SERPL-MCNC: 0.51 MG/DL (ref 0.6–1.3)
EOSINOPHIL # BLD AUTO: 0.07 THOUSAND/ΜL (ref 0–0.61)
EOSINOPHIL NFR BLD AUTO: 1 % (ref 0–6)
ERYTHROCYTE [DISTWIDTH] IN BLOOD BY AUTOMATED COUNT: 13.2 % (ref 11.6–15.1)
GFR SERPL CREATININE-BSD FRML MDRD: 99 ML/MIN/1.73SQ M
GLUCOSE P FAST SERPL-MCNC: 86 MG/DL (ref 65–99)
HCT VFR BLD AUTO: 42.2 % (ref 34.8–46.1)
HDLC SERPL-MCNC: 53 MG/DL
HGB BLD-MCNC: 13.8 G/DL (ref 11.5–15.4)
IMM GRANULOCYTES # BLD AUTO: 0.01 THOUSAND/UL (ref 0–0.2)
IMM GRANULOCYTES NFR BLD AUTO: 0 % (ref 0–2)
LDLC SERPL CALC-MCNC: 116 MG/DL (ref 0–100)
LYMPHOCYTES # BLD AUTO: 1.73 THOUSANDS/ΜL (ref 0.6–4.47)
LYMPHOCYTES NFR BLD AUTO: 28 % (ref 14–44)
MCH RBC QN AUTO: 30.1 PG (ref 26.8–34.3)
MCHC RBC AUTO-ENTMCNC: 32.7 G/DL (ref 31.4–37.4)
MCV RBC AUTO: 92 FL (ref 82–98)
MONOCYTES # BLD AUTO: 0.54 THOUSAND/ΜL (ref 0.17–1.22)
MONOCYTES NFR BLD AUTO: 9 % (ref 4–12)
NEUTROPHILS # BLD AUTO: 3.78 THOUSANDS/ΜL (ref 1.85–7.62)
NEUTS SEG NFR BLD AUTO: 62 % (ref 43–75)
NONHDLC SERPL-MCNC: 137 MG/DL
NRBC BLD AUTO-RTO: 0 /100 WBCS
PLATELET # BLD AUTO: 206 THOUSANDS/UL (ref 149–390)
PMV BLD AUTO: 11.3 FL (ref 8.9–12.7)
POTASSIUM SERPL-SCNC: 4.7 MMOL/L (ref 3.5–5.3)
PROT SERPL-MCNC: 7.1 G/DL (ref 6.4–8.4)
RBC # BLD AUTO: 4.59 MILLION/UL (ref 3.81–5.12)
SODIUM SERPL-SCNC: 138 MMOL/L (ref 135–147)
T4 FREE SERPL-MCNC: 0.79 NG/DL (ref 0.61–1.12)
TRIGL SERPL-MCNC: 105 MG/DL
TSH SERPL DL<=0.05 MIU/L-ACNC: 0.96 UIU/ML (ref 0.45–4.5)
WBC # BLD AUTO: 6.14 THOUSAND/UL (ref 4.31–10.16)

## 2024-10-02 PROCEDURE — 85025 COMPLETE CBC W/AUTO DIFF WBC: CPT

## 2024-10-02 PROCEDURE — 84443 ASSAY THYROID STIM HORMONE: CPT

## 2024-10-02 PROCEDURE — 99215 OFFICE O/P EST HI 40 MIN: CPT | Performed by: STUDENT IN AN ORGANIZED HEALTH CARE EDUCATION/TRAINING PROGRAM

## 2024-10-02 PROCEDURE — 36415 COLL VENOUS BLD VENIPUNCTURE: CPT

## 2024-10-02 PROCEDURE — 84439 ASSAY OF FREE THYROXINE: CPT

## 2024-10-02 PROCEDURE — 80053 COMPREHEN METABOLIC PANEL: CPT

## 2024-10-02 PROCEDURE — 93000 ELECTROCARDIOGRAM COMPLETE: CPT | Performed by: STUDENT IN AN ORGANIZED HEALTH CARE EDUCATION/TRAINING PROGRAM

## 2024-10-02 PROCEDURE — 80061 LIPID PANEL: CPT

## 2024-10-02 RX ORDER — CLONAZEPAM 0.5 MG/1
0.5 TABLET ORAL DAILY PRN
Qty: 30 TABLET | Refills: 0 | Status: SHIPPED | OUTPATIENT
Start: 2024-10-02

## 2024-10-02 SDOH — SOCIAL STABILITY - SOCIAL INSECURITY: ACCULTURATION DIFFICULTY: Z60.3

## 2024-10-02 NOTE — PROGRESS NOTES
Ambulatory Visit  Name: Bren Traylor      : 1955      MRN: [id number]  Encounter Provider: Margarita Pedersen MD  Encounter Date: 10/2/2024   Encounter department: [facility] [address]    Assessment & Plan  Chest pain, unspecified type    Orders:    POCT ECG    NM myocardial perfusion spect (rx stress and/or rest); Future    Comprehensive metabolic panel; Future    CBC (Includes Diff/Plt) (Refl); Future    TSH + Free T4; Future    XR chest pa and lateral; Future    Lipid panel; Future    Arm numbness    Orders:    NM myocardial perfusion spect (rx stress and/or rest); Future    Comprehensive metabolic panel; Future    CBC (Includes Diff/Plt) (Refl); Future    TSH + Free T4; Future    XR chest pa and lateral; Future    Lipid panel; Future    SOB (shortness of breath) on exertion    Orders:    NM myocardial perfusion spect (rx stress and/or rest); Future    Comprehensive metabolic panel; Future    CBC (Includes Diff/Plt) (Refl); Future    TSH + Free T4; Future    XR chest pa and lateral; Future    Lipid panel; Future    Decreased exercise tolerance    Orders:    NM myocardial perfusion spect (rx stress and/or rest); Future    Comprehensive metabolic panel; Future    CBC (Includes Diff/Plt) (Refl); Future    TSH + Free T4; Future    XR chest pa and lateral; Future    Lipid panel; Future    Language barrier  Son is         Symptoms concernign for cardiac etiology, will complete workup. If symptoms worsen proceed tot he ER    Upon leaving states her stress has been over flowing, not sure if that is causing her symptoms. Can do a short course of clonapin   Panic attack    Orders:    clonazePAM (KlonoPIN) 0.5 mg tablet; Take 1 tablet (0.5 mg total) by mouth daily as needed for seizures       History of Present Illness     HPI    Chest pain and SOB x 1 month, also gets dizziness, left arm numbness and tingling when symptoms occur. Is off and on, had symptoms  last night but not the day prior. Did have symptoms on Sunday.    Has noticed a decrease in exercise tolerance when walking. Shopping was easier but has noticed she had to stop going up hills to rest    Has been taking baby aspirin daily (bought in [place])    states she had a history of pericarditis many year s ago    Asking if this is the thyroid    History obtained from : patient  Review of Systems   Constitutional:  Negative for chills, fatigue and fever.   HENT:  Negative for rhinorrhea and sore throat.    Eyes:  Negative for visual disturbance.   Respiratory:  Positive for shortness of breath. Negative for cough.    Cardiovascular:  Positive for chest pain and leg swelling. Negative for palpitations.   Gastrointestinal:  Negative for abdominal pain, constipation, diarrhea, nausea and vomiting.   Genitourinary:  Negative for difficulty urinating, dysuria and frequency.   Musculoskeletal:  Negative for arthralgias and myalgias.   Skin:  Negative for color change and rash.   Neurological:  Negative for weakness and headaches.     Current Outpatient Medications on File Prior to Visit   Medication Sig Dispense Refill    atorvastatin (LIPITOR) 20 mg tablet Take 1 tablet (20 mg total) by mouth daily 90 tablet 3    busPIRone (BUSPAR) 7.5 mg tablet TAKE 1 TABLET (7.5 MG TOTAL) BY MOUTH 2 (TWO) TIMES A DAY AS NEEDED (PANIC ATTACL) 60 tablet 5    losartan (COZAAR) 50 mg tablet Take 1 tablet (50 mg total) by mouth daily 90 tablet 3    sertraline (ZOLOFT) 100 mg tablet Take 0.5 tablets (50 mg total) by mouth daily 90 tablet 1     No current facility-administered medications on file prior to visit.      Social History     Tobacco Use    Smoking status: Every Day     Current packs/day: 1.00     Average packs/day: 1 pack/day for 46.6 years (46.6 ttl pk-yrs)     Types: Cigarettes     Start date: 2/28/1978    Smokeless tobacco: Never   Vaping Use    Vaping status: Never Used   Substance and Sexual Activity    Alcohol use: Not  "Currently    Drug use: Not Currently    Sexual activity: Not on file         Objective     /56   Pulse 85   Temp 97.6 °F (36.4 °C) (Tympanic)   Ht 5' 4\" (1.626 m)   Wt 53.8 kg (118 lb 9.6 oz)   SpO2 99%   BMI 20.36 kg/m²     Physical Exam  Constitutional:       General: She is not in acute distress.     Appearance: Normal appearance. She is not ill-appearing.   HENT:      Head: Normocephalic and atraumatic.      Right Ear: Tympanic membrane, ear canal and external ear normal.      Left Ear: Tympanic membrane, ear canal and external ear normal.      Nose: Nose normal.      Mouth/Throat:      Mouth: Mucous membranes are moist.      Pharynx: Oropharynx is clear. No oropharyngeal exudate or posterior oropharyngeal erythema.   Eyes:      General: No scleral icterus.        Right eye: No discharge.         Left eye: No discharge.      Extraocular Movements: Extraocular movements intact.      Conjunctiva/sclera: Conjunctivae normal.      Pupils: Pupils are equal, round, and reactive to light.   Cardiovascular:      Rate and Rhythm: Normal rate and regular rhythm.      Pulses: Normal pulses.      Heart sounds: Normal heart sounds. No murmur heard.  Pulmonary:      Effort: Pulmonary effort is normal. No respiratory distress.      Breath sounds: Normal breath sounds.   Abdominal:      General: Bowel sounds are normal.      Palpations: Abdomen is soft.      Tenderness: There is no abdominal tenderness.   Musculoskeletal:         General: Normal range of motion.      Cervical back: Normal range of motion and neck supple.   Lymphadenopathy:      Cervical: No cervical adenopathy.   Skin:     General: Skin is warm and dry.      Capillary Refill: Capillary refill takes less than 2 seconds.   Neurological:      General: No focal deficit present.      Mental Status: She is alert and oriented to person, place, and time. Mental status is at baseline.      Cranial Nerves: No cranial nerve deficit.   Psychiatric:         Mood " and Affect: Mood normal.

## 2024-10-16 ENCOUNTER — HOSPITAL ENCOUNTER (OUTPATIENT)
Dept: NON INVASIVE DIAGNOSTICS | Facility: CLINIC | Age: 69
Discharge: HOME | End: 2024-10-16

## 2024-10-16 DIAGNOSIS — R07.9 CHEST PAIN, UNSPECIFIED TYPE: ICD-10-CM

## 2024-10-16 DIAGNOSIS — R06.02 SOB (SHORTNESS OF BREATH) ON EXERTION: ICD-10-CM

## 2024-10-16 DIAGNOSIS — R20.0 ARM NUMBNESS: ICD-10-CM

## 2024-10-16 DIAGNOSIS — R68.89 DECREASED EXERCISE TOLERANCE: ICD-10-CM

## 2024-10-17 ENCOUNTER — TELEPHONE (OUTPATIENT)
Dept: FAMILY MEDICINE CLINIC | Facility: CLINIC | Age: 69
End: 2024-10-17

## 2024-10-17 DIAGNOSIS — R06.02 SOB (SHORTNESS OF BREATH) ON EXERTION: ICD-10-CM

## 2024-10-17 DIAGNOSIS — R07.9 CHEST PAIN, UNSPECIFIED TYPE: Primary | ICD-10-CM

## 2024-10-17 NOTE — TELEPHONE ENCOUNTER
----- Message from Clay MONTEJO sent at 10/16/2024  8:44 AM EDT -----  Dr. Correia arrived for her Nuclear Stress test and refused to do it.  She was not aware of getting an IV and the medication.  She stated she only wants to day a regular stress.  I spoke with her son via phone and he also stated there was no way she would allow an IV to be placed.  I told her she would need to get in touch with you to arrange for a regular stress test.     Regards  Baudilio Gamez BS BA [facility]   Nuclear Medicine

## 2024-10-31 ENCOUNTER — TELEPHONE (OUTPATIENT)
Age: 69
End: 2024-10-31

## 2024-10-31 NOTE — TELEPHONE ENCOUNTER
10/31/24 4:24 PM     The office's request has been received and reviewed.  Please confirm if PCP removal is still needed.  This message will now be completed.    Thank you  Kaci Jones

## 2024-10-31 NOTE — TELEPHONE ENCOUNTER
Please remove Dr. Pedersen' as patient's PCP per patient request. She has not elected a new PCP just yet.    Please advise, thank you!

## 2024-10-31 NOTE — TELEPHONE ENCOUNTER
Please remove current pcp from pcp field    Patient called in stating dr. Pedersen will be her pcp  she will be establishing care with some one else.

## 2024-10-31 NOTE — TELEPHONE ENCOUNTER
"Called pt - LMOM to RCB to clarify \"Patient called in stating dr. Pedersen will be her pcp she will be establishing care with some one else\"   (message left in polish &english) pt can ask for Leonila if needed / I will be more then happy to assist.   Also would like to get pts new PCP name/location. Labs reprinted and mailed to her.     "

## 2024-11-01 NOTE — TELEPHONE ENCOUNTER
Spoke w pt / pt confirmed she will be establishing with Dr. Adrián Lin MD is a family medicine physician in [place], [place] on 12/6/2024.     Confirming  PCP removal, Thank you!

## 2024-11-22 DIAGNOSIS — E78.2 MIXED HYPERLIPIDEMIA: ICD-10-CM

## 2024-11-22 RX ORDER — ATORVASTATIN CALCIUM 20 MG/1
20 TABLET, FILM COATED ORAL DAILY
Qty: 90 TABLET | Refills: 1 | Status: SHIPPED | OUTPATIENT
Start: 2024-11-22

## 2024-12-06 ENCOUNTER — APPOINTMENT (EMERGENCY)
Dept: CT IMAGING | Facility: HOSPITAL | Age: 69
End: 2024-12-06
Payer: COMMERCIAL

## 2024-12-06 ENCOUNTER — HOSPITAL ENCOUNTER (EMERGENCY)
Facility: HOSPITAL | Age: 69
End: 2024-12-06
Attending: EMERGENCY MEDICINE
Payer: COMMERCIAL

## 2024-12-06 ENCOUNTER — ANESTHESIA (OUTPATIENT)
Dept: RADIOLOGY | Facility: HOSPITAL | Age: 69
End: 2024-12-06
Payer: COMMERCIAL

## 2024-12-06 ENCOUNTER — ANESTHESIA EVENT (OUTPATIENT)
Dept: RADIOLOGY | Facility: HOSPITAL | Age: 69
End: 2024-12-06
Payer: COMMERCIAL

## 2024-12-06 VITALS
BODY MASS INDEX: 20.36 KG/M2 | HEART RATE: 96 BPM | RESPIRATION RATE: 24 BRPM | SYSTOLIC BLOOD PRESSURE: 140 MMHG | WEIGHT: 118.61 LBS | OXYGEN SATURATION: 95 % | DIASTOLIC BLOOD PRESSURE: 70 MMHG | TEMPERATURE: 97.7 F

## 2024-12-06 DIAGNOSIS — R29.810 FACIAL DROOP: Primary | ICD-10-CM

## 2024-12-06 DIAGNOSIS — I63.9 CVA (CEREBRAL VASCULAR ACCIDENT) (HCC): ICD-10-CM

## 2024-12-06 LAB
ANION GAP SERPL CALCULATED.3IONS-SCNC: 6 MMOL/L (ref 4–13)
APTT PPP: 36 SECONDS (ref 23–34)
BUN SERPL-MCNC: 8 MG/DL (ref 5–25)
CALCIUM SERPL-MCNC: 8.4 MG/DL (ref 8.4–10.2)
CARDIAC TROPONIN I PNL SERPL HS: 140 NG/L (ref ?–50)
CHLORIDE SERPL-SCNC: 104 MMOL/L (ref 96–108)
CO2 SERPL-SCNC: 25 MMOL/L (ref 21–32)
CREAT SERPL-MCNC: 0.44 MG/DL (ref 0.6–1.3)
ERYTHROCYTE [DISTWIDTH] IN BLOOD BY AUTOMATED COUNT: 13.3 % (ref 11.6–15.1)
GFR SERPL CREATININE-BSD FRML MDRD: 104 ML/MIN/1.73SQ M
GLUCOSE SERPL-MCNC: 108 MG/DL (ref 65–140)
GLUCOSE SERPL-MCNC: 123 MG/DL (ref 65–140)
HCT VFR BLD AUTO: 35 % (ref 34.8–46.1)
HGB BLD-MCNC: 11.6 G/DL (ref 11.5–15.4)
INR PPP: 1.1 (ref 0.85–1.19)
MCH RBC QN AUTO: 29.8 PG (ref 26.8–34.3)
MCHC RBC AUTO-ENTMCNC: 33.1 G/DL (ref 31.4–37.4)
MCV RBC AUTO: 90 FL (ref 82–98)
PLATELET # BLD AUTO: 215 THOUSANDS/UL (ref 149–390)
PMV BLD AUTO: 10.7 FL (ref 8.9–12.7)
POTASSIUM SERPL-SCNC: 3.4 MMOL/L (ref 3.5–5.3)
PROTHROMBIN TIME: 14.9 SECONDS (ref 12.3–15)
RBC # BLD AUTO: 3.89 MILLION/UL (ref 3.81–5.12)
SODIUM SERPL-SCNC: 135 MMOL/L (ref 135–147)
WBC # BLD AUTO: 11.93 THOUSAND/UL (ref 4.31–10.16)

## 2024-12-06 PROCEDURE — 70496 CT ANGIOGRAPHY HEAD: CPT

## 2024-12-06 PROCEDURE — 99285 EMERGENCY DEPT VISIT HI MDM: CPT

## 2024-12-06 PROCEDURE — 85730 THROMBOPLASTIN TIME PARTIAL: CPT | Performed by: EMERGENCY MEDICINE

## 2024-12-06 PROCEDURE — 85610 PROTHROMBIN TIME: CPT | Performed by: EMERGENCY MEDICINE

## 2024-12-06 PROCEDURE — 82948 REAGENT STRIP/BLOOD GLUCOSE: CPT

## 2024-12-06 PROCEDURE — 36415 COLL VENOUS BLD VENIPUNCTURE: CPT | Performed by: EMERGENCY MEDICINE

## 2024-12-06 PROCEDURE — 99291 CRITICAL CARE FIRST HOUR: CPT | Performed by: EMERGENCY MEDICINE

## 2024-12-06 PROCEDURE — 93005 ELECTROCARDIOGRAM TRACING: CPT

## 2024-12-06 PROCEDURE — 84484 ASSAY OF TROPONIN QUANT: CPT | Performed by: EMERGENCY MEDICINE

## 2024-12-06 PROCEDURE — 96361 HYDRATE IV INFUSION ADD-ON: CPT

## 2024-12-06 PROCEDURE — 70498 CT ANGIOGRAPHY NECK: CPT

## 2024-12-06 PROCEDURE — 85027 COMPLETE CBC AUTOMATED: CPT | Performed by: EMERGENCY MEDICINE

## 2024-12-06 PROCEDURE — 96374 THER/PROPH/DIAG INJ IV PUSH: CPT

## 2024-12-06 PROCEDURE — 80048 BASIC METABOLIC PNL TOTAL CA: CPT | Performed by: EMERGENCY MEDICINE

## 2024-12-06 RX ORDER — FENTANYL CITRATE 50 UG/ML
INJECTION, SOLUTION INTRAMUSCULAR; INTRAVENOUS AS NEEDED
Status: DISCONTINUED | OUTPATIENT
Start: 2024-12-06 | End: 2024-12-07

## 2024-12-06 RX ORDER — ACETAMINOPHEN 325 MG/1
975 TABLET ORAL ONCE
Status: DISCONTINUED | OUTPATIENT
Start: 2024-12-06 | End: 2024-12-06

## 2024-12-06 RX ORDER — ASPIRIN 81 MG/1
324 TABLET, CHEWABLE ORAL ONCE
Status: DISCONTINUED | OUTPATIENT
Start: 2024-12-06 | End: 2024-12-06

## 2024-12-06 RX ORDER — ACETAMINOPHEN 10 MG/ML
1000 INJECTION, SOLUTION INTRAVENOUS ONCE
Status: COMPLETED | OUTPATIENT
Start: 2024-12-06 | End: 2024-12-06

## 2024-12-06 RX ORDER — ROCURONIUM BROMIDE 10 MG/ML
INJECTION, SOLUTION INTRAVENOUS AS NEEDED
Status: DISCONTINUED | OUTPATIENT
Start: 2024-12-06 | End: 2024-12-07

## 2024-12-06 RX ORDER — LIDOCAINE HYDROCHLORIDE 10 MG/ML
INJECTION, SOLUTION EPIDURAL; INFILTRATION; INTRACAUDAL; PERINEURAL AS NEEDED
Status: DISCONTINUED | OUTPATIENT
Start: 2024-12-06 | End: 2024-12-07

## 2024-12-06 RX ORDER — SUCCINYLCHOLINE/SOD CL,ISO/PF 100 MG/5ML
SYRINGE (ML) INTRAVENOUS AS NEEDED
Status: DISCONTINUED | OUTPATIENT
Start: 2024-12-06 | End: 2024-12-07

## 2024-12-06 RX ORDER — ASPIRIN 300 MG/1
300 SUPPOSITORY RECTAL ONCE
Status: DISCONTINUED | OUTPATIENT
Start: 2024-12-06 | End: 2024-12-06

## 2024-12-06 RX ORDER — PROPOFOL 10 MG/ML
INJECTION, EMULSION INTRAVENOUS AS NEEDED
Status: DISCONTINUED | OUTPATIENT
Start: 2024-12-06 | End: 2024-12-07

## 2024-12-06 RX ORDER — EPHEDRINE SULFATE 50 MG/ML
INJECTION INTRAVENOUS AS NEEDED
Status: DISCONTINUED | OUTPATIENT
Start: 2024-12-06 | End: 2024-12-07

## 2024-12-06 RX ADMIN — NICARDIPINE HYDROCHLORIDE 200 MCG: 2.5 INJECTION, SOLUTION INTRAVENOUS at 23:27

## 2024-12-06 RX ADMIN — ROCURONIUM BROMIDE 20 MG: 10 INJECTION, SOLUTION INTRAVENOUS at 23:45

## 2024-12-06 RX ADMIN — EPHEDRINE SULFATE 5 MG: 50 INJECTION, SOLUTION INTRAVENOUS at 23:38

## 2024-12-06 RX ADMIN — NICARDIPINE HYDROCHLORIDE 200 MCG: 2.5 INJECTION, SOLUTION INTRAVENOUS at 23:30

## 2024-12-06 RX ADMIN — PROPOFOL 100 MG: 10 INJECTION, EMULSION INTRAVENOUS at 22:27

## 2024-12-06 RX ADMIN — PHENYLEPHRINE HYDROCHLORIDE 30 MCG/MIN: 10 INJECTION INTRAVENOUS at 22:37

## 2024-12-06 RX ADMIN — NICARDIPINE HYDROCHLORIDE 100 MCG: 2.5 INJECTION, SOLUTION INTRAVENOUS at 23:43

## 2024-12-06 RX ADMIN — NICARDIPINE HYDROCHLORIDE 6 MG/HR: 2.5 INJECTION, SOLUTION INTRAVENOUS at 23:33

## 2024-12-06 RX ADMIN — SODIUM CHLORIDE 1000 ML: 0.9 INJECTION, SOLUTION INTRAVENOUS at 21:07

## 2024-12-06 RX ADMIN — LIDOCAINE HYDROCHLORIDE 50 MG: 10 INJECTION, SOLUTION EPIDURAL; INFILTRATION; INTRACAUDAL; PERINEURAL at 22:27

## 2024-12-06 RX ADMIN — ROCURONIUM BROMIDE 50 MG: 10 INJECTION, SOLUTION INTRAVENOUS at 22:31

## 2024-12-06 RX ADMIN — IOHEXOL 85 ML: 350 INJECTION, SOLUTION INTRAVENOUS at 20:31

## 2024-12-06 RX ADMIN — ACETAMINOPHEN 1000 MG: 10 INJECTION INTRAVENOUS at 20:51

## 2024-12-06 RX ADMIN — Medication 100 MG: at 22:27

## 2024-12-06 RX ADMIN — SODIUM CHLORIDE: 0.9 INJECTION, SOLUTION INTRAVENOUS at 22:30

## 2024-12-06 RX ADMIN — NICARDIPINE HYDROCHLORIDE 200 MCG: 2.5 INJECTION, SOLUTION INTRAVENOUS at 23:44

## 2024-12-06 RX ADMIN — NICARDIPINE HYDROCHLORIDE 200 MCG: 2.5 INJECTION, SOLUTION INTRAVENOUS at 23:22

## 2024-12-06 RX ADMIN — NICARDIPINE HYDROCHLORIDE 200 MCG: 2.5 INJECTION, SOLUTION INTRAVENOUS at 23:32

## 2024-12-06 RX ADMIN — NICARDIPINE HYDROCHLORIDE 200 MCG: 2.5 INJECTION, SOLUTION INTRAVENOUS at 23:24

## 2024-12-06 RX ADMIN — FENTANYL CITRATE 50 MCG: 50 INJECTION INTRAMUSCULAR; INTRAVENOUS at 23:45

## 2024-12-07 PROBLEM — I63.411 CEREBROVASCULAR ACCIDENT (CVA) DUE TO EMBOLISM OF RIGHT MIDDLE CEREBRAL ARTERY (HCC): Status: ACTIVE | Noted: 2024-12-07

## 2024-12-07 PROBLEM — I63.511 CEREBROVASCULAR ACCIDENT (CVA) DUE TO STENOSIS OF RIGHT MIDDLE CEREBRAL ARTERY (HCC): Status: ACTIVE | Noted: 2024-12-07

## 2024-12-07 LAB
ATRIAL RATE: 93 BPM
P AXIS: 82 DEGREES
PR INTERVAL: 154 MS
QRS AXIS: 72 DEGREES
QRSD INTERVAL: 80 MS
QT INTERVAL: 392 MS
QTC INTERVAL: 487 MS
T WAVE AXIS: 71 DEGREES
VENTRICULAR RATE: 93 BPM

## 2024-12-07 PROCEDURE — 93010 ELECTROCARDIOGRAM REPORT: CPT | Performed by: INTERNAL MEDICINE

## 2024-12-07 RX ORDER — SODIUM CHLORIDE 9 MG/ML
INJECTION, SOLUTION INTRAVENOUS CONTINUOUS PRN
Status: DISCONTINUED | OUTPATIENT
Start: 2024-12-06 | End: 2024-12-07

## 2024-12-07 RX ORDER — PROPOFOL 10 MG/ML
INJECTION, EMULSION INTRAVENOUS CONTINUOUS PRN
Status: DISCONTINUED | OUTPATIENT
Start: 2024-12-07 | End: 2024-12-07

## 2024-12-07 RX ADMIN — FENTANYL CITRATE 50 MCG: 50 INJECTION INTRAMUSCULAR; INTRAVENOUS at 00:11

## 2024-12-07 RX ADMIN — PROPOFOL 50 MCG/KG/MIN: 10 INJECTION, EMULSION INTRAVENOUS at 00:11

## 2024-12-07 NOTE — RESPIRATORY THERAPY NOTE
RT Protocol Note  Bren Traylor 69 y.o. female MRN: [id number]  Unit/Bed#: ICU 11 Encounter: [id number]    Assessment    Active Problems:    Cerebrovascular accident (CVA) due to embolism of right middle cerebral artery (HCC)      Home Pulmonary Medications:  None        Past Medical History:   Diagnosis Date    High cholesterol     Hypertension      Social History     Socioeconomic History    Marital status:      Spouse name: Not on file    Number of children: Not on file    Years of education: Not on file    Highest education level: Not on file   Occupational History    Not on file   Tobacco Use    Smoking status: Every Day     Current packs/day: 1.00     Average packs/day: 1 pack/day for 46.8 years (46.8 ttl pk-yrs)     Types: Cigarettes     Start date: 2/28/1978    Smokeless tobacco: Never   Vaping Use    Vaping status: Never Used   Substance and Sexual Activity    Alcohol use: Not Currently    Drug use: Not Currently    Sexual activity: Not on file   Other Topics Concern    Not on file   Social History Narrative    Not on file     Social Drivers of Health     Financial Resource Strain: Low Risk  (2/28/2024)    Overall Financial Resource Strain (CARDIA)     Difficulty of Paying Living Expenses: Not hard at all   Food Insecurity: Not on file   Transportation Needs: Unmet Transportation Needs (2/28/2024)    PRAPARE - Transportation     Lack of Transportation (Medical): Yes     Lack of Transportation (Non-Medical): No   Physical Activity: Not on file   Stress: Not on file   Social Connections: Not on file   Intimate Partner Violence: Not on file   Housing Stability: Not on file       Subjective         Objective    Physical Exam:   Assessment Type: Assess only  General Appearance: Sedated  Respiratory Pattern: Assisted  Chest Assessment: Chest expansion symmetrical  Bilateral Breath Sounds: Clear  Cough: None  Suction: ET Tube    Vitals:  Blood pressure 111/59, pulse 72, resp. rate 13, SpO2 97%.           Imaging and other studies: Results Review Statement: I reviewed radiology reports from this admission including: chest xray.          Plan    Respiratory Plan: Vent/NIV/HFNC        Resp Comments: (P) Pt. presents from OSH S/P CVA. Pt. presents from IR intubated. Pt. placed on documented CMV settings. Pt. is intubated with a 7.0 ETT 23@ LIP. Commercial ETT [place] placed. CXR obtained to confirm ETT placement. Pt. does not have any pulm hx/meds listed on EPIC. BBS clear, RR 15, SPO2 97% on documented CMV settings. Will continue to follow per RCP.

## 2024-12-07 NOTE — QUICK NOTE
Stroke alert called: 821 PM pre hospital   Neurology response immediate  D/w ED attending as soon as patient arrived 5 minutes afterward  NIHSS 7 for lue lle and left facial weakness based on ED exam relayed to me  LKW last night    CT H stat with right basal ganglia acute ischemia noted  CTA H/N with severe bill intracranial stenosis, RMCA M1 hyperdense sign with some flow noted through M2/M3 branches- reviewed personally in PACS and with neuroradiology    Concern for symptomatic acute RMCA M1 ischemic infarct, atheroembolic from BILL   - No TNKtPA as out of window  - Discussed case with neuroIR on call , patient to transfer to [facility] emergently for CTP to see if candidate for acute intervention  - Further recommendations pending above  - D/w ED attending physician SBP recommended 160-210 for adequate cerebral perfusion  - Can give IVF if needed for above BP parameters  - I am told patient also failed bedside swallow screen    D/w ED attending physician at time of alert

## 2024-12-07 NOTE — RESPIRATORY THERAPY NOTE
Resp Care   12/07/24 0722   Respiratory Assessment   Assessment Type Assess only   General Appearance Sedated   Respiratory Pattern Assisted   Chest Assessment Chest expansion symmetrical   Bilateral Breath Sounds Clear   Cough Productive   Suction ET Tube   Resp Comments Pt found on documented vent settings, no changes made at this time, bs are diminished, will cont to monitor per resp protocol.   Vent Information   Vent ID [id number]   Vent type Valderrama C3   Valderrama Vent Mode (S)CMV   $ Pulse Oximetry Spot Check Charge Completed   (S)CMV Settings   Resp Rate (BPM) 14 BPM   VT (mL) 450 mL   FIO2 (%) 60 %   PEEP (cmH2O) 6 cmH2O   I:E Ratio 1/3.3   Insp Time (%) 1 %   Flow Trigger (LPM) 3   Heater Temperature (Set) 95 °F (35 °C)   (S)CMV Actuals   Resp Rate (BPM) 18 BPM   VT (mL) 405   MV 7.6   MAP (cmH2O) 9.5 cmH2O   Peak Pressure (cmH2O) 18 cmH2O   I:E Ratio (Obs) 1/2.4   Heater Temperature (Obs) 95 °F (35 °C)   (S)CMV Alarms   High Peak Pressure (cmH2O) 40   Low Pressure (cmH2O) 5 cm H2O   High Resp Rate (BPM) 40 BPM   Low Resp Rate (BPM) 8 BPM   High MV (L/min) 20 L/min   Low MV (L/min) 4 L/min   High VT (mL) 1100 mL   Low VT (mL) 250 mL   Apnea Time (s) 20 S   Maintenance   Alarm (pink) cable attached No   Resuscitation bag with peep valve at bedside Yes   Water bag changed No   Circuit changed No   Daily Screen   Patient safety screen outcome: Failed   Not Ready for Weaning due to: Underline problem not resolved   ETT  Hi-Lo;Oral;EDWIN;Pre-curved;Inflated 7 mm   Placement Date/Time: 12/06/24 2229   Mask Ventilation: Mask ventilation not attempted (0)  Preoxygenated: Yes  Technique: Direct laryngoscopy;Stylet;Rapid sequence  Type: Hi-Lo;Oral;EDWIN;Pre-curved;Inflated  Tube Size: 7 mm  Laryngoscope: Mac  Blade Si...   Secured at (cm) 22   Measured from Lips   Secured Location Left   Repositioned Center to Left   Secured by Commercial tube d[place]   Site Condition Dry   HI-LO Suction  Intermittent suction   HI-LO  Secretions Scant   HI-LO Intervention Patent

## 2024-12-07 NOTE — ANESTHESIA PROCEDURE NOTES
Arterial Line Insertion    Performed by: Juan Miguel Chapman CRNA  Authorized by: Michael Kaur MD  Consent: The procedure was performed in an emergent situation.  Preparation: Patient was prepped and draped in the usual sterile fashion.  Indications: hemodynamic monitoring  Orientation:  Left  Location: radial artery  Sedation:  Patient sedated: GETA.    Procedure Details:  Needle gauge: 20  Number of attempts: 1    Post-procedure:  Post-procedure: dressing applied  Waveform: good waveform and waveform confirmed  Patient tolerance: Patient tolerated the procedure well with no immediate complications  Comments: SS INSERTED W/ EASE

## 2024-12-07 NOTE — ED NOTES
Endovascular Alert, transfer from [place] to [facility] perfusion scan, then ICU 11, Dr. Holm accepting, p/u time 2125 with [facility], number for report [phone]      Jerri Rowland RN  12/06/24 2110

## 2024-12-07 NOTE — BRIEF OP NOTE (RAD/CATH)
IR STROKE ALERT Procedure Note    PATIENT NAME: Bren Traylor  : 1955  MRN: [id number]    Pre-op Diagnosis:   1. Cerebrovascular accident (CVA), unspecified mechanism (HCC)      Post-op Diagnosis:   1. Cerebrovascular accident (CVA), unspecified mechanism (HCC)        Surgeon:   Mirza Holm MD  Assistants:     No qualified resident was available, Resident is only observing    Estimated Blood Loss: 250 cc  Findings:   Right MCA M1 occlusion, TICI 0.  Right carotid bifurcation critical stenosis.    Successful mechanical thrombectomy.  Successful right carotid angioplasty and stenting.    PUNCTURE: 22: 42  FIRST PASS: 23: 17, ADAPT  RECAN: 23: 21, TICI 3    Right femoral sheath removed, closure was deployed.    Specimens: None    Complications: None    Anesthesia: general    Mirza Holm MD     Date: 2024  Time: 12:15 AM

## 2024-12-07 NOTE — CONSULTS
Consultation - Neurosurgery   Name: Bren Traylor 69 y.o. female I MRN: [id number]  Unit/Bed#: ICU 11 I Date of Admission: 12/6/2024   Date of Service: 12/7/2024 I Hospital Day: 0   Consults  Physician Requesting Evaluation: Mirza Holm MD   Reason for Evaluation / Principal Problem: CVA    Assessment & Plan  Cerebrovascular accident (CVA) due to embolism of right middle cerebral artery (HCC)  Bren is a right M1 occlusion with a large penumbra determined on CT perfusion.  I recommended cerebral angiography mechanical thrombectomy and likely carotid angioplasty and stenting.  Risks and benefits discussed with her son who has elected proceed.      History of Present Illness   HPI: Bren Traylor is a 69 y.o. year old female who presents with acute right MCA syndrome.  Last known normal was yesterday evening.  NIH stroke scale 7.  CTA with right carotid critical stenosis and right M1 occlusion.    Review of Systems   Unable to perform ROS: Acuity of condition     Patient's prior history (PMH, PSH, SH, FH, etc) not obtainable due to Clinical Condition    Objective :  Temp:  [97.7 °F (36.5 °C)] 97.7 °F (36.5 °C)  HR:  [] 114  BP: (137-172)/(63-74) 172/74  Resp:  [18-24] 24  SpO2:  [93 %-95 %] 94 %  O2 Device: None (Room air)    Physical Exam  Constitutional:       Appearance: Normal appearance.   HENT:      Head: Normocephalic.   Cardiovascular:      Pulses: Normal pulses.   Pulmonary:      Effort: Pulmonary effort is normal.   Neurological:      Mental Status: She is alert.      Comments: Mild left facial droop.  Left upper extremity and left lower extremity drift.  Mild extinction   Psychiatric:         Mood and Affect: Mood normal.         Behavior: Behavior normal.      Neurological Exam  Mental Status  Alert.  Mild left facial droop.  Left upper extremity and left lower extremity drift.  Mild extinction.        Lab Results: I have reviewed the following results:  Recent Labs      12/06/24 2045   WBC 11.93*   HGB 11.6   HCT 35.0      SODIUM 135   K 3.4*      CO2 25   BUN 8   CREATININE 0.44*   GLUC 108   PTT 36*   INR 1.10   HSTNI0 140*             VTE Pharmacologic Prophylaxis: Sequential compression device (Venodyne)

## 2024-12-07 NOTE — ED PROVIDER NOTES
Time reflects when diagnosis was documented in both MDM as applicable and the Disposition within this note       Time User Action Codes Description Comment    12/6/2024  8:23 PM Eb Palma Add [R29.080] Facial droop           ED Disposition       None          Assessment & Plan   {Hyperlinks  Risk Stratification - NIHSS - HEART SCORE - Fill out sepsis note and make sure you call [phone] if severe or septic shock:[id number]}    [facility]         Medications   iohexol (OMNIPAQUE) 350 MG/ML injection (MULTI-DOSE) 85 mL (85 mL Intravenous Given 12/6/24 2031)       ED Risk Strat Scores                                               History of Present Illness   {Hyperlinks  History (Med, Surg, Fam, Social) - Current Medications - Allergies  :[id number]}    Chief Complaint   Patient presents with    STROKE Alert       Past Medical History:   Diagnosis Date    Hypertension       No past surgical history on file.   No family history on file.   Social History     Tobacco Use    Smoking status: Every Day     Current packs/day: 1.00     Average packs/day: 1 pack/day for 46.8 years (46.8 ttl pk-yrs)     Types: Cigarettes     Start date: 2/28/1978    Smokeless tobacco: Never   Vaping Use    Vaping status: Never Used   Substance Use Topics    Alcohol use: Not Currently    Drug use: Not Currently      E-Cigarette/Vaping    E-Cigarette Use Never User       E-Cigarette/Vaping Substances      I have reviewed and agree with the history as documented.     HPI    Review of Systems        Objective   {Hyperlinks  Historical Vitals - Historical Labs - Chart Review/Microbiology - Last Echo - Code Status  :[id number]}    ED Triage Vitals [12/06/24 2026]   Temperature Pulse Blood Pressure Respirations SpO2 Patient Position - Orthostatic VS   97.7 °F (36.5 °C) 91 140/63 18 93 % Lying      Temp Source Heart Rate Source BP Location FiO2 (%) Pain Score    Oral Monitor -- -- --      Vitals      Date and Time Temp Pulse SpO2 Resp BP Pain Score FACES  Pain Rating User   12/06/24 2026 97.7 °F (36.5 °C) 91 93 % 18 140/63 -- -- CB            Physical Exam    Results Reviewed       Procedure Component Value Units Date/Time    Fingerstick Glucose (POCT) [[id number]]  (Normal) Collected: 12/06/24 2037    Lab Status: Final result Specimen: Blood Updated: 12/06/24 2037     POC Glucose 123 mg/dl     Basic metabolic panel [[id number]]     Lab Status: No result Specimen: Blood     CBC and Platelet [[id number]]     Lab Status: No result Specimen: Blood     Protime-INR [[id number]]     Lab Status: No result Specimen: Blood     APTT [[id number]]     Lab Status: No result Specimen: Blood     HS Troponin 0hr (reflex protocol) [[id number]]     Lab Status: No result Specimen: Blood             CT stroke alert brain    (Results Pending)   CTA stroke alert (head/neck)    (Results Pending)       Procedures    ED Medication and Procedure Management   Prior to Admission Medications   Prescriptions Last Dose Informant Patient Reported? Taking?   atorvastatin (LIPITOR) 20 mg tablet   No No   Sig: TAKE 1 TABLET (20 MG TOTAL) BY MOUTH DAILY   busPIRone (BUSPAR) 7.5 mg tablet   No No   Sig: TAKE 1 TABLET (7.5 MG TOTAL) BY MOUTH 2 (TWO) TIMES A DAY AS NEEDED (PANIC ATTACL)   clonazePAM (KlonoPIN) 0.5 mg tablet   No No   Sig: Take 1 tablet (0.5 mg total) by mouth daily as needed for seizures   losartan (COZAAR) 50 mg tablet   No No   Sig: Take 1 tablet (50 mg total) by mouth daily   sertraline (ZOLOFT) 100 mg tablet   No No   Sig: Take 0.5 tablets (50 mg total) by mouth daily      Facility-Administered Medications: None     Patient's Medications   Discharge Prescriptions    No medications on file     No discharge procedures on file.  ED SEPSIS DOCUMENTATION   Time reflects when diagnosis was documented in both MDM as applicable and the Disposition within this note       Time User Action Codes Description Comment    12/6/2024  8:23 PM Eb Palma Add [R29.810] Facial droop                weakness and headaches.           Objective       ED Triage Vitals   Temperature Pulse Blood Pressure Respirations SpO2 Patient Position - Orthostatic VS   12/06/24 2026 12/06/24 2026 12/06/24 2026 12/06/24 2026 12/06/24 2026 12/06/24 2026   97.7 °F (36.5 °C) 91 140/63 18 93 % Lying      Temp Source Heart Rate Source BP Location FiO2 (%) Pain Score    12/06/24 2026 12/06/24 2026 12/06/24 2045 -- 12/06/24 2030    Oral Monitor Left arm  9      Vitals      Date and Time Temp Pulse SpO2 Resp BP Pain Score FACES Pain Rating User   12/06/24 2130 -- 96 95 % 24 140/70 9 -- GB   12/06/24 2115 -- 91 94 % 23 137/69 9 -- GB   12/06/24 2100 -- 95 94 % 23 154/73 9 -- GB   12/06/24 2045 -- 93 94 % 24 143/66 9 -- GB   12/06/24 2030 -- 96 -- 19 144/67 9 -- GB   12/06/24 2026 97.7 °F (36.5 °C) 91 93 % 18 140/63 -- -- CB            Physical Exam  Vitals and nursing note reviewed.   Constitutional:       General: She is not in acute distress.     Appearance: She is well-developed. She is not diaphoretic.   HENT:      Head: Normocephalic and atraumatic.      Mouth/Throat:      Mouth: Mucous membranes are moist.      Pharynx: Oropharynx is clear.   Eyes:      Conjunctiva/sclera: Conjunctivae normal.      Pupils: Pupils are equal, round, and reactive to light.   Neck:      Vascular: No JVD.   Cardiovascular:      Rate and Rhythm: Normal rate and regular rhythm.      Pulses: Normal pulses.      Heart sounds: Normal heart sounds.   Pulmonary:      Effort: Pulmonary effort is normal. No respiratory distress.      Breath sounds: Normal breath sounds. No stridor.   Abdominal:      General: There is no distension.      Palpations: Abdomen is soft.      Tenderness: There is no abdominal tenderness. There is no guarding or rebound.   Musculoskeletal:         General: No tenderness or deformity. Normal range of motion.      Cervical back: Normal range of motion and neck supple. No rigidity.   Skin:     General: Skin is warm and dry.      Capillary  Refill: Capillary refill takes less than 2 seconds.      Coloration: Skin is not jaundiced or pale.      Findings: No bruising, erythema or rash.   Neurological:      Mental Status: She is alert and oriented to person, place, and time.      Cranial Nerves: No cranial nerve deficit.      Sensory: No sensory deficit.      Motor: Weakness present. No abnormal muscle tone.      Coordination: Coordination normal.      Gait: Gait normal.         Results Reviewed       Procedure Component Value Units Date/Time    Protime-INR [[id number]]  (Normal) Collected: 12/06/24 2045    Lab Status: Final result Specimen: Blood from Arm, Right Updated: 12/06/24 2118     Protime 14.9 seconds      INR 1.10    Narrative:      INR Therapeutic Range    Indication                                             INR Range      Atrial Fibrillation                                               2.0-3.0  Hypercoagulable State                                    2.0.2.3  Left Ventricular Asist Device                            2.0-3.0  Mechanical Heart Valve                                  -    Aortic(with afib, MI, embolism, HF, LA enlargement,    and/or coagulopathy)                                     2.0-3.0 (2.5-3.5)     Mitral                                                             2.5-3.5  Prosthetic/Bioprosthetic Heart Valve               2.0-3.0  Venous thromboembolism (VTE: VT, PE        2.0-3.0    APTT [[id number]]  (Abnormal) Collected: 12/06/24 2045    Lab Status: Final result Specimen: Blood from Arm, Right Updated: 12/06/24 2118     PTT 36 seconds     HS Troponin 0hr (reflex protocol) [[id number]]  (Abnormal) Collected: 12/06/24 2045    Lab Status: Final result Specimen: Blood from Arm, Right Updated: 12/06/24 2112     hs TnI 0hr 140 ng/L     Basic metabolic panel [[id number]]  (Abnormal) Collected: 12/06/24 2045    Lab Status: Final result Specimen: Blood from Arm, Right Updated: 12/06/24 2104     Sodium 135 mmol/L      Potassium 3.4  mmol/L      Chloride 104 mmol/L      CO2 25 mmol/L      ANION GAP 6 mmol/L      BUN 8 mg/dL      Creatinine 0.44 mg/dL      Glucose 108 mg/dL      Calcium 8.4 mg/dL      eGFR 104 ml/min/1.73sq m     Narrative:      [employer] guidelines for Chronic Kidney Disease (CKD):     Stage 1 with normal or high GFR (GFR > 90 mL/min/1.73 square meters)    Stage 2 Mild CKD (GFR = 60-89 mL/min/1.73 square meters)    Stage 3A Moderate CKD (GFR = 45-59 mL/min/1.73 square meters)    Stage 3B Moderate CKD (GFR = 30-44 mL/min/1.73 square meters)    Stage 4 Severe CKD (GFR = 15-29 mL/min/1.73 square meters)    Stage 5 End Stage CKD (GFR <15 mL/min/1.73 square meters)  Note: GFR calculation is accurate only with a steady state creatinine    CBC and Platelet [[id number]]  (Abnormal) Collected: 12/06/24 2045    Lab Status: Final result Specimen: Blood from Arm, Right Updated: 12/06/24 2050     WBC 11.93 Thousand/uL      RBC 3.89 Million/uL      Hemoglobin 11.6 g/dL      Hematocrit 35.0 %      MCV 90 fL      MCH 29.8 pg      MCHC 33.1 g/dL      RDW 13.3 %      Platelets 215 Thousands/uL      MPV 10.7 fL     Fingerstick Glucose (POCT) [[id number]]  (Normal) Collected: 12/06/24 2037    Lab Status: Final result Specimen: Blood Updated: 12/06/24 2037     POC Glucose 123 mg/dl             CTA stroke alert (head/neck)   Final Interpretation by Matthew Cortes DO (12/06 2050)      Acute thrombosis of the mid to distal aspect of the right M1 segment. Adequate enhancement of the more distal M2 and M3 branches.      Severe near occlusive stenosis of the proximal right internal carotid artery within the neck and a severe tandem stenosis intracranially.      High-grade stenosis of the distal left common carotid artery with moderate stenosis of the internal carotid artery bulb.      Moderate to high-grade stenosis of the right subclavian origin from the brachiocephalic.               Findings were directly discussed with  Love Keith  at 8:40 p.m.      Workstation performed: [id number]         CT stroke alert brain   Final Interpretation by Matthew Cortes DO (12/06 2043)      There is infarction identified within the right lentiform nucleus and decreased attenuation. Suspected mild cytotoxic edema within the caudate head and body resulting in partial effacement of the frontal horn of the lateral ventricle. No hemorrhagic    transformation.      Hyperdensity within the right M1 segment on series 2 image 16 suggesting M1 thrombosis. See separate CT angiogram report.      Probable old cortical infarct within the right lateral frontal lobe.      Findings were directly discussed with Love Keith  at approximately 8:40 p.m.      Workstation performed: [id number]             CriticalCare Time    Date/Time: 12/6/2024 9:00 PM    Performed by: Eb Palma MD  Authorized by: Eb Palma MD    Critical care provider statement:     Critical care time (minutes):  35    Critical care time was exclusive of:  Separately billable procedures and treating other patients    Critical care was necessary to treat or prevent imminent or life-threatening deterioration of the following conditions:  CNS failure or compromise      ED Medication and Procedure Management   Prior to Admission Medications   Prescriptions Last Dose Informant Patient Reported? Taking?   atorvastatin (LIPITOR) 20 mg tablet   No No   Sig: TAKE 1 TABLET (20 MG TOTAL) BY MOUTH DAILY   busPIRone (BUSPAR) 7.5 mg tablet   No No   Sig: TAKE 1 TABLET (7.5 MG TOTAL) BY MOUTH 2 (TWO) TIMES A DAY AS NEEDED (PANIC ATTACL)   clonazePAM (KlonoPIN) 0.5 mg tablet   No No   Sig: Take 1 tablet (0.5 mg total) by mouth daily as needed for seizures   losartan (COZAAR) 50 mg tablet   No No   Sig: Take 1 tablet (50 mg total) by mouth daily   sertraline (ZOLOFT) 100 mg tablet   No No   Sig: Take 0.5 tablets (50 mg total) by mouth daily      Facility-Administered Medications: None      Discharge Medication List as of 12/6/2024  9:42 PM        CONTINUE these medications which have NOT CHANGED    Details   atorvastatin (LIPITOR) 20 mg tablet TAKE 1 TABLET (20 MG TOTAL) BY MOUTH DAILY, Starting Fri 11/22/2024, Normal      busPIRone (BUSPAR) 7.5 mg tablet TAKE 1 TABLET (7.5 MG TOTAL) BY MOUTH 2 (TWO) TIMES A DAY AS NEEDED (PANIC ATTACL), Starting Wed 7/3/2024, Normal      clonazePAM (KlonoPIN) 0.5 mg tablet Take 1 tablet (0.5 mg total) by mouth daily as needed for seizures, Starting Wed 10/2/2024, Normal      losartan (COZAAR) 50 mg tablet Take 1 tablet (50 mg total) by mouth daily, Starting Wed 2/28/2024, Normal      sertraline (ZOLOFT) 100 mg tablet Take 0.5 tablets (50 mg total) by mouth daily, Starting Wed 2/28/2024, Normal           No discharge procedures on file.  ED SEPSIS DOCUMENTATION   Time reflects when diagnosis was documented in both MDM as applicable and the Disposition within this note       Time User Action Codes Description Comment    12/6/2024  8:23 PM Eb Palma [R29.810] Facial droop     12/6/2024  8:57 PM Eb Palma [I63.9] CVA (cerebral vascular accident) ([facility])                  Eb Palma MD  12/09/24 1537

## 2024-12-07 NOTE — H&P
H&P - Critical Care/ICU   Name: Bren Traylor 69 y.o. female I MRN: [id number]  Unit/Bed#: ICU 11 I Date of Admission: 12/6/2024   Date of Service: 12/7/2024 I Hospital Day: 0       Assessment & Plan   Neuro/Psych:  Diagnoses: Ischemic stroke w/ Right MCA M1 occlusion s/p mechanical thrombectomy, right carotid stenosis s/p right carotid angioplasty and stenting   12/7 Mechanical thrombectomy and right carotid angioplasty and stenting - TICI 3   Plan:  IR following, appreciate recommendations  Continue Integrilin infusion   Atorvastatin 40 mg daily   SBP goals 110-150   Repeat CTH @ 9 AM   MRI Brain ordered   Echo ordered   Neuro checks   Sedation: propofol @ 15   Analgesia: acetaminophen 650 mg as needed pain, fentanyl prn     Diagnosis: Anxiety  Home regimen: buspirone 7.5 mg, sertraline 100 mg, clonazepam 0.5 mg prn panic attacks  Plan:  Hold home medications     CV:  Diagnoses: History of hypertension, hyperlipidemia   Home regimen: losartan 50 mg, atorvastatin 20 mg  Plan:  Hold home losartan  Increase atorvastatin to atorvastatin 40 mg   Labetalol 10 prn SBP > 150  Hydralazine 10 mg SBP > 150 2nd line   Continuous cardiopulmonary monitoring. Maintain MAP >65.    Pulm:  Diagnoses: Acute respiratory failure in setting of ischemic stroke s/p mechanical thrombectomy   Plan:  Vent: AC/VC 16/400/6/40. Daily SAT/SBT. Wean FiO2 and PEEP as tolerated.   Continuous pulse oximetry. Maintain O2 sat >92%.     GI:  Diagnoses: No active issues  Plan:  Bowel regimen: Senokot     :  Diagnoses: No active issues   Creatinine trend: at baseline   Baseline creatinine: 0.4-0.5  Plan:  Trend renal indices.   Monitor I/Os.    F/E/N:  F: NS @ 100 ml/hr. Fluid resuscitation prn.  E: Monitor and replete electrolytes for Mg >2, Phos >3, K >4.  N: NPO     Heme/Onc:  Diagnoses: No active issues   Plan:  Continue Integrillin     Endo:  Diagnoses: No active issues  Plan:   F/u Hgb A1c and lipid panel   Monitor blood  glucose.    ID:  Diagnoses: Leukocytosis likely inflammatory in setting of ischemic stroke   Labs: WBC 11.9   Temperature: afebrile  Plan:  Monitor fever curve and WBC.    MSK/Skin:  Plan:  PT/OT when appropriate. Encourage OOB and ambulation when appropriate. Local wound care prn.    LDAs:  Lines -   Drains -   Airways -    Disposition: Critical care    History of Present Illness   Bren Traylor is a 69 y.o. with a PMHx hypertension, hyperlipidemia, and anxiety who presents to \[facility]\"" as an endovascular alert for Right MCA M1 occlusion. Patient originally presented to [facility] as a stroke alert for left sided weakness and altered mental status. Son reports he found her altered and slumped over with significant left sided facial droop and weakness. Patient underwent mechanical thrombectomy, TICI 3 and right carotid angioplasty and stent placement. Patient admitted to ICU for further management.     History obtained from chart review and the patient.    Review of Systems   Unable to perform ROS: Intubated       Historical Information   Past Medical History:  No date: High cholesterol  No date: Hypertension No past surgical history on file.   Current Outpatient Medications   Medication Instructions    atorvastatin (LIPITOR) 20 mg, Oral, Daily    busPIRone (BUSPAR) 7.5 mg, Oral, 2 times daily PRN    clonazePAM (KLONOPIN) 0.5 mg, Oral, Daily PRN    losartan (COZAAR) 50 mg, Oral, Daily    sertraline (ZOLOFT) 50 mg, Oral, Daily    No Known Allergies   Social History     Tobacco Use    Smoking status: Every Day     Current packs/day: 1.00     Average packs/day: 1 pack/day for 46.8 years (46.8 ttl pk-yrs)     Types: Cigarettes     Start date: 2/28/1978    Smokeless tobacco: Never   Vaping Use    Vaping status: Never Used   Substance Use Topics    Alcohol use: Not Currently    Drug use: Not Currently    No family history on file.       Objective :                   Vitals I/O      Most Recent Min/Max in 24hrs   Temp 98 °F  (36.7 °C) Temp  Min: 97.7 °F (36.5 °C)  Max: 98 °F (36.7 °C)   Pulse 72 Pulse  Min: 64  Max: 114   Resp 17 Resp  Min: 13  Max: 48   /65 BP  Min: 91/66  Max: 172/74   O2 Sat 100 % SpO2  Min: 93 %  Max: 100 %      Intake/Output Summary (Last 24 hours) at 12/7/2024 0421  Last data filed at 12/7/2024 0400  Gross per 24 hour   Intake 1090.54 ml   Output 300 ml   Net 790.54 ml       Diet NPO    Invasive Monitoring           Physical Exam   Physical Exam  Vitals and nursing note reviewed.   Eyes:      Conjunctiva/sclera: Conjunctivae normal.      Pupils: Pupils are equal, round, and reactive to light.   Skin:     General: Skin is warm and dry.   HENT:      Head: Normocephalic.      Right Ear: No drainage.      Left Ear: No drainage.      Mouth/Throat:      Mouth: Mucous membranes are moist.   Cardiovascular:      Rate and Rhythm: Normal rate and regular rhythm.      Pulses: Normal pulses.      Heart sounds: Normal heart sounds.   Musculoskeletal:      Right lower leg: No edema.      Left lower leg: No edema.   Abdominal: General: There is no distension.      Palpations: Abdomen is soft.   Constitutional:       Appearance: She is well-developed.      Interventions: She is sedated and intubated.   Pulmonary:      Effort: Pulmonary effort is normal. No respiratory distress. She is intubated.      Breath sounds: Normal breath sounds. No wheezing.   Neurological:      Comments: Intubated and sedated - exam limited; responds to verbal stimuli, follows commands, has not opened eyes. Moving right upper and right lower extremities.           Diagnostic Studies        Lab Results: I have reviewed the following results:     Medications:  Scheduled PRN   atorvastatin, 40 mg, QPM  chlorhexidine, 15 mL, Q12H DANIS  potassium chloride, 20 mEq, Once  senna-docusate sodium, 2 tablet, BID      acetaminophen, 650 mg, Q6H PRN  fentaNYL, 50 mcg, Q2H PRN  hydrALAZINE, 10 mg, Q4H PRN  labetalol, 10 mg, Q4H PRN       Continuous     eptifibatide, 1 mcg/kg/min, Last Rate: 1 mcg/kg/min (12/07/24 0137)  propofol, 5-50 mcg/kg/min, Last Rate: 45 mcg/kg/min (12/07/24 0400)  sodium chloride, 100 mL/hr, Last Rate: 100 mL/hr (12/07/24 0248)         Labs:   CBC    Recent Labs     12/06/24 2045   WBC 11.93*   HGB 11.6   HCT 35.0        BMP    Recent Labs     12/06/24 2045   SODIUM 135   K 3.4*      CO2 25   AGAP 6   BUN 8   CREATININE 0.44*   CALCIUM 8.4       Coags    Recent Labs     12/06/24 2045   INR 1.10   PTT 36*        Additional Electrolytes  No recent results       Blood Gas    No recent results  No recent results LFTs  No recent results    Infectious  No recent results  Glucose  Recent Labs     12/06/24 2045   GLUC 108

## 2024-12-07 NOTE — DISCHARGE INSTRUCTIONS
Today, you underwent a diagnostic cerebral angiogram under the care of Dr. Holm for evaluation of a stroke  ?  The following instructions will help you care for yourself, or be cared for upon your return home today. These are guidelines for your care right after your surgery only.   ?  Notify Your Doctor or Nurse if you have any of the following:  ?  SYMPTOMS OF WOUND INFECTION--   Increased pain in or around the incision   Swelling around the incision  Any drainage from the incision  Incision separates or opens up  Warmth in the tissues around the incision  Redness or tenderness on the skin near the incision   Fever (temperature greater than 101 degrees F)   ?  NEUROLOGICAL CHANGES--  Change in alertness  Increased sleepiness   Nausea and vomiting   New onset of numbness or weakness in arms or legs   New problems with your bowels or bladder  New or worse problems with balance or walking  Seizures, new or worsening  ?  UNRELIEVED HEADACHE PAIN--  New or increased pain unrelieved with pain medications   Pain associated with nausea and vomiting   Pain associated with other symptoms  ?  QUESTIONS OR PROBLEMS--  Any questions or problems that you are unsure about  Wound Care:  Keep Incision Clean and Dry   You may shower daily, but do not soak incision. Pat dry after showering.   No tub baths, soaking, swimming for 1 week after angiogram.   You do not need to cover the incision. Mild to moderate bruising and tenderness to the site is expected and may last up to 1-2 weeks after your procedure.   ?  A closure device was placed at the catheter insertion site. This is MRI compatible. Remove the dressing 24 hours after your procedure.   If your groin site is bleeding, apply firm pressure for 10 minutes. Reinforce dressing rather than removing and checking frequently. If continues to bleed through the dressing after 1 hour, contact your neurosurgeon's office.   Anticipatory Education:  ?  PAIN MED W/ Acetaminophen  (Tylenol)  --IF a prescription for pain medicine has been sent home with you:  --Narcotic pain medication may cause constipation. Be sure to take stool softeners or laxatives while you are on narcotic pain medication.   --Do not drive after taking prescription pain medicine.   ?  If this medicine is too strong, or no longer necessary, or we did NOT recommend/prescribe oral narcotics, you may take:   - Tylenol Extra-strength/Acetaminophen, 2 tablets every 4-6 hours as needed for mild pain. DO NOT TAKE MORE THAN 4000MG PER DAY from combined sources. NOTE: Remember to eat when taking pain medicines in order to avoid nausea. Watch for constipation. Eat plenty of fruits, vegetables, juices, and drink 6-8 glasses of water each day.   Constipation: Stay active and drink at least 6-8 cups of fluid each day to prevent constipation. If you need a laxative or stool softener follow the package directions or consult with your local pharmacists if you have questions.  ?  After anesthesia, rest for 24 hours. Do not drive, drink alcohol beverages or make any important decisions during this time. General anesthesia may cause sore throat, jaw discomfort or muscle aches. These symptoms can last for one or two days.  Activity: Please follow these instructions:  Advance your activity as you can tolerate. You may do light house work; nothing strenuous   You may walk all you want. You may go up and down the steps. Use the railing for support  Do not do excessive bending, straining or heavy lifting for 48 hours after your procedure  Do not drive or return to work until you are instructed   It is normal for your energy level and sleep patterns to change after surgery.   Get extra sleep at night and take naps during the day to help you feel less tired.   Take rest periods during the day.   Complete recovery may take several weeks.  ?  You may resume driving after 24-48 hours recovery.   You may return to work after 48 hours of recovery.    ?  Diet:  Your doctor has recommended that you follow these diet instructions at home. Refer to the patient education materials you received during your hospital stay. If you would like more nutrition counseling, ask your doctor about making an appointment with an outpatient dietitian.  Resume your home diet  ?  Medications:  Please resume your home medications as instructed.   ?  Home Supplies and Equipment:  none  Additional Contacts:  ?  CONTACTS FOR NEUROSURGERY: You may call your neurosurgeon’s office if you have questions between 8:30 am and 4:30 pm. You may request to speak to the nurse practitioner who is available Monday through Friday.   ?  For off hours or the weekend you may call your neurosurgeon's office to leave a message.

## 2024-12-07 NOTE — ASSESSMENT & PLAN NOTE
Bren is a right M1 occlusion with a large penumbra determined on CT perfusion.  I recommended cerebral angiography mechanical thrombectomy and likely carotid angioplasty and stenting.  Risks and benefits discussed with her son who has elected proceed.

## 2024-12-07 NOTE — ANESTHESIA POSTPROCEDURE EVALUATION
Post-Op Assessment Note    CV Status:  Stable  Pain Score: 0    Pain management: adequate       Post-procedure mental status: sedated.   Hydration Status:  Euvolemic   Airway Patency:  Patent (Ett  in situ)     Post Op Vitals Reviewed: Yes    No anethesia notable event occurred.    Staff: CRNA           Last Filed PACU Vitals:  Vitals Value Taken Time   Temp     Pulse 65 12/07/24 0033   /64 12/07/24 0033   Resp 14 12/07/24 0033   SpO2 98 % 12/07/24 0033       Modified Naseem:  No data recorded

## 2024-12-07 NOTE — SEDATION DOCUMENTATION
Cerebral angiogram with thrombectomy and carotid artery stent placement performed by Dr Hlom. Procedure tolerated well, anesthesia present throughout the case. Right groin access closed with an angioseal. IR Procedure Bedrest Start Time is 2355. Patient being transported to ICU.     In department: 2219  In room: 2222  Access: 2242  1st pass: 2317  Recan time: 2321  Closure: 2353  TICI pre: 0  TICI post: 3

## 2024-12-07 NOTE — PROGRESS NOTES
Pastoral Care Progress Note          Chaplaincy Interventions Utilized:   Provided spiritual and emotional support to patient and family members her two  sons/. Also provided prayer to patient.

## 2024-12-07 NOTE — QUICK NOTE
NIHSS:    1a.Level of Consciousness: 0 = Alert   1b. LOC Questions: 0 = Answers both correctly   1c. LOC Commands: 0 = Obeys both correctly   2. Best Gaze: 0 = Normal   3. Visual: 0 = No visual field loss   4. Facial Palsy: 1=Minor paralysis (flattened nasolabial fold, asymmetric on smiling)   5a. Motor Right Arm: 0=No drift, limb holds 90 (or 45) degrees for full 10 seconds   5b. Motor Left Arm: 0=No drift, limb holds 90 (or 45) degrees for full 10 seconds   6a. Motor Right Le=Drift, limb holds 90 (or 45) degrees but drifts down before full 10 seconds: does not hit bed   6b. Motor Left Le=Some effort against gravity, limb cannot get to or maintain (if cured) 90 (or 45) degrees, drifts down to bed, but has some effort against gravity   7. Limb Ataxia:  0=Absent   8. Sensory: 0=Normal; no sensory loss   9. Best Language:  0=No aphasia, normal   10. Dysarthria: 1=Mild to moderate, patient slurs at least some words and at worst, can be understood with some difficulty   11. Extinction and Inattention (formerly Neglect): 0=No abnormality   Total Score: 6       Bren Doan is a 69 year old female presenting [facility] [place] as an Endovascular alert. Seen at [facility] as a pre-hospital stroke alert.     NIHSS at [facility] was 7.

## 2024-12-07 NOTE — SPEECH THERAPY NOTE
Order received and chart reviewed. Pt is currently intubated and sedated. Please send a new ST order when pt is extubated and appropriate for assessment.

## 2024-12-07 NOTE — ANESTHESIA PREPROCEDURE EVALUATION
Procedure:  IR STROKE ALERT    Relevant Problems   No relevant active problems        Physical Exam    Airway    Mallampati score: II         Dental   No notable dental hx     Cardiovascular      Pulmonary      Other Findings  post-pubertal.      Anesthesia Plan  ASA Score- 3 Emergent    Anesthesia Type- general with ASA Monitors.         Additional Monitors: arterial line.    Airway Plan: ETT.    Comment: I, Dr. Kaur, the attending physician, have personally seen and evaluated the patient prior to anesthetic care.  I have reviewed the pre-anesthetic record, and other medical records if appropriate to the anesthetic care.  If a CRNA is involved in the case, I have reviewed the CRNA assessment, if present, and agree.  The patient is in a suitable condition to proceed with my formulated anesthetic plan.  .       Plan Factors-    Chart reviewed.                      Induction- intravenous.    Postoperative Plan-         Informed Consent- Anesthetic plan and risks discussed with patient.  I personally reviewed this patient with the CRNA. Discussed and agreed on the Anesthesia Plan with the CRNA..

## 2024-12-07 NOTE — EMTALA/ACUTE CARE TRANSFER
[facility] EMERGENCY DEPARTMENT  [address]  [place] [address]  Dept: [phone]      EMTALA TRANSFER CONSENT    NAME Bren OLIVEROS 1955                              MRN [id number]    I have been informed of my rights regarding examination, treatment, and transfer   by Dr. Eb Palma MD    Benefits: Specialized equipment and/or services available at the receiving facility (Include comment)________________________    Risks: Potential for delay in receiving treatment, Potential deterioration of medical condition, Loss of IV      Consent for Transfer:  I acknowledge that my medical condition has been evaluated and explained to me by the emergency department physician or other qualified medical person and/or my attending physician, who has recommended that I be transferred to the service of  Accepting Physician: Dr Holm at Accepting Facility Name, City & State : [facility]. The above potential benefits of such transfer, the potential risks associated with such transfer, and the probable risks of not being transferred have been explained to me, and I fully understand them.  The doctor has explained that, in my case, the benefits of transfer outweigh the risks.  I agree to be transferred.    I authorize the performance of emergency medical procedures and treatments upon me in both transit and upon arrival at the receiving facility.  Additionally, I authorize the release of any and all medical records to the receiving facility and request they be transported with me, if possible.  I understand that the safest mode of transportation during a medical emergency is an ambulance and that the Hospital advocates the use of this mode of transport. Risks of traveling to the receiving facility by car, including absence of medical control, life sustaining equipment, such as oxygen, and medical personnel has been explained to me and I fully  understand them.    (DICK CORRECT BOX BELOW)  [  ]  I consent to the stated transfer and to be transported by ambulance/helicopter.  [  ]  I consent to the stated transfer, but refuse transportation by ambulance and accept full responsibility for my transportation by car.  I understand the risks of non-ambulance transfers and I exonerate the Hospital and its staff from any deterioration in my condition that results from this refusal.    X___________________________________________    DATE  24  TIME________  Signature of patient or legally responsible individual signing on patient behalf           RELATIONSHIP TO PATIENT_________________________          Provider Certification    NAME Bren Traylor                                         1955                              MRN [id number]    A medical screening exam was performed on the above named patient.  Based on the examination:    Condition Necessitating Transfer The primary encounter diagnosis was Facial droop. A diagnosis of CVA (cerebral vascular accident) (HCC) was also pertinent to this visit.    Patient Condition: The patient has been stabilized such that within reasonable medical probability, no material deterioration of the patient condition or the condition of the unborn child(matilde) is likely to result from the transfer    Reason for Transfer: Level of Care needed not available at this facility    Transfer Requirements: Facility B   Space available and qualified personnel available for treatment as acknowledged by    Agreed to accept transfer and to provide appropriate medical treatment as acknowledged by       Dr Holm  Appropriate medical records of the examination and treatment of the patient are provided at the time of transfer   STAFF INITIAL WHEN COMPLETED _______  Transfer will be performed by qualified personnel from    and appropriate transfer equipment as required, including the use of necessary and appropriate life  support measures.    Provider Certification: I have examined the patient and explained the following risks and benefits of being transferred/refusing transfer to the patient/family:  Unanticipated needs of medical equipment and personnel during transport, Risk of worsening condition, General risk, such as traffic hazards, adverse weather conditions, rough terrain or turbulence, possible failure of equipment (including vehicle or aircraft), or consequences of actions of persons outside the control of the transport personnel, The possibility of a transport vehicle being unavailable      Based on these reasonable risks and benefits to the patient and/or the unborn child(matilde), and based upon the information available at the time of the patient’s examination, I certify that the medical benefits reasonably to be expected from the provision of appropriate medical treatments at another medical facility outweigh the increasing risks, if any, to the individual’s medical condition, and in the case of labor to the unborn child, from effecting the transfer.    X____________________________________________ DATE 12/06/24        TIME_______      ORIGINAL - SEND TO MEDICAL RECORDS   COPY - SEND WITH PATIENT DURING TRANSFER

## 2024-12-07 NOTE — CASE MANAGEMENT
Case Management Assessment & Discharge Planning Note    Patient name Bren Traylor  Location ICU 11/ICU 11 MRN [id number]  : 1955 Date 2024       Current Admission Date: 2024  Current Admission Diagnosis:Cerebrovascular accident (CVA) due to embolism of right middle cerebral artery (HCC)  Patient Active Problem List    Diagnosis Date Noted Date Diagnosed    Cerebrovascular accident (CVA) due to embolism of right middle cerebral artery (HCC) 2024       LOS (days): 0  Geometric Mean LOS (GMLOS) (days):   Days to GMLOS:     OBJECTIVE:    Risk of Unplanned Readmission Score: 14         Current admission status: Inpatient  Referral Reason: Stroke    Preferred Pharmacy:   [id number] - [place] - [address] [address]  Phone: [phone] Fax: [phone]    Primary Care Provider: Margarita Pedersen MD    Primary Insurance: [email or web address]  Secondary Insurance:     ASSESSMENT:  Active Health Care Proxies    There are no active Health Care Proxies on file.          Readmission Root Cause  30 Day Readmission: No    Patient Information  Admitted from:: Home  Mental Status: Intubated  During Assessment patient was accompanied by: Not accompanied during assessment       DISCHARGE DETAILS:        Additional Comments: CM attempted to complete Open assesment with pt's son, Lauren listed as emergency contact.  Pt is currently intubated and unable to complete assessment.  VM was left for son requesting a call back.

## 2024-12-07 NOTE — ASSESSMENT & PLAN NOTE
Initial NIHSS . Initial BP Blood Pressure: (!) 172/74, NC CTH performed and CTA H/N completed, both as noted below. As a result of > 4.5 hours from time of symptom onset patient was not determined to be a candidate for thrombolysis (TNK).  Patient underwent thrombectomy related to below CTA & CTP imaging findings, also received stent to right carotid.  - Home Antiplatelet /Anticoagulants PTA: no antiplatelet or anticoagulants  - Stroke risk factors: Unknown, apparent old infarct  - Prior Stroke Hx: yes, exact type of CVA unknown    Workup:  - Initial CTH: There is infarction identified within the right lentiform nucleus and decreased attenuation. Suspected mild cytotoxic edema within the caudate head and body resulting in partial effacement of the frontal horn of the lateral ventricle. No hemorrhagic transformation. Probable old cortical infarct within the right lateral frontal lobe.   - Initial CTA: Acute thrombosis of the mid to distal aspect of the right M1 segment. Adequate enhancement of the more distal M2 and M3 branches. Severe near occlusive stenosis of the proximal right internal carotid artery within the neck and a severe tandem stenosis intracranially. High-grade stenosis of the distal left common carotid artery with moderate stenosis of the internal carotid artery bulb. Moderate to high-grade stenosis of the right subclavian origin from the brachiocephalic.  - CTP: mismatch volume 32 ml, ratio infinite; Large area of milder hypoperfusion in the right MCA territory (Tmax greater than 4 seconds) acute ischemic changes again seen in the right basal ganglia but more conspicuous on earlier noncontrast study. Focal right frontal encephalomalacia.  - MRI: Pending  - TTE: Pending  - Labs: Hemoglobin A1c 5.3 (12/7/2024), LDL 76 (12/7/2024)    Impression: 69 y.o. female currently on no known AP/AC who presented with LFD & L sided weakness, found to have RM1 occlusion & RBG acute ischemia now s/p successful  thrombectomy TICI 3 & R carotid stent but since found to have significant IPH on subsequent imaging.    Plan: Discussed plan with neurology attending, Dr. Wagoner  Admit along the stroke pathway with telemetry monitoring  Strict NPO prior to dysphagia screen   Frequent neuro checks per protocol.  If any acute changes in exam, obtain stat CT head and notify neurology team  BP Goals: BP normotension goal in setting of new IPH  AP/AC had been on Integrilin following stent & thrombectomy  Now DDAVP given & AP/AC held for IPH  Statin: Increase home statin, atorvastatin 20, to 40 mg daily   Brain Imaging: Obtain MRI brain without contrast  TTE, pending  Euthermic/Euglycemic  DVT PPx: per primary team  PT/OT/ST/PMR evaluations when able  Stroke education and counseling  Rest of other care per primary team appreciated    Disposition: PT Recommendations -

## 2024-12-07 NOTE — CONSULTS
Consultation - Neurology   Name: Bren Traylor 69 y.o. female I MRN: [id number]  Unit/Bed#: ICU 11 I Date of Admission: 12/6/2024   Date of Service: 12/7/2024 I Hospital Day: 0   Inpatient consult to Neurology  Consult performed by: Morales Tijerina DO  Consult ordered by: Vanda Palacios PA-C        Physician Requesting Evaluation: Vanda Villafuerte DO   Reason for Evaluation / Principal Problem: stroke    Assessment & Plan  Cerebrovascular accident (CVA) due to embolism of right middle cerebral artery (HCC)  Initial NIHSS . Initial BP Blood Pressure: (!) 172/74, NC CTH performed and CTA H/N completed, both as noted below. As a result of > 4.5 hours from time of symptom onset patient was not determined to be a candidate for thrombolysis (TNK).  Patient underwent thrombectomy related to below CTA & CTP imaging findings, also received stent to right carotid.  - Home Antiplatelet /Anticoagulants PTA: no antiplatelet or anticoagulants  - Stroke risk factors: Unknown, apparent old infarct  - Prior Stroke Hx: yes, exact type of CVA unknown    Workup:  - Initial CTH: There is infarction identified within the right lentiform nucleus and decreased attenuation. Suspected mild cytotoxic edema within the caudate head and body resulting in partial effacement of the frontal horn of the lateral ventricle. No hemorrhagic transformation. Probable old cortical infarct within the right lateral frontal lobe.   - Initial CTA: Acute thrombosis of the mid to distal aspect of the right M1 segment. Adequate enhancement of the more distal M2 and M3 branches. Severe near occlusive stenosis of the proximal right internal carotid artery within the neck and a severe tandem stenosis intracranially. High-grade stenosis of the distal left common carotid artery with moderate stenosis of the internal carotid artery bulb. Moderate to high-grade stenosis of the right subclavian origin from the brachiocephalic.  - CTP: mismatch volume 32 ml,  ratio infinite; Large area of milder hypoperfusion in the right MCA territory (Tmax greater than 4 seconds) acute ischemic changes again seen in the right basal ganglia but more conspicuous on earlier noncontrast study. Focal right frontal encephalomalacia.  - MRI: Pending  - TTE: Pending  - Labs: Hemoglobin A1c 5.3 (12/7/2024), LDL 76 (12/7/2024)    Impression: 69 y.o. female currently on  no known AP/AC  who presented with LFD & L sided weakness, found to have RM1 occlusion & RBG acute ischemia now s/p successful thrombectomy TICI 3 & R carotid stent but since found to have significant IPH on subsequent imaging.    Plan: Discussed plan with neurology attending, Dr. Wagoner  Admit along the stroke pathway with telemetry monitoring  Strict NPO prior to dysphagia screen   Frequent neuro checks per protocol.  If any acute changes in exam, obtain stat CT head and notify neurology team  BP Goals: BP normotension goal in setting of new IPH  AP/AC had been on Integrilin following stent & thrombectomy  Now DDAVP given & AP/AC held for IPH  Statin: Increase home statin, atorvastatin 20, to 40 mg daily   Brain Imaging: Obtain MRI brain without contrast  TTE, pending  Euthermic/Euglycemic  DVT PPx: per primary team  PT/OT/ST/PMR evaluations when able  Stroke education and counseling  Rest of other care per primary team appreciated    Disposition: PT Recommendations -      I have discussed with Dr. Wagonre the above plan to treat pt. He agrees with the plan.  Please contact the SecureChat role for the Neurology service with any questions/concerns.    Recommendations for outpatient neurological follow up have yet to be determined.    History of Present Illness   Bren Traylor is a 69 y.o. female who presents with left-sided weakness and left facial droop with last known well evening prior to hospital presentation.  Minimal history gathered at presentation, and since then difficulty reaching family members.  Patient  urgently transferred to [facility] for CTP and is now s/p thrombectomy TICI 3 and stenting.  Seen today to be completely unresponsive to entirety of the left side, concern for bleed on repeat imaging and worsened exam findings.    Review of Systems   Unable to perform ROS: Intubated        I have reviewed the patient's PMH, PSH, Social History, Family History, Meds, and Allergies  Historical Information   Past Medical History:   Diagnosis Date    High cholesterol     Hypertension      No past surgical history on file.  Social History     Tobacco Use    Smoking status: Every Day     Current packs/day: 1.00     Average packs/day: 1 pack/day for 46.8 years (46.8 ttl pk-yrs)     Types: Cigarettes     Start date: 2/28/1978    Smokeless tobacco: Never   Vaping Use    Vaping status: Never Used   Substance and Sexual Activity    Alcohol use: Not Currently    Drug use: Not Currently    Sexual activity: Not on file     E-Cigarette/Vaping    E-Cigarette Use Never User      E-Cigarette/Vaping Substances    Nicotine No     THC No     CBD No     Flavoring No     Other No     Unknown No      No family history on file.  Social History     Tobacco Use    Smoking status: Every Day     Current packs/day: 1.00     Average packs/day: 1 pack/day for 46.8 years (46.8 ttl pk-yrs)     Types: Cigarettes     Start date: 2/28/1978    Smokeless tobacco: Never   Vaping Use    Vaping status: Never Used   Substance and Sexual Activity    Alcohol use: Not Currently    Drug use: Not Currently    Sexual activity: Not on file       Current Facility-Administered Medications:     acetaminophen (TYLENOL) oral suspension 650 mg, Q6H PRN    aspirin chewable tablet 81 mg, Daily    atorvastatin (LIPITOR) tablet 40 mg, QPM    chlorhexidine (PERIDEX) 0.12 % oral rinse 15 mL, Q12H DANIS    fentaNYL injection 50 mcg, Q2H PRN    hydrALAZINE (APRESOLINE) injection 10 mg, Q4H PRN    labetalol (NORMODYNE) injection 10 mg, Q4H PRN    niCARdipine (CARDENE) 2.5 mg/mL injection  **ADS Override Pull**,     niCARdipine (CARDENE) 25 mg (STANDARD CONCENTRATION) in sodium chloride 0.9% 250 mL, Titrated, Last Rate: Stopped (12/07/24 1207)    phenylephrine (SEBASTIAN-SYNEPHRINE) 50 mg (STANDARD CONCENTRATION) in sodium chloride 0.9% 250 mL, Titrated, Last Rate: 110 mcg/min (12/07/24 1353)    phenylephrine HCl (VAZCULEP) 10 MG/ML solution **ADS Override Pull**,     propofol (DIPRIVAN) 1000 mg in 100 mL infusion (premix), Titrated, Last Rate: 50 mcg/kg/min (12/07/24 1347)    senna-docusate sodium (SENOKOT S) 8.6-50 mg per tablet 2 tablet, BID    sodium chloride (HYPERTONIC) 3 % infusion, Continuous, Last Rate: 50 mL/hr (12/07/24 1114)  Prior to Admission Medications   Prescriptions Last Dose Informant Patient Reported? Taking?   atorvastatin (LIPITOR) 20 mg tablet   No No   Sig: TAKE 1 TABLET (20 MG TOTAL) BY MOUTH DAILY   busPIRone (BUSPAR) 7.5 mg tablet   No No   Sig: TAKE 1 TABLET (7.5 MG TOTAL) BY MOUTH 2 (TWO) TIMES A DAY AS NEEDED (PANIC ATTACL)   clonazePAM (KlonoPIN) 0.5 mg tablet   No No   Sig: Take 1 tablet (0.5 mg total) by mouth daily as needed for seizures   losartan (COZAAR) 50 mg tablet   No No   Sig: Take 1 tablet (50 mg total) by mouth daily   sertraline (ZOLOFT) 100 mg tablet   No No   Sig: Take 0.5 tablets (50 mg total) by mouth daily      Facility-Administered Medications: None     Patient has no known allergies.    Objective :  Temp:  [97.7 °F (36.5 °C)-98.9 °F (37.2 °C)] 98.9 °F (37.2 °C)  HR:  [] 84  BP: ()/() 125/56  Resp:  [13-48] 14  SpO2:  [93 %-100 %] 100 %  O2 Device: None (Room air)    Physical Exam  Vitals and nursing note reviewed.   Constitutional:       General: She is in acute distress.      Appearance: She is ill-appearing. She is not diaphoretic.   HENT:      Head: Normocephalic and atraumatic.      Right Ear: External ear normal.      Left Ear: External ear normal.   Eyes:      General: No scleral icterus.        Right eye: No discharge.         Left  eye: No discharge.      Conjunctiva/sclera: Conjunctivae normal.     Neurological Exam  Mental Status    Patient unresponsive to painful stimuli on left side, right side moves spontaneously, intubated on propofol.    Cranial Nerves  Poor blink to threat, brainstem reflex intact.    Motor    Left side no movement, right side spontaneous movement.    Sensory  Does not participate in exam.    Reflexes  Plantar upgoing on the left, equivocal on right.    Coordination    Unable to test.    Gait    Deferred for safety.        Lab Results: I have reviewed the following results:CBC:   Results from last 7 days   Lab Units 12/07/24  0442 12/06/24  2045   WBC Thousand/uL 10.01 11.93*   RBC Million/uL 3.30* 3.89   HEMOGLOBIN g/dL 10.2* 11.6   HEMATOCRIT % 29.9* 35.0   MCV fL 91 90   PLATELETS Thousands/uL 198 215   , BMP/CMP:   Results from last 7 days   Lab Units 12/07/24  1159 12/07/24  0442 12/06/24  2045   SODIUM mmol/L 143 141 135   POTASSIUM mmol/L 3.7 4.0 3.4*   CHLORIDE mmol/L 113* 111* 104   CO2 mmol/L 23 23 25   BUN mg/dL 6 7 8   CREATININE mg/dL 0.40* 0.40* 0.44*   CALCIUM mg/dL 7.7* 8.0* 8.4   EGFR ml/min/1.73sq m 108 108 104   , HgBA1C:   Results from last 7 days   Lab Units 12/07/24  0442   HEMOGLOBIN A1C % 5.3   , Lipid Profile:   Results from last 7 days   Lab Units 12/07/24  0442   HDL mg/dL 42*   LDL CALC mg/dL 76   TRIGLYCERIDES mg/dL 110     Recent Labs     12/07/24  0442 12/07/24  1159   WBC 10.01  --    HGB 10.2*  --    HCT 29.9*  --      --    SODIUM 141 143   K 4.0 3.7   * 113*   CO2 23 23   BUN 7 6   CREATININE 0.40* 0.40*   GLUC 121 141*   CAIONIZED 1.07*  --    MG 1.7*  --    PHOS 2.8  --      Imaging Results Review: I personally reviewed the following image studies in PACS and associated radiology reports: CT head and MRI brain. My interpretation of the radiology images/reports is: as noted above.  Other Study Results Review: EKG was reviewed.     VTE Prophylaxis: VTE covered  by:    None        Administrative Statements   I have spent a total time of 60 minutes in caring for this patient on the day of the visit/encounter including Diagnostic results, Prognosis, Risks and benefits of tx options, Counseling / Coordination of care, Documenting in the medical record, Reviewing / ordering tests, medicine, procedures  , Obtaining or reviewing history  , and Communicating with other healthcare professionals .

## 2024-12-07 NOTE — RESPIRATORY THERAPY NOTE
12/07/24 0439   Respiratory Assessment   Assessment Type Assess only   General Appearance Sedated   Respiratory Pattern Assisted   Chest Assessment Chest expansion symmetrical   Bilateral Breath Sounds Clear   Cough None   Suction ET Tube   Resp Comments Pt. remains on CMV, tolerating well.   Vent Information   Vent ID [id number]   Vent type Valderrama C3   Valderrama Vent Mode (S)CMV   $ Pulse Oximetry Spot Check Charge Completed   (S)CMV Settings   Resp Rate (BPM) 14 BPM   VT (mL) 450 mL   FIO2 (%) 60 %   PEEP (cmH2O) 6 cmH2O   I:E Ratio 1/3.3   Insp Time (%) 1 %   Flow Trigger (LPM) 3   Heater Temperature (Set) 95 °F (35 °C)   (S)CMV Actuals   Resp Rate (BPM) 14 BPM   VT (mL) 446   MV 6.4   Peak Pressure (cmH2O) 18 cmH2O   I:E Ratio (Obs) 1/3.3   Insp Resistance 14   Heater Temperature (Obs) 95 °F (35 °C)   Static Compliance (mL/cmH20) 59.2 mL/cmH2O   (S)CMV Alarms   High Peak Pressure (cmH2O) 40   Low Pressure (cmH2O) 5 cm H2O   High Resp Rate (BPM) 35 BPM   Low Resp Rate (BPM) 6 BPM   High MV (L/min) 25 L/min   Low MV (L/min) 4.5 L/min   High VT (mL) 900 mL   Low VT (mL) 250 mL   Apnea Time (s) 20 S   Maintenance   Alarm (pink) cable attached No   Resuscitation bag with peep valve at bedside Yes   Water bag changed No   Circuit changed No   ETT  Hi-Lo;Oral;EDWIN;Pre-curved;Inflated 7 mm   Placement Date/Time: 12/06/24 2228   Mask Ventilation: Mask ventilation not attempted (0)  Preoxygenated: Yes  Technique: Direct laryngoscopy;Stylet;Rapid sequence  Type: Hi-Lo;Oral;EDWIN;Pre-curved;Inflated  Tube Size: 7 mm  Laryngoscope: Mac  Blade Si...   Secured at (cm) 23   Measured from Lips   Secured Location Right   Secured by Commercial tube [place]   Site Condition Dry   Cuff Pressure (cm H2O)   (MLT)   HI-LO Suction  Intermittent suction   HI-LO Secretions Scant   HI-LO Intervention Patent     RT Ventilator Management Note  Bren Traylor 69 y.o. female MRN: [id number]  Unit/Bed#: ICU 11 Encounter:  [id number]      Daily Screen    No data found in the last 10 encounters.           Physical Exam:   Assessment Type: (P) Assess only  General Appearance: (P) Sedated  Respiratory Pattern: (P) Assisted  Chest Assessment: (P) Chest expansion symmetrical  Bilateral Breath Sounds: (P) Clear  Cough: (P) None  Suction: (P) ET Tube      Resp Comments: (P) Pt. remains on CMV, tolerating well.

## 2024-12-07 NOTE — ASSESSMENT & PLAN NOTE
12/6/24: Underwent right M1 mechanical thrombectomy.  12/7/24: The patient had an exam change and CTA demonstrated recanalization of the MCA however with hemorrhage in the right basal ganglia resulting in mild midline shift. Currently, the patient is intubated. She is questionably following commands and appears to be hemiplegic on the left.     Plan:  1) Stop anticoagulation  2) Give hypertonic Saline with goal Na >150  3) SBP <120  4) Repeat CT in 4-6 hours  5) Serial neuro checks

## 2024-12-07 NOTE — PROGRESS NOTES
Brief Update:    24 hour events:  Admitted to ICU s/p mechanical thrombectomy for Right M1 occlusion and s/p right MONA for severe carotid stenosis  Remains intubated  Mg 1.7 replete  Follow up CTA - Acute IPH 29 ml right MCA territory and R basal ganglia with 3 mm midline shift. Discussed with Dr. Holm and Dr. Briggs stopped Integrilin gave DDAVP for reversal, -140, HTS bolus and maintenance Na goal >145, repeat imaging in 4-6 hours and crani watch     Physical exam:  After returning from CT scanner this am consistent exam from prior, will not open eyes to voice does attempt with forced eye opening right gaze preference, able to nod head, and following commands in RUE and RLE. No response to LUE, withdraw in LLE.     Impression:  Acute CVA with right M1 occlusion and s/p right MONA for severe carotid stenosis presented with left sided weakness and AMS  Intubated for AWP for procedure   Left sided hemiparesis  HTN  HLD  Leukocytosis   Anxiety/depression    Plan:  Discussed with Dr. Holm and Dr. Briggs stopped Integrilin gave DDAVP for reversal, -140, HTS bolus and maintenance Na goal >145, repeat imaging in 4-6 hours and crani watch would be a candidate for OR intervention  MRI pending  -140 witn MAP >65 (trend cuff for MAP and tushar for SBP)  Holding AC for now  Send A1c and lipid panel   TTE  Maintain vent pending repeat imaging and crani watch   Propofol and fentanyl gtt for RASS -1 to +1, will need to maintain neuro exam while on light sedation   Statin  Maintain enteral access, NPO in case she needs OR  Maintain sierra     Afternoon update:  Interval CT stable  Will follow up with HCT in am  Started ASA 81 mg  Cont HTS work up Na to >145  Placed PICC in RUE extremity - at junction of R Subclavian and SVC safe to use for infusions, recommend adjusting PICC position with PICC team on Monday  Updated family bedside    I spent 58 minutes of critical care time excluding teaching, procedures and  family discussion

## 2024-12-07 NOTE — PROGRESS NOTES
Progress Note - Neurosurgery   Name: Bren Traylor 69 y.o. female I MRN: [id number]  Unit/Bed#: ICU 11 I Date of Admission: 12/6/2024   Date of Service: 12/7/2024 I Hospital Day: 0     Assessment & Plan  Cerebrovascular accident (CVA) due to embolism of right middle cerebral artery (HCC)  12/6/24: Underwent right M1 mechanical thrombectomy.  12/7/24: The patient had an exam change and CTA demonstrated recanalization of the MCA however with hemorrhage in the right basal ganglia resulting in mild midline shift. Currently, the patient is intubated. She is questionably following commands and appears to be hemiplegic on the left.     Plan:  1) Stop anticoagulation  2) Give hypertonic Saline with goal Na >150  3) SBP <120  4) Repeat CT in 4-6 hours  5) Serial neuro checks        Subjective   Sudden exam decline     Objective :  Temp:  [97.7 °F (36.5 °C)-98.9 °F (37.2 °C)] 98.9 °F (37.2 °C)  HR:  [] 84  BP: ()/() 125/56  Resp:  [13-48] 14  SpO2:  [93 %-100 %] 100 %  O2 Device: None (Room air)    I/O         12/05 0701 12/06 0700 12/06 0701 12/07 0700 12/07 0701 12/08 0700    I.V.  1228.8 244.3    NG/GT  100 30    IV Piggyback  100     Total Intake  1428.8 274.3    Urine  1050 125    Total Output  1050 125    Net  +378.8 +149.3                 Physical Exam Neurological Exam     Neurologic Exam:  Intubate  Does not open eyes  PERRL  Moves right side spontaneously  Questionably follows commands on right  Dense hemiparesis on the left        Lab Results: I have reviewed the following results:  Recent Labs     12/06/24 2045 12/07/24  0220 12/07/24  0442   WBC 11.93*  --  10.01   HGB 11.6  --  10.2*   HCT 35.0  --  29.9*     --  198   SODIUM 135  --  141   K 3.4*  --  4.0     --  111*   CO2 25  --  23   BUN 8  --  7   CREATININE 0.44*  --  0.40*   GLUC 108  --  121   CAIONIZED  --   --  1.07*   MG  --   --  1.7*   PHOS  --   --  2.8   PTT 36*  --   --    INR 1.10  --   --    HSTNI0 140*  213*  --    HSTNI2  --   --  250*             VTE Pharmacologic Prophylaxis: Sequential compression device (Venodyne)

## 2024-12-08 NOTE — PROGRESS NOTES
Progress Note - Neurosurgery   Name: Bren Traylor 69 y.o. female I MRN: [id number]  Unit/Bed#: ICU 11 I Date of Admission: 12/6/2024   Date of Service: 12/8/2024 I Hospital Day: 1     Assessment & Plan  Cerebrovascular accident (CVA) due to embolism of right middle cerebral artery (HCC)  12/6/24: Underwent right M1 mechanical thrombectomy.  12/7/24: The patient had an exam change and CTA demonstrated recanalization of the MCA however with hemorrhage in the right basal ganglia resulting in mild midline shift.   12/8/24: CT head stable from prior; basal ganglia hemorrhage with effacement of the right lateral ventricle. The patient is extubated. She is following commands and appears to be hemiplegic on the left. She is also minimally verbal.    Plan:  1) Stop anticoagulation  2) Give hypertonic Saline with goal Na >150  3) SBP <120  4) Repeat CT in AM  5) Serial neuro checks        Subjective   No issues overnight. Extubated.    Objective :  Temp:  [98.7 °F (37.1 °C)-100.9 °F (38.3 °C)] 98.7 °F (37.1 °C)  HR:  [] 84  BP: (112-135)/(53-65) 112/59  Resp:  [10-24] 20  SpO2:  [96 %-100 %] 96 %  O2 Device: None (Room air)  Nasal Cannula O2 Flow Rate (L/min):  [3 L/min] 3 L/min    I/O         12/06 0701  12/07 0700 12/07 0701  12/08 0700 12/08 0701  12/09 0700    I.V. (mL/kg) 1228.8 2100.4 (37.4) 352.4 (6.3)    NG/ 290 60    IV Piggyback 100 100     Total Intake(mL/kg) 1428.8 2490.4 (44.3) 412.4 (7.4)    Urine (mL/kg/hr) 1050 1525 (1.1) 190 (0.6)    Emesis/NG output  300     Total Output 1050 1825 190    Net +378.8 +665.4 +222.4                 Physical Exam Neurological Exam  Opens eyes spontaneously  PERRL  Moves right side spontaneously  Follows commands on right  Dense hemiparesis on the left      Lab Results: I have reviewed the following results:  Recent Labs     12/06/24 2045 12/06/24  2045 12/07/24  0220 12/07/24  0442 12/07/24  1159 12/08/24  0511   WBC 11.93*  --   --  10.01  --  16.02*   HGB  11.6  --   --  10.2*  --  9.7*   HCT 35.0  --   --  29.9*  --  29.6*     --   --  198  --  189   SODIUM 135  --   --  141   < > 150*   K 3.4*  --   --  4.0   < > 3.9     --   --  111*   < > 121*   CO2 25  --   --  23   < > 24   BUN 8  --   --  7   < > 7   CREATININE 0.44*  --   --  0.40*   < > 0.40*   GLUC 108  --   --  121   < > 136   CAIONIZED  --   --   --  1.07*  --   --    MG  --    < >  --  1.7*  --  2.3   PHOS  --    < >  --  2.8  --  2.3   PTT 36*  --   --   --   --   --    INR 1.10  --   --   --   --   --    HSTNI0 140*  --  213*  --   --   --    HSTNI2  --   --   --  250*  --   --     < > = values in this interval not displayed.             VTE Pharmacologic Prophylaxis: Sequential compression device (Venodyne)     Administrative Statements   I have spent a total time of 20 minutes in caring for this patient on the day of the visit/encounter including Documenting in the medical record, Reviewing / ordering tests, medicine, procedures  , and Obtaining or reviewing history  .

## 2024-12-08 NOTE — NURSING NOTE
Pt noted to be tachycardic 11teens-upper 130's, appearing to be afib on monitor. Ncc team aware. EKG completed.

## 2024-12-08 NOTE — UTILIZATION REVIEW
Initial Clinical Review    Admission: Date/Time/Statement:   Admission Orders (From admission, onward)       Ordered        12/07/24 0043  Inpatient Admission  Once                          Orders Placed This Encounter   Procedures    Inpatient Admission     Standing Status:   Standing     Number of Occurrences:   1     Level of Care:   Critical Care [15]     Estimated length of stay:   More than 2 Midnights     Certification:   I certify that inpatient services are medically necessary for this patient for a duration of greater than two midnights. See H&P and MD Progress Notes for additional information about the patient's course of treatment.     Care was started in ED 12-06-24 @ 2146 & 12-07 24       Initial Presentation: 69 y.o. female presented to [facility] Emergency Department , transferred to [facility] as ICU inpatient admission for   Cerebrovascular accident (CVA) due to embolism of right middle cerebral artery  NIH stroke scale 7.  CTA with right carotid critical stenosis and right M1 occlusion  On exam  Mild left facial droop. Left upper extremity and left lower extremity drift. Mild extinction .  Anesthesiology paged for Emergent intubation and a-line placement   Emergent consult IR(   Cerebral angiogram with thrombectomy and carotid artery stent placement performed by Dr Holm. Procedure tolerated well, anesthesia present throughout the case. Right groin access closed with an angioseal. IR Procedure Bedrest Start Time is 2355. Patient being transported to ICU.   Findings:   Right MCA M1 occlusion, TICI 0.  Right carotid bifurcation critical stenosis.  Successful mechanical thrombectomy.  Successful right carotid angioplasty and stenting.   Consulted neurosurgery  Assessment & Plan  Cerebrovascular accident (CVA) due to embolism of right middle cerebral artery (HCC)  Bren is a right M1 occlusion with a large penumbra determined on CT perfusion.  I recommended cerebral  angiography mechanical thrombectomy and likely carotid angioplasty and stenting.  Risks and benefits discussed with her son who has elected proceed.  History of Present Illness  HPI: Bren Traylor is a 69 y.o. year old female who presents with acute right MCA syndrome.  Last known normal was yesterday evening.  NIH stroke scale 7.  CTA with right carotid critical stenosis and right M1 occlusion.    On exam Intubated and sedated - exam limited; responds to verbal stimuli, follows commands, has not opened eyes. Moving right upper and right lower extremities.     Plan intubated, vent,  A-line, NPO, NG tube, neuro / VS q 1 hr  continuous cardio-pulmonary and pulse ox, turn q 2 hrs HOB elevated strict bedside, PT/OT SCD and supportive care     Date: 12-08-24  Day 3: Has surpassed a 2nd midnight with active treatments and services.: extubated place on 3 L NC a-line remains intact for invasive monitoring Strict NPO prior to dysphagia screen Increase home statin, atorvastatin 20, to 40 mg daily ASA started yesterday evening. NG tube low continuous suction, PICC line placed, PT/OTmidline shift, sodium goal 145-155, utilize hypertonic saline remains on phenylephrine gtt  (+) slurred speech, Left sided hemiparesis  Follows commands right upper and lower extremity antigravity, but no withdrawal to noxious stimuli in left upper and lower extremity continue frequent neuro checks, PT/OT/speech eval, monitor labs and vs and supportive care       Scheduled Medications:  aspirin, 81 mg, Per NG Tube, Daily  atorvastatin, 40 mg, Per NG Tube, QPM  chlorhexidine, 15 mL, Mouth/Throat, Q12H DANIS  levETIRAcetam, 500 mg, Intravenous, Q12H DANIS  senna-docusate sodium, 2 tablet, Per NG Tube, BID  sertraline, 50 mg, Oral, Daily      Continuous IV Infusions:  phenylephine,  mcg/min, Intravenous, Titrated  sodium chloride, 30 mL/hr, Intravenous, Continuous      PRN Meds:  acetaminophen, 650 mg, Per NG Tube, Q6H PRN  busPIRone, 7.5 mg,  Oral, BID PRN  clonazePAM, 0.5 mg, Oral, Daily PRN  fentaNYL, 25 mcg, Intravenous, Q2H PRN  hydrALAZINE, 5 mg, Intravenous, Q4H PRN  labetalol, 10 mg, Intravenous, Q4H PRN      ED Triage Vitals   Temperature Pulse Respirations Blood Pressure SpO2 Pain Score   12/07/24 0033 12/06/24 2220 12/07/24 0033 12/06/24 2220 12/06/24 2220 --   98 °F (36.7 °C) (!) 114 14 (!) 172/74 94 %      Weight (last 2 days)       Date/Time Weight    12/08/24 0735 55.8 (123)    12/08/24 0200 56.2 (123.9)            Vital Signs (last 3 days)       Date/Time Temp Pulse Resp BP MAP (mmHg) Arterial Line BP MAP SpO2 Calculated FIO2 (%) - Nasal Cannula Nasal Cannula O2 Flow Rate (L/min) O2 Device Kandy Coma Scale Score    12/08/24 1000 -- 86 20 113/56 76 -- -- 97 % -- -- -- 13 12/08/24 0940 -- -- -- -- -- -- -- -- 32 3 L/min Nasal cannula --    12/08/24 0900 -- 56 24 131/57 79 146/40 72 mmHg 99 % -- -- -- 10    12/08/24 0815 98.7 °F (37.1 °C) 76 24 -- -- 116/40 64 mmHg 98 % -- -- Ventilator --    12/08/24 0800 -- -- -- -- -- -- -- -- -- -- -- 10    12/08/24 0745 -- 52 13 -- -- 148/42 72 mmHg 100 % -- -- -- --    12/08/24 0735 -- 52 -- 119/53 -- -- -- -- -- -- -- --    12/08/24 0700 -- 52 13 119/53 80 142/40 68 mmHg 100 % -- -- -- 10    12/08/24 0600 99 °F (37.2 °C) 76 15 119/54 80 148/46 78 mmHg 100 % -- -- -- 10    12/08/24 0500 -- 56 13 135/61 90 140/44 78 mmHg 100 % -- -- -- 8 12/08/24 0400 -- 56 13 129/54 79 134/42 74 mmHg 100 % -- -- -- 8 12/08/24 0300 -- 56 14 126/55 82 132/38 70 mmHg 100 % -- -- -- 8 12/08/24 0200 -- 68 13 -- -- 150/48 84 mmHg 100 % -- -- -- 8 12/08/24 0148 100.9 °F (38.3 °C) 68 13 -- -- 136/44 74 mmHg 100 % -- -- -- --    12/08/24 0104 -- -- -- 135/53 74 -- -- -- -- -- -- --    12/08/24 0100 -- -- -- -- -- -- -- -- -- -- -- 8 12/08/24 0000 -- 102 20 122/65 84 122/40 66 mmHg 100 % -- -- -- 8 12/07/24 2333 100.4 °F (38 °C) 76 18 129/59 86 154/40 72 mmHg 100 % -- -- -- --    12/07/24 2300 -- 74 10  122/55 83 124/36 64 mmHg 100 % -- -- -- 8 12/07/24 2200 100.8 °F (38.2 °C) 92 20 121/58 74 112/34 60 mmHg 99 % -- -- -- 8 12/07/24 2100 -- 70 15 118/56 84 128/32 60 mmHg -- -- -- -- 8 12/07/24 2000 -- 72 13 120/55 81 124/34 62 mmHg -- -- -- -- 8 12/07/24 1900 -- 72 13 124/57 88 138/36 66 mmHg 100 % -- -- -- 8 12/07/24 1800 -- 76 13 122/56 85 128/40 70 mmHg 100 % -- -- -- 8 12/07/24 1700 -- 70 18 132/57 90 144/42 76 mmHg 100 % -- -- -- 8 12/07/24 1600 98.7 °F (37.1 °C) 68 13 129/57 88 128/44 78 mmHg 100 % -- -- Ventilator 8 12/07/24 1500 -- 68 15 131/61 93 102/90 98 mmHg 100 % -- -- -- 8 12/07/24 1400 -- 68 14 133/61 93 140/40 72 mmHg 100 % -- -- -- 8 12/07/24 1300 -- 72 13 122/59 87 78/38 56 mmHg 100 % -- -- -- 8 12/07/24 1200 -- 106 20 -- -- 150/48 82 mmHg 100 % -- -- -- 8 12/07/24 1100 -- 84 14 -- -- 138/44 76 mmHg 100 % -- -- -- 8 12/07/24 1055 -- 78 18 -- -- 130/40 70 mmHg 100 % -- -- -- --    12/07/24 1050 -- 74 15 -- -- 128/40 68 mmHg 100 % -- -- -- --    12/07/24 1045 -- 74 15 125/56 83 132/40 70 mmHg 100 % -- -- -- --    12/07/24 1040 -- 62 14 -- -- 142/44 74 mmHg 100 % -- -- -- --    12/07/24 1035 -- 62 14 67/39 47 76/28 40 mmHg 100 % -- -- -- --    12/07/24 1030 -- 74 14 -- -- 122/36 56 mmHg 100 % -- -- -- --    12/07/24 1025 -- 76 13 -- -- 108/32 54 mmHg 100 % -- -- -- --    12/07/24 1020 -- 86 14 -- -- 106/26 50 mmHg 100 % -- -- -- --    12/07/24 1015 -- 108 14 -- -- 132/38 66 mmHg 100 % -- -- -- --    12/07/24 1000 -- 98 18 -- -- 154/42 76 mmHg 100 % -- -- -- 8 12/07/24 0900 -- -- -- -- -- -- -- -- -- -- -- 8 12/07/24 0815 -- 94 18 -- -- 134/38 70 mmHg 100 % -- -- -- 8 12/07/24 0800 99 °F (37.2 °C) 118 20 152/77 114 148/50 84 mmHg 100 % -- -- Ventilator --    12/07/24 0745 -- 108 20 -- -- 144/46 80 mmHg 100 % -- -- -- 8 12/07/24 0715 -- 80 18 -- -- 128/36 64 mmHg 100 % -- -- -- 8 12/07/24 0656 -- 86 19 -- -- 144/44 76 mmHg -- -- -- -- --    12/07/24  0655 -- 96 23 -- -- 146/46 80 mmHg -- -- -- -- --    12/07/24 0645 -- 68 14 -- -- 122/34 60 mmHg -- -- -- -- 7 12/07/24 0615 -- 70 14 -- -- 116/34 56 mmHg -- -- -- -- 7 12/07/24 0600 -- 68 21 115/57 79 126/36 62 mmHg 100 % -- -- -- 7 12/07/24 0530 -- 72 13 105/59 75 118/36 60 mmHg -- -- -- -- 7 12/07/24 0515 -- 86 27 -- -- 150/48 82 mmHg -- -- -- -- --    12/07/24 0500 -- 70 13 99/54 74 108/34 54 mmHg 100 % -- -- -- 7 12/07/24 0430 -- 74 13 100/56 72 96/32 52 mmHg -- -- -- -- 7 12/07/24 0400 98.9 °F (37.2 °C) 72 17 118/65 85 140/46 74 mmHg 100 % -- -- -- 7 12/07/24 0345 -- 74 14 116/77 89 94/46 64 mmHg -- -- -- -- 7 12/07/24 0330 -- 82 48 150/110 133 108/50 72 mmHg -- -- -- -- 7 12/07/24 0315 -- 82 20 130/67 96 112/46 72 mmHg -- -- -- -- 7 12/07/24 0300 -- 86 20 143/74 102 146/56 90 mmHg 100 % -- -- -- 7 12/07/24 0245 -- 68 13 105/57 69 116/40 62 mmHg -- -- -- -- 7 12/07/24 0230 -- 84 20 142/68 100 136/56 88 mmHg -- -- -- -- 7 12/07/24 0215 -- 68 13 97/57 67 120/40 60 mmHg -- -- -- -- 7 12/07/24 0200 -- 74 25 108/70 88 98/50 66 mmHg 100 % -- -- -- 7 12/07/24 0145 -- 66 13 91/66 74 106/40 58 mmHg -- -- -- -- 7    12/07/24 0130 -- 72 13 111/59 75 86/56 72 mmHg -- -- -- -- 7    12/07/24 0115 -- 72 15 141/78 100 112/62 82 mmHg -- -- -- -- 7 12/07/24 0100 -- 64 13 105/76 85 134/42 68 mmHg 97 % -- -- -- 7 12/07/24 0045 -- 112 30 169/87 113 214/78 128 mmHg 100 % -- -- -- 7    12/07/24 0033 98 °F (36.7 °C) 65 14 156/64 66 166/54 92 mmHg 98 % -- -- -- --    12/07/24 0027 -- -- -- -- -- -- -- 100 % -- -- -- --    12/06/24 22:20:31 -- 114 -- 172/74 -- -- -- 94 % -- -- -- --              Pertinent Labs/Diagnostic Test Results:   Radiology:  CT head wo contrast   Final Interpretation by Pedro Carlson DO (12/08 0829)      Stable CT of the brain with recent right MCA infarct status post mechanical thrombectomy with hematoma and contrast staining located in the right basal  "ganglia. Mass effect on the right lateral ventricle results in 5 mm right to left midline shift    unchanged from prior. Small volume intraventricular hemorrhage layering in the left occipital horn.                  Workstation performed: [id number]         MRI brain wo contrast   Final Interpretation by Matthew Cortes DO (12/08 0649)      Large acute parenchymal hemorrhage within the right basal ganglia similar in size to most recent CT scan. Mild surrounding edema and localized mass effect with approximately 5 mm of right to left shift. Adjacent acute ischemia is seen within the caudate    head and body.      There is hyperintense T2 signal within the distal cervical and intracranial internal carotid artery on the right suggesting slow flow or occlusion. The M1 segment and middle cerebral artery branches on the right demonstrate appropriate flow voids.    Consider repeat CT angiography.      This examination was marked \"immediate notification\" in Epic in order to begin the standard process by which the radiology reading room liaison alerts the referring practitioner.      Workstation performed: [id number]         XR chest portable ICU   Final Interpretation by Joseline Schneider MD (12/07 5568)      Right PICC over right superior mediastinum above the head of the right clavicle. It may be in the inferior aspect of the right internal jugular vein.      Improved left base atelectasis.            Workstation performed: [id number]         CT head wo contrast   Final Interpretation by Robert Douglas MD (12/07 0060)      Similar acute infarct in right MCA territory with large acute mixed-density intraparenchymal hematoma centered in right basal ganglia with small-volume intraventricular extension of blood products. Persistent compressive mass effect on right lateral    ventricle with similar approximately 0.5 cm leftward midline shift when remeasured by this user. Continued surveillance recommended. "      The study was marked in EPIC for immediate notification.                     Workstation performed: [id number]         CTA head and neck w wo contrast   Final Interpretation by Robert Douglas MD (12/07 0653)   Addendum (preliminary) 1 of 1 by Robert Douglas MD (12/07 1043)   ADDENDUM:         I personally discussed this study with ALLISON JARVIS on 12/7/2024 10:42    AM.            Final      CT Brain:   - Evolution of acute infarct in right MCA territory with large amount of hyperdense material in right basal ganglia likely combination of contrast staining and acute intraparenchymal hematoma measuring approximately 6.0 x 3.0 x 3.2 cm (estimated volume    29 mL), and small volume intraventricular extension of blood products. Worsened compressive mass effect on right lateral ventricle with approximately 0.3 cm leftward midline shift. Recommend reevaluation by neurosurgery and short-term follow-up CT head    without contrast.      CT Angiography:   - Interval resolution of right MCA M1 segment occlusion status post right MCA mechanical thrombectomy. No new large vessel occlusion in the head or neck.   -  Interval placement of right carotid stent with moderate-to-severe stenosis in the right ICA proximal cervical segment due to extrinsic compression from calcified atherosclerotic disease.   - Atherosclerotic disease of the head and neck, as detailed above.      Multiple scattered micronodular opacities in visualized upper lung zones, which may be infectious/inflammatory or aspiration. Consider follow-up CT chest without contrast.      Incidental thyroid nodule(s) for which nonemergent thyroid ultrasound is recommended.      Additional chronic/incidental findings as detailed above.                  Workstation performed: [id number]         X-ray chest 1 view   Final Interpretation by Joseline Schneider MD (12/07 0434)      ET tube 5 cm above the ion.      Mild left base opacity and elevation of  the left diaphragm which could be due to atelectasis and/or aspiration.            Workstation performed: [id number]         IR stroke alert    (Results Pending)   XR chest portable ICU    (Results Pending)     Cardiology:  Echo complete w/ contrast if indicated   Final Result by Colt Randall MD (12/08 1049)        Left Ventricle: Left ventricular cavity size is normal. Wall thickness    is normal. The left ventricular ejection fraction is 45-50 %. Systolic    function is mildly reduced. Akinesis of the inferolateral portion of the    left ventricle was noted. Diastolic function is normal.     Aortic Valve: There is mild regurgitation.     Mitral Valve: There is moderate to severe regurgitation.     Tricuspid Valve: There is mild to moderate regurgitation. The right    ventricular systolic pressure is mildly elevated.         ECG 12 lead   Final Result by Colt Randall MD (12/08 0502)   Normal sinus rhythm   Possible Inferior infarct , age undetermined   Abnormal ECG      Confirmed by Colt Randall ([id number]) on 12/8/2024 5:01:55 AM        GI:  No orders to display           Results from last 7 days   Lab Units 12/08/24  0511 12/07/24  0442 12/06/24  2045   WBC Thousand/uL 16.02* 10.01 11.93*   HEMOGLOBIN g/dL 9.7* 10.2* 11.6   HEMATOCRIT % 29.6* 29.9* 35.0   PLATELETS Thousands/uL 189 198 215   TOTAL NEUT ABS Thousands/µL 13.22*  --   --          Results from last 7 days   Lab Units 12/08/24  0511 12/07/24  2258 12/07/24  1746 12/07/24  1159 12/07/24  0442   SODIUM mmol/L 150* 148* 146 143 141   POTASSIUM mmol/L 3.9 3.5 3.5 3.7 4.0   CHLORIDE mmol/L 121* 118* 115* 113* 111*   CO2 mmol/L 24 24 23 23 23   ANION GAP mmol/L 5 6 8 7 7   BUN mg/dL 7 6 5 6 7   CREATININE mg/dL 0.40* 0.43* 0.45* 0.40* 0.40*   EGFR ml/min/1.73sq m 108 105 104 108 108   CALCIUM mg/dL 7.8* 7.6* 8.0* 7.7* 8.0*   CALCIUM, IONIZED mmol/L  --   --   --   --  1.07*   MAGNESIUM mg/dL 2.3  --   --   --  1.7*   PHOSPHORUS mg/dL 2.3  " --   --   --  2.8         Results from last 7 days   Lab Units 12/06/24  2037   POC GLUCOSE mg/dl 123     Results from last 7 days   Lab Units 12/08/24  0511 12/07/24  2258 12/07/24  1746 12/07/24  1159 12/07/24  0442 12/06/24  2045   GLUCOSE RANDOM mg/dL 136 121 119 141* 121 108     Results from last 7 days   Lab Units 12/08/24  0511 12/07/24 2258 12/07/24  1746 12/07/24  1159   OSMOLALITY, SERUM mmol/* 309* 302* 302*     Results from last 7 days   Lab Units 12/07/24  0442   HEMOGLOBIN A1C % 5.3   EAG mg/dl 105     No results found for: \"BETA-HYDROXYBUTYRATE\"                   Results from last 7 days   Lab Units 12/07/24  1200 12/07/24  0442 12/07/24  0220 12/06/24  2045   HS TNI 0HR ng/L  --   --  213* 140*   HS TNI 2HR ng/L  --  250*  --   --    HSTNI D2 ng/L  --  37*  --   --    HS TNI 4HR ng/L 169*  --   --   --    HSTNI D4 ng/L -44  --   --   --          Results from last 7 days   Lab Units 12/06/24  2045   PROTIME seconds 14.9   INR  1.10   PTT seconds 36*                                                         Results from last 7 days   Lab Units 12/08/24  0511 12/07/24 2258 12/07/24  1746 12/07/24  1159   OSMOLALITY, SERUM mmol/* 309* 302* 302*                                                       Past Medical History:   Diagnosis Date    High cholesterol     Hypertension      Present on Admission:  **None**      Admitting Diagnosis: Cerebrovascular accident (CVA), unspecified mechanism (HCC) [I63.9]  Age/Sex: 69 y.o. female    Network Utilization Review Department  ATTENTION: Please call with any questions or concerns to [phone] and carefully listen to the prompts so that you are directed to the right person. All voicemails are confidential.   For Discharge needs, contact Care Management DC Support Team at [phone] opt. 2  Send all requests for admission clinical reviews, approved or denied determinations and any other requests to dedicated fax number below belonging to the campus " where the patient is receiving treatment. List of dedicated fax numbers for the Facilities:  FACILITY NAME UR FAX NUMBER   ADMISSION DENIALS (Administrative/Medical Necessity) [phone]   DISCHARGE SUPPORT TEAM (NETWORK) [phone]   PARENT CHILD HEALTH (Maternity/NICU/Pediatrics) [phone]   [facility] [phone]   [facility] [phone]   [facility] [phone]   [facility] [phone]   [facility] [phone]   [facility] [phone]   [facility] [phone]   [facility] [phone]   [facility] [phone]   [facility] [phone]   [facility] [phone]   [facility] [phone]

## 2024-12-08 NOTE — ASSESSMENT & PLAN NOTE
Initial NIHSS . Initial BP Blood Pressure: (!) 172/74, NC CTH performed and CTA H/N completed, both as noted below. As a result of > 4.5 hours from time of symptom onset patient was not determined to be a candidate for thrombolysis (TNK).  Patient underwent thrombectomy related to below CTA & CTP imaging findings, also received stent to right carotid.  - Home Antiplatelet /Anticoagulants PTA: no antiplatelet or anticoagulants  - Stroke risk factors: Unknown, apparent old infarct  - Prior Stroke Hx: yes, exact type of CVA unknown    Workup:  - Initial CTH: There is infarction identified within the right lentiform nucleus and decreased attenuation. Suspected mild cytotoxic edema within the caudate head and body resulting in partial effacement of the frontal horn of the lateral ventricle. No hemorrhagic transformation. Probable old cortical infarct within the right lateral frontal lobe.   - Initial CTA: Acute thrombosis of the mid to distal aspect of the right M1 segment. Adequate enhancement of the more distal M2 and M3 branches. Severe near occlusive stenosis of the proximal right internal carotid artery within the neck and a severe tandem stenosis intracranially. High-grade stenosis of the distal left common carotid artery with moderate stenosis of the internal carotid artery bulb. Moderate to high-grade stenosis of the right subclavian origin from the brachiocephalic.  - CTP: mismatch volume 32 ml, ratio infinite; Large area of milder hypoperfusion in the right MCA territory (Tmax greater than 4 seconds) acute ischemic changes again seen in the right basal ganglia but more conspicuous on earlier noncontrast study. Focal right frontal encephalomalacia.  - MRI: Large acute parenchymal hemorrhage within the right basal ganglia similar in size to most recent CT scan. Mild surrounding edema and localized mass effect with approximately 5 mm of right to left shift. Adjacent acute ischemia is seen within the caudate   head  and body. There is hyperintense T2 signal within the distal cervical and intracranial internal carotid artery on the right suggesting slow flow or occlusion. The M1 segment and middle cerebral artery branches on the right demonstrate appropriate flow voids.   - TTE: No left atrial dilation, EF 45%  - CTH right MCA infarct with hematoma in the right basal ganglia with mass effect 5 mm right to left midline shift on right lateral ventricle  - Labs: Hemoglobin A1c 5.3 (12/7/2024), LDL 76 (12/7/2024)    Impression: 69 y.o. female currently on no known AP/AC who presented with LFD & L sided weakness, found to have RM1 occlusion & RBG acute ischemia now s/p successful thrombectomy TICI 3 & R carotid stent but since found to have significant IPH on subsequent imaging.    Plan: Discussed plan with neurology attending, Dr. Wagoner  Admit along the stroke pathway with telemetry monitoring  Strict NPO prior to dysphagia screen   Frequent neuro checks per protocol.  If any acute changes in exam, obtain stat CT head and notify neurology team  BP Goals: BP normotension goal in setting of new IPH  AP/AC currently on aspirin  Now DDAVP given & AP/AC held for IPH  Statin: Increase home statin, atorvastatin 20, to 40 mg daily   Euthermic/Euglycemic  Given midline shift, sodium goal 145-155, utilize hypertonic saline as needed  DVT PPx: per primary team  PT/OT/ST/PMR evaluations when able  Stroke education and counseling  Rest of other care per primary team appreciated    Disposition: PT Recommendations -

## 2024-12-08 NOTE — SPEECH THERAPY NOTE
Speech-Language Pathology Bedside Swallow Evaluation      Patient Name: Bren Traylor    Today's Date: 12/8/2024     Problem List  Active Problems:    Cerebrovascular accident (CVA) due to embolism of right middle cerebral artery (HCC)      Past Medical History  Past Medical History:   Diagnosis Date    High cholesterol     Hypertension        Past Surgical History  No past surgical history on file.    Summary  Pt presented with s/s suggestive of at least moderate oropharyngeal dysphagia. Symptoms or concerns included decreased bolus acceptance, decreased bolus propulsion, suspected decreased control of all materials, and anterior loss/spillage of liquids, as well as suspected pharyngeal swallow delay and suspected decreased hyolaryngeal elevation upon palpation. Pt is significantly restless with AMS. She had some but limited communication with use of Polish . Coughing occurred (immediate-delayed) with all PO trials offered today including ice chips, thin, and honey thick liquids. Discussed with RN and physician, will need NGT, ST f/u for reassessment and likely MBS when appropriate.     Risk/s for Aspiration: mod-high    Recommended Diet: NPO with tube feeds   Recommended Form of Meds: non-oral if possible   Aspiration precautions and swallowing strategies: upright posture  Other Recommendations: Continue frequent oral care      Current Medical Status  Bren Traylor is a 69 y.o. with a PMHx hypertension, hyperlipidemia, and anxiety who presents to [facility] as an endovascular alert for Right MCA M1 occlusion. Patient originally presented to [facility] as a stroke alert for left sided weakness and altered mental status. Son reports he found her altered and slumped over with significant left sided facial droop and weakness. Patient underwent mechanical thrombectomy, TICI 3 and right carotid angioplasty and stent placement. Patient admitted to ICU for further management.     Special Studies:  CT head  12/8/24 IMPRESSION:  Stable CT of the brain with recent right MCA infarct status post mechanical thrombectomy with hematoma and contrast staining located in the right basal ganglia. Mass effect on the right lateral ventricle results in 5 mm right to left midline shift   unchanged from prior. Small volume intraventricular hemorrhage layering in the left occipital horn.      Swallow Information   Current Risks for Dysphagia & Aspiration: CVA and AMS  Current Symptoms/Concerns: coughing with po  Current Diet: NPO   Baseline Diet:  suspect regular/thin      Baseline Assessment   Behavior/Cognition: waxing and waning arousal level and decreased attention, restless  Speech/Language Status: able to participate in basic conversation and able to follow commands inconsistently  Patient Positioning: upright in bed/partially reclined, difficult to maintain position d/t restlessness  Pain Status/Interventions/Response to Interventions: No report of or nonverbal indications of pain.       Swallow Mechanism Exam  Facial:  L side weakness  Labial: unable to test 2/2 limited command following  Lingual: unable to test 2/2 limited command following  Velum: unable to visualize  Mandible:  decreased ROM  Dentition: adequate  Vocal quality:weak   Volitional Cough: strong/productive   Respiratory Status: on RA        Consistencies Assessed and Performance   Consistencies Administered: ice chips, thin liquids, and honey thick    Oral Stage: moderate, decreased bolus acceptance, suspected decreased control of all materials, and anterior loss/spillage of liquids    Pharyngeal Stage: moderate, suspected pharyngeal swallow delay, and suspected decreased hyolaryngeal elevation upon palpation    Esophageal Concerns: none reported      Summary and Recommendations (see above)    Results Reviewed with: patient, RN, and MD     Treatment Recommended: yes     Frequency of treatment: 3-5x/week      Dysphagia LTG  -Patient will demonstrate safe and  effective oral intake (without overt s/s significant oral/pharyngeal dysphagia including s/s penetration or aspiration) for the highest appropriate diet level.     Short Term Goals:  -Pt will tolerate the least restrictive diet with the least restrictive liquid consistency without s/s of aspiration x72hrs.    -Patient will tolerate trials of upgraded food and/or liquid texture with no significant s/s of oral or pharyngeal dysphagia including aspiration across 1-3 diagnostic sessions.    -Patient will comply with a Video/Modified Barium Swallow study for more complete assessment of swallowing anatomy/physiology/aspiration risk and to assess efficacy of treatment techniques so as to best guide treatment plan.

## 2024-12-08 NOTE — ASSESSMENT & PLAN NOTE
12/6/24: Underwent right M1 mechanical thrombectomy.  12/7/24: The patient had an exam change and CTA demonstrated recanalization of the MCA however with hemorrhage in the right basal ganglia resulting in mild midline shift.   12/8/24: CT head stable from prior; basal ganglia hemorrhage with effacement of the right lateral ventricle. The patient is extubated. She is following commands and appears to be hemiplegic on the left. She is also minimally verbal.    Plan:  1) Stop anticoagulation  2) Give hypertonic Saline with goal Na >150  3) SBP <120  4) Repeat CT in AM  5) Serial neuro checks

## 2024-12-08 NOTE — PROGRESS NOTES
Progress Note - Critical Care/ICU   Name: Bren Traylor 69 y.o. female I MRN: [id number]  Unit/Bed#: ICU 11 I Date of Admission: 12/6/2024   Date of Service: 12/8/2024 I Hospital Day: 1     Assessment & Plan   Neuro:   Diagnosis: Ischemic stroke w/ Right MCA M1 occlusion s/p mechanical thrombectomy, right carotid stenosis s/p right carotid angioplasty and stenting POD #1, hemorrhagic conversion   Analgesia: Fentanyl 25mcg Q2 hr PRN  Sedation:  Precedex at 0.04  RASS goal: 0 Alert and Calm  Regulate sleep/wake cycle   Delirium precautions  CAM-ICU daily  Trend neuro exam   STAT CTH for drop in GCS >2 points  Continue ASA 81 mg daily  HTS currently at 50cc/hr  Na 150 this AM  Decrease to 30cc/hr  Na goal 145-155  SBP goal 110-140    CV:   Diagnosis: SBP goal 110-140 in the setting of IVH  Cardiac infusions: Neosynephrine, 50 mcg/min    SBP goal 110-140, MAP goal >65  Monitor on telemetry   Diagnosis: History of HTN, HLD  Hold home Losartan  Continue statin therapy    Pulm:  Diagnosis: Acute hypoxic respiratory failure, intubated for airway protection  SPONT 8/6, 40%  FIO2 for goal SpO2 >92%  Trend ABG as needed   Goal for extubation   VAP bundle ordered   Pulmonary toileting, incentive spirometry     GI:   Diagnosis: No acute issues  Bowel regimen: Senokot    :   Diagnosis: no active issues  Silvestre in place for strict I/O, to d/c once improved ambulation   Trend BMP     F/E/N:   F: HTS 3% at 50cc/hr, decrease to 30cc/hr  E: Replete electrolytes for goal K >4, Phos >3, Mg >2  N: NPO, speech eval once extubated, diet vs NG feeds pending speech    Heme/Onc:   Diagnosis: no acute issues  Trend Hgb and Plts, transfuse for Hgb < 7  VTE ppx: SCDs only    Endo:   Diagnosis: no acute issues  BG goal 140-180   A1c 5.3%    ID:   Diagnosis: Leukocytosis, suspect inflammatory in the setting of ischemic stroke  Trend fever curve and WBC count     MSK/Skin:   PT/OT when able  Q2h turning and repositioning      Lines/Tubes/Drains:   ETT day 2    Disposition: Critical care    ICU Core Measures     Vented Patient  VAP Bundle  VAP bundle ordered     A: Assess, Prevent, and Manage Pain Has pain been assessed? Yes  Need for changes to pain regimen? No   B: Both Spontaneous Awakening Trials (SATs) and Spontaneous Breathing Trials (SBTs) Plan to perform spontaneous awakening trial today? Yes   Plan to perform spontaneous breathing trial today? Yes   Obvious barriers to extubation? Yes   C: Choice of Sedation RASS Goal: 0 Alert and Calm  Need for changes to sedation or analgesia regimen? No   D: Delirium CAM-ICU: unable to assess, patient intubated   E: Early Mobility  Plan for early mobility? Yes   F: Family Engagement Plan for family engagement today? Yes       Review of Invasive Devices:  Silvestre Plan: Continue for accurate I/O monitoring for 48 hours and Voiding trial after improvement in ambulation   Central access plan: Patient requires PICC secondary to HTS  [place] Plan: Keep arterial line for hemodynamic monitoring    Prophylaxis:  VTE Contraindicated secondary to: ICH   Stress Ulcer  not ordered     24 Hour Events : Yesterday, CTH with hemorrhagic conversion. Integrilin gtt discontinued and reversed with DDAVP. Patient remains on ASA 81mg daily.    This AM, patient following commands in RUE/RLE in Polish. She is able to lift head off the bed. Remained intubated on low dose Precedex.    Subjective   Review of Systems: Review of Systems not obtainable due to intubated    Objective :                   Vitals I/O      Most Recent Min/Max in 24hrs   Temp 99 °F (37.2 °C) Temp  Min: 98.7 °F (37.1 °C)  Max: 100.9 °F (38.3 °C)   Pulse (!) 52 Pulse  Min: 52  Max: 118   Resp 13 Resp  Min: 10  Max: 20   /53 BP  Min: 67/39  Max: 152/77   O2 Sat 100 % SpO2  Min: 99 %  Max: 100 %      Intake/Output Summary (Last 24 hours) at 12/8/2024 0715  Last data filed at 12/8/2024 0600  Gross per 24 hour   Intake 2490.36 ml   Output 1825 ml    Net 665.36 ml       Diet NPO    Invasive Monitoring   Arterial Line  [place] /38  Arterial Line BP  Min: 102/90  Max: 154/40   MAP (!) 64 mmHg  Arterial Line MAP (mmHg)  Min: 60 mmHg  Max: 98 mmHg           Physical Exam   Physical Exam  Vitals and nursing note reviewed.   Eyes:      Pupils: Pupils are equal, round, and reactive to light.      Comments: Crosses midline   Skin:     General: Skin is warm and dry.   HENT:      Head: Normocephalic and atraumatic.   Cardiovascular:      Rate and Rhythm: Normal rate and regular rhythm.      Pulses: Normal pulses.      Heart sounds: Normal heart sounds.   Musculoskeletal:      Right lower leg: No edema.      Left lower leg: No edema.   Abdominal:      Palpations: Abdomen is soft.   Constitutional:       General: She is not in acute distress.     Appearance: She is well-developed and well-nourished.      Interventions: She is intubated and restrained.   Pulmonary:      Effort: Pulmonary effort is normal. She is intubated.      Breath sounds: Normal breath sounds.   Neurological:      Mental Status: She is alert. She is calm.      Comments: Lifts head off bed and Follows commands in RUE/RLE  No response to noxious stimuli in LUE/LLE   Genitourinary/Anorectal:  Silvestre present.        Diagnostic Studies      Lab Results: I have reviewed the following results:     Medications:  Scheduled PRN   aspirin, 81 mg, Daily  atorvastatin, 40 mg, QPM  chlorhexidine, 15 mL, Q12H DANIS  senna-docusate sodium, 2 tablet, BID      acetaminophen, 650 mg, Q6H PRN  fentaNYL, 25 mcg, Q2H PRN  hydrALAZINE, 10 mg, Q4H PRN  labetalol, 10 mg, Q4H PRN       Continuous    dexmedetomidine, 0.1-0.7 mcg/kg/hr, Last Rate: 0.4 mcg/kg/hr (12/08/24 0342)  niCARdipine, 1-15 mg/hr, Last Rate: Stopped (12/07/24 1207)  phenylephine,  mcg/min, Last Rate: 25 mcg/min (12/08/24 0607)  propofol, 5-50 mcg/kg/min, Last Rate: Stopped (12/08/24 0200)  sodium chloride, 50 mL/hr, Last Rate: 50 mL/hr (12/07/24  2324)         Labs:   CBC    Recent Labs     12/06/24 2045 12/07/24  0442   WBC 11.93* 10.01   HGB 11.6 10.2*   HCT 35.0 29.9*    198     BMP    Recent Labs     12/07/24 2258 12/08/24  0511   SODIUM 148* 150*   K 3.5 3.9   * 121*   CO2 24 24   AGAP 6 5   BUN 6 7   CREATININE 0.43* 0.40*   CALCIUM 7.6* 7.8*       Coags    Recent Labs     12/06/24 2045   INR 1.10   PTT 36*        Additional Electrolytes  Recent Labs     12/07/24 0442 12/08/24  0511   MG 1.7* 2.3   PHOS 2.8 2.3   CAIONIZED 1.07*  --           Blood Gas    No recent results  No recent results LFTs  No recent results    Infectious  No recent results  Glucose  Recent Labs     12/07/24  1159 12/07/24  1746 12/07/24 2258 12/08/24  0511   GLUC 141* 119 121 136

## 2024-12-08 NOTE — PROGRESS NOTES
Patient was evaluated overnight and did follow commands with her right upper extremity showing 2 fingers as well as thumbs up when  was used and patient was asked to follow commands in Polish.

## 2024-12-08 NOTE — PROGRESS NOTES
Progress Note - Neurology   Name: Bren Traylor 69 y.o. female I MRN: [id number]  Unit/Bed#: ICU 11 I Date of Admission: 12/6/2024   Date of Service: 12/8/2024 I Hospital Day: 1    Assessment & Plan  Cerebrovascular accident (CVA) due to embolism of right middle cerebral artery (HCC)  Initial NIHSS . Initial BP Blood Pressure: (!) 172/74, NC CTH performed and CTA H/N completed, both as noted below. As a result of > 4.5 hours from time of symptom onset patient was not determined to be a candidate for thrombolysis (TNK).  Patient underwent thrombectomy related to below CTA & CTP imaging findings, also received stent to right carotid.  - Home Antiplatelet /Anticoagulants PTA: no antiplatelet or anticoagulants  - Stroke risk factors: Unknown, apparent old infarct  - Prior Stroke Hx: yes, exact type of CVA unknown    Workup:  - Initial CTH: There is infarction identified within the right lentiform nucleus and decreased attenuation. Suspected mild cytotoxic edema within the caudate head and body resulting in partial effacement of the frontal horn of the lateral ventricle. No hemorrhagic transformation. Probable old cortical infarct within the right lateral frontal lobe.   - Initial CTA: Acute thrombosis of the mid to distal aspect of the right M1 segment. Adequate enhancement of the more distal M2 and M3 branches. Severe near occlusive stenosis of the proximal right internal carotid artery within the neck and a severe tandem stenosis intracranially. High-grade stenosis of the distal left common carotid artery with moderate stenosis of the internal carotid artery bulb. Moderate to high-grade stenosis of the right subclavian origin from the brachiocephalic.  - CTP: mismatch volume 32 ml, ratio infinite; Large area of milder hypoperfusion in the right MCA territory (Tmax greater than 4 seconds) acute ischemic changes again seen in the right basal ganglia but more conspicuous on earlier noncontrast study. Focal right  frontal encephalomalacia.  - MRI: Large acute parenchymal hemorrhage within the right basal ganglia similar in size to most recent CT scan. Mild surrounding edema and localized mass effect with approximately 5 mm of right to left shift. Adjacent acute ischemia is seen within the caudate   head and body. There is hyperintense T2 signal within the distal cervical and intracranial internal carotid artery on the right suggesting slow flow or occlusion. The M1 segment and middle cerebral artery branches on the right demonstrate appropriate flow voids.   - TTE: No left atrial dilation, EF 45%  - CTH right MCA infarct with hematoma in the right basal ganglia with mass effect 5 mm right to left midline shift on right lateral ventricle  - Labs: Hemoglobin A1c 5.3 (12/7/2024), LDL 76 (12/7/2024)    Impression: 69 y.o. female currently on  no known AP/AC  who presented with LFD & L sided weakness, found to have RM1 occlusion & RBG acute ischemia now s/p successful thrombectomy TICI 3 & R carotid stent but since found to have significant IPH on subsequent imaging.    Plan: Discussed plan with neurology attending, Dr. Wagoner  Admit along the stroke pathway with telemetry monitoring  Strict NPO prior to dysphagia screen   Frequent neuro checks per protocol.  If any acute changes in exam, obtain stat CT head and notify neurology team  BP Goals: BP normotension goal in setting of new IPH  AP/AC currently on aspirin  Now DDAVP given & AP/AC held for IPH  Statin: Increase home statin, atorvastatin 20, to 40 mg daily   Euthermic/Euglycemic  Given midline shift, sodium goal 145-155, utilize hypertonic saline as needed  DVT PPx: per primary team  PT/OT/ST/PMR evaluations when able  Stroke education and counseling  Rest of other care per primary team appreciated    Disposition: PT Recommendations -        Recommendations for outpatient neurological follow up have yet to be determined.     SUBJECTIVE     Patient was seen and examined.  No acute events overnight.  This morning patient was extubated.  She was able to interact using Google translate Polish.    OBJECTIVE     Patient ID: Bren Traylor is a 69 y.o. female.    Vitals:    24 0400 24 0500 24 0600 24 0700   BP: 129/54 135/61 119/54 119/53   Pulse: 56 56 76 (!) 52   Resp: 13 13 15 13   Temp:   99 °F (37.2 °C)    TempSrc:   Oral    SpO2: 100% 100% 100% 100%   Weight:          Temperature:   Temp (24hrs), Av.8 °F (37.7 °C), Min:98.7 °F (37.1 °C), Max:100.9 °F (38.3 °C)    Temperature: 99 °F (37.2 °C)      Physical Exam  Eyes:      Extraocular Movements: EOM normal.      Pupils: Pupils are equal, round, and reactive to light.   Psychiatric:         Speech: Speech is slurred.          Neurologic Exam     Mental Status   Oriented to person.   Speech: slurred   Level of consciousness: alert  Following commands utilizing Google translate     Cranial Nerves     CN III, IV, VI   Pupils are equal, round, and reactive to light.  Extraocular motions are normal.   Patient able to cross midline  + cough reflex when intubated     Motor Exam Follows commands right upper and lower extremity antigravity, but no withdrawal to noxious stimuli in left upper and lower extremity     Sensory Exam   Light touch normal.     Gait, Coordination, and Reflexes     Tremor   Resting tremor: absent           LABORATORY DATA     Labs: I have personally reviewed pertinent reports.    Results from last 7 days   Lab Units 24  0442 24  2045   WBC Thousand/uL 10.01 11.93*   HEMOGLOBIN g/dL 10.2* 11.6   HEMATOCRIT % 29.9* 35.0   PLATELETS Thousands/uL 198 215      Results from last 7 days   Lab Units 24  0511 24  2258 24  1746   SODIUM mmol/L 150* 148* 146   POTASSIUM mmol/L 3.9 3.5 3.5   CHLORIDE mmol/L 121* 118* 115*   CO2 mmol/L 24 24 23   BUN mg/dL 7 6 5   CREATININE mg/dL 0.40* 0.43* 0.45*   CALCIUM mg/dL 7.8* 7.6* 8.0*     Results from last 7 days   Lab Units  "12/08/24  0511 12/07/24  0442   MAGNESIUM mg/dL 2.3 1.7*     Results from last 7 days   Lab Units 12/08/24  0511 12/07/24  0442   PHOSPHORUS mg/dL 2.3 2.8      Results from last 7 days   Lab Units 12/06/24  2045   INR  1.10   PTT seconds 36*               IMAGING & DIAGNOSTIC TESTING     Radiology Results: I have personally reviewed pertinent reports.      MRI brain wo contrast   Final Result by Matthew Cortes DO (12/08 0649)      Large acute parenchymal hemorrhage within the right basal ganglia similar in size to most recent CT scan. Mild surrounding edema and localized mass effect with approximately 5 mm of right to left shift. Adjacent acute ischemia is seen within the caudate    head and body.      There is hyperintense T2 signal within the distal cervical and intracranial internal carotid artery on the right suggesting slow flow or occlusion. The M1 segment and middle cerebral artery branches on the right demonstrate appropriate flow voids.    Consider repeat CT angiography.      This examination was marked \"immediate notification\" in Epic in order to begin the standard process by which the radiology reading room liaison alerts the referring practitioner.      Workstation performed: [id number]         XR chest portable ICU   Final Result by Joseline Schneider MD (12/07 1708)      Right PICC over right superior mediastinum above the head of the right clavicle. It may be in the inferior aspect of the right internal jugular vein.      Improved left base atelectasis.            Workstation performed: [id number]         CT head wo contrast   Final Result by Robert Douglas MD (12/07 6590)      Similar acute infarct in right MCA territory with large acute mixed-density intraparenchymal hematoma centered in right basal ganglia with small-volume intraventricular extension of blood products. Persistent compressive mass effect on right lateral    ventricle with similar approximately 0.5 cm leftward " midline shift when remeasured by this user. Continued surveillance recommended.      The study was marked in EPIC for immediate notification.                     Workstation performed: [id number]         CTA head and neck w wo contrast   Final Result by Robert Douglas MD (12/07 1043)   Addendum (preliminary) 1 of 1 by Robert Douglas MD (12/07 1043)   ADDENDUM:         I personally discussed this study with ALLISON SALCEDO MATHEUS on 12/7/2024 10:42    AM.            Final      CT Brain:   - Evolution of acute infarct in right MCA territory with large amount of hyperdense material in right basal ganglia likely combination of contrast staining and acute intraparenchymal hematoma measuring approximately 6.0 x 3.0 x 3.2 cm (estimated volume    29 mL), and small volume intraventricular extension of blood products. Worsened compressive mass effect on right lateral ventricle with approximately 0.3 cm leftward midline shift. Recommend reevaluation by neurosurgery and short-term follow-up CT head    without contrast.      CT Angiography:   - Interval resolution of right MCA M1 segment occlusion status post right MCA mechanical thrombectomy. No new large vessel occlusion in the head or neck.   -  Interval placement of right carotid stent with moderate-to-severe stenosis in the right ICA proximal cervical segment due to extrinsic compression from calcified atherosclerotic disease.   - Atherosclerotic disease of the head and neck, as detailed above.      Multiple scattered micronodular opacities in visualized upper lung zones, which may be infectious/inflammatory or aspiration. Consider follow-up CT chest without contrast.      Incidental thyroid nodule(s) for which nonemergent thyroid ultrasound is recommended.      Additional chronic/incidental findings as detailed above.                  Workstation performed: [id number]         X-ray chest 1 view   Final Result by Joseline Schneider MD (12/07 4714)      ET tube 5 cm  above the ion.      Mild left base opacity and elevation of the left diaphragm which could be due to atelectasis and/or aspiration.            Workstation performed: [id number]         IR stroke alert    (Results Pending)       Other Diagnostic Testing: I have personally reviewed pertinent reports.      ACTIVE MEDICATIONS       Current Facility-Administered Medications:     acetaminophen (TYLENOL) oral suspension 650 mg, Q6H PRN    aspirin chewable tablet 81 mg, Daily    atorvastatin (LIPITOR) tablet 40 mg, QPM    chlorhexidine (PERIDEX) 0.12 % oral rinse 15 mL, Q12H DANIS    dexmedeTOMIDine (Precedex) 400 mcg in sodium chloride 0.9% 100 mL, Titrated, Last Rate: 0.4 mcg/kg/hr (12/08/24 0342)    fentaNYL injection 25 mcg, Q2H PRN    hydrALAZINE (APRESOLINE) injection 10 mg, Q4H PRN    labetalol (NORMODYNE) injection 10 mg, Q4H PRN    niCARdipine (CARDENE) 25 mg (STANDARD CONCENTRATION) in sodium chloride 0.9% 250 mL, Titrated, Last Rate: Stopped (12/07/24 1207)    phenylephrine (SEBASTIAN-SYNEPHRINE) 50 mg (STANDARD CONCENTRATION) in sodium chloride 0.9% 250 mL, Titrated, Last Rate: 25 mcg/min (12/08/24 0607)    propofol (DIPRIVAN) 1000 mg in 100 mL infusion (premix), Titrated, Last Rate: Stopped (12/08/24 0200)    senna-docusate sodium (SENOKOT S) 8.6-50 mg per tablet 2 tablet, BID    sodium chloride (HYPERTONIC) 3 % infusion, Continuous, Last Rate: 50 mL/hr (12/07/24 2324)    Prior to Admission medications    Medication Sig Start Date End Date Taking? Authorizing Provider   atorvastatin (LIPITOR) 20 mg tablet TAKE 1 TABLET (20 MG TOTAL) BY MOUTH DAILY 11/22/24   Margarita Pedersen MD   busPIRone (BUSPAR) 7.5 mg tablet TAKE 1 TABLET (7.5 MG TOTAL) BY MOUTH 2 (TWO) TIMES A DAY AS NEEDED (PANIC ATTACL) 7/3/24   Margarita Pedersen MD   clonazePAM (KlonoPIN) 0.5 mg tablet Take 1 tablet (0.5 mg total) by mouth daily as needed for seizures 10/2/24   Margarita Pedersen MD   losartan (COZAAR) 50 mg tablet Take 1 tablet (50 mg total) by  mouth daily 2/28/24   Margarita Pedersen MD   sertraline (ZOLOFT) 100 mg tablet Take 0.5 tablets (50 mg total) by mouth daily 2/28/24   Margarita Pedersen MD         VTE Pharmacologic Prophylaxis: VTE covered by:    None      VTE Mechanical Prophylaxis: sequential compression device    ======    I have discussed the patient's history, physical exam findings, assessment, and plan in detail with attending, Dr. Wagoner    Thank you for allowing me to participate in the care of your patient, Bren Traylor.    Jaye Macedo MD  [facility] Neurology Residency, PGY-2

## 2024-12-08 NOTE — PROGRESS NOTES
This is an attestation to AP note    24 hour events:  Yesterday am follow up CTA  Acute IPH right MCA, Integrillin was stopped and reverse and HTS given.   MRI overnight stable IPH with 5 mm R to L shift  F/u with HCT this am consistent with MRI  ASA started yesterday evening 81 mg  Following commands in RUE in Polish overnight  Phenyleprhine at 25 mcg this am to maintain -140   Na and Osm at goal remains on HTS  Extubated this morning      Physical exam:  When I walked into room she was laying in bed with eyes slightly open does cross midline. Moving RUE and RLE spontaneously and on command. No response to LUE, no response in LLE.      Impression:  Acute CVA with right M1 occlusion and s/p right MONA for severe carotid stenosis presented with left sided weakness and AMS  Hemorrhagic conversion with IPH within right MCA  Intubated for AWP for procedure   Left sided hemiparesis  HTN  HLD  Leukocytosis   Anxiety/depression     Plan:  HCT this am - grossly stable with prior MRI  MRI completed  -140 wean phenylephrine as able  ASA 81 mg  Holding AC and DVT ppx for now  Maintain HTS drop to 30 cc/hr, Na at goal 145-155  A1c and lipid panel completed  TTE completed  Statin  SLP eval for diet id fails and cannot take PO will place NGT  DC sierra   PICC line travels through subclavian and projects to wards SVC junction - This line is safe to use however would recommend having PICC team reposition on Monday      I spent 32 minutes of critical care time excluding education, family discussion and procedures

## 2024-12-09 ENCOUNTER — TELEPHONE (OUTPATIENT)
Age: 69
End: 2024-12-09

## 2024-12-09 NOTE — CONSULTS
Consultation - Wound Care   Bren Traylor 69 y.o. female MRN: [id number]  Unit/Bed#: ICU 11 Encounter: [id number]    Assessment:  Ecchymosis   Ambulatory dysfunction    Plan:  L posterior thigh with small area of ecchymosis present. Noted dispersing edges. No open aspect, redness or drainage. No pressure injury to b/l heels or sacro-buttocks. Will recommend preventative skin care plan. Hydraguard cream to the b/l sacro-buttocks and posterior thighs, and foam dressings to the b/l heels.   No clinical s/s of infection present  Will recommend preventative nursing skin care measures  Pressure relief- offloading of pressure with turning/repositioning as patient medically tolerates, heel elevation, foam wedges for offloading/repositioning, and waffle cushion to chair.  Epic secure chat wound care team with questions or concerns.   Wound care will sign off at this time, please re-consult if needed.      History of Present Illness:  Patient is a 69-year-old female who was admitted to hospital with CVA.  Patient has a history of hypertension, hyperlipidemia, and anxiety.  Patient [facility] presented to [facility] and was transferred to [facility] for higher level of care.  Wound care consulted for potential DTI to left thigh area. Patient unable to provide wound history due to medical acuity, nursing reports areas of ecchymosis to the left hip/thigh area. No wound related complaints reported by nursing.       Subjective:    Review of Systems   Unable to perform ROS: Acuity of condition       Historical Information   Past Medical History:   Diagnosis Date    High cholesterol     Hypertension      No past surgical history on file.  Social History   Social History     Substance and Sexual Activity   Alcohol Use Not Currently     Social History     Substance and Sexual Activity   Drug Use Not Currently     E-Cigarette/Vaping    E-Cigarette Use Never User      E-Cigarette/Vaping Substances     "Nicotine No     THC No     CBD No     Flavoring No     Other No     Unknown No      Social History     Tobacco Use   Smoking Status Every Day    Current packs/day: 1.00    Average packs/day: 1 pack/day for 46.8 years (46.8 ttl pk-yrs)    Types: Cigarettes    Start date: 2/28/1978   Smokeless Tobacco Never     Family History: No family history on file.    Meds/Allergies   current meds:   Current Facility-Administered Medications:     acetaminophen (TYLENOL) oral suspension 650 mg, Q6H PRN    aspirin chewable tablet 81 mg, Daily    atorvastatin (LIPITOR) tablet 20 mg, QPM    bisacodyl (DULCOLAX) rectal suppository 10 mg, Daily PRN    chlorhexidine (PERIDEX) 0.12 % oral rinse 15 mL, Q12H DANIS    dexmedeTOMIDine (Precedex) 400 mcg in sodium chloride 0.9% 100 mL, Titrated, Last Rate: 0.3 mcg/kg/hr (12/09/24 1239)    levETIRAcetam (KEPPRA) tablet 500 mg, Q12H DANIS    phenylephrine (SEBASTIAN-SYNEPHRINE) 50 mg (STANDARD CONCENTRATION) in sodium chloride 0.9% 250 mL, Titrated, Last Rate: 90 mcg/min (12/09/24 1423)    polyethylene glycol (MIRALAX) packet 17 g, Daily    QUEtiapine (SEROquel) tablet 25 mg, BID    senna-docusate sodium (SENOKOT S) 8.6-50 mg per tablet 2 tablet, BID    sertraline (ZOLOFT) tablet 50 mg, Daily  No Known Allergies    Objective   Vitals: Blood pressure (!) 90/42, pulse 58, temperature 100.1 °F (37.8 °C), temperature source Rectal, resp. rate (!) 39, height 5' 4\" (1.626 m), weight 55.8 kg (123 lb), SpO2 97%.       Physical Exam  Constitutional:       General: She is awake. She is not in acute distress.     Interventions: Nasal cannula in place.      Comments: NG tube in place   HENT:      Head: Normocephalic.      Right Ear: External ear normal.      Left Ear: External ear normal.   Pulmonary:      Effort: Pulmonary effort is normal. No respiratory distress.   Genitourinary:     Comments: Incontinent of bowel and bladder  Musculoskeletal:      Comments: Patient seen OOB in recliner chair on offloading " "seating cushion. Low air loss critical care mattress in place with foam wedges. Dependent for care.    Skin:     General: Skin is warm and dry.      Findings: No wound.      Comments: 1. B/l heels, sacrum and buttocks are intact without redness or wounds. No maceration noted in the sacral intergluteal crease.  2. L posterior thigh with area of ecchymosis. Area is not located over a bony prominence. No open aspect, redness or drainage. Noted dispersing edges. Non-tender with palpation. No temperature or texture differences.       No induration, fluctuance, odor, warmth/temperature/texture differences, redness, or purulence noted to the above mentioned skin areas assessed. New dressings applied as noted above. Patient tolerated assessment well- no s/s of non-verbal pain or discomfort observed during the encounter.       Neurological:      Mental Status: She is alert.      Gait: Gait abnormal.           Lab, Imaging and other studies: Results Review Statement: No pertinent imaging studies reviewed.    Code Status: Level 1 - Full Code      Counseling / Coordination of Care  Total time spent today:    Total time (face-to-face and non-face-to-face) spent on today's visit was 15 minutes. This includes preparation for the visits (H&P on 12/6/24 and critical care note from 12/9/24 ) performance of a medically appropriate history and examination, and orders for medications/treatments or testing.  Discussed assessment findings, and plan of care/recommendations with patients RN.      Jennifer Castrejon, JORDI, PATRICE-C, STEFANIE      Portions of the record may have been created with voice recognition software.  Occasional wrong word or \"sound a like\" substitutions may have occurred due to the inherent limitations of voice recognition software.  Read the chart carefully and recognize, using context, where substitutions have occurred      "

## 2024-12-09 NOTE — PHYSICAL THERAPY NOTE
Physical Therapy Evaluation     Patient's Name: Bren Traylor    Admitting Diagnosis  Cerebrovascular accident (CVA), unspecified mechanism (HCC) [I63.9]    Problem List  Patient Active Problem List   Diagnosis    Cerebrovascular accident (CVA) due to embolism of right middle cerebral artery (HCC)       Past Medical History  Past Medical History:   Diagnosis Date    High cholesterol     Hypertension        Past Surgical History  No past surgical history on file.       12/09/24 0937   PT Last Visit   PT Visit Date 12/09/24   Note Type   Note type Evaluation   Pain Assessment   Pain Assessment Tool FLACC   Pain Rating: FLACC (Rest) - Face 0   Pain Rating: FLACC (Rest) - Legs 0   Pain Rating: FLACC (Rest) - Activity 0   Pain Rating: FLACC (Rest) - Cry 0   Pain Rating: FLACC (Rest) - Consolability 0   Score: FLACC (Rest) 0   Pain Rating: FLACC (Activity) - Face 0   Pain Rating: FLACC (Activity) - Legs 0   Pain Rating: FLACC (Activity) - Activity 0   Pain Rating: FLACC (Activity) - Cry 0   Pain Rating: FLACC (Activity) - Consolability 0   Score: FLACC (Activity) 0   Restrictions/Precautions   Weight Bearing Precautions Per Order No   Other Precautions Impulsive;Cognitive;Chair Alarm;Bed Alarm;Pain;Fall Risk;O2;Telemetry;Multiple lines   Home Living   Type of Home House   Home Layout One level  (0 BASIM)   Prior Function   Level of [place] Independent with functional mobility   Lives With Alone   Receives Help From Family   General   Family/Caregiver Present Yes   Cognition   Orientation Level Unable to assess   Subjective   Subjective Pt very fatigue and difficult to arouse during session   RLE Assessment   RLE Assessment   (grossly <3/5, limited assessment with fatigue)   LLE Assessment   LLE Assessment   (grossly <3/5, limited assessment with fatigue)   Coordination   Movements are Fluid and Coordinated 0   Bed Mobility   Supine to Sit 1  Dependent   Additional items Assist x 2;HOB elevated   Transfers   Sit  to Stand 2  Maximal assistance   Additional items Assist x 2;Increased time required   Stand to Sit 2  Maximal assistance   Additional items Assist x 2;Increased time required   Ambulation/Elevation   Gait pattern Excessively slow;Short stride;Shuffling;Decreased foot clearance;R Foot drag;L Foot drag   Gait Assistance 1  Dependent   Additional items Assist x 2   Assistive Device Other (Comment)  (HHA)   Distance 1'   Balance   Static Sitting Poor -   Dynamic Sitting Poor -   Static Standing Zero   Ambulatory Zero   Endurance Deficit   Endurance Deficit Yes   Endurance Deficit Description limited by fatigue, decreased arousal   Activity Tolerance   Activity Tolerance Patient limited by fatigue;Patient limited by pain   Medical Staff Made Aware OT   Nurse Made Aware yes, nsg gave clearance to work with pt   Assessment   Prognosis Fair   Problem List Decreased strength;Decreased range of motion;Decreased endurance;Impaired balance;Decreased mobility;Decreased coordination;Decreased cognition;Decreased safety awareness;Impaired judgement;Impaired vision;Impaired hearing;Impaired tone;Impaired sensation;Decreased skin integrity;Orthopedic restrictions;Pain   Assessment Pt is 69 y.o. female seen for PT evaluation s/p admit to [facility] on 12/6/2024 w/ CVA. PT consulted to assess pt's functional mobility and d/c needs. Order placed for PT eval and tx, w/ up w/ A order. Comorbidities affecting pt's physical performance at time of assessment include:  has a past medical history of High cholesterol and Hypertension. PTA, pt was requiring A for mobility and ambulates household distances. Personal factors affecting pt at time of IE include: inaccessible home environment, ambulating w/ assistive device, stairs to enter home, communication issues, inability to ambulate household distances, decreased cognition, limited home support, positive fall history, decreased initiation and engagement, impulsivity, unable to  perform physical activity, limited insight into impairments, inability to perform IADLs, and inability to perform ADLs. Please find objective findings from PT assessment regarding body systems outlined above with impairments and limitations including weakness, decreased ROM, impaired balance, decreased endurance, impaired coordination, gait deviations, pain, decreased activity tolerance, decreased functional mobility tolerance, altered sensation, decreased safety awareness, impaired judgement, fall risk, orthopedic restrictions, impaired tone, decreased skin integrity, and decreased cognition. Limited assessment 2* to severe fatigue and decreased alertness throughout evaluation. The following objective measures performed on IE also reveal limitations: The patient's AM-PAC Basic Mobility Inpatient Short Form Raw Score is 12, Standardized Score is 32.23. A standardized score less than 42.9 suggests the patient may benefit from discharge to post-acute rehabilitation services. Please also refer to the recommendation of the Physical Therapist for safe discharge planning. Pt's clinical presentation is currently unstable/unpredictable seen in pt's presentation of critical care monitoring. Pt to benefit from continued PT tx to address deficits as defined above and maximize level of functional independent mobility and consistency. From PT/mobility standpoint, recommendation at time of d/c would be level I pending progress in order to facilitate return to PLOF.   Barriers to Discharge Decreased caregiver support;Inaccessible home environment   Goals   Patient Goals non verbal   STG Expiration Date 12/21/24   Short Term Goal #1 1. Complete bed mobility and transfers I to decrease need for caregiver in home. 2. Ambulate 300' I to complete household and community mobility without A. 3. Improve dynamic balance to good to decrease need for UE support during ambulation.   Plan   Treatment/Interventions OT;Spoke to case  management;Spoke to nursing;Gait training;Bed mobility;Patient/family training;Endurance training;Therapeutic exercise;LE strengthening/ROM;Functional transfer training   PT Frequency 3-5x/wk   Discharge Recommendation   Rehab Resource Intensity Level, PT I (Maximum Resource Intensity)  (pending progress and participiation)   AM-PAC Basic Mobility Inpatient   Turning in Flat Bed Without Bedrails 2   Lying on Back to Sitting on Edge of Flat Bed Without Bedrails 2   Moving Bed to Chair 2   Standing Up From Chair Using Arms 2   Walk in Room 2   Climb 3-5 Stairs With Railing 2   Basic Mobility Inpatient Raw Score 12   Basic Mobility Standardized Score 32.23   [facility] Highest Level Of Mobility   -HL Goal 4: Move to chair/commode   -HLM Achieved 4: Move to chair/commode         Nori Denton, PT

## 2024-12-09 NOTE — TELEPHONE ENCOUNTER
STILL ADMITTED:12/6/2024 - present (3 days)  [facility]        HFU/ SL BEV/ Cerebrovascular accident (CVA) due to embolism of right middle cerebral artery     DC- HOME 12/09/2024      ----- Message from Morales Tijerina DO sent at 12/9/2024  3:11 PM EST -----  Regarding: HFU  Bren Layton will need follow-up in in 6 weeks with neurovascular team for Stroke secondary to atheroembolic etiology in 60 minute appointment. They will require a zio / loop recorder at o/p neuro / cardio discretion within 8 weeks. Thank you

## 2024-12-09 NOTE — PROGRESS NOTES
Progress Note - Neurosurgery   Name: Bren Traylor 69 y.o. female I MRN: [id number]  Unit/Bed#: ICU 11 I Date of Admission: 12/6/2024   Date of Service: 12/9/2024 I Hospital Day: 2    Assessment & Plan  Cerebrovascular accident (CVA) due to embolism of right middle cerebral artery (HCC)  PPD#3 R M1 mechanical thrombectomy (TICI 3) and R ICA stenting with Dr. 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  Exam change on 12/8 and noted on CT head with acute BG hemorrhage with effacement of R lateral ventricle.    Imaging:  CT head wo, 12/8/24: Stable CT of the brain with recent right MCA infarct status post mechanical thrombectomy with hematoma and contrast staining located in the right basal ganglia. Mass effect on the right lateral ventricle results in 5 mm right to left midline shift unchanged from prior. Small volume intraventricular hemorrhage layering in the left occipital horn.    Plan:  Continue to monitor neuro exam closely.  STAT CT head with decline in GCS > 2 pts in 1 hour.  Continue ASA 81 mg daily.  Maintain SBP <120.  Repeat CTA head and neck tomorrow 12/10.  Mobilize as tolerated.  DVT ppx: SCDs, holding pharmacologic DVT ppx in setting of acute hemorrhage.    Neurosurgery will continue to follow. Call with questions.    I have discussed the above management plan in detail with the primary service.   Neurosurgery service will follow.  Please contact the SecureChat role for the Neurosurgery service with any questions/concerns.    Subjective   PERRL.  Expressive aphasia.  Nodding yes/no.  Moving RUE/RLE to command.  Left side plegic.  Examined on Precedex.    Objective :  Temp:  [99.2 °F (37.3 °C)-99.6 °F (37.6 °C)] 99.2 °F (37.3 °C)  HR:  [] 54  BP: ()/(49-88) 125/58  Resp:  [14-35] 26  SpO2:  [93 %-100 %] 100 %  O2 Device: None (Room air)  Nasal Cannula O2 Flow Rate (L/min):  [3 L/min] 3 L/min    I/O         12/07 0701 12/08 0700 12/08 0701 12/09 0700 12/09  0701  12/10 0700    I.V. (mL/kg) 2100.4 (37.4) 880.6 (15.8)     NG/ 60     IV Piggyback 100 100     Total Intake(mL/kg) 2490.4 (44.3) 1040.6 (18.6)     Urine (mL/kg/hr) 1525 (1.1) 290 (0.2)     Emesis/NG output 300      Total Output 1825 290     Net +665.4 +750.6            Unmeasured Urine Occurrence  4 x           Physical Exam  Constitutional:       Appearance: She is ill-appearing.   Eyes:      Pupils: Pupils are equal, round, and reactive to light.   Pulmonary:      Comments: tachypneic     Neurological Exam  Mental Status  Arousable to verbal stimuli.    Cranial Nerves  CN III, IV, VI: Pupils equal round and reactive to light bilaterally.    Motor    Moves right side to command, left side plegic.        Lab Results: I have reviewed the following results:  Recent Labs     12/06/24 2045 12/06/24  2045 12/07/24  0220 12/07/24  0442 12/07/24  1159 12/09/24  0527   WBC 11.93*  --   --  10.01   < > 12.51*   HGB 11.6  --   --  10.2*   < > 8.7*   HCT 35.0  --   --  29.9*   < > 27.0*     --   --  198   < > 146*   SODIUM 135  --   --  141   < > 157*   K 3.4*  --   --  4.0   < > 3.6     --   --  111*   < > 126*   CO2 25  --   --  23   < > 24   BUN 8  --   --  7   < > 16   CREATININE 0.44*  --   --  0.40*   < > 0.41*   GLUC 108  --   --  121   < > 126   CAIONIZED  --    < >  --  1.07*  --  1.16   MG  --   --   --  1.7*   < > 2.1   PHOS  --   --   --  2.8   < > 2.9   PTT 36*  --   --   --   --   --    INR 1.10  --   --   --   --   --    HSTNI0 140*  --  213*  --   --   --    HSTNI2  --   --   --  250*  --   --     < > = values in this interval not displayed.       Imaging Results Review: I personally reviewed the following image studies in PACS and associated radiology reports: CT head. My interpretation of the radiology images/reports is: as above.  Other Study Results Review: No additional pertinent studies reviewed.    VTE Pharmacologic Prophylaxis: VTE covered by:    None    and Sequential  compression device (Venodyne)

## 2024-12-09 NOTE — SPEECH THERAPY NOTE
Speech-Language Pathology Progress Note      Patient Name: Bren Traylor    Today's Date: 12/9/2024      Subjective:  Pt was awake initially, restless, poor attention but speaking to son in polish on occasion. Sitting upright in bed, after prolonged restlessness she promptly fell asleep.     Objective:  Pt was seen today for dysphagia therapy. Attempted several times today with pt either being hyperalert but with limited attention/participation, or significantly lethargic and unable to participate. She has NGT and is on 3L O2 nasal cannula. Seen at bedside with RN, physician, and pt's son present. Son concerned with pt asking for water and current NPO status. Oral care completed with significant wet coughing and RN suctioning secretions with jenniferkauer over base of tongue. Discussed/educated son re: concern for aspiration, A&P of swallowing, and relationship between aspiration and PNA. Son feels cough is d/t her illness prior to CVA, however we discussed swallowing is often affected by CVAs. Discussed need for MBS once pt is able to participate. Planned to trial ice chips however while speaking with son pt shifted from agitated to lethargic and was unable to participate.    Assessment:  Mentation/participation is a barrier to assessing swallow function, pt has NGT for nutrition, hydration, and meds.     Plan:  Maintain NPO with TF.  Swabs for comfort and frequent oral care. Continue ST follow up. MBS as able/appropriate.

## 2024-12-09 NOTE — ASSESSMENT & PLAN NOTE
Initial NIHSS . Initial BP Blood Pressure: (!) 172/74, NC CTH performed and CTA H/N completed, both as noted below. As a result of > 4.5 hours from time of symptom onset patient was not determined to be a candidate for thrombolysis (TNK).  Patient underwent thrombectomy related to below CTA & CTP imaging findings, also received stent to right carotid.  - Home Antiplatelet /Anticoagulants PTA: no antiplatelet or anticoagulants  - Stroke risk factors: Unknown, apparent old infarct  - Prior Stroke Hx: yes, exact type of CVA unknown    Workup:  - Initial CTH: There is infarction identified within the right lentiform nucleus and decreased attenuation. Suspected mild cytotoxic edema within the caudate head and body resulting in partial effacement of the frontal horn of the lateral ventricle. No hemorrhagic transformation. Probable old cortical infarct within the right lateral frontal lobe.   - Initial CTA: Acute thrombosis of the mid to distal aspect of the right M1 segment. Adequate enhancement of the more distal M2 and M3 branches. Severe near occlusive stenosis of the proximal right internal carotid artery within the neck and a severe tandem stenosis intracranially. High-grade stenosis of the distal left common carotid artery with moderate stenosis of the internal carotid artery bulb. Moderate to high-grade stenosis of the right subclavian origin from the brachiocephalic.  - CTP: mismatch volume 32 ml, ratio infinite; Large area of milder hypoperfusion in the right MCA territory (Tmax greater than 4 seconds) acute ischemic changes again seen in the right basal ganglia but more conspicuous on earlier noncontrast study. Focal right frontal encephalomalacia.  - MRI: Large acute parenchymal hemorrhage within the right basal ganglia similar in size to most recent CT scan. Mild surrounding edema and localized mass effect with approximately 5 mm of right to left shift. Adjacent acute ischemia is seen within the caudate   head  and body. There is hyperintense T2 signal within the distal cervical and intracranial internal carotid artery on the right suggesting slow flow or occlusion. The M1 segment and middle cerebral artery branches on the right demonstrate appropriate flow voids.   - TTE: No left atrial dilation, EF 45%  - CTH right MCA infarct with hematoma in the right basal ganglia with mass effect 5 mm right to left midline shift on right lateral ventricle  - Labs: Hemoglobin A1c 5.3 (12/7/2024), LDL 76 (12/7/2024)    Impression: 69 y.o. female currently on no known AP/AC who presented with LFD & L sided weakness, found to have RM1 occlusion & RBG acute ischemia now s/p successful thrombectomy TICI 3 & R carotid stent but since found to have significant IPH on subsequent imaging; more likely AE & vessel disease chronic although afib possibly seen on EKG paroxysmally, this could be provoked in setting of significant disease state.    Plan: Discussed plan with neurology attending, Dr. Lentz  Telemetry monitoring  Frequent neuro checks per protocol  If any acute changes in exam, obtain stat CT head and notify neurology team  BP Goals: BP normotension goal in setting of new IPH  AP/AC: currently on aspirin  Statin: Continue atorvastatin 40 mg daily increased from 20 mg home dose  Euthermic/Euglycemic  Given midline shift, rec sodium goal 145-155, utilize hypertonic saline prn  DVT PPx: per primary team  PT/OT/ST/PMR evaluations when able  Stroke education and counseling  Loop recorder rec in o/p setting, +/- Zio patch  Rest of other care per primary team appreciated    Disposition: PT Recommendations -

## 2024-12-09 NOTE — PROGRESS NOTES
Vascular consult completed at this time.  PICC line was placed 12/7/24 by Dr. Deleon.  Per procedure not and xray PICC line not in proper placement but being used at this time.  Spoke with Dr Corbin Zimmerman to place order for IR to reposition PICC line.

## 2024-12-09 NOTE — PLAN OF CARE
Problem: OCCUPATIONAL THERAPY ADULT  Goal: Performs self-care activities at highest level of function for planned discharge setting.  See evaluation for individualized goals.  Description: Treatment Interventions: ADL retraining, Functional transfer training, UE strengthening/ROM, Endurance training, Cognitive reorientation, Patient/family training, Equipment evaluation/education, Compensatory technique education, Continued evaluation, Energy conservation, Activityengagement, Visual perceptual retraining, Fine motor coordination activities          See flowsheet documentation for full assessment, interventions and recommendations.   Note: Limitation: Decreased ADL status, Decreased UE strength, Decreased UE ROM, Decreased Safe judgement during ADL, Decreased cognition, Decreased endurance, Visual deficit, Decreased fine motor control, Decreased high-level ADLs, Decreased self-care trans  Prognosis: Fair  Assessment: Pt is a 70 y/o female seen for OT eval s/p adm to [facility] for endovascular alert for R MCA M1 occlusion, L sided weakness and altered mental stus. Pt is s/p mechanical thrombectomy, angioplasty and stent placement. Pt  has a past medical history of High cholesterol and Hypertension. Pt with active OT orders and activity as tolerated orders. Pt lives with her son in ran home w/ 0 BASIM. Pt was I w/  ADLS and required no use of DME PTA. Pt is currently demonstrating the following occupational deficits: Max-Total A ADLS, Max Ax2 transfers/functional mobility. These deficits that are impacting pt's baseline areas of occupation are a result of the following impairments: pain, endurance, activity tolerance, functional mobility, forward functional reach, functional mobility, balance, trunk control, functional standing tolerance, unsupportive home environment, decreased I w/ ADLS/IADLS, strength, ROM, cognitive impairments, decreased safety awareness, and decreased insight into deficits.The following Occupational  Performance Areas to address include: eating, grooming, bathing/shower, toilet hygiene, dressing, medication management, socialization, health maintenance, functional mobility, community mobility, clothing management, and household maintenance. Recommend inpatient rehab  upon D/C. Pt to continue to benefit from acute immediate OT services to address the following goals 2-3x/week to  w/in 10-14 days:     Rehab Resource Intensity Level, OT: II (Moderate Resource Intensity)     Chantelle Swenson MS, OTR/L

## 2024-12-09 NOTE — ANESTHESIA POSTPROCEDURE EVALUATION
Post-Op Assessment Note    CV Status:  Stable         Hydration Status:  Stable    No anethesia notable event occurred.    Staff: Anesthesiologist     Reason for prolonged intubation > 24 hours:  Ongoing critical care issues      Last Filed PACU Vitals:  Vitals Value Taken Time   Temp     Pulse 86 12/09/24 1613   /62 12/09/24 1600   Resp 45 12/09/24 1613   SpO2 96 % 12/09/24 1613   Vitals shown include unfiled device data.    Modified Naseem:  No data recorded

## 2024-12-09 NOTE — PROGRESS NOTES
Progress Note - Neurology   Name: Bren Traylor 69 y.o. female I MRN: [id number]  Unit/Bed#: ICU 11 I Date of Admission: 12/6/2024   Date of Service: 12/9/2024 I Hospital Day: 2    Assessment & Plan  Cerebrovascular accident (CVA) due to embolism of right middle cerebral artery (HCC)  Initial NIHSS . Initial BP Blood Pressure: (!) 172/74, NC CTH performed and CTA H/N completed, both as noted below. As a result of > 4.5 hours from time of symptom onset patient was not determined to be a candidate for thrombolysis (TNK).  Patient underwent thrombectomy related to below CTA & CTP imaging findings, also received stent to right carotid.  - Home Antiplatelet /Anticoagulants PTA: no antiplatelet or anticoagulants  - Stroke risk factors: Unknown, apparent old infarct  - Prior Stroke Hx: yes, exact type of CVA unknown    Workup:  - Initial CTH: There is infarction identified within the right lentiform nucleus and decreased attenuation. Suspected mild cytotoxic edema within the caudate head and body resulting in partial effacement of the frontal horn of the lateral ventricle. No hemorrhagic transformation. Probable old cortical infarct within the right lateral frontal lobe.   - Initial CTA: Acute thrombosis of the mid to distal aspect of the right M1 segment. Adequate enhancement of the more distal M2 and M3 branches. Severe near occlusive stenosis of the proximal right internal carotid artery within the neck and a severe tandem stenosis intracranially. High-grade stenosis of the distal left common carotid artery with moderate stenosis of the internal carotid artery bulb. Moderate to high-grade stenosis of the right subclavian origin from the brachiocephalic.  - CTP: mismatch volume 32 ml, ratio infinite; Large area of milder hypoperfusion in the right MCA territory (Tmax greater than 4 seconds) acute ischemic changes again seen in the right basal ganglia but more conspicuous on earlier noncontrast study. Focal right  frontal encephalomalacia.  - MRI: Large acute parenchymal hemorrhage within the right basal ganglia similar in size to most recent CT scan. Mild surrounding edema and localized mass effect with approximately 5 mm of right to left shift. Adjacent acute ischemia is seen within the caudate   head and body. There is hyperintense T2 signal within the distal cervical and intracranial internal carotid artery on the right suggesting slow flow or occlusion. The M1 segment and middle cerebral artery branches on the right demonstrate appropriate flow voids.   - TTE: No left atrial dilation, EF 45%  - CTH right MCA infarct with hematoma in the right basal ganglia with mass effect 5 mm right to left midline shift on right lateral ventricle  - Labs: Hemoglobin A1c 5.3 (12/7/2024), LDL 76 (12/7/2024)    Impression: 69 y.o. female currently on  no known AP/AC  who presented with LFD & L sided weakness, found to have RM1 occlusion & RBG acute ischemia now s/p successful thrombectomy TICI 3 & R carotid stent but since found to have significant IPH on subsequent imaging; more likely AE & vessel disease chronic although afib possibly seen on EKG paroxysmally, this could be provoked in setting of significant disease state.    Plan: Discussed plan with neurology attending, Dr. Lentz  Telemetry monitoring  Frequent neuro checks per protocol  If any acute changes in exam, obtain stat CT head and notify neurology team  BP Goals: BP normotension goal in setting of new IPH  AP/AC: currently on aspirin  Statin: Continue atorvastatin 40 mg daily increased from 20 mg home dose  Euthermic/Euglycemic  Given midline shift, rec sodium goal 145-155, utilize hypertonic saline prn  DVT PPx: per primary team  PT/OT/ST/PMR evaluations when able  Stroke education and counseling  Loop recorder rec in o/p setting, +/- Zio patch  Rest of other care per primary team appreciated    Disposition: PT Recommendations -      Recommendations for outpatient  neurological follow up have yet to be determined.     I have discussed with Dr. Lentz the above plan to treat pt. He agrees with the plan.  Please contact the SecureChat role for the Neurology service with any questions/concerns.    Eliseo Traylor is a 69 y.o. with a PMHx hypertension, hyperlipidemia, and anxiety who presented to [facility] on December 6 as an endovascular alert for Right MCA M1 occlusion. Patient originally presented to [facility] as a stroke alert for left sided weakness and altered mental status. Son reports he found her altered and slumped over with significant left sided facial droop and weakness. Patient underwent mechanical thrombectomy, TICI 3 and right carotid angioplasty and stent placement. Patient admitted to ICU for further management.      History obtained from chart review.    Today, patient was sedated with Precedex due to combative and aggressive behavior reported by nurse including punching, kicking, and inappropriate touching of staff. Patient is unable to follow commands, but is still able to purposefully move her right side.      Review of Systems   Reason unable to perform ROS: Patient sedated on Precedex due to combative behavior, not able to respond or cooperate with exam.       Objective :  Temp:  [99.2 °F (37.3 °C)-100.1 °F (37.8 °C)] 100.1 °F (37.8 °C)  HR:  [] 56  BP: ()/(38-88) 126/54  Resp:  [14-40] 19  SpO2:  [94 %-100 %] 98 %  O2 Device: Nasal cannula  Nasal Cannula O2 Flow Rate (L/min):  [3 L/min] 3 L/min    Physical Exam  Vitals and nursing note reviewed.   Constitutional:       Appearance: She is ill-appearing. She is not diaphoretic.   HENT:      Head: Normocephalic and atraumatic.      Right Ear: External ear normal.      Left Ear: External ear normal.      Nose: Nose normal.   Eyes:      General: No scleral icterus.        Right eye: No discharge.         Left eye: No discharge.      Conjunctiva/sclera: Conjunctivae normal.      Pupils:  Pupils are equal, round, and reactive to light.   Pulmonary:      Effort: Pulmonary effort is normal.   Musculoskeletal:      Cervical back: Normal range of motion.   Skin:     General: Skin is warm.   Psychiatric:      Comments: Combative, punching, kicking, groping members of staff before Precedex administration     Neurological Exam  Mental Status  Responsive to painful stimuli. Level of consciousness: asleep.  No painful response to L side, does not follow commands today on precedex.    Cranial Nerves  CN III, IV, VI: Pupils equal round and reactive to light bilaterally.  No obvious deficit although not following commands.    Motor  Normal muscle bulk throughout. No fasciculations present. Normal muscle tone. No abnormal involuntary movements. Left hemiparesis.  Spontaneous movement to R side only, no movement whatsoever L side.    Sensory  Unable to examine.    Reflexes  Deep tendon reflexes are 2+ and symmetric except as noted.  Right Plantar: equivocal  Left Plantar: upgoing  Plantars non down going.    Coordination    Unable to test.    Gait    Defer for safety.        Lab Results: I have reviewed the following results:  Imaging Results Review: I personally reviewed the following image studies/reports in PACS and discussed pertinent findings with Radiology: CT head, MRI brain, and Ultrasound(s). My interpretation of the radiology images/reports is: addressed above.    VTE Pharmacologic Prophylaxis: VTE covered by:    None        Administrative Statements   I have spent a total time of 50 minutes in caring for this patient on the day of the visit/encounter including Counseling / Coordination of care, Documenting in the medical record, Reviewing / ordering tests, medicine, procedures  , Obtaining or reviewing history  , and Communicating with other healthcare professionals .

## 2024-12-09 NOTE — ASSESSMENT & PLAN NOTE
PPD#3 R M1 mechanical thrombectomy (TICI 3) and R ICA stenting with Dr. 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  Exam change on 12/8 and noted on CT head with acute BG hemorrhage with effacement of R lateral ventricle.    Imaging:  CT head wo, 12/8/24: Stable CT of the brain with recent right MCA infarct status post mechanical thrombectomy with hematoma and contrast staining located in the right basal ganglia. Mass effect on the right lateral ventricle results in 5 mm right to left midline shift unchanged from prior. Small volume intraventricular hemorrhage layering in the left occipital horn.    Plan:  Continue to monitor neuro exam closely.  STAT CT head with decline in GCS > 2 pts in 1 hour.  Continue ASA 81 mg daily.  Maintain SBP <120.  Repeat CTA head and neck tomorrow 12/10.  Mobilize as tolerated.  DVT ppx: SCDs, holding pharmacologic DVT ppx in setting of acute hemorrhage.    Neurosurgery will continue to follow. Call with questions.

## 2024-12-09 NOTE — PLAN OF CARE
Problem: PHYSICAL THERAPY ADULT  Goal: Performs mobility at highest level of function for planned discharge setting.  See evaluation for individualized goals.  Description: Treatment/Interventions: OT, Spoke to case management, Spoke to nursing, Gait training, Bed mobility, Patient/family training, Endurance training, Therapeutic exercise, LE strengthening/ROM, Functional transfer training          See flowsheet documentation for full assessment, interventions and recommendations.  Note: Prognosis: Fair  Problem List: Decreased strength, Decreased range of motion, Decreased endurance, Impaired balance, Decreased mobility, Decreased coordination, Decreased cognition, Decreased safety awareness, Impaired judgement, Impaired vision, Impaired hearing, Impaired tone, Impaired sensation, Decreased skin integrity, Orthopedic restrictions, Pain  Assessment: Pt is 69 y.o. female seen for PT evaluation s/p admit to [facility] on 12/6/2024 w/ CVA. PT consulted to assess pt's functional mobility and d/c needs. Order placed for PT eval and tx, w/ up w/ A order. Comorbidities affecting pt's physical performance at time of assessment include:  has a past medical history of High cholesterol and Hypertension. PTA, pt was requiring A for mobility and ambulates household distances. Personal factors affecting pt at time of IE include: inaccessible home environment, ambulating w/ assistive device, stairs to enter home, communication issues, inability to ambulate household distances, decreased cognition, limited home support, positive fall history, decreased initiation and engagement, impulsivity, unable to perform physical activity, limited insight into impairments, inability to perform IADLs, and inability to perform ADLs. Please find objective findings from PT assessment regarding body systems outlined above with impairments and limitations including weakness, decreased ROM, impaired balance, decreased endurance, impaired  coordination, gait deviations, pain, decreased activity tolerance, decreased functional mobility tolerance, altered sensation, decreased safety awareness, impaired judgement, fall risk, orthopedic restrictions, impaired tone, decreased skin integrity, and decreased cognition. Limited assessment 2* to severe fatigue and decreased alertness throughout evaluation. The following objective measures performed on IE also reveal limitations: The patient's AM-PAC Basic Mobility Inpatient Short Form Raw Score is 12, Standardized Score is 32.23. A standardized score less than 42.9 suggests the patient may benefit from discharge to post-acute rehabilitation services. Please also refer to the recommendation of the Physical Therapist for safe discharge planning. Pt's clinical presentation is currently unstable/unpredictable seen in pt's presentation of critical care monitoring. Pt to benefit from continued PT tx to address deficits as defined above and maximize level of functional independent mobility and consistency. From PT/mobility standpoint, recommendation at time of d/c would be level I pending progress in order to facilitate return to PLOF.  Barriers to Discharge: Decreased caregiver support, Inaccessible home environment     Rehab Resource Intensity Level, PT: I (Maximum Resource Intensity) (pending progress and participiation)    See flowsheet documentation for full assessment.

## 2024-12-09 NOTE — PROGRESS NOTES
Bren Traylor is a 69 y.o. female who is currently ordered Vancomycin IV with management by the Pharmacy Consult service.  Relevant clinical data and objective / subjective history reviewed.  Vancomycin Assessment:  Indication and Goal AUC/Trough: Pneumonia (goal -600, trough >10); Bacteremia (goal -600, trough >10)  Clinical Status: stable  Micro:     Renal Function:  SCr: 0.41 mg/dL  CrCl: 115 mL/min  Renal replacement: Not on dialysis  Days of Therapy: 1  Current Dose: 750mg IV Q 12H  Vancomycin Plan:  New Dosinmg IV load then 1500mg Q 12H  Estimated AUC: 571 mcg*hr/mL  Estimated Trough: 11.8 mcg/mL  Next Level: 12/10 with AM labs  Renal Function Monitoring: Daily BMP and UOP  Pharmacy will continue to follow closely for s/sx of nephrotoxicity, infusion reactions and appropriateness of therapy.  BMP and CBC will be ordered per protocol. We will continue to follow the patient’s culture results and clinical progress daily.    Oziel Thao, Pharmacist

## 2024-12-09 NOTE — CASE MANAGEMENT
Case Management Assessment & Discharge Planning Note    Patient name Bren Traylor  Location ICU 11/ICU 11 MRN [id number]  : 1955 Date 2024       Current Admission Date: 2024  Current Admission Diagnosis:Cerebrovascular accident (CVA) due to embolism of right middle cerebral artery (HCC)  Patient Active Problem List    Diagnosis Date Noted Date Diagnosed    Cerebrovascular accident (CVA) due to embolism of right middle cerebral artery (HCC) 2024       LOS (days): 2  Geometric Mean LOS (GMLOS) (days):   Days to GMLOS:     OBJECTIVE:    Risk of Unplanned Readmission Score: 11.53     Current admission status: Inpatient    Preferred Pharmacy:   [id number] [place] - [address] [address]  Phone: [phone] Fax: [phone]    Primary Care Provider: Margarita Pedersen MD    Primary Insurance: [employer]  Secondary Insurance:     ASSESSMENT:  Active Health Care Proxies       Rajniowentinaicz, Severin [facility] Representative - Son   Primary Phone: [phone] (Mobile)                 Advance Directives  Does patient have a Health Care POA?: No  Does patient have Advance Directives?: No  Primary Contact: Severin Bialasiewicz (Son)    Readmission Root Cause  30 Day Readmission: No    Patient Information  Admitted from:: Home  During Assessment patient was accompanied by: Not accompanied during assessment  Assessment information provided by:: Son  Primary Caregiver: Self  Support Systems: Family members, Son  Home entry access options. Select all that apply.: No steps to enter home  Type of Current Residence: [facility]  Living Arrangements: Lives Alone (Son resides locally to pt's home.)    Activities of Daily Living Prior to Admission  Functional Status: Independent  Completes ADLs independently?: Yes  Ambulates independently?: Yes  Does patient use assisted devices?: No  Does patient currently own DME?: No  Does patient have a history  of Outpatient Therapy (PT/OT)?: No  Does the patient have a history of Short-Term Rehab?: No  Does patient have a history of HHC?: No  Does patient currently have HHC?: No    Patient Information Continued  Does patient have prescription coverage?: Yes  Does patient have a history of substance abuse?: No  Does patient have a history of Mental Health Diagnosis?: No    Means of Transportation  Means of Transport to Appts:: Family transport          DISCHARGE DETAILS:    Discharge planning discussed with:: pt's son    Other Referral/Resources/Interventions Provided:  Referral Comments: Initial CM assessment completed with pt's son Severin. CM introduced role and obtained baseline information. Pt resides alone in a ranch style home with 0 BASIM. Family resides local and report to be a support for pt as pt would need. Pt independent PTA per son, with no use of DME or HHC/STR hx. Pt's son provides transport to and from all medical appointments and errands. After care needs pending, CM team will continue to follow and remain available for discharge coordination.

## 2024-12-09 NOTE — OCCUPATIONAL THERAPY NOTE
Occupational Therapy Evaluation     Patient Name: Bren Traylor  Today's Date: 12/9/2024  Problem List  Active Problems:    Cerebrovascular accident (CVA) due to embolism of right middle cerebral artery (HCC)    Past Medical History  Past Medical History:   Diagnosis Date    High cholesterol     Hypertension      Past Surgical History  No past surgical history on file.        12/09/24 0936   OT Last Visit   OT Visit Date 12/09/24   Note Type   Note type Evaluation   Pain Assessment   Pain Assessment Tool FLACC   Pain Rating: FLACC (Rest) - Face 0   Pain Rating: FLACC (Rest) - Legs 0   Pain Rating: FLACC (Rest) - Activity 0   Pain Rating: FLACC (Rest) - Cry 0   Pain Rating: FLACC (Rest) - Consolability 0   Score: FLACC (Rest) 0   Pain Rating: FLACC (Activity) - Face 0   Pain Rating: FLACC (Activity) - Legs 0   Pain Rating: FLACC (Activity) - Activity 0   Pain Rating: FLACC (Activity) - Cry 0   Pain Rating: FLACC (Activity) - Consolability 0   Score: FLACC (Activity) 0   Restrictions/Precautions   Weight Bearing Precautions Per Order No   Other Precautions Cognitive;Impulsive;Chair Alarm;Bed Alarm;Restraints;Multiple lines;Telemetry;Fall Risk;Pain;O2  (NGT; [place]; R hand mitt; RUE/RLE wrist/ankle restraint; 3L O2)   Home Living   Type of Home House   Home Layout One level  (0STE)   Home Equipment Other (Comment)  (none per EMR)   Additional Comments Pt is nonverbal throughout session; hx obtained from EMR. Lives alone in 1 SH, 0 BASIM   Prior Function   Level of [place] Independent with functional mobility;Independent with ADLs;Independent with IADLS   Lives With Alone   Receives Help From Family   Comments per EMR, pt is I PTA and requires no use of DME   Lifestyle   Autonomy I w/ ADLS, transfers and functional mobility PTA   Reciprocal Relationships Pt lives alone   Service to Others Unable to report; will continue to assess   Intrinsic Gratification Unable to report; will continue to assess   General    Additional General Comments pt is polish speaking; attempted use of ipad  however pt not verbally responding; gave 1 head nod throughout session.   Subjective   Subjective Nonverbal   ADL   Eating Assistance 1  Total Assistance   Grooming Assistance 1  Total Assistance   UB Bathing Assistance 1  Total Assistance   LB Bathing Assistance 1  Total Assistance   UB Dressing Assistance 1  Total Assistance   LB Dressing Assistance 1  Total Assistance   Toileting Assistance  1  Total Assistance   Functional Assistance 1  Total Assistance   Bed Mobility   Supine to Sit 1  Dependent   Additional items Assist x 2;HOB elevated;Increased time required;Verbal cues;LE management   Sit to Supine Unable to assess   Additional Comments pt sat EOB w/ Max A for sitting balance/trunk control; head/neck in flexed position w/ eyes closed.   Transfers   Sit to Stand 2  Maximal assistance   Additional items Assist x 2;Increased time required;Verbal cues   Stand to Sit 2  Maximal assistance   Additional items Assist x 2;Increased time required;Verbal cues   Additional Comments B/L HHA used   Functional Mobility   Functional Mobility 2  Maximal assistance   Additional Comments Pt took step from EOB to recliner w/ max A x2; B/L HHA used   Additional items Hand hold assistance   Balance   Static Sitting Poor -   Dynamic Sitting Poor -   Static Standing Poor -   Dynamic Standing Poor -   Ambulatory Poor -   Activity Tolerance   Activity Tolerance Patient limited by pain;Patient limited by fatigue  (cognition)   Medical Staff Made Aware PT   Nurse Made Aware yes   RUE Assessment   RUE Assessment   (not following MMT commands; likely WFL as per RN pt was combative this AM)   LUE Assessment   LUE Assessment   (not following MMT commands; unable to formally assess)   Hand Function   Gross Motor Coordination Impaired   Fine Motor Coordination Impaired   Vision - Complex Assessment   Additional Comments pt not opening eyes to assess at  this time   Psychosocial   Psychosocial (WDL) X   Cognition   Overall Cognitive Status Impaired   Arousal/Participation Lethargic;Poorly responsive   Attention Difficulty attending to directions   Orientation Level Unable to assess   Memory Unable to assess   Following Commands Unable to follow one step commands   Comments pt is nonverbal; nodded head once to  when asked if she could hear the interpretor speaking. Pt groaned while seated EOB; otherwise not following commands this session.   Assessment   Limitation Decreased ADL status;Decreased UE strength;Decreased UE ROM;Decreased Safe judgement during ADL;Decreased cognition;Decreased endurance;Visual deficit;Decreased fine motor control;Decreased high-level ADLs;Decreased self-care trans   Prognosis Fair   Assessment Pt is a 70 y/o female seen for OT eval s/p adm to [facility] for endovascular alert for R MCA M1 occlusion, L sided weakness and altered mental stus. Pt is s/p mechanical thrombectomy, angioplasty and stent placement. Pt  has a past medical history of High cholesterol and Hypertension. Pt with active OT orders and activity as tolerated orders. Pt lives with her son in ranch home w/ 0 BASIM. Pt was I w/  ADLS and required no use of DME PTA. Pt is currently demonstrating the following occupational deficits: Max-Total A ADLS, Max Ax2 transfers/functional mobility. These deficits that are impacting pt's baseline areas of occupation are a result of the following impairments: pain, endurance, activity tolerance, functional mobility, forward functional reach, functional mobility, balance, trunk control, functional standing tolerance, unsupportive home environment, decreased I w/ ADLS/IADLS, strength, ROM, cognitive impairments, decreased safety awareness, and decreased insight into deficits.The following Occupational Performance Areas to address include: eating, grooming, bathing/shower, toilet hygiene, dressing, medication management, socialization,  health maintenance, functional mobility, community mobility, clothing management, and household maintenance. Recommend inpatient rehab  upon D/C. Pt to continue to benefit from acute immediate OT services to address the following goals 2-3x/week to  w/in 10-14 days:   Goals   Patient Goals nonverbal; unable to report   LTG Time Frame 10-14   Long Term Goal #1 see below listed goals   Plan   Treatment Interventions ADL retraining;Functional transfer training;UE strengthening/ROM;Endurance training;Cognitive reorientation;Patient/family training;Equipment evaluation/education;Compensatory technique education;Continued evaluation;Energy conservation;Activityengagement;Visual perceptual retraining;Fine motor coordination activities   Goal Expiration Date 24   OT Frequency 2-3x/wk   Discharge Recommendation   Rehab Resource Intensity Level, OT II (Moderate Resource Intensity)   Additional Comments  The patient's raw score on the AM-PAC Daily Activity Inpatient Short Form is 7. A raw score of less than 19 suggests the patient may benefit from discharge to post-acute rehabilitation services. Please refer to the recommendation of the Occupational Therapist for safe discharge planning.   Additional Comments 2 Pt seen as a co-session due to the patient's co-morbidities, clinically unstable presentation, and present impairments which are a regression from the patient's baseline.   AM-PAC Daily Activity Inpatient   Lower Body Dressing 1   Bathing 1   Toileting 1   Upper Body Dressing 1   Grooming 1   Eating 2   Daily Activity Raw Score 7   Turning Head Towards Sound 2   Follow Simple Instructions 1   Low Function Daily Activity Raw Score 10   Low Function Daily Activity Standardized Score  17.31   AM-PAC Applied Cognition Inpatient   Following a Speech/Presentation 1   Understanding Ordinary Conversation 2   Taking Medications 1   Remembering Where Things Are Placed or Put Away 1   Remembering List of 4-5 Errands 1    Taking Care of Complicated Tasks 1   Applied Cognition Raw Score 7   Applied Cognition Standardized Score 15.17   Barthel Index   Feeding 0   Bathing 0   Grooming Score 0   Dressing Score 0   Bladder Score 0   Bowels Score 0   Toilet Use Score 5   Transfers (Bed/Chair) Score 5   Mobility (Level Surface) Score 0   Stairs Score 0   Barthel Index Score 10   End of Consult   Education Provided Yes   Patient Position at End of Consult Bedside chair;All needs within reach;Bed/Chair alarm activated   Nurse Communication Nurse aware of consult       GOALS    1) Pt will complete rolling left/right in bed with Min assist, as prerequisite for further engagement in ADLS   2) Pt will complete supine to sit transfer with Min A using B/L UEs to initiate bed mobility   3) Pt will tolerate sitting at EOB 20 minutes with S assist and stable vital signs, as prerequisite for further engagement in ADLS   4) Pt will complete grooming task with Min assist and increased time to increase independence in functional tasks  5) Pt will increase B/L UE ROM 1/2 MMT to increase functional UB use during ADLS   6) Pt will complete UB ADLS with Min A and use of AD/DME as needed to increase independence in functional tasks  7) Pt will complete LB ADLS with Mod A and use of AD/DME as needed to increase independence in functional tasks  8) Pt will complete sit to stand transfer / sit pivot transfer / stand pivot transfer with Mod assist on/off all ADL surfaces   9) Pt will follow 100% simple one step verbal commands and be A/Ox4 consistently t/o use of external environmental cues to increase awareness for functional tasks  10) Pt will demonstrate 75% carryover of E.C. techniques t/o fx'l I/ADL/ leisure tasks w/o cues s/p skilled education  11)Pt will improve functional mobility to Mod A w/ use of AD/DME as needed to increase engagement in meaningful tasks  12) Pt will engage in ongoing  assessments, screens, and activities t/o fx'l I/ADL/leisure  tasks w/ G participation to A w/ adaptation and accomodations or rule out visual perceptual impairments    Chantelle Swenson MS, OTR/L

## 2024-12-09 NOTE — PLAN OF CARE
Problem: PAIN - ADULT  Goal: Verbalizes/displays adequate comfort level or baseline comfort level  Description: Interventions:  - Encourage patient to monitor pain and request assistance  - Assess pain using appropriate pain scale  - Administer analgesics based on type and severity of pain and evaluate response  - Implement non-pharmacological measures as appropriate and evaluate response  - Consider cultural and social influences on pain and pain management  - Notify physician/advanced practitioner if interventions unsuccessful or patient reports new pain  Outcome: Progressing     Problem: INFECTION - ADULT  Goal: Absence or prevention of progression during hospitalization  Description: INTERVENTIONS:  - Assess and monitor for signs and symptoms of infection  - Monitor lab/diagnostic results  - Monitor all insertion sites, i.e. indwelling lines, tubes, and drains  - Monitor endotracheal if appropriate and nasal secretions for changes in amount and color  - Dodson appropriate cooling/warming therapies per order  - Administer medications as ordered  - Instruct and encourage patient and family to use good hand hygiene technique  - Identify and instruct in appropriate isolation precautions for identified infection/condition  Outcome: Progressing  Goal: Absence of fever/infection during neutropenic period  Description: INTERVENTIONS:  - Monitor WBC    Outcome: Progressing     Problem: SAFETY ADULT  Goal: Patient will remain free of falls  Description: INTERVENTIONS:  - Educate patient/family on patient safety including physical limitations  - Instruct patient to call for assistance with activity   - Consult OT/PT to assist with strengthening/mobility   - Keep Call bell within reach  - Keep bed low and locked with side rails adjusted as appropriate  - Keep care items and personal belongings within reach  - Initiate and maintain comfort rounds  - Make Fall Risk Sign visible to staff  - Offer Toileting every 2 Hours,  in advance of need  - Initiate/Maintain Bed/Chair alarm  - Obtain necessary fall risk management equipment  - Apply yellow socks and bracelet for high fall risk patients  - Consider moving patient to room near nurses station  Outcome: Progressing  Goal: Maintain or return to baseline ADL function  Description: INTERVENTIONS:  -  Assess patient's ability to carry out ADLs; assess patient's baseline for ADL function and identify physical deficits which impact ability to perform ADLs (bathing, care of mouth/teeth, toileting, grooming, dressing, etc.)  - Assess/evaluate cause of self-care deficits   - Assess range of motion  - Assess patient's mobility; develop plan if impaired  - Assess patient's need for assistive devices and provide as appropriate  - Encourage maximum independence but intervene and supervise when necessary  - Involve family in performance of ADLs  - Assess for home care needs following discharge   - Consider OT consult to assist with ADL evaluation and planning for discharge  - Provide patient education as appropriate  Outcome: Progressing  Goal: Maintains/Returns to pre admission functional level  Description: INTERVENTIONS:  - Perform AM-PAC 6 Click Basic Mobility/ Daily Activity assessment daily.  - Set and communicate daily mobility goal to care team and patient/family/caregiver.   - Collaborate with rehabilitation services on mobility goals if consulted  - Perform Range of Motion 10 times a day.  - Reposition patient every 2 hours.  - Dangle patient 2 times a day  - Stand patient 2 times a day  - Ambulate patient 2 times a day  - Out of bed to chair 2 times a day   - Record patient progress and toleration of activity level   Outcome: Progressing     Problem: DISCHARGE PLANNING  Goal: Discharge to home or other facility with appropriate resources  Description: INTERVENTIONS:  - Identify barriers to discharge w/patient and caregiver  - Arrange for needed discharge resources and transportation as  appropriate  - Identify discharge learning needs (meds, wound care, etc.)  - Arrange for interpretive services to assist at discharge as needed  - Refer to Case Management Department for coordinating discharge planning if the patient needs post-hospital services based on physician/advanced practitioner order or complex needs related to functional status, cognitive ability, or social support system  Outcome: Progressing     Problem: Knowledge Deficit  Goal: Patient/family/caregiver demonstrates understanding of disease process, treatment plan, medications, and discharge instructions  Description: Complete learning assessment and assess knowledge base.  Interventions:  - Provide teaching at level of understanding  - Provide teaching via preferred learning methods  Outcome: Progressing     Problem: Prexisting or High Potential for Compromised Skin Integrity  Goal: Skin integrity is maintained or improved  Description: INTERVENTIONS:  - Identify patients at risk for skin breakdown  - Assess and monitor skin integrity  - Assess and monitor nutrition and hydration status  - Monitor labs   - Assess for incontinence   - Turn and reposition patient  - Assist with mobility/ambulation  - Relieve pressure over bony prominences  - Avoid friction and shearing  - Provide appropriate hygiene as needed including keeping skin clean and dry  - Evaluate need for skin moisturizer/barrier cream  - Collaborate with interdisciplinary team   - Patient/family teaching  - Consider wound care consult   Outcome: Progressing     Problem: SAFETY,RESTRAINT: NV/NON-SELF DESTRUCTIVE BEHAVIOR  Goal: Remains free of harm/injury (restraint for non violent/non self-detsructive behavior)  Description: INTERVENTIONS:  - Instruct patient/family regarding restraint use   - Assess and monitor physiologic and psychological status   - Provide interventions and comfort measures to meet assessed patient needs   - Identify and implement measures to help patient  regain control  - Assess readiness for release of restraint   Outcome: Progressing  Goal: Returns to optimal restraint-free functioning  Description: INTERVENTIONS:  - Assess the patient's behavior and symptoms that indicate continued need for restraint  - Identify and implement measures to help patient regain control  - Assess readiness for release of restraint   Outcome: Progressing     Problem: Potential for Falls  Goal: Patient will remain free of falls  Description: INTERVENTIONS:  - Educate patient/family on patient safety including physical limitations  - Instruct patient to call for assistance with activity   - Consult OT/PT to assist with strengthening/mobility   - Keep Call bell within reach  - Keep bed low and locked with side rails adjusted as appropriate  - Keep care items and personal belongings within reach  - Initiate and maintain comfort rounds  - Make Fall Risk Sign visible to staff  - Offer Toileting every 2 Hours, in advance of need  - Initiate/Maintain Bed/Chair alarm  - Obtain necessary fall risk management equipment  - Apply yellow socks and bracelet for high fall risk patients  - Consider moving patient to room near nurses station  Outcome: Progressing     Problem: COPING  Goal: Pt/Family able to verbalize concerns and demonstrate effective coping strategies  Description: INTERVENTIONS:  - Assist patient/family to identify coping skills, available support systems and cultural and spiritual values  - Provide emotional support, including active listening and acknowledgement of concerns of patient and caregivers  - Reduce environmental stimuli, as able  - Provide patient education  - Assess for spiritual pain/suffering and initiate spiritual care, including notification of Pastoral Care or april based community as needed  - Assess effectiveness of coping strategies  Outcome: Progressing  Goal: Will report anxiety at manageable levels  Description: INTERVENTIONS:  - Administer medication as  ordered  - Teach and encourage coping skills  - Provide emotional support  - Assess patient/family for anxiety and ability to cope  Outcome: Progressing     Problem: Nutrition/Hydration-ADULT  Goal: Nutrient/Hydration intake appropriate for improving, restoring or maintaining nutritional needs  Description: Monitor and assess patient's nutrition/hydration status for malnutrition. Collaborate with interdisciplinary team and initiate plan and interventions as ordered.  Monitor patient's weight and dietary intake as ordered or per policy. Utilize nutrition screening tool and intervene as necessary. Determine patient's food preferences and provide high-protein, high-caloric foods as appropriate.     INTERVENTIONS:  - Monitor oral intake, urinary output, labs, and treatment plans  - Assess nutrition and hydration status and recommend course of action  - Evaluate amount of meals eaten  - Assist patient with eating if necessary   - Allow adequate time for meals  - Recommend/ encourage appropriate diets, oral nutritional supplements, and vitamin/mineral supplements  - Order, calculate, and assess calorie counts as needed  - Recommend, monitor, and adjust tube feedings and TPN/PPN based on assessed needs  - Assess need for intravenous fluids  - Provide specific nutrition/hydration education as appropriate  - Include patient/family/caregiver in decisions related to nutrition  Outcome: Progressing     Problem: NEUROSENSORY - ADULT  Goal: Achieves stable or improved neurological status  Description: INTERVENTIONS  - Monitor and report changes in neurological status  - Monitor vital signs such as temperature, blood pressure, glucose, and any other labs ordered   - Initiate measures to prevent increased intracranial pressure  - Monitor for seizure activity and implement precautions if appropriate      Outcome: Progressing  Goal: Achieves maximal functionality and self care  Description: INTERVENTIONS  - Monitor swallowing and  airway patency with patient fatigue and changes in neurological status  - Encourage and assist patient to increase activity and self care.   - Encourage visually impaired, hearing impaired and aphasic patients to use assistive/communication devices  Outcome: Progressing     Problem: CARDIOVASCULAR - ADULT  Goal: Maintains optimal cardiac output and hemodynamic stability  Description: INTERVENTIONS:  - Monitor I/O, vital signs and rhythm  - Monitor for S/S and trends of decreased cardiac output  - Administer and titrate ordered vasoactive medications to optimize hemodynamic stability  - Assess quality of pulses, skin color and temperature  - Assess for signs of decreased coronary artery perfusion  - Instruct patient to report change in severity of symptoms  Outcome: Progressing  Goal: Absence of cardiac dysrhythmias or at baseline rhythm  Description: INTERVENTIONS:  - Continuous cardiac monitoring, vital signs, obtain 12 lead EKG if ordered  - Administer antiarrhythmic and heart rate control medications as ordered  - Monitor electrolytes and administer replacement therapy as ordered  Outcome: Progressing     Problem: RESPIRATORY - ADULT  Goal: Achieves optimal ventilation and oxygenation  Description: INTERVENTIONS:  - Assess for changes in respiratory status  - Assess for changes in mentation and behavior  - Position to facilitate oxygenation and minimize respiratory effort  - Oxygen administered by appropriate delivery if ordered  - Initiate smoking cessation education as indicated  - Encourage broncho-pulmonary hygiene including cough, deep breathe, Incentive Spirometry  - Assess the need for suctioning and aspirate as needed  - Assess and instruct to report SOB or any respiratory difficulty  - Respiratory Therapy support as indicated  Outcome: Progressing     Problem: GASTROINTESTINAL - ADULT  Goal: Maintains or returns to baseline bowel function  Description: INTERVENTIONS:  - Assess bowel function  - Encourage  oral fluids to ensure adequate hydration  - Administer IV fluids if ordered to ensure adequate hydration  - Administer ordered medications as needed  - Encourage mobilization and activity  - Consider nutritional services referral to assist patient with adequate nutrition and appropriate food choices  Outcome: Progressing  Goal: Maintains adequate nutritional intake  Description: INTERVENTIONS:  - Monitor percentage of each meal consumed  - Identify factors contributing to decreased intake, treat as appropriate  - Assist with meals as needed  - Monitor I&O, weight, and lab values if indicated  - Obtain nutrition services referral as needed  Outcome: Progressing     Problem: GENITOURINARY - ADULT  Goal: Maintains or returns to baseline urinary function  Description: INTERVENTIONS:  - Assess urinary function  - Encourage oral fluids to ensure adequate hydration if ordered  - Administer IV fluids as ordered to ensure adequate hydration  - Administer ordered medications as needed  - Offer frequent toileting  - Follow urinary retention protocol if ordered  Outcome: Progressing  Goal: Absence of urinary retention  Description: INTERVENTIONS:  - Assess patient’s ability to void and empty bladder  - Monitor I/O  - Bladder scan as needed  - Discuss with physician/AP medications to alleviate retention as needed  - Discuss catheterization for long term situations as appropriate  Outcome: Progressing     Problem: METABOLIC, FLUID AND ELECTROLYTES - ADULT  Goal: Electrolytes maintained within normal limits  Description: INTERVENTIONS:  - Monitor labs and assess patient for signs and symptoms of electrolyte imbalances  - Administer electrolyte replacement as ordered  - Monitor response to electrolyte replacements, including repeat lab results as appropriate  - Instruct patient on fluid and nutrition as appropriate  Outcome: Progressing  Goal: Fluid balance maintained  Description: INTERVENTIONS:  - Monitor labs   - Monitor I/O and  WT  - Instruct patient on fluid and nutrition as appropriate  - Assess for signs & symptoms of volume excess or deficit  Outcome: Progressing  Goal: Glucose maintained within target range  Description: INTERVENTIONS:  - Monitor Blood Glucose as ordered  - Assess for signs and symptoms of hyperglycemia and hypoglycemia  - Administer ordered medications to maintain glucose within target range  - Assess nutritional intake and initiate nutrition service referral as needed  Outcome: Progressing     Problem: SKIN/TISSUE INTEGRITY - ADULT  Goal: Skin Integrity remains intact(Skin Breakdown Prevention)  Description: Assess:  -Perform Geovani assessment every shift  -Clean and moisturize skin every shift  -Inspect skin when repositioning, toileting, and assisting with ADLS  -Assess under medical devices every shift  -Assess extremities for adequate circulation and sensation     Bed Management:  -Have minimal linens on bed & keep smooth, unwrinkled  -Change linens as needed when moist or perspiring  -Avoid sitting or lying in one position for more than 2 hours while in bed  -Keep HOB at 30 degrees     Toileting:  -Offer bedside commode  -Assess for incontinence every 2 hours  -Use incontinent care products after each incontinent episode     Activity:  -Mobilize patient 2 times a day  -Encourage activity and walks on unit  -Encourage or provide ROM exercises   -Turn and reposition patient every 2 Hours  -Use appropriate equipment to lift or move patient in bed  -Instruct/ Assist with weight shifting every 30 minutes when out of bed in chair  -Consider limitation of chair time 4 hour intervals    Skin Care:  -Avoid use of baby powder, tape, friction and shearing, hot water or constrictive clothing  -Relieve pressure over bony prominences   -Do not massage red bony areas    Next Steps:  -Teach patient strategies to minimize risks   -Consider consults to  interdisciplinary teams   Outcome: Progressing  Goal: Incision(s), wounds(s)  or drain site(s) healing without S/S of infection  Description: INTERVENTIONS  - Assess and document dressing, incision, wound bed, drain sites and surrounding tissue  - Provide patient and family education  - Perform skin care/dressing changes every shift  Outcome: Progressing     Problem: HEMATOLOGIC - ADULT  Goal: Maintains hematologic stability  Description: INTERVENTIONS  - Assess for signs and symptoms of bleeding or hemorrhage  - Monitor labs  - Administer supportive blood products/factors as ordered and appropriate  Outcome: Progressing     Problem: MUSCULOSKELETAL - ADULT  Goal: Maintain or return mobility to safest level of function  Description: INTERVENTIONS:  - Assess patient's ability to carry out ADLs; assess patient's baseline for ADL function and identify physical deficits which impact ability to perform ADLs (bathing, care of mouth/teeth, toileting, grooming, dressing, etc.)  - Assess/evaluate cause of self-care deficits   - Assess range of motion  - Assess patient's mobility  - Assess patient's need for assistive devices and provide as appropriate  - Encourage maximum independence but intervene and supervise when necessary  - Involve family in performance of ADLs  - Assess for home care needs following discharge   - Consider OT consult to assist with ADL evaluation and planning for discharge  - Provide patient education as appropriate  Outcome: Progressing  Goal: Maintain proper alignment of affected body part  Description: INTERVENTIONS:  - Support, maintain and protect limb and body alignment  - Provide patient/ family with appropriate education  Outcome: Progressing     Problem: Neurological Deficit  Goal: Neurological status is stable or improving  Description: Interventions:  - Monitor and assess patient's level of consciousness, motor function, sensory function, and level of assistance needed for ADLs.   - Monitor and report changes from baseline. Collaborate with interdisciplinary team to  initiate plan and implement interventions as ordered.   - Provide and maintain a safe environment.  - Consider seizure precautions.  - Consider fall precautions.  - Consider aspiration precautions.  - Consider bleeding precautions.  Outcome: Progressing     Problem: Activity Intolerance/Impaired Mobility  Goal: Mobility/activity is maintained at optimum level for patient  Description: Interventions:  - Assess and monitor patient  barriers to mobility and need for assistive/adaptive devices.  - Assess patient's emotional response to limitations.  - Collaborate with interdisciplinary team and initiate plans and interventions as ordered.  - Encourage independent activity per ability.  - Maintain proper body alignment.  - Perform active/passive rom as tolerated/ordered.  - Plan activities to conserve energy.  - Turn patient as appropriate  Outcome: Progressing     Problem: Communication Impairment  Goal: Ability to express needs and understand communication  Description: Assess patient's communication skills and ability to understand information.  Patient will demonstrate use of effective communication techniques, alternative methods of communication and understanding even if not able to speak.     - Encourage communication and provide alternate methods of communication as needed.  - Collaborate with case management/ for discharge needs.  - Include patient/family/caregiver in decisions related to communication.  Outcome: Progressing     Problem: Potential for Aspiration  Goal: Non-ventilated patient's risk of aspiration is minimized  Description: Assess and monitor vital signs, respiratory status, and labs (WBC).  Monitor for signs of aspiration (tachypnea, cough, rales, wheezing, cyanosis, fever).    - Assess and monitor patient's ability to swallow.  - Place patient up in chair to eat if possible.  - HOB up at 90 degrees to eat if unable to get patient up into chair.  - Supervise patient during oral  intake.   - Instruct patient/ family to take small bites.  - Instruct patient/ family to take small single sips when taking liquids.  - Follow patient-specific strategies generated by speech pathologist.  Outcome: Progressing     Problem: Nutrition  Goal: Nutrition/Hydration status is improving  Description: Monitor and assess patient's nutrition/hydration status for malnutrition (ex- brittle hair, bruises, dry skin, pale skin and conjunctiva, muscle wasting, smooth red tongue, and disorientation). Collaborate with interdisciplinary team and initiate plan and interventions as ordered.  Monitor patient's weight and dietary intake as ordered or per policy. Utilize nutrition screening tool and intervene per policy. Determine patient's food preferences and provide high-protein, high-caloric foods as appropriate.     - Assist patient with eating.  - Allow adequate time for meals.  - Encourage patient to take dietary supplement as ordered.  - Collaborate with clinical nutritionist.  - Include patient/family/caregiver in decisions related to nutrition.  Outcome: Progressing

## 2024-12-09 NOTE — PROGRESS NOTES
Progress Note - Critical Care/ICU   Name: Bren Traylor 69 y.o. female I MRN: [id number]  Unit/Bed#: ICU 11 I Date of Admission: 12/6/2024   Date of Service: 12/9/2024 I Hospital Day: 2      Assessment & Plan   Neuro:   Diagnosis: Ischemic stroke w/ Right MCA M1 occlusion s/p mechanical thrombectomy, right carotid stenosis s/p right carotid angioplasty and stenting POD #1, hemorrhagic conversion   12/7 Mechanical thrombectomy and right carotid angioplasty and stenting - TICI 3   12/8 MRI Brain wo contrast: Large acute parenchymal hemorrhage within the right basal ganglia similar in size to most recent CT scan. Mild surrounding edema and localized mass effect with approximately 5 mm of right to left shift. Adjacent acute ischemia is seen within the caudate head and body. There is hyperintense T2 signal within the distal cervical and intracranial internal carotid artery on the right suggesting slow flow or occlusion. The M1 segment and middle cerebral artery branches on the right demonstrate appropriate flow voids. Consider repeat CT angiography.  Plan:   Continue ASA 81 mg daily  Hypertonic saline discontinued  SBP goal 110-140  Neurochecks every hour in the daytime and every 2 hours at night  Neurology following, appreciate recommendations  Neurosurgery following, appreciate recommendations    CV:   Diagnosis: SBP goal 110-140 in the setting of IVH  Plan:   SBP goal <140  Patient on phenylephrine, wean as appropriate    Diagnosis: Hypertension, hyperlipidemia  Home medications: Losartan 50 mg daily, Lipitor 20 mg daily  LDL: 76  Plan:   Holding losartan, consider restarting today  Continuing Lipitor 40 mg daily, consider changing to Lipitor 20 mg daily    Pulm:  Diagnosis: Acute respiratory failure, resolved  Extubated on 12/8/2024  Plan:   SpO2 goal >92%  Airway precautions  Respiratory protocol    GI:   Diagnosis: No acute issues  Last BM: None documented  Plan:   MiraLAX 17 mg daily  Senokot 2 tablets  twice daily  Dulcolax suppository 10 mg daily as needed    :   Diagnosis: No issues  Plan:   Monitor I's and O's is appropriate    F/E/N:     F: None  E: Replete as appropriate  N: NPO, NGT in place, re-initiate tube feeds, speech therapy following    Heme/Onc:   Diagnosis: DVT prophylaxis  Plan:   Hold due to ICH    Endo:   Diagnosis: No issues  A1c: 5.3  Glucose: 108-141  Plan:   BG goal 140-180  Hypoglycemia protocol in place    ID:   Diagnosis: Leukocytosis, likely reactive  WBCs 16.01 --> 12.51  Plan:   Trend fever curve and WBCs    MSK/Skin:   Diagnosis: Ambulatory dysfunction  Plan:   PT/OT   Out of bed as appropriate    Disposition: Critical care    ICU Core Measures     A: Assess, Prevent, and Manage Pain Has pain been assessed? Yes  Need for changes to pain regimen? No   B: Both SAT/SAT  N/A   C: Choice of Sedation RASS Goal: 0 Alert and Calm  Need for changes to sedation or analgesia regimen? No   D: Delirium CAM-ICU: Negative   E: Early Mobility  Plan for early mobility? Yes   F: Family Engagement Plan for family engagement today? Yes       Review of Invasive Devices:      Central access plan: Hemodynamic monitoring  Qi Plan: Keep arterial line for hemodynamic monitoring    Prophylaxis:  VTE Contraindicated secondary to: ICH   Stress Ulcer  not ordered         24 Hour Events : No events overnight.     Subjective     Patient extremely sleepy this morning and not really waking up despite holding open her eyes.     Review of Systems: Review of Systems not obtainable due to patient sleeping and not really waking up.    Objective :                   Vitals I/O      Most Recent Min/Max in 24hrs   Temp 99.2 °F (37.3 °C) Temp  Min: 98.7 °F (37.1 °C)  Max: 99.6 °F (37.6 °C)   Pulse (!) 54 Pulse  Min: 54  Max: 110   Resp (!) 26 Resp  Min: 14  Max: 35   /58 BP  Min: 99/53  Max: 160/88   O2 Sat 100 % SpO2  Min: 93 %  Max: 100 %      Intake/Output Summary (Last 24 hours) at 12/9/2024 3542  Last data filed  at 12/9/2024 0600  Gross per 24 hour   Intake 1040.57 ml   Output 290 ml   Net 750.57 ml       Diet NPO    Invasive Monitoring   Arterial Line  Qi /46  Arterial Line BP  Min: 84/80  Max: 156/50   MAP 72 mmHg  Arterial Line MAP (mmHg)  Min: 60 mmHg  Max: 100 mmHg           Physical Exam   Physical Exam  Vitals reviewed.   Eyes:      Conjunctiva/sclera: Conjunctivae normal.      Pupils: Pupils are equal, round, and reactive to light.   Skin:     General: Skin is warm.   HENT:      Head: Normocephalic and atraumatic.      Mouth/Throat:      Mouth: Mucous membranes are moist.   Cardiovascular:      Rate and Rhythm: Normal rate and regular rhythm.   Abdominal: General: Bowel sounds are normal.      Palpations: Abdomen is soft.      Tenderness: There is no abdominal tenderness.   Constitutional:       Interventions: She is sedated.   Pulmonary:      Effort: Pulmonary effort is normal.      Breath sounds: Normal breath sounds.   Neurological:      Comments: PERRL, EOM unable to be tested, L facial droop appreciated, tongue midline. Patient able to squeeze R hand and wiggle toes on R foot. No movement to painful stimulus in the LUE/LLE.           Diagnostic Studies        Lab Results: I have reviewed the following results:     Medications:  Scheduled PRN   aspirin, 81 mg, Daily  atorvastatin, 40 mg, QPM  chlorhexidine, 15 mL, Q12H DANIS  levETIRAcetam, 500 mg, Q12H DANIS  polyethylene glycol, 17 g, Daily  senna-docusate sodium, 2 tablet, BID  sertraline, 50 mg, Daily      acetaminophen, 650 mg, Q6H PRN  bisacodyl, 10 mg, Daily PRN  busPIRone, 7.5 mg, BID PRN  clonazePAM, 0.5 mg, Daily PRN  fentaNYL, 25 mcg, Q2H PRN  hydrALAZINE, 5 mg, Q4H PRN  labetalol, 10 mg, Q4H PRN       Continuous    dexmedetomidine, 0.1-0.7 mcg/kg/hr, Last Rate: 0.7 mcg/kg/hr (12/09/24 0541)  phenylephine,  mcg/min, Last Rate: 60 mcg/min (12/09/24 0613)         Labs:   CBC    Recent Labs     12/08/24 0511 12/09/24 0527   WBC 16.02* 12.51*    HGB 9.7* 8.7*   HCT 29.6* 27.0*    146*     BMP    Recent Labs     12/08/24  2356 12/09/24  0527   SODIUM 155* 157*   K 3.8 3.6   * 126*   CO2 22 24   AGAP 6 7   BUN 15 16   CREATININE 0.49* 0.41*   CALCIUM 8.3* 8.8       Coags    No recent results     Additional Electrolytes  Recent Labs     12/08/24  0511 12/09/24  0527   MG 2.3 2.1   PHOS 2.3 2.9   CAIONIZED  --  1.16          Blood Gas    No recent results  No recent results LFTs  No recent results    Infectious  No recent results  Glucose  Recent Labs     12/08/24  1145 12/08/24  1748 12/08/24  2356 12/09/24  0527   GLUC 125 134 131 126

## 2024-12-10 PROBLEM — I26.99 ACUTE PULMONARY EMBOLISM (HCC): Status: ACTIVE | Noted: 2024-12-10

## 2024-12-10 NOTE — RESPIRATORY THERAPY NOTE
Resp care   12/10/24 7547   Respiratory Assessment   Resp Comments Pt has i/e wheezing before and after udn tx. CXR this am. Oral sx for small amt old blood from back of throat. Pt has dry cough. Cont udn and cpt.   Oral Suctioning/Secretions   Suction Type Oral   Suction Device Catheter   Secretion Amount Small   Secretion Color Hemoptysis

## 2024-12-10 NOTE — PROGRESS NOTES
Vancomycin IV Pharmacy-to-Dose Consultation    Bren Traylor is a 69 y.o. female who is currently receiving Vancomycin IV with management by the Pharmacy Consult service.    Relevant clinical data and objective / subjective history reviewed.      Vancomycin Assessment:  Indication: Pneumonia (goal -600, trough >10), Bacteremia (goal -600, trough >10)    Status: critically ill  Micro:   - NGTD  Procalcitonin: 0.1 on 12/9  Renal Function:     Lab Results   Component Value Date    CREATININE 0.65 12/10/2024     Estimated Creatinine Clearance: 72.5 mL/min (by C-G formula based on SCr of 0.65 mg/dL).  Dialysis: no  Days of Therapy: 2  Current Dose: 750 mg IV q12h   Goal AUC / Trough: -600, trough >10   Last Level: 9.0 on 12/10  Model Fit:  Good  Assessment: WBC elevated, afebrile.       Vancomycin Plan:  New Dosing: change to 1000 mg IV q12h   Predicted Trough / AUC: 12.6 / 508  Next Level: on 12/17 at 0600  Renal Function Monitoring: Daily BMP and UOP      Pharmacy will continue to follow closely for s/sx of nephrotoxicity, infusion reactions and appropriateness of therapy.  BMP and CBC will be ordered per protocol. We will continue to follow the patient’s culture results and clinical progress daily.       Josesito Corrales, PharmD, BCCCP  Critical Care Clinical Pharmacist  Available via Secure Chat

## 2024-12-10 NOTE — PROGRESS NOTES
Progress Note - Critical Care/ICU   Name: Bren Traylor 69 y.o. female I MRN: [id number]  Unit/Bed#: ICU 11 I Date of Admission: 12/6/2024   Date of Service: 12/10/2024 I Hospital Day: 3      Assessment & Plan   Neuro:   Diagnosis: Ischemic stroke w/ Right MCA M1 occlusion s/p mechanical thrombectomy, right carotid stenosis s/p right carotid angioplasty and stenting, hemorrhagic conversion   12/7 Mechanical thrombectomy and right carotid angioplasty and stenting - TICI 3   12/8 MRI Brain wo contrast: Large acute parenchymal hemorrhage within the right basal ganglia similar in size to most recent CT scan. Mild surrounding edema and localized mass effect with approximately 5 mm of right to left shift. Adjacent acute ischemia is seen within the caudate head and body. There is hyperintense T2 signal within the distal cervical and intracranial internal carotid artery on the right suggesting slow flow or occlusion. The M1 segment and middle cerebral artery branches on the right demonstrate appropriate flow voids. Consider repeat CT angiography.  12/9 CTH: Stable acute hemorrhage and residual contrast staining centered within the MCA territory infarct. Stable right to left midline shift of 5 mm.  12/10 CTA H/N: Stable, pending final read  Plan:   Continue ASA 81 mg daily  Hypertonic saline bolus given overnight  SBP goal 110-140  Sedation: Patient on Precedex @0.7  Neurochecks every hour in the daytime and every 2 hours at night  Neurology following, appreciate recommendations  Neurosurgery following, appreciate recommendations    CV:   Diagnosis: SBP goal 110-140 in the setting of IVH   Plan:   SBP goal <140   Patient on Levophed @6, wean as appropriate    Pulm:  Diagnosis: Acute respiratory failure, resolved   Extubated on 12/8/2024  Chest x-ray 12/9/24: Congestive heart failure  Chest x-ray 12/10/24: Vascular congestion possibly, pending final read  Plan:   SpO2 goal >92%  Airway precautions  Respiratory  protocol    GI:   Diagnosis: No acute issues   Last BM: 12/10/24  Plan:   MiraLAX 17 mg daily  Senokot 2 tablets twice daily  Dulcolax suppository 10 mg daily as needed    :   Diagnosis: No issues   Plan:   Monitor I's and O's is appropriate     F/E/N:     F: None  E: Replete as appropriate  N: Tube feeds started, speech therapy following    Heme/Onc:   Diagnosis: DVT prophylaxis   Plan:   Hold due to ICH     Endo:   Diagnosis: No issues   A1c: 5.3  Glucose:  108-203  Plan:   BG goal 140-180  Hypoglycemia protocol in place    ID:   Diagnosis: Leukocytosis  WBCs: 12.51 --> 18.56  COVID-negative  Blood cultures x 2 pending  MRSA culture pending  Plan:   Trend fever curve and monitor WBCs  Patient started on bank/Zosyn on 12/9/2024    MSK/Skin:   Diagnosis: Ambulatory dysfunction  Plan:   PT/OT  Out of bed as appropriate    Disposition: Critical care    ICU Core Measures     A: Assess, Prevent, and Manage Pain Has pain been assessed? Yes  Need for changes to pain regimen? No   B: Both SAT/SAT  N/A   C: Choice of Sedation RASS Goal: 0 Alert and Calm  Need for changes to sedation or analgesia regimen? No   D: Delirium CAM-ICU: Negative   E: Early Mobility  Plan for early mobility? Yes   F: Family Engagement Plan for family engagement today? Yes       Antibiotic Review: Patient on appropriate coverage based on culture data.     Review of Invasive Devices:      Central access plan: Hemodynamic monitoring  [place] Plan: Keep arterial line for frequent ABGs and frequent labs    Prophylaxis:  VTE Contraindicated secondary to: ICH   Stress Ulcer  not ordered         24 Hour Events : Patient CT had overnight was stable.  Patient did receive a push of hypertonic saline and was initiated on hypertonic infusion.  Patient was also initiated on antibiotics.  Patient's chest x-ray looks volume overloaded but patient did not receive any diuretics overnight.  Patient has new labs pending overnight along with a CTA H/N which was just  completed this morning, pending final read.    Subjective     Patient following minimal commands and she looks uncomfortable.    Review of Systems: Unable to be obtained given patient's clinical condition    Objective :                   Vitals I/O      Most Recent Min/Max in 24hrs   Temp 99 °F (37.2 °C) Temp  Min: 99 °F (37.2 °C)  Max: 102.3 °F (39.1 °C)   Pulse 82 Pulse  Min: 50  Max: 96   Resp (!) 35 Resp  Min: 19  Max: 42   /77 BP  Min: 78/38  Max: 155/77   O2 Sat 97 % SpO2  Min: 96 %  Max: 100 %      Intake/Output Summary (Last 24 hours) at 12/10/2024 0814  Last data filed at 12/10/2024 0800  Gross per 24 hour   Intake 4501.28 ml   Output --   Net 4501.28 ml       Diet Enteral/Parenteral; Tube Feeding No Oral Diet; Jevity 1.2 Navarro; Continuous; 50; 100; Water; Every 4 hours    Invasive Monitoring   Arterial Line  Qi /62  Arterial Line BP  Min: 56/42  Max: 222/218   MAP 96 mmHg  Arterial Line MAP (mmHg)  Min: 2 mmHg  Max: 220 mmHg           Physical Exam   Physical Exam  Vitals reviewed.   Eyes:      Conjunctiva/sclera: Conjunctivae normal.      Pupils: Pupils are equal, round, and reactive to light.   HENT:      Head: Normocephalic and atraumatic.   Cardiovascular:      Rate and Rhythm: Normal rate and regular rhythm.   Abdominal: General: Bowel sounds are normal.      Palpations: Abdomen is soft.      Tenderness: There is no abdominal tenderness.   Constitutional:       Appearance: She is ill-appearing.   Pulmonary:      Effort: Tachypnea and respiratory distress present.      Breath sounds: Wheezing present.   Neurological:      Mental Status: She is lethargic.      Cranial Nerves: Facial asymmetry (L facial droop) present.      Motor: Weakness.      Comments: PERRL, facial asymmetry appreciated. Patient moving RUE/RLE spontaneously and moving LUE wtih painful stimulus. No movement in LLE with painful stimulus.           Diagnostic Studies        Lab Results: I have reviewed the following  results:     Medications:  Scheduled PRN   aspirin, 81 mg, Daily  atorvastatin, 20 mg, QPM  chlorhexidine, 15 mL, Q12H DANIS  ipratropium, 0.5 mg, Q6H  levalbuterol, 1.25 mg, Q6H  levETIRAcetam, 500 mg, Q12H DANIS  piperacillin-tazobactam, 4.5 g, Q8H  polyethylene glycol, 17 g, Daily  QUEtiapine, 25 mg, BID  senna-docusate sodium, 2 tablet, BID  sertraline, 50 mg, Daily  vancomycin, 750 mg, Q12H      acetaminophen, 650 mg, Q6H PRN  bisacodyl, 10 mg, Daily PRN       Continuous    dexmedetomidine, 0.1-0.7 mcg/kg/hr, Last Rate: 0.7 mcg/kg/hr (12/10/24 0447)  norepinephrine, 1-30 mcg/min, Last Rate: 6 mcg/min (12/10/24 0600)         Labs:   CBC    Recent Labs     12/09/24  0527 12/10/24  0457   WBC 12.51* 18.56*   HGB 8.7* 7.9*   HCT 27.0* 24.4*   * 159     BMP    Recent Labs     12/10/24  0028 12/10/24  0457   SODIUM 160* 157*   K 3.6 3.6   * 130*   CO2 19* 18*   AGAP 9 9   BUN 24 22   CREATININE 0.66 0.61   CALCIUM 8.2* 8.0*       Coags    No recent results     Additional Electrolytes  Recent Labs     12/09/24  0527 12/10/24  0457   MG 2.1 1.9   PHOS 2.9 2.9   CAIONIZED 1.16  --           Blood Gas    Recent Labs     12/10/24  0456   PHART 7.434   MWV5ULU 26.9*   PO2ART 85.1   NFY3CRA 17.6*   BEART -5.7   SOURCE Line, Arterial     Recent Labs     12/10/24  0456   SOURCE Line, Arterial    LFTs  Recent Labs     12/10/24  0457   *   *   ALKPHOS 108*   ALB 3.3*   TBILI 0.78       Infectious  Recent Labs     12/09/24  1612   PROCALCITONI 0.10     Glucose  Recent Labs     12/09/24  1745 12/09/24  2204 12/10/24  0028 12/10/24  0457   GLUC 165* 187* 179* 203*

## 2024-12-10 NOTE — TELEMEDICINE
e-Consult (IPC)  - Interventional Radiology  Bren Traylor 69 y.o. female MRN: [id number]  Unit/Bed#: ICU 11 Encounter: [id number]          Interventional Radiology has been consulted to evaluate Bren Traylor    We were consulted by critical care concerning this patient with small pulmonary embolus.    Inpatient Consult to IR  Consult performed by: Maryanne Bishop PA-C  Consult ordered by: Corbin Zimmerman DO        12/10/24    Assessment/Recommendation:     69 year old female presenting with ischemic stroke status post mechanical thrombectomy, right carotid stenosis status post stent, hemorrhagic conversion.  CTA head and neck 12/10 revealed small filling defect of one of the left upper lobe pulmonary artery branches suggesting small pulmonary embolus.  IR consulted for PE thrombectomy. BNP 1,164. Currently 99% on 6L, respirations 37    -Discussed with attending and reviewed imaging.  Patient with very small single subsegmental PE to left upper lobe. Unable to fully view right pulmonary artery from CTA head and neck but area viewed does not have notable PE. If area below was able to be seen, likely would not be large volume but unable to say for certainty. Patient currently unable to undergo CTA chest PE study due to agitation per primary team  - would suggest bilateral lower extremity DVT US. If patient were to have appropriate clot burden, could potentially place IVC filter if patient is unable to be anticoagulated   - please reach out to IR if patient were to decompensate. Would likely need CTA chest PE study before PE intervention     21-30 minutes, >50% of the total time devoted to medical consultative verbal/EMR discussion between providers. Written report will be generated in the EMR.     Thank you for allowing Interventional Radiology to participate in the care of Bren Traylor. Please don't hesitate to call or TigerText us with any questions.     Maryanne Bishop,  REMIGIO

## 2024-12-10 NOTE — PROGRESS NOTES
Bren Traylor is a 69 y.o. female who is currently ordered Vancomycin IV with management by the Pharmacy Consult service.  Relevant clinical data and objective / subjective history reviewed.  Vancomycin Assessment:  Indication and Goal AUC/Trough: Pneumonia (goal -600, trough >10); Bacteremia (goal -600, trough >10)  Clinical Status: stable  Micro:     Renal Function:  SCr: 0.62 mg/dL  CrCl: 76 mL/min  Renal replacement: Not on dialysis  Days of Therapy: 1  Current Dose: 750mg IV Q 12H  Vancomycin Plan:  New Dosinmg Loading dose. Then 750mg IV Q 12H  Estimated AUC: 431 mcg*hr/mL  Estimated Trough: 11.2 mcg/mL  Next Level: 12/10 with AM labs  Renal Function Monitoring: Daily BMP and UOP  Pharmacy will continue to follow closely for s/sx of nephrotoxicity, infusion reactions and appropriateness of therapy.  BMP and CBC will be ordered per protocol. We will continue to follow the patient’s culture results and clinical progress daily.    Oziel Thao, Pharmacist

## 2024-12-10 NOTE — SPEECH THERAPY NOTE
Returned this afternoon to see pt for dysphagia tx. She is currently off the floor. ST to follow up as able/appropriate

## 2024-12-10 NOTE — PROGRESS NOTES
The patient is being transported to the Interventional Radiology department for a PICC line repositioning procedure.

## 2024-12-10 NOTE — PROCEDURES
Venous Access Line Insertion    Date/Time: 12/10/2024 4:00 PM    Performed by: Gloria Rodriguez  Authorized by: Bonita Santamaria MD    Patient location:  IR  Other Assisting Provider: Yes (comment)    Consent:     Consent obtained:  Written (consent obtained by provider)    Consent given by:  Healthcare agent (discussed wth provider)    Risks discussed:  Arterial puncture, incorrect placement, nerve damage, pneumothorax, infection and bleeding (discussed with provider)    Alternatives discussed:  No treatment and delayed treatment (consent obtained by provider)  Universal protocol:     Procedure explained and questions answered to patient or proxy's satisfaction: yes      Immediately prior to procedure, a time out was called: yes      Relevant documents present and verified: yes      Test results available and properly labeled: yes      Radiology Images displayed and confirmed.  If images not available, report reviewed: yes      Required blood products, implants, devices, and special equipment available: yes      Site/side marked: yes      Patient identity confirmed:  Provided demographic data, hospital-assigned identification number, verbally with patient and arm band  Pre-procedure details:     Hand hygiene: Hand hygiene performed prior to insertion      Sterile barrier technique: All elements of maximal sterile technique followed      Skin preparation:  ChloraPrep    Skin preparation agent: Skin preparation agent completely dried prior to procedure    Procedure details:     Complex Venous Access Line Type: PICC      Complex Venous Access Line Indications: medications requiring central line      Catheter tip vessel location: atriocaval junction      Orientation:  Right    Procedural supplies:  Single lumen    Catheter size:  4 Fr (LOT [id number], expir 4/30/27)    Total catheter length (cm):  42    Catheter out on skin (cm):  0    Max flow rate:  999    Arm circumference:  24    Patient evaluated for  contraindications to access (i.e. fistula, thrombosis, etc): Yes      Site selection rationale:  Largest most patent vessel    Approach: percutaneous technique used      Patient position:  Flat    Ultrasound image availability:  Images available in PACS    Sterile ultrasound techniques: Sterile gel and sterile probe covers were used      Number of attempts:  1    Successful placement: yes      Landmarks identified: yes      Cath access vessel: placed under fluoro.  Anesthesia (see MAR for exact dosages):     Anesthesia method:  Local infiltration    Local anesthetic:  Lidocaine 1% w/o epi    Sedation type:  Other (comment) (none required, pt tolerated procedure)  Post-procedure details:     Post-procedure:  Dressing applied and securement device placed    Assessment:  Blood return through all ports, no pneumothorax on x-ray, free fluid flow and placement verified by x-ray    Post-procedure complications: none      Patient tolerance of procedure:  Tolerated well, no immediate complications    Observer: Yes (NOEMI CORONEL)      Observer name:  NOEMI CORONEL

## 2024-12-10 NOTE — PROGRESS NOTES
Progress Note - Neurosurgery   Name: Bren Traylor 69 y.o. female I MRN: [id number]  Unit/Bed#: ICU 11 I Date of Admission: 12/6/2024   Date of Service: 12/10/2024 I Hospital Day: 3    Assessment & Plan  Cerebrovascular accident (CVA) due to embolism of right middle cerebral artery (HCC)  PPD#4 R M1 mechanical thrombectomy (TICI 3) and R ICA stenting with Dr. 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  Exam change on 12/8 and noted on CT head with acute BG hemorrhage with effacement of R lateral ventricle.  Noted 12/10 with interval occlusion of R ICA stent.    Imaging:  CTA head w/wo, 12/10/24: CT Brain: Stable large intraparenchymal hemorrhage centered within the right basal ganglia with surrounding vasogenic edema and localized mass effect including approximately 5 mm of right to left shift. Early subacute ischemia within the right caudate head and body extending into the corona radiata. 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 The vessel does reconstitute intracranially at the level of the clinoid segment but is significantly diminished in caliber as it extends to the ICA terminus. The right M1 segment is patent as are the M2 and M3 branches. However, the M1 segment is diminished in caliber compared to the opposite left hemisphere and compared to the prior examination. Stable moderate stenosis of the right subclavian origin from the brachiocephalic and left distal common carotid artery and proximal cervical internal carotid artery. Other findings: Interval development of large right and moderate left posterior layering pleural effusions within the lung apices. Suspected small filling defect identified within one of the left upper lobe pulmonary artery branches seen on series 303 image 25 suggesting small pulmonary embolus.     Plan:  Continue to monitor neuro exam closely.  STAT CTA head with decline in  GCS > 2 pts in 1 hour.  Continue ASA 81 mg daily.  Maintain SBP <120.  Would not start anticoagulation therapy for acute PE.  Mobilize as tolerated.  DVT ppx: SCDs, holding pharmacologic DVT ppx in setting of acute hemorrhage.    Neurosurgery will continue to follow. Call with questions.  Acute pulmonary embolism (HCC)  Small central upper lobe PE  Defer anticoagulation at this time.    I have discussed the above management plan in detail with the primary service.   Neurosurgery service will follow.  Please contact the SecureChat role for the Neurosurgery service with any questions/concerns.    Subjective   Moaning and appears dyspneic.  Moving RUE/RLE spontaneously.  L facial droop.  L side plegic.  R groin access site clean and dry, no ecchymosis or edema.    Objective :  Temp:  [99 °F (37.2 °C)-102.3 °F (39.1 °C)] 99 °F (37.2 °C)  HR:  [50-96] 58  BP: ()/(38-83) 84/39  Resp:  [19-42] 30  SpO2:  [92 %-100 %] 93 %  O2 Device: Nasal cannula  Nasal Cannula O2 Flow Rate (L/min):  [3 L/min] 3 L/min    I/O         12/08 0701  12/09 0700 12/09 0701  12/10 0700 12/10 0701  12/11 0700    I.V. (mL/kg) 880.6 (15.8) 1415.7 (25.4) 176.3 (3.2)    NG/GT 60 1035     IV Piggyback 100 1600     Feedings  226 100    Total Intake(mL/kg) 1040.6 (18.6) 4276.7 (76.6) 276.3 (5)    Urine (mL/kg/hr) 290 (0.2)      Emesis/NG output       Total Output 290      Net +750.6 +4276.7 +276.3           Unmeasured Urine Occurrence 4 x 5 x     Unmeasured Stool Occurrence  2 x           Physical Exam  Constitutional:       General: She is awake.      Appearance: She is ill-appearing.   Eyes:      Pupils: Pupils are equal, round, and reactive to light.   Pulmonary:      Effort: Respiratory distress present.      Breath sounds: Rhonchi present.      Comments: tachypneic     Neurological Exam  Mental Status  Awake.  Does not follow commands.    Cranial Nerves  CN III, IV, VI: Pupils equal round and reactive to light bilaterally.  CN VII:  Left:  There is central facial weakness.    Motor    Moves right side to command, left side plegic.        Lab Results: I have reviewed the following results:  Recent Labs     12/09/24  0527 12/09/24  1612 12/09/24  1745 12/10/24  0457   WBC 12.51*  --   --  18.56*   HGB 8.7*  --   --  7.9*   HCT 27.0*  --   --  24.4*   *  --   --  159   SODIUM 157*  --    < > 157*   K 3.6  --    < > 3.6   *  --    < > 130*   CO2 24  --    < > 18*   BUN 16  --    < > 22   CREATININE 0.41*  --    < > 0.61   GLUC 126  --    < > 203*   CAIONIZED 1.16  --   --   --    MG 2.1  --   --  1.9   PHOS 2.9  --   --  2.9   AST  --   --   --  189*   ALT  --   --   --  277*   ALB  --   --   --  3.3*   TBILI  --   --   --  0.78   ALKPHOS  --   --   --  108*   BNP  --   --   --  1,164*   LACTICACID  --  1.6  --   --     < > = values in this interval not displayed.       Imaging Results Review: I personally reviewed the following image studies in PACS and associated radiology reports: CT head. My interpretation of the radiology images/reports is: as above.  Other Study Results Review: No additional pertinent studies reviewed.    VTE Pharmacologic Prophylaxis: VTE covered by:  heparin (porcine), Subcutaneous    and Sequential compression device (Venodyne)

## 2024-12-10 NOTE — PROGRESS NOTES
The patient is now being transported to the CT Scan department to have a CTA of the Chest done/ PE Study done.

## 2024-12-10 NOTE — ASSESSMENT & PLAN NOTE
PPD#4 R M1 mechanical thrombectomy (TICI 3) and R ICA stenting with Dr. 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  Exam change on 12/8 and noted on CT head with acute BG hemorrhage with effacement of R lateral ventricle.  Noted 12/10 with interval occlusion of R ICA stent.    Imaging:  CTA head w/wo, 12/10/24: CT Brain: Stable large intraparenchymal hemorrhage centered within the right basal ganglia with surrounding vasogenic edema and localized mass effect including approximately 5 mm of right to left shift. Early subacute ischemia within the right caudate head and body extending into the corona radiata. 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 The vessel does reconstitute intracranially at the level of the clinoid segment but is significantly diminished in caliber as it extends to the ICA terminus. The right M1 segment is patent as are the M2 and M3 branches. However, the M1 segment is diminished in caliber compared to the opposite left hemisphere and compared to the prior examination. Stable moderate stenosis of the right subclavian origin from the brachiocephalic and left distal common carotid artery and proximal cervical internal carotid artery. Other findings: Interval development of large right and moderate left posterior layering pleural effusions within the lung apices. Suspected small filling defect identified within one of the left upper lobe pulmonary artery branches seen on series 303 image 25 suggesting small pulmonary embolus.     Plan:  Continue to monitor neuro exam closely.  STAT CTA head with decline in GCS > 2 pts in 1 hour.  Continue ASA 81 mg daily.  Maintain SBP <120.  Would not start anticoagulation therapy for acute PE.  Mobilize as tolerated.  DVT ppx: SCDs, holding pharmacologic DVT ppx in setting of acute hemorrhage.    Neurosurgery will continue to follow. Call with questions.

## 2024-12-10 NOTE — CONSULTS
Increase goal to Jevity 1.2 at 55mL/hr, 2 packets prosource no carb daily, 100 mL water every 4 hours.   Recommended regimen will provide 1320 mL Jevity 1.2 (1584kcal), total of 1704 kcal, 103 g protein, 1665 mL water daily(30mL/kg) via NG.   Monitor need to adjust water flushes.

## 2024-12-10 NOTE — SEDATION DOCUMENTATION
PICC line repo/placement performed by Dr Cervantes/DIAZ RodriguezRT without complication. Patient tolerated well. See procedure note for details. Report provided to primary nurse D[place], patient transported out of IR by primary nurse and ICU staff.

## 2024-12-10 NOTE — CASE MANAGEMENT
Case Management Discharge Planning Note    Patient name Bren Traylor  Location ICU 11/ICU 11 MRN [id number]  : 1955 Date 12/10/2024       Current Admission Date: 2024  Current Admission Diagnosis:Cerebrovascular accident (CVA) due to embolism of right middle cerebral artery (HCC)   Patient Active Problem List    Diagnosis Date Noted Date Diagnosed    Acute pulmonary embolism (HCC) 12/10/2024     Cerebrovascular accident (CVA) due to embolism of right middle cerebral artery (HCC) 2024       LOS (days): 3  Geometric Mean LOS (GMLOS) (days):   Days to GMLOS:     OBJECTIVE:  Risk of Unplanned Readmission Score: 17.22         Current admission status: Inpatient   Preferred Pharmacy:   [id number] [place] - [address]  [place] [address]  Phone: [phone] Fax: [phone]    Primary Care Provider: Margarita Pedersen MD    Primary Insurance: [facility]  Secondary Insurance:     DISCHARGE DETAILS:    Discharge planning discussed with:: Son, Severin  Freedom of Choice: Yes  Comments - Freedom of Choice: Discussed FOC  CM contacted family/caregiver?: Yes (son, Severin)  Were Treatment Team discharge recommendations reviewed with patient/caregiver?: Yes  Did patient/caregiver verbalize understanding of patient care needs?: N/A- going to facility  Were patient/caregiver advised of the risks associated with not following Treatment Team discharge recommendations?: Yes    Other Referral/Resources/Interventions Provided:  Interventions: Short Term Rehab  Referral Comments: This CM called son, Severin to discuss therapy recommendations.  This CM explained different levels of STR, would like referral to  [facility] (as it is closer to family and patient's home), referral entered in [facility]

## 2024-12-10 NOTE — SPEECH THERAPY NOTE
Attempted to see pt this morning for dysphagia tx, determine if pt may be appropriate for VBS/MBS. Spoke with RN. Pt is going to CT scan shortly, and is currently lethargic. NGT in place for nutrition. ST to follow later as able.

## 2024-12-11 PROBLEM — G93.40 ENCEPHALOPATHY: Status: ACTIVE | Noted: 2024-12-11

## 2024-12-11 PROBLEM — I42.9 CARDIOMYOPATHY (HCC): Status: ACTIVE | Noted: 2024-12-11

## 2024-12-11 PROBLEM — R79.89 ELEVATED TROPONIN: Status: ACTIVE | Noted: 2024-12-11

## 2024-12-11 PROBLEM — I48.0 PAROXYSMAL ATRIAL FIBRILLATION (HCC): Status: ACTIVE | Noted: 2024-12-11

## 2024-12-11 PROBLEM — E78.5 HYPERLIPIDEMIA: Chronic | Status: ACTIVE | Noted: 2024-12-11

## 2024-12-11 PROBLEM — I65.29 CAROTID ARTERY STENOSIS: Status: ACTIVE | Noted: 2024-12-11

## 2024-12-11 PROBLEM — I10 HYPERTENSION: Chronic | Status: ACTIVE | Noted: 2024-12-11

## 2024-12-11 PROBLEM — I95.9 HYPOTENSION: Status: ACTIVE | Noted: 2024-12-11

## 2024-12-11 NOTE — ASSESSMENT & PLAN NOTE
Troponin 140--> 213--> 250--> 169--> 1164--> 5343--> 5239--> 6084 in the setting of acute CVA and bilateral PEs  EKG with sinus rhythm and lateral ST-T wave abnormality  EF 50-55% with RWMA  In 10/2024 patient had reported exertional chest pain and dyspnea over the last month with some exercise intolerance and therefore a echocardiogram and stress test was ordered at that time however never completed   CTA reviewed with significant coronary calcifications

## 2024-12-11 NOTE — ASSESSMENT & PLAN NOTE
Presented 12/6 with left-sided weakness, slurred speech and facial droop  CTH revealed infarction in the right lentiform nucleus with mild cytotoxic edema with hyperdensity in the right M1 segment suggesting M1 thrombosis.    CTA of the head revealed acute thrombosis in the mid-distal right M1 segment with severe near occlusion of the proximal BILL with severe tandem stenosis intracranially and high-grade stenosis of the last distal common carotid artery, moderate high-grade stenosis of the right subclavian origin.   Underwent successful mechanical thrombectomy and right carotid angioplasty/stenting and placed on Integrilin.   12/7 CTA with hemorrhagic conversion

## 2024-12-11 NOTE — PHYSICAL THERAPY NOTE
Physical Therapy Cancellation Note           12/11/24 0900   PT Last Visit   PT Visit Date 12/11/24   Note Type   Note Type Cancelled Session   Cancel Reasons Medical status  (on Bipap/ receiving blood transfusion- defer per RN)      CHART REVIEW COMPLETED. PT NOT APPROPRIATE FOR PARTICIPATION IN PT AT THIS TIME 2* MEDICAL STATUS. WILL CONTINUE TO FOLLOW ON [facility]EL[facility].   Terese Henao, PT

## 2024-12-11 NOTE — ASSESSMENT & PLAN NOTE
EKG on admission revealed sinus rhythm  EKG 12/8 noted to have A-fib with RVR at 113 with ST-T wave abnormality  [id number]: Not currently on anticoagulation in the setting of CVA with hemorrhagic conversion

## 2024-12-11 NOTE — RESPIRATORY THERAPY NOTE
Resp care   12/11/24 0756   Respiratory Assessment   Assessment Type Assess only   General Appearance Sleeping   Respiratory Pattern Tachypneic   Chest Assessment Chest expansion symmetrical   Bilateral Breath Sounds Diminished   Resp Comments Pt changed from CPAP to BIPAP at this time for increase WOB. Resident aware. UDN given. BS diminished, pt looks more comfortable on 10/5 40% at this time.   Non-Invasive Information   O2 Interface Device Face mask   Non-Invasive Ventilation Mode BiPAP   $ Pulse Oximetry Spot Check Charge Completed   Non-Invasive Settings   IPAP (cm) 10 cm   EPAP (cm) 5 cm   Rate (Set) 12   FiO2 (%) 40   Trigger Sensitivity Flow (lpm) 3   Non-Invasive Readings   Total Rate 40   MAP (Obs) 7   Spontaneous MV (mL) 19.4   Spontaneous Vt (mL) 470   I/E Ratio (Obs) 1/2   Leak (lpm) 19   Skin Intervention Skin intact   Non-Invasive Alarms   Insp Pressure High (cm H20) 40   Insp Pressure Low (cm H20) 5   Low Insp Pressure Time (sec) 20 sec   MV High (L/min) 25   MV Low (L/min) 2   Vt High (mL) 1300   Vt Low (mL) 200   High Resp Rate (BPM) 50 BPM   Low Resp Rate (BPM) 8 BPM   Apnea Interval (sec) 20

## 2024-12-11 NOTE — ASSESSMENT & PLAN NOTE
Echocardiogram 12/10/2024: EF 50-55% with basal inferior, basal inferolateral, mid inferior and mid inferolateral hypokinesis, normal RV function, moderate MR, moderate TR.  When compared to echocardiogram from 12/8/2024 EF was 45-50% with inferolateral akinesis with moderate-severe MR.  CXR 12/10: Persistent pulmonary edema and pleural effusions  BNP 1164  Received 2 doses of IV Lasix 40 mg yesterday.  Output 24 hours -474 mL.  Net + 6.5 L  Plan for thoracentesis

## 2024-12-11 NOTE — CONSULTS
Consultation - Cardiology Team One  Bren Traylor 69 y.o. female MRN: [id number]  Unit/Bed#: ICU 11 Encounter: [id number]    Inpatient consult to Cardiology  Consult performed by: Stella Cruz PA-C  Consult ordered by: Corbin Zimmerman DO          Physician Requesting Consult: Bonita Santamaria MD  Reason for Consult / Principal Problem: Elevated troponin, PAF    Assessment & Plan  Paroxysmal atrial fibrillation (HCC)  EKG on admission revealed sinus rhythm  EKG 12/8 noted to have A-fib with RVR at 113 with ST-T wave abnormality  [id number]: Not currently on anticoagulation in the setting of CVA with hemorrhagic conversion  Elevated troponin  Troponin 140--> 213--> 250--> 169--> 1164--> 5343--> 5149--> 6084 in the setting of acute CVA and bilateral PEs  EKG with sinus rhythm and lateral ST-T wave abnormality  EF 50-55% with RWMA  In 10/2024 patient had reported exertional chest pain and dyspnea over the last month with some exercise intolerance and therefore a echocardiogram and stress test was ordered at that time however never completed   CTA reviewed with significant coronary calcifications  Cardiomyopathy (HCC)  Echocardiogram 12/10/2024: EF 50-55% with basal inferior, basal inferolateral, mid inferior and mid inferolateral hypokinesis, normal RV function, moderate MR, moderate TR.  When compared to echocardiogram from 12/8/2024 EF was 45-50% with inferolateral akinesis with moderate-severe MR.  CXR 12/10: Persistent pulmonary edema and pleural effusions  BNP 1164  Received 2 doses of IV Lasix 40 mg yesterday.  Output 24 hours -474 mL.  Net + 6.5 L  Plan for thoracentesis  Cerebrovascular accident (CVA) due to embolism of right middle cerebral artery (HCC)  Presented 12/6 with left-sided weakness, slurred speech and facial droop  CTH revealed infarction in the right lentiform nucleus with mild cytotoxic edema with hyperdensity in the right M1 segment suggesting M1 thrombosis.    CTA of the head revealed  acute thrombosis in the mid-distal right M1 segment with severe near occlusion of the proximal BILL with severe tandem stenosis intracranially and high-grade stenosis of the last distal common carotid artery, moderate high-grade stenosis of the right subclavian origin.   Underwent successful mechanical thrombectomy and right carotid angioplasty/stenting and placed on Integrilin.   12/7 CTA with hemorrhagic conversion  Carotid artery stenosis  CTA of the head revealed acute thrombosis in the mid-distal right M1 segment with severe near occlusion of the proximal BILL with severe tandem stenosis intracranially and high-grade stenosis of the last distal common carotid artery, moderate high-grade stenosis of the right subclavian origin.   12/6 s/p emergent right carotid angioplasty/stenting and placed on Integrilin  Repeat imaging 12/7 revealed hemorrhagic conversion and patient was given DDAVP administered  CTA 12/10 revealed BILL stent occlusion  Currently on aspirin and statin per NG tube  Acute pulmonary embolism (HCC)  CTA Chest 12/10 revealed bilateral PEs with developing pulmonary infarction  Venous duplex negative for DVT  Encephalopathy  In the setting of acute CVA with hemorrhagic conversion  Hypotension  Currently on Levophed  Hypertension  Currently hypotensive requiring Levophed  PTA: Losartan 50 mg daily  Hyperlipidemia  Lipid panel 12/7/2024 with , , HDL 42, LDL 76 on atorvastatin 20 mg daily per NG    Plan/Recommendations:  Wean Levo as able  Add BB when able  Ultimately would recommend AC when deemed safe from a neurological standpoint  Elevated troponin multifactorial in the setting of acute CVA and PE.  Given reported symptoms and coronary calcifications noted on CTA, would recommend eventual ischemic evaluation pending clinical course  Continue to monitor on telemetry  Continue IV diuretics  Agree with plan for thoracentesis  Await attending attestation for final  recommendations  ___________________________________________________________    CC: Stroke alert      History of Present Illness   HPI: Bren Traylor is a 69 y.o. year old female who has essential hypertension, hyperlipidemia presented to [facility] on 12/6/2024 via EMS for stroke alert.  Patient reported having a headache the evening prior and then falling in the morning with progressive left-sided weakness, left-sided facial droop and slurred speech.  On admission revealed sinus rhythm.  CTH revealed infarction in the right lentiform nucleus with mild cytotoxic edema with hyperdensity in the right M1 segment suggesting M1 thrombosis.  CTA of the head revealed acute thrombosis in the mid-distal right M1 segment with severe near occlusion of the proximal BILL with severe tandem stenosis intracranially and high-grade stenosis of the last distal common carotid artery, moderate high-grade stenosis of the right subclavian origin.  Patient was evaluated by neurology and recommended for emergent transfer to [facility] for neuro IR intervention.  Patient underwent successful mechanical thrombectomy and right carotid angioplasty/stenting and placed on Integrilin.  On 12/7 repeat imaging revealed hemorrhagic conversion patient was given DDAVP for reversal.      EKG on 12/8 revealed A-fib with RVR in the 110s.  Patient has had hypotension requiring pressors with severe encephalopathy.  Repeat CTA on 12/10 revealed occlusion of the right ICA stent and possible small filling defect in the left upper lobe pulmonary artery branch suggesting PE.  CTA of the chest revealed bilateral PEs with developing pulmonary infarction.  Echocardiogram revealed EF 50-55% with RWMA.  CXR revealed vascular congestion and pleural effusions as well as elevated BNP therefore patient received 2 doses of IV Lasix 40 mg yesterday. Intermittent sinus tachycardia and AF with RVR on telemetry review.  Troponin initially 140 POA was noted to  rise to >6000 today.  Cardiology has been consulted for further evaluation management of elevated troponin, PAF.    Home medication regimen includes losartan 50 mg daily, atorvastatin 20 mg daily.    Patient currently remains on Levophed.    Review of patient chart and visit with PCP in 10/2024 patient complained of nonspecific exertional chest pain and HUANG for 1 month with some left arm numbness and tingling.  An echocardiogram and nuclear stress test were ordered at that time      Echocardiogram 12/10/2024: EF 50-55% with basal inferior, basal inferolateral, mid inferior and mid inferolateral hypokinesis, normal RV function, moderate MR, moderate TR.  When compared to echocardiogram from 12/8/2024 EF was 45-50% with inferolateral akinesis with moderate-severe MR.    EKG reviewed personally: 12/11/2024-sinus rhythm with PACs at a rate of 89 bpm with lateral ST-T wave abnormality.  When compared to the EKG from 12/8/2024 A-fib with RVR in 113 bpm was noted.    Telemetry reviewed personally: Currently sinus rhythm however noted to have sinus tach with PACs and brief A-fib with RVR with rates in the 100-130s on 24-hour telemetry review    Review of Systems   Unable to perform ROS: Mental status change     Historical Information   Past Medical History:   Diagnosis Date    High cholesterol     Hypertension      Past Surgical History:   Procedure Laterality Date    IR PICC REPOSITION  12/10/2024    IR STROKE ALERT  12/7/2024     Social History     Substance and Sexual Activity   Alcohol Use Not Currently     Social History     Substance and Sexual Activity   Drug Use Not Currently     Social History     Tobacco Use   Smoking Status Every Day    Current packs/day: 1.00    Average packs/day: 1 pack/day for 46.8 years (46.8 ttl pk-yrs)    Types: Cigarettes    Start date: 2/28/1978   Smokeless Tobacco Never     Family History: No family history on file.    Meds/Allergies   all current active meds have been reviewed, current  meds:   Current Facility-Administered Medications:     acetaminophen (TYLENOL) oral suspension 650 mg, Q6H PRN    aspirin chewable tablet 81 mg, Daily    atorvastatin (LIPITOR) tablet 20 mg, QPM    bisacodyl (DULCOLAX) rectal suppository 10 mg, Daily PRN    chlorhexidine (PERIDEX) 0.12 % oral rinse 15 mL, Q12H DANIS    dexmedeTOMIDine (Precedex) 400 mcg in sodium chloride 0.9% 100 mL, Titrated, Last Rate: Stopped (12/11/24 0854)    divalproex sodium (DEPAKOTE) DR tablet 500 mg, Q12H DANIS    heparin (porcine) subcutaneous injection 5,000 Units, Q8H DANIS    insulin lispro (HumALOG/ADMELOG) 100 units/mL subcutaneous injection 1-6 Units, Q6H DANIS **AND** Fingerstick Glucose (POCT), Q6H    ipratropium (ATROVENT) 0.02 % inhalation solution 0.5 mg, Q6H    levalbuterol (XOPENEX) inhalation solution 1.25 mg, Q6H    norepinephrine (LEVOPHED) 4 mg (STANDARD CONCENTRATION) IV in sodium chloride 0.9% 250 mL, Titrated, Last Rate: 10 mcg/min (12/11/24 0850)    [COMPLETED] piperacillin-tazobactam (ZOSYN) 4.5 g in sodium chloride 0.9 % 100 mL IV LOADING DOSE, Once, Last Rate: Stopped (12/09/24 2000) **FOLLOWED BY** piperacillin-tazobactam (ZOSYN) 4.5 g in sodium chloride 0.9 % 100 mL IVPB (EXTENDED INFUSION), Q8H, Last Rate: 4.5 g (12/11/24 0558)    polyethylene glycol (MIRALAX) packet 17 g, Daily    potassium chloride 20 mEq IVPB (premix), BID    QUEtiapine (SEROquel) tablet 50 mg, BID    senna-docusate sodium (SENOKOT S) 8.6-50 mg per tablet 2 tablet, BID    sertraline (ZOLOFT) tablet 50 mg, Daily    vancomycin (VANCOCIN) IVPB (premix in dextrose) 1,000 mg 200 mL, Q12H, Last Rate: 1,000 mg (12/11/24 0901), and PTA meds:   Prior to Admission Medications   Prescriptions Last Dose Informant Patient Reported? Taking?   atorvastatin (LIPITOR) 20 mg tablet   No No   Sig: TAKE 1 TABLET (20 MG TOTAL) BY MOUTH DAILY   busPIRone (BUSPAR) 7.5 mg tablet   No No   Sig: TAKE 1 TABLET (7.5 MG TOTAL) BY MOUTH 2 (TWO) TIMES A DAY AS NEEDED (PANIC  "ATTACL)   clonazePAM (KlonoPIN) 0.5 mg tablet   No No   Sig: Take 1 tablet (0.5 mg total) by mouth daily as needed for seizures   losartan (COZAAR) 50 mg tablet   No No   Sig: Take 1 tablet (50 mg total) by mouth daily   sertraline (ZOLOFT) 100 mg tablet   No No   Sig: Take 0.5 tablets (50 mg total) by mouth daily      Facility-Administered Medications: None     dexmedetomidine, 0.1-0.5 mcg/kg/hr, Last Rate: Stopped (24 0854)  norepinephrine, 1-30 mcg/min, Last Rate: 10 mcg/min (24 0850)        No Known Allergies    Objective   Vitals: Blood pressure (!) 88/56, pulse (!) 125, temperature (!) 101.7 °F (38.7 °C), temperature source Rectal, resp. rate (!) 35, height 5' 4\" (1.626 m), weight 55.8 kg (123 lb), SpO2 99%.,     Body mass index is 21.11 kg/m².,     Systolic (24hrs), Av , Min:84 , Max:133     Diastolic (24hrs), Av, Min:39, Max:86    Wt Readings from Last 3 Encounters:   12/10/24 55.8 kg (123 lb)   24 53.8 kg (118 lb 9.7 oz)   10/02/24 53.8 kg (118 lb 9.6 oz)      Lab Results   Component Value Date    CREATININE 0.63 2024    CREATININE 0.64 2024    CREATININE 0.64 12/10/2024         Intake/Output Summary (Last 24 hours) at 2024 0903  Last data filed at 2024 0600  Gross per 24 hour   Intake 2654.13 ml   Output 3605 ml   Net -950.87 ml     Weight (last 2 days)       Date/Time Weight    12/10/24 1119 55.8 (123)          Invasive Devices       Peripherally Inserted Central Catheter Line  Duration             PICC Line 12/10/24 <1 day              Peripheral Intravenous Line  Duration             Peripheral IV  Left Antecubital -- days    Peripheral IV 24 Left Forearm 4 days    Peripheral IV 24 Left Hand 3 days              Arterial Line  Duration             Arterial Line 24 Left Radial 4 days              Drain  Duration             NG/OG/Enteral Tube Nasogastric 12 Fr Right nare 2 days    External Urinary Catheter <1 day              "         Physical Exam  Vitals and nursing note reviewed.   Constitutional:       General: She is not in acute distress.     Appearance: She is well-developed.      Comments: On CPAP in NAD  Opens eyes on verbal stimuli   HENT:      Head: Normocephalic and atraumatic.   Neck:      Vascular: JVD present.   Cardiovascular:      Rate and Rhythm: Normal rate and regular rhythm.      Heart sounds: Normal heart sounds. No murmur heard.     No friction rub.   Pulmonary:      Effort: Pulmonary effort is normal. No respiratory distress.      Breath sounds: No wheezing or rales.      Comments: Diminished breath sounds bilaterally  Abdominal:      General: Bowel sounds are normal. There is no distension.      Palpations: Abdomen is soft.      Tenderness: There is no abdominal tenderness.      Comments: NG tube    Musculoskeletal:         General: No tenderness. Normal range of motion.      Cervical back: Normal range of motion and neck supple.      Right lower leg: No edema.      Left lower leg: No edema.   Skin:     General: Skin is warm and dry.      Findings: No erythema.   Neurological:      Comments: Left sided plegia           LABORATORY RESULTS:      CBC with diff:   Results from last 7 days   Lab Units 12/11/24  0425 12/10/24  0457 12/09/24  0527 12/08/24  0511 12/07/24  0442 12/06/24  2045   WBC Thousand/uL 18.31* 18.56* 12.51* 16.02* 10.01 11.93*   HEMOGLOBIN g/dL 7.0* 7.9* 8.7* 9.7* 10.2* 11.6   HEMATOCRIT % 21.8* 24.4* 27.0* 29.6* 29.9* 35.0   MCV fL 96 96 97 96 91 90   PLATELETS Thousands/uL 158 159 146* 189 198 215   RBC Million/uL 2.28* 2.55* 2.79* 3.07* 3.30* 3.89   MCH pg 30.7 31.0 31.2 31.6 30.9 29.8   MCHC g/dL 32.1 32.4 32.2 32.8 34.1 33.1   RDW % 14.7 14.7 14.4 14.5 13.8 13.3   MPV fL 12.5 11.9 12.1 11.3 11.5 10.7   NRBC AUTO /100 WBCs 0 0 0 0  --   --        CMP:  Results from last 7 days   Lab Units 12/11/24  0407 12/11/24  0027 12/10/24  1807 12/10/24  1147 12/10/24  0457 12/10/24  0028 12/09/24  2204  "  POTASSIUM mmol/L 3.7 3.7 3.3* 2.9* 3.6 3.6 4.0   CHLORIDE mmol/L 128* 127* 125* 126* 130* 132* 134*   CO2 mmol/L 22 22 22 20* 18* 19* 20*   BUN mg/dL 20 20 19 23 22 24 27*   CREATININE mg/dL 0.63 0.64 0.64 0.65 0.61 0.66 0.66   CALCIUM mg/dL 7.9* 7.9* 8.1* 8.0* 8.0* 8.2* 7.9*   AST U/L  --   --   --   --  189*  --   --    ALT U/L  --   --   --   --  277*  --   --    ALK PHOS U/L  --   --   --   --  108*  --   --    EGFR ml/min/1.73sq m 93 92 92 92 94 91 91       BMP:  Results from last 7 days   Lab Units 12/11/24  0407 12/11/24  0027 12/10/24  1807 12/10/24  1147 12/10/24  0457 12/10/24  0028 12/09/24  2204   POTASSIUM mmol/L 3.7 3.7 3.3* 2.9* 3.6 3.6 4.0   CHLORIDE mmol/L 128* 127* 125* 126* 130* 132* 134*   CO2 mmol/L 22 22 22 20* 18* 19* 20*   BUN mg/dL 20 20 19 23 22 24 27*   CREATININE mg/dL 0.63 0.64 0.64 0.65 0.61 0.66 0.66   CALCIUM mg/dL 7.9* 7.9* 8.1* 8.0* 8.0* 8.2* 7.9*          No results found for: \"NTBNP\"         Results from last 7 days   Lab Units 12/11/24  0455 12/10/24  0457 12/09/24  0527 12/08/24  0511 12/07/24  0442   MAGNESIUM mg/dL 2.1 1.9 2.1 2.3 1.7*          Results from last 7 days   Lab Units 12/07/24  0442   HEMOGLOBIN A1C % 5.3              Results from last 7 days   Lab Units 12/06/24  2045   INR  1.10     Lipid Profile:   No results found for: \"CHOL\"  Lab Results   Component Value Date    HDL 42 (L) 12/07/2024    HDL 53 10/02/2024     Lab Results   Component Value Date    LDLCALC 76 12/07/2024    LDLCALC 116 (H) 10/02/2024     Lab Results   Component Value Date    TRIG 110 12/07/2024    TRIG 105 10/02/2024       Imaging: Results Review Statement: I reviewed radiology reports from this admission including: CT head and procedure reports.  IR PICC reposition  Result Date: 12/10/2024  Narrative: PROCEDURE: Peripherally Inserted Central Catheter (PICC) exchange Procedural Personnel Attending physician(s): Dr. Cervantes Pre-procedure diagnosis: Ischemic stroke Post-procedure diagnosis: Same " Indication: Malpositioned right arm PICC. PROCEDURE SUMMARY: - PICC exchange with fluoroscopic guidance PROCEDURE DETAILS: Pre-procedure Consent: Existing informed consent was utilized and time-out was performed prior to the procedure. Preparation (MIPS): The site was prepared and draped using all elements of maximal sterile barrier technique including sterile gloves, sterile gown, cap, mask, large sterile sheet, sterile ultrasound probe cover, hand hygiene and cutaneous antisepsis with 2% chlorhexidine. Anesthesia/sedation Level of anesthesia/sedation: Local lidocaine Catheter exchange Existing right arm PICC was noted to terminate at the level of right subclavian vein. The existing catheter was removed over a 0.018 inch wire. A new 5 [place] triple-lumen PICC was attempted to be advanced over the wire, however, met significant resistance at the level of the right axillary artery. A 4 [place] single-lumen power PICC cut at 42 cm marker was advanced over the wire with catheter terminating near the cavoatrial junction. Catheter was found to aspirate and flush easily. Catheter was secured to skin using StatLock. Radiation Dose Fluoroscopy time (minutes): 8.8 Reference air kerma (mGy): 35.66 Additional Details Estimated blood loss (mL): 10 Complications: No immediate complications.     Impression: Right arm PICC exchange and repositioning using 4 [place] single-lumen power PICC, with tip in the expected location of the cavoatrial junction. Plan: The catheter may be used immediately. Workstation performed: [id number]      VAS VENOUS DUPLEX - LOWER LIMB BILATERAL  Result Date: 12/10/2024  Narrative:  [facility] REPORT CLINICAL: Indications: Patient presents with recent discovery of pulmonary embolism and physician wants to determine potential source. Operative History: No prior cardiovascular surgeries Risk Factors The patient has history of CVA, PE, and smoking (current) 1-2 ppd.   CONCLUSION:  Impression:  RIGHT  LOWER LIMB: No evidence of acute or chronic deep vein thrombosis. No evidence of superficial thrombophlebitis noted. Doppler evaluation shows a normal response to augmentation maneuvers. Popliteal, posterior tibial and anterior tibial arterial Doppler waveforms are triphasic.  Note: There is a well defined hypoechoic non-vascularized cystic-type structure noted in the popliteal fossa measuring approximately 4.8 cm x 1.2 cm.  LEFT LOWER LIMB: No evidence of acute or chronic deep vein thrombosis. No evidence of superficial thrombophlebitis noted. Doppler evaluation shows a normal response to augmentation maneuvers. Popliteal, posterior tibial and anterior tibial arterial Doppler waveforms are triphasic.  SIGNATURE: Electronically Signed by: DORITA ORNELAS MD on 2024-12-10 03:28:27 PM    CTA chest pe study  Result Date: 12/10/2024  Narrative: CTA - CHEST WITH IV CONTRAST - PULMONARY ANGIOGRAM INDICATION: pe seen on CTA h/n. COMPARISON: CTA from the day before. TECHNIQUE: CTA examination of the chest was performed using angiographic technique according to a protocol specifically tailored to evaluate for pulmonary embolism. Multiplanar 2D reformatted images were created from the source data. In addition, coronal  3D MIP postprocessing was performed on the acquisition scanner. Radiation dose length product (DLP) for this visit: 162.86 mGy-cm . This examination, like all CT scans performed in the [facility], was performed utilizing techniques to minimize radiation dose exposure, including the use of iterative reconstruction and automated exposure control. IV Contrast: 75 mL of iohexol (OMNIPAQUE) FINDINGS: PULMONARY ARTERIAL TREE: There is bilateral pulmonary embolism. Pulmonary embolism involves the right upper, middle and lower lobe arteries with extension into the segmental branch vessels. There is involvement of the left upper lobe segmental branch vessels. There is an RV to LV ratio of 0.85 and no  evidence for right heart strain. LUNGS: There is bibasilar subsegmental atelectasis. There are peripherally situated areas of groundglass on series 301, image 59 and image 72 possibly representing areas of developing pulmonary infarction. There is pulmonary edema with septal thickening noted. There are moderate right and small to moderate left layering bilateral pleural effusions with subjacent compressive atelectasis. PLEURA: Please see above HEART/GREAT VESSELS: Heart is unremarkable for patient's age. No thoracic aortic aneurysm. There are atherosclerotic changes of the aorta. MEDIASTINUM AND SHAKEEL: There are prominent mediastinal lymph nodes noted. CHEST WALL AND LOWER NECK: Unremarkable. VISUALIZED STRUCTURES IN THE UPPER ABDOMEN: Probable splenule in the left upper quadrant feeding tube coursing into the stomach with the tip not visualized. OSSEOUS STRUCTURES: No acute fracture or destructive osseous lesion.     Impression: Bilateral pulmonary embolism as described above.  Measured RV/LV ratio is within normal limits at less than 0.9 and there is no CT evidence for right heart strain. Peripherally situated wedge-shaped areas of groundglass within the bilateral upper lobes likely representing areas of developing pulmonary infarction. Pulmonary edema with layering right greater than left bilateral pleural effusions. I personally discussed this study with Dr. Santamaria on 12/10/2024 1:37 PM. Workstation performed: [id number]     Echo follow up/limited w/ contrast if indicated  Result Date: 12/10/2024  Narrative:   Left Ventricle: Left ventricular cavity size is normal. The left ventricular ejection fraction is 50 to 55%. Systolic function is low normal. Unable to assess diastolic function.   The following segments are hypokinetic: basal inferior, basal inferolateral, mid inferior and mid inferolateral.   All other segments are normal.   Right Ventricle: Right ventricular cavity size is normal. Systolic function is  normal.   Aortic Valve: There is mild regurgitation.   Mitral Valve: There is moderate regurgitation.   Tricuspid Valve: There is moderate regurgitation. The right ventricular systolic pressure is mildly elevated. The estimated right ventricular systolic pressure is 46.00 mmHg.   Pericardium: There is a left pleural effusion.     XR chest portable ICU  Result Date: 12/10/2024  Narrative: XR CHEST PORTABLE ICU INDICATION: Tachypnea. COMPARISON: 12/9/2024 FINDINGS: Distal aspect of NG tube is coursing below the left hemidiaphragm. Right-sided PICC line tip terminates over the expected subclavian vein at the level of the proximal to mid third of the clavicle, similar to prior. Persistent pulmonary edema and pleural effusions. No pneumothorax. Unchanged cardiomediastinal silhouette. No acute osseous abnormality within the limitations of portable radiography. Normal upper abdomen.     Impression: Persistent pulmonary edema and pleural effusions. Workstation performed: [id number]     CTA head and neck w wo contrast  Result Date: 12/10/2024  Narrative: CTA NECK AND BRAIN WITH AND WITHOUT CONTRAST INDICATION: R carotid stent. Follow-up intracranial hemorrhage and right carotid stent placement. COMPARISON:   CT angiogram dated 12/7/2024 and 12/6/2024. TECHNIQUE:  Routine CT imaging of the Brain without contrast.Post contrast imaging was performed after administration of iodinated contrast through the neck and brain. Post contrast axial 0.625 mm images timed to opacify the arterial system.  3D rendering was performed on an independent workstation.   MIP reconstructions performed. Coronal and sagittal reconstructions were performed of the non contrast portion of the brain. Radiation dose length product (DLP) for this visit:  1612 mGy-cm .  This examination, like all CT scans performed [facility] the [facility], was performed utilizing techniques to minimize radiation dose exposure, including the use of iterative  reconstruction and automated exposure control. IV Contrast:  65 mL of iohexol (OMNIPAQUE) IMAGE QUALITY:   Diagnostic FINDINGS: NONCONTRAST BRAIN PARENCHYMA: Once again identified is a large acute intraparenchymal hemorrhage identified within the right hemisphere centered within the basal ganglia and extending into the corona radiata. Mild surrounding vasogenic edema and moderate localized mass effect. There is partial effacement of the right lateral ventricle and stable, approximately 5 mm right to left shift. Basilar cisterns are patent. Stable early ischemia seen superior and medial to the hemorrhage within the right caudate body and corona radiata. VENTRICLES AND EXTRA-AXIAL SPACES: Effacement of the right lateral ventricle. No obstructive hydrocephalus. There is a small amount of intraventricular hemorrhage within the atria/occipital horn of the left lateral ventricle. VISUALIZED ORBITS: Normal. PARANASAL SINUSES: Normal. CTA NECK ARCH AND GREAT VESSELS: Stable atherosclerotic change of the right brachiocephalic bifurcation with stenosis of the right subclavian origin, at least moderate and possibly high-grade. VERTEBRAL ARTERIES: Patent extracranial segments. RIGHT CAROTID: A stent has been placed within the distal common carotid artery. In the proximal aspect of the stent the vessel appears to occlude with lack of enhancement. No enhancement is seen within the remainder of the stent and the vessel is occluded to the skull base. This is a new finding compared to the prior examination. LEFT CAROTID: Unfortunately there is motion at the level of the bifurcation. Once again identified is advanced atherosclerotic change of the distal common carotid artery and proximal cervical internal carotid artery with suspected moderate stenosis of the distal common carotid artery and proximal cervical internal carotid artery. Above this the vessel is normal in caliber to the skull base. NASCET criteria was used to determine  the degree of internal carotid artery diameter stenosis. CTA BRAIN: INTERNAL CAROTID ARTERIES: There is lack of enhancement of the intracranial internal carotid artery on the right within the carotid canal and within the cavernous segment. The vessel reconstitutes at the level of the anterior clinoid in the periophthalmic segment but demonstrates severe stenosis within the clinoid segment and is decreased in caliber compared to the prior examination. The left intracranial internal carotid artery demonstrates mild calcification. ANTERIOR CEREBRAL ARTERY CIRCULATION:  No stenosis or occlusion. MIDDLE CEREBRAL ARTERY CIRCULATION: Both M1 segments and middle cerebral artery branches are patent. The right M1 segment is mildly diminished in caliber compared to the opposite left side and compared to the prior examination. DISTAL VERTEBRAL ARTERIES: Distal right vertebral artery is dominant. The left is hypoplastic but patent. BASILAR ARTERY:  No stenosis or occlusion. POSTERIOR CEREBRAL ARTERIES: No stenosis or occlusion. VENOUS STRUCTURES:  Normal. NON VASCULAR ANATOMY BONY STRUCTURES: Stable bony structures. SOFT TISSUES OF THE NECK: Stable soft tissues. THORACIC INLET: Interval development of large right and moderate left posterior layering pleural effusions within the lung apices with adjacent compressive atelectasis. Suspected small filling defect within one of the central upper lobe pulmonary artery branches on the left, series 303 image 25 suggesting a small pulmonary embolus.     Impression: CT Brain: Stable large intraparenchymal hemorrhage centered within the right basal ganglia with surrounding vasogenic edema and localized mass effect including approximately 5 mm of right to left shift. Early subacute ischemia within the right caudate head and body extending into the corona radiata. 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 The  vessel does reconstitute intracranially at the level of the clinoid segment but is significantly diminished in caliber as it extends to the ICA terminus. The right M1 segment is patent as are the M2 and M3 branches. However, the M1 segment is diminished in caliber compared to the opposite left hemisphere and compared to the prior examination. Stable moderate stenosis of the right subclavian origin from the brachiocephalic and left distal common carotid artery and proximal cervical internal carotid artery. Other findings: Interval development of large right and moderate left posterior layering pleural effusions within the lung apices. Suspected small filling defect identified within one of the left upper lobe pulmonary artery branches seen on series 303 image 25 suggesting small pulmonary embolus. I personally discussed this study with LEILA ADLER on 12/10/2024 9:18 AM. Workstation performed: [id number]     XR chest portable ICU  Result Date: 12/10/2024  Narrative: XR CHEST PORTABLE ICU INDICATION: Possible pna?. COMPARISON: 12/08/2024 FINDINGS: Clear lungs. No pneumothorax or pleural effusion. Normal cardiomediastinal silhouette. Patient is congestive heart failure. NG tube extends into the stomach. Right-sided PICC line terminates in the right subclavian vein. Bones are unremarkable for age. Normal upper abdomen.     Impression: Congestive heart failure. Workstation performed: [id number]     CT head wo contrast  Result Date: 12/9/2024  Narrative: CT BRAIN - WITHOUT CONTRAST INDICATION:   Follow-up intracranial hemorrhage. COMPARISON: 12/8/2024. TECHNIQUE:  CT examination of the brain was performed.  Multiplanar 2D reformatted images were created from the source data. Radiation dose length product (DLP) for this visit:  1097.41 mGy-cm .  This examination, like all CT scans performed in the [facility], was performed utilizing techniques to minimize radiation dose exposure, including the use of  iterative reconstruction and automated exposure control. IMAGE QUALITY:  Diagnostic. FINDINGS: PARENCHYMA: Stable acute hemorrhage and residual contrast staining located in the right basal ganglia and corona radiata, measuring 6 x 3.6 x 3.2 cm. Stable mild surrounding vasogenic edema. No increase in extent of MCA territory infarct. Right to left midline shift 5 mm, stable. VENTRICLES AND EXTRA-AXIAL SPACES: Stable effacement of the right lateral ventricle. No hydrocephalus VISUALIZED ORBITS: Intact globes. No retro-orbital inflammation. PARANASAL SINUSES: Clear. CALVARIUM AND EXTRACRANIAL SOFT TISSUES: Stable mastoid effusions.     Impression: 1. Stable acute hemorrhage and residual contrast staining centered within the MCA territory infarct. 2. Stable right to left midline shift of 5 mm. Workstation performed: [id number]     IR stroke alert  Result Date: 12/9/2024  Narrative: PROCEDURE: IR STROKE ALERT HISTORY: Right MCA syndrome. FLUOROSCOPY TIME:  42.9 MINUTES minutes total. TIMES: PUNCTURE: 22: 42 FIRST PASS: 23: 17, ADAPT RECAN: 23: 21, TICI 3 COMPARISON: CTA on 12/6/2024 OPERATORS: Mihai VESSELS SELECTED: Right common carotid artery; right internal carotid artery; left common carotid artery; right femoral artery. COMPLICATIONS: None. CONTRAST: 140 mL of iodixanol (VISIPAQUE) mL of Visipaque given intra-arterially. Nonionic contrast was utilized for the patient's safety. ANESTHESIA: The patient was administered general endotracheal anesthesia under the care and supervision of the attending anesthesiologist. TECHNIQUE and INTERVENTION: The procedure, its risks, benefits, and alternatives were discussed in detail with the patient and her son. Risks include but are not limited to: bleeding, infection, nerve injury, vessel injury, groin hematoma, pain, radiation exposure, alopecia, renal toxicity, renal failure, and allergic reaction to contrast, stroke, blindness, coma, paralysis, death, and the need for  "additional treatments.  All questions were answered and no guarantees were provided. After informed consent the patient was brought into the angiography suite and placed supine on the angiographic table.  The right groin was prepped and draped using sterile technique. The skin overlying the right femoral artery was locally anesthetized with lidocaine. Ultrasound guidance was used to evaluate the right groin site and patency of the right femoral artery was noted.  Using standard micropuncture kit with ultrasound guidance under realtime visualization the micropuncture needle was advanced  into the right femoral artery, intravascular location of the needle tip was confirmed on ultrasound and documented in PACS. A small skin incision was made, and a 6-Malaysian 80 cm sheath was placed. Using an 0.035\" Glidewire Advantage wire and 5-Malaysian VTK  catheter, the above vessels were selectively catheterized for angiography. Next a neuron Max 80 cm catheter was prepped according to standard technique and advanced into the distal right common carotid artery over a Berenstein catheter over a 0.038\" [place] Wire. Thereafter exchange length Synchro 2 microwire was advanced beyond the proximal right internal carotid artery occlusion with the aid of a Berenstein catheter.  After prepping a 5.0 x 30 mm balloon using standard technique, the catheter was removed and the balloon was navigated into the right internal carotid artery spanning the focal area of occlusion. The balloon was then inflated to nominal pressure (6 mm atmosphere) then deflated for a total of 3 times. Several attempts were made to navigate the neuron Max sheath beyond the region of stenosis however the bulky calcification prevented this. Next with the sheath tip positioned in the distal right common carotid artery a construct consisting of a Freeclimb  70 reperfusion catheter with a Brian over a Synchro microwire was navigated into the right middle cerebral artery M1 " segment. The Freeclimb reperfusion catheter was advanced to the point of the occlusion and the Brian was withdrawn. Next, using the ADAPT technique, the  reperfusion catheter was withdrawn while under continuous suction, as well as continuous aspiration through sheath. This technique was performed for a total of one pass. Thereafter repeat angiography of the carotid bifurcation reveals worsening stenosis. Therefore decision was made to place a carotid stent. IV Integrilin was initiated by the radiology nurse. The 6-8 x 30 mm Protege stent size carotid stent was prepped in  a standard fashion and was then advanced across the focal area of stenosis and deployed without complication. The stent extended from the distal common carotid artery to the proximal right internal carotid artery, spanning the carotid bifurcation and area of stenosis. The parent and branch arteries were patent on final angiography with a stent spanning the area of stenosis involving the carotid bifurcation and proximal internal carotid artery. Post stenting cerebral angiogram show patent size middle cerebral artery and size anterior cerebral artery with normal perfusion. At the completion of the procedure, the catheter and sheath were removed and hemostasis in the right groin obtained by placement of a 6-Y[place] Angio-seal arteriotomy closure device. The device was deployed without complication. No complications were encountered during or immediately following the procedure. The patient was transferred to the neurological ICU intubated and sedated. Carotid Artery Stent High Risk Factors For CEA - Medical Risk: CAD > = 2 vessel = at least 70% stenosis of 2 major coronary arteries based on catheterization High Risk Factors For CEA - Anatomical Risk: None Refused for Surgery: Not Evaluated by Surgeon Urgency: Emergent Indication: Part of Intracranial treatment Arch Type: Type I Bovine Arch: No Approach: Femoral Artery Prophylactic  Anti-Bradyarrhythmic:  No Bradyarrhythmia Requiring Tx: No Lesion Length: 1.3 cm Lesion Stenosis: 99% Lesion Calcification: 100% circumferential Neurologic Change: No Intra-Cranial Completion Angiogram: Yes, without new occlusions FINDINGS: RIGHT COMMON CAROTID ARTERY, RIGHT INTERNAL CAROTID ARTERY INJECTION, HEAD AND NECK VIEWS: Atherosclerotic disease at the right carotid bifurcation results in critical stenosis (99% stenosis by NASCET criteria). There is occlusion of the distal right middle cerebral artery M1 bifurcation, TICI 0. Angiography following mechanical thrombectomy reveals revascularization of the right MCA territory, TICI 3. Angiography following carotid stenting reveals good antegrade flow with approximately 50% stenosis remaining. . LEFT COMMON CAROTID ARTERY INJECTION, HEAD AND NECK VIEWS: Atherosclerotic disease at the left carotid bifurcation results in at least 50 % stenosis by NASCET criteria. Unremarkable angiographic appearance of the left supraclinoid internal carotid artery, left middle cerebral artery, and left anterior cerebral artery. The angiogram has normal parenchymal and venous phases. No evidence of cerebral aneurysm, high grade focal intracranial stenosis, or vascular malformation. . RIGHT COMMON FEMORAL ARTERY: Single CARDOZA oblique projection of the right common femoral artery was performed. Normal angiographic appearance of the femoral artery bifurcation with vessel suitable for placement of a vascular closure device.     Impression: Right MCA M1 occlusion, TICI 0. Right carotid bifurcation critical stenosis. Successful mechanical thrombectomy with TICI 3 reperfusion. Successful right carotid angioplasty and stenting. Workstation performed: [id number]     XR chest portable  Result Date: 12/9/2024  Narrative: XR CHEST PORTABLE INDICATION: NG tube placement. COMPARISON: Chest radiograph same day 12/8/2024 at 11:37 a.m. FINDINGS: [place]port of the enteric tube projects over the  stomach. Tip of the right-sided PICC projects over the right subclavian vein. Pulmonary vasculature appears engorged and indistinct. Scattered Kerley B-lines are seen peripherally in both lungs. Streaky bandlike retrocardiac opacities likely representing atelectasis though there are no central opacities concerning for alveolar edema. No pneumothorax or pleural effusion. Normal cardiomediastinal silhouette. Bones are unremarkable for age. Normal upper abdomen. Partially visualized stent in the right neck.     Impression: 1.  Enteric tube appears appropriately position. 2.  Findings of mild volume overload including pulmonary vascular congestion and interstitial edema. No amarilys alveolar edema or effusions. 3.  Peripheral location of PICC tip projecting over the right subclavian vein. Consider replacement. Resident: Josué Adams I, the attending radiologist, have reviewed the images and agree with the final report above. Workstation performed: [id number]     XR chest portable ICU  Result Date: 12/8/2024  Narrative: XR CHEST PORTABLE ICU INDICATION: post picc. COMPARISON: 12/7/2020 FINDINGS: Interval extubation and removal of NG tube. Right central line tip projects over the right apex likely within the brachiocephalic. Recommend advancement to the caval atrial junction. Central congestion noted. No pneumothorax or pleural effusion. Normal cardiomediastinal silhouette. Bones are unremarkable for age. Normal upper abdomen.     Impression: Right PICC line tip noted as above without pneumothorax. No pneumothorax after extubation and NG tube removal. Mild central congestion without consolidation. Workstation performed: [id number]     Echo complete w/ contrast if indicated  Result Date: 12/8/2024  Narrative:   Left Ventricle: Left ventricular cavity size is normal. Wall thickness is normal. The left ventricular ejection fraction is 45-50 %. Systolic function is mildly reduced. Akinesis of the inferolateral portion of the  left ventricle was noted. Diastolic function is normal.   Aortic Valve: There is mild regurgitation.   Mitral Valve: There is moderate to severe regurgitation.   Tricuspid Valve: There is mild to moderate regurgitation. The right ventricular systolic pressure is mildly elevated.     CT head wo contrast  Result Date: 12/8/2024  Narrative: CT BRAIN - WITHOUT CONTRAST INDICATION:   post M1 thrombectomy and Left carotid stent with hemorrhagic conversion. COMPARISON: 1 day prior TECHNIQUE:  CT examination of the brain was performed.  Multiplanar 2D reformatted images were created from the source data. Radiation dose length product (DLP) for this visit:  1138 mGy-cm .  This examination, like all CT scans performed in the [facility], was performed utilizing techniques to minimize radiation dose exposure, including the use of iterative reconstruction and automated exposure control. IMAGE QUALITY:  Diagnostic. FINDINGS: PARENCHYMA: Stable appearance of right MCA infarct containing mixed density intraparenchymal hematoma and contrast staining with small volume intraventricular hemorrhage layering in the occipital horn on the left. Mass effect on the right lateral ventricle persists with 5 mm right to left shift unchanged from prior. No hydrocephalus. VENTRICLES AND EXTRA-AXIAL SPACES: As above. VISUALIZED ORBITS: Normal visualized orbits. PARANASAL SINUSES: Mild scattered mucosal thickening. CALVARIUM AND EXTRACRANIAL SOFT TISSUES: Normal.     Impression: Stable CT of the brain with recent right MCA infarct status post mechanical thrombectomy with hematoma and contrast staining located in the right basal ganglia. Mass effect on the right lateral ventricle results in 5 mm right to left midline shift unchanged from prior. Small volume intraventricular hemorrhage layering in the left occipital horn. Workstation performed: [id number]     MRI brain wo contrast  Result Date: 12/8/2024  Narrative: MRI BRAIN WITHOUT  CONTRAST INDICATION: stroke. COMPARISON:   Prior CT and CT angiography from 12/6 and 12/7. TECHNIQUE:  Multiplanar, multisequence imaging of the brain was performed. IMAGE QUALITY:  Diagnostic. FINDINGS: BRAIN PARENCHYMA: There is a large heterogeneous parenchymal hemorrhage identified within the right basal ganglia measuring 6.3 x 3.2 x 3.3 cm. This corresponds to a volume of approximately 33 cu mm. There is mild surrounding vasogenic edema and localized mass effect including effacement of portions of the right lateral ventricle and sylvian fissure. There is acute ischemia superior and medial to the hemorrhage within the caudate head and body. All of these changes result in mild right to left shift measuring approximately 5 mm. Basilar cisterns remain patent. VENTRICLES: There is hemorrhage layering within the atria and occipital horn of the left lateral ventricle. As described above the frontal horn and anterior body of the right lateral ventricle are effaced by the large parenchymal hemorrhage. SELLA AND PITUITARY GLAND:  Normal. ORBITS:  Normal. PARANASAL SINUSES: Diffuse mucosal thickening of the paranasal sinuses. No air-fluid levels. There is fluid layering within the nasopharynx. The patient is intubated VASCULATURE: Incomplete flow void of the right intracranial internal carotid artery with hyperintense signal on T2 weighted imaging which may be secondary to slow flow or occlusion. See recently performed CT angiogram. The right M1 segment and middle cerebral artery branches demonstrate appropriate flow voids. CALVARIUM AND SKULL BASE: Large bilateral mastoid effusions. EXTRACRANIAL SOFT TISSUES:  Normal.     Impression: Large acute parenchymal hemorrhage within the right basal ganglia similar in size to most recent CT scan. Mild surrounding edema and localized mass effect with approximately 5 mm of right to left shift. Adjacent acute ischemia is seen within the caudate head and body. There is hyperintense  "T2 signal within the distal cervical and intracranial internal carotid artery on the right suggesting slow flow or occlusion. The M1 segment and middle cerebral artery branches on the right demonstrate appropriate flow voids. Consider repeat CT angiography. This examination was marked \"immediate notification\" in Epic in order to begin the standard process by which the radiology reading room liaison alerts the referring practitioner. Workstation performed: [id number]     XR chest portable ICU  Result Date: 12/7/2024  Narrative: XR CHEST PORTABLE ICU INDICATION: picc placement. COMPARISON: CXR and CTA neck 12/07/2024. FINDINGS: Right PICC over the right superior mediastinum above the head of the right clavicle. ET tube 4 cm above the ion. NG tube in stomach with sidehole in gastric fundus. Carotid stent in the right neck. Improved left base atelectasis. No pneumothorax or pleural effusion. Normal cardiomediastinal silhouette. Bones are unremarkable for age. Normal upper abdomen.     Impression: Right PICC over right superior mediastinum above the head of the right clavicle. It may be in the inferior aspect of the right internal jugular vein. Improved left base atelectasis. Workstation performed: [id number]     X-ray chest 1 view  Result Date: 12/7/2024  Narrative: XR CHEST 1 VIEW INDICATION: Endotracheal tube positioning. COMPARISON: CT neck 12/07/2024. FINDINGS: ET tube 5 cm above the ion. Mild left base opacity with elevation of the left diaphragm. No pneumothorax or pleural effusion. Normal cardiomediastinal silhouette. Bones are unremarkable for age. Normal upper abdomen.     Impression: ET tube 5 cm above the ion. Mild left base opacity and elevation of the left diaphragm which could be due to atelectasis and/or aspiration. Workstation performed: [id number]     CT head wo contrast  Result Date: 12/7/2024  Narrative: CT BRAIN - WITHOUT CONTRAST INDICATION: . COMPARISON: Earlier same day CTA head and neck " with and without contrast. IR stroke alert, CT cerebral perfusion, CTA stroke alert head and neck, CT stroke alert brain 12/6/2024. TECHNIQUE:  CT examination of the brain was performed.  Multiplanar 2D reformatted images were created from the source data. Radiation dose length product (DLP) for this visit:  1042 mGy-cm .  This examination, like all CT scans performed in the [facility], was performed utilizing techniques to minimize radiation dose exposure, including the use of iterative reconstruction and automated exposure control. IMAGE QUALITY:  Diagnostic. FINDINGS: PARENCHYMA, VENTRICLES AND EXTRA-AXIAL SPACES: Similar acute infarct in right MCA territory with large acute mixed-density intraparenchymal hematoma centered in right basal ganglia with small-volume intraventricular extension of blood products. Persistent compressive mass effect on right lateral ventricle with similar approximately 0.5 cm leftward midline shift when remeasured by this user. No intracranial mass. No obstructive hydrocephalus. Arterial calcifications of carotid siphons in right intradural vertebral artery. VISUALIZED ORBITS: Normal visualized orbits. PARANASAL SINUSES: Mild-to-moderate pansinus mucosal thickening (moderate in ethmoid sinuses). CALVARIUM AND EXTRACRANIAL SOFT TISSUES: Normal.     Impression: Similar acute infarct in right MCA territory with large acute mixed-density intraparenchymal hematoma centered in right basal ganglia with small-volume intraventricular extension of blood products. Persistent compressive mass effect on right lateral ventricle with similar approximately 0.5 cm leftward midline shift when remeasured by this user. Continued surveillance recommended. The study was marked in EPIC for immediate notification. Workstation performed: [id number]     CTA head and neck w wo contrast  Addendum Date: 12/7/2024  Addendum: ADDENDUM: I personally discussed this study with ALLISON JARVIS on 12/7/2024  10:42 AM.     Result Date: 12/7/2024  Narrative: CTA NECK AND BRAIN WITH AND WITHOUT CONTRAST INDICATION: Follow-up imaging COMPARISON: IR stroke alert, CT cerebral perfusion, CTA stroke alert head neck, CT stroke alert brain 12/6/2024. TECHNIQUE:  Routine CT imaging of the Brain without contrast.Post contrast imaging was performed after administration of iodinated contrast through the neck and brain. Post contrast axial 0.625 mm images timed to opacify the arterial system.  3D rendering was performed on an independent workstation.   MIP reconstructions performed. Coronal and sagittal reconstructions were performed of the non contrast portion of the brain. Radiation dose length product (DLP) for this visit:  1581 mGy-cm .  This examination, like all CT scans performed in the [facility], was performed utilizing techniques to minimize radiation dose exposure, including the use of iterative reconstruction and automated exposure control. IV Contrast:  85 mL of iohexol (OMNIPAQUE) IMAGE QUALITY:   Diagnostic FINDINGS: NONCONTRAST BRAIN PARENCHYMA, VENTRICLES AND EXTRA-AXIAL SPACES: Evolution of acute infarct in right MCA territory with large amount of hyperdense material in right basal ganglia likely combination of contrast staining and acute intraparenchymal hematoma measuring approximately 6.0 x 3.0 x 3.2 cm (estimated volume 29 mL), and small volume intraventricular extension of blood products. Worsened compressive mass effect on right lateral ventricle with approximately 0.3 cm leftward midline shift. No intracranial mass. No obstructive hydrocephalus. VISUALIZED ORBITS: Normal. PARANASAL SINUSES: Mild-to-moderate pansinus mucosal thickening (moderate in ethmoid sinuses). CTA NECK ARCH AND GREAT VESSELS: Mild atherosclerotic changes with no severe stenosis. VERTEBRAL ARTERIES: Patent dominant right and mildly hypoplastic left extracranial segments. RIGHT CAROTID: Patent. Interval placement of right  carotid stent with moderate-to-severe stenosis in the right ICA proximal cervical segment due to extrinsic compression from calcified atherosclerotic disease. No dissection. LEFT CAROTID: Patent. Mild calcified atherosclerotic disease in left carotid bifurcation and proximal cervical internal carotid artery. Less than 50% stenosis.  No dissection. NASCET criteria was used to determine the degree of internal carotid artery diameter stenosis. CTA BRAIN: INTERNAL CAROTID ARTERIES: Moderate stenosis in right ICA cavernous segment and mild stenosis in left ICA cavernous segment due to moderately calcified atherosclerotic disease. No occlusion. ANTERIOR CEREBRAL ARTERY CIRCULATION:  No stenosis or occlusion. Hypoplastic right A1 segment, normal variant. MIDDLE CEREBRAL ARTERY CIRCULATION: Sequela of right MCA mechanical thrombectomy without evidence of right M1 occlusion. No stenosis or new occlusion. DISTAL VERTEBRAL ARTERIES: Patent dominant right and mildly hypoplastic left distal vertebral artery. Mildly hypoplastic left distal vertebral artery becomes more diminutive after PICA takeoff. Mild calcified atherosclerotic disease in proximal V4 segment. No stenosis or occlusion. BASILAR ARTERY:  No stenosis or occlusion. POSTERIOR CEREBRAL ARTERIES: No stenosis or occlusion. VENOUS STRUCTURES:  Normal. NON VASCULAR ANATOMY BONY STRUCTURES:  No acute osseous abnormality. Moderate-to-severe multilevel degenerative changes of cervical spine, worse at C5-C6. SOFT TISSUES OF THE NECK: Patient is intubated with placement of enteric tube. 1.6 cm heterogeneously enhancing nodule in posterior right thyroid lobe (303:87). Incidental discovery of one or more thyroid nodule(s) measuring more than 1.5 cm and without suspicious features is noted in this patient who is above 35 years old; according to guidelines published in the February 2015 white paper on incidental thyroid nodules in the Journal of the American College of Radiology  (JACR), further characterization with thyroid ultrasound is recommended. THORACIC INLET: Mild biapical fibrotic change. Endotracheal tube in distal trachea. Enteric tube courses out of the field-of-view inferiorly into the thoracic esophagus. Multiple scattered micronodular opacities in visualized upper lung zones.     Impression: CT Brain: - Evolution of acute infarct in right MCA territory with large amount of hyperdense material in right basal ganglia likely combination of contrast staining and acute intraparenchymal hematoma measuring approximately 6.0 x 3.0 x 3.2 cm (estimated volume 29 mL), and small volume intraventricular extension of blood products. Worsened compressive mass effect on right lateral ventricle with approximately 0.3 cm leftward midline shift. Recommend reevaluation by neurosurgery and short-term follow-up CT head without contrast. CT Angiography: - Interval resolution of right MCA M1 segment occlusion status post right MCA mechanical thrombectomy. No new large vessel occlusion in the head or neck. -  Interval placement of right carotid stent with moderate-to-severe stenosis in the right ICA proximal cervical segment due to extrinsic compression from calcified atherosclerotic disease. - Atherosclerotic disease of the head and neck, as detailed above. Multiple scattered micronodular opacities in visualized upper lung zones, which may be infectious/inflammatory or aspiration. Consider follow-up CT chest without contrast. Incidental thyroid nodule(s) for which nonemergent thyroid ultrasound is recommended. Additional chronic/incidental findings as detailed above. Workstation performed: [id number]     CT cerebral perfusion  Result Date: 12/7/2024  Narrative: CT PERFUSION INDICATION:  I63.9: Cerebral infarction, unspecified. COMPARISON: CT/CTA from earlier the same date. TECHNIQUE: CT perfusion study was performed.  A total of 8 cm of brain tissue was evaluated in two different volumetric  acquisitions.  iSchemaView RAPID software was utilized to calculate cerebral blood flow, cerebral blood volume, mean transit time, and  Tmax. Radiation dose length product (DLP) for this visit:  3134 mGy-cm .  This examination, like all CT scans performed in the [facility], was performed utilizing techniques to minimize radiation dose exposure, including the use of iterative reconstruction and automated exposure control. IV Contrast:  85 mL of iohexol (OMNIPAQUE) IMAGE QUALITY:  Diagnostic. FINDINGS: Hemisphere affected: Right. Total CBF<30% volume: 0 mL Total Tmax>6.0s volume: 32 mL Total Mismatch difference: 32 mL Total Mismatch ratio: Infinite Hypoperfusion Index (Tmax>10s/Tmax.6s): [HIR] Additional comments: Large area of milder hypoperfusion in the right MCA territory (Tmax greater than 4 seconds) acute ischemic changes again seen in the right basal ganglia but more conspicuous on earlier noncontrast study. Focal right frontal encephalomalacia.     Impression: CT perfusion performed.  Data available on PACS. Preliminary report provided by [employer]. Workstation performed: [id number]     CTA stroke alert (head/neck)  Result Date: 12/6/2024  Narrative: CTA NECK AND BRAIN WITH CONTRAST INDICATION: Stroke Alert COMPARISON:   None. TECHNIQUE:  Post contrast imaging was performed after administration of iodinated contrast through the neck and brain. Post contrast axial 0.625 mm images timed to opacify the arterial system.  3D rendering was performed on an independent workstation.   MIP reconstructions performed. Coronal and sagittal reconstructions were performed of the non contrast portion of the brain. Radiation dose length product (DLP) for this visit:  463 mGy-cm .  This examination, like all CT scans performed in the [facility], was performed utilizing techniques to minimize radiation dose exposure, including the use of iterative reconstruction and automated exposure  control. IV Contrast:  85 mL of iohexol (OMNIPAQUE) IMAGE QUALITY:   Diagnostic FINDINGS: CTA NECK ARCH AND GREAT VESSELS: Unremarkable aortic arch. There is focal calcification of the right subclavian origin from the brachiocephalic with a moderate to high-grade stenosis. Distal to this the vessel is normal in caliber. VERTEBRAL ARTERIES: Patent extracranial segments. RIGHT CAROTID: Minimal atherosclerotic change of the distal common carotid artery. However, there is more advanced atherosclerotic change of the internal carotid artery bulb diffusely with a severe high-grade stenosis measuring 90%. Distal to this the vessel is diminished in caliber throughout the remainder of the neck as it extends to the skull base consistent with a near occlusive stenosis. No dissection. LEFT CAROTID: Atherosclerotic change of the distal common carotid artery with high-grade stenosis. This atherosclerotic change extends into the bifurcation and proximal cervical internal carotid artery where there is approximately 60% stenosis. No dissection. NASCET criteria was used to determine the degree of internal carotid artery diameter stenosis. CTA BRAIN: INTERNAL CAROTID ARTERIES: The right intracranial internal carotid artery is diminished in caliber compared to the left. Atherosclerotic change within the cavernous and clinoid segments with a severe high-grade stenosis of the anterior genu of the cavernous segment extending to the ophthalmic segment. Moderate stenosis of the clinoid segment. No significant left-sided stenosis. ANTERIOR CEREBRAL ARTERY CIRCULATION:  No stenosis or occlusion. MIDDLE CEREBRAL ARTERY CIRCULATION: Right M1 segments demonstrates a filling defect within the mid to distal aspect of the vessel consistent with acute thrombosis. Diminished size of one of the anterior temporal branches. Otherwise adequate enhancement of the more distal M2 and M3 branches. Normal left M1 segment and middle cerebral artery branches.  DISTAL VERTEBRAL ARTERIES: Distal right vertebral artery is dominant while the left is hypoplastic. BASILAR ARTERY:  No stenosis or occlusion. POSTERIOR CEREBRAL ARTERIES: No stenosis or occlusion. VENOUS STRUCTURES:  Normal. NON VASCULAR ANATOMY BONY STRUCTURES: No acute osseous abnormality. Osteomalacia of the maxilla and mandible with mild cervical spondylitic degenerative change. SOFT TISSUES OF THE NECK:  Normal. THORACIC INLET:  Unremarkable.     Impression: Acute thrombosis of the mid to distal aspect of the right M1 segment. Adequate enhancement of the more distal M2 and M3 branches. Severe near occlusive stenosis of the proximal right internal carotid artery within the neck and a severe tandem stenosis intracranially. High-grade stenosis of the distal left common carotid artery with moderate stenosis of the internal carotid artery bulb. Moderate to high-grade stenosis of the right subclavian origin from the brachiocephalic. Findings were directly discussed with Love Keith  at 8:40 p.m. Workstation performed: [id number]     CT stroke alert brain  Result Date: 12/6/2024  Narrative: CT BRAIN - STROKE ALERT PROTOCOL INDICATION:   Stroke Alert. Left-sided weakness, arm and leg beginning this morning. COMPARISON:  None. TECHNIQUE:  CT examination of the brain was performed.  In addition to axial images, coronal reformatted images were created and submitted for interpretation. Radiation dose length product (DLP) for this visit:  810 mGy-cm .  This examination, like all CT scans performed in the [facility], was performed utilizing techniques to minimize radiation dose exposure, including the use of iterative reconstruction and automated exposure control. IMAGE QUALITY:  Diagnostic. FINDINGS: PARENCHYMA: There is hypodensity identified within the right basal ganglia involving the lentiform nucleus, caudate head and caudate body. Suspected mild cytotoxic edema within the caudate head and body  resulting in partial effacement of the frontal horn  of the lateral ventricle. There is a small hyperdensity identified within the right mid to distal M1 segment on series 2 image 16 suspicious for thrombosis. See separate CT angiogram report. Within the more distal posterior lateral right frontal lobe there is an area of focal cortical encephalomalacia and gliosis suggesting a more chronic infarct. Moderate vascular calcification of the intracranial internal carotid arteries bilaterally. VENTRICLES AND EXTRA-AXIAL SPACES:  Normal for the patient's age. VISUALIZED ORBITS: Normal visualized orbits. PARANASAL SINUSES: Moderate paranasal sinus mucosal thickening and sphenoid sinus mucosal thickening. Mild mucosal thickening of the maxillary sinuses. CALVARIUM AND EXTRACRANIAL SOFT TISSUES:   Normal.     Impression: There is infarction identified within the right lentiform nucleus and decreased attenuation. Suspected mild cytotoxic edema within the caudate head and body resulting in partial effacement of the frontal horn of the lateral ventricle. No hemorrhagic transformation. Hyperdensity within the right M1 segment on series 2 image 16 suggesting M1 thrombosis. See separate CT angiogram report. Probable old cortical infarct within the right lateral frontal lobe. Findings were directly discussed with Love Keith  at approximately 8:40 p.m. Workstation performed: [id number]         Counseling / Coordination of Care  Total floor / unit time spent today 45 minutes.  Greater than 50% of total time was spent with the patient and / or family counseling and / or coordination of care.  A description of the counseling / coordination of care: Review of history, current assessment, development of a plan.      Code Status: Level 1 - Full Code    ** Please Note: Dragon 360 Dictation voice to text software may have been used in the creation of this document. **

## 2024-12-11 NOTE — QUICK NOTE
Spoke to the patient's son over the phone in regards to a blood transfusion.  I explained the risks and benefits to the patient's son and he showed understanding of that.  I answered all the questions to the best of my abilities.  Patient's son stated that he was okay giving verbal consent for the transfusion.

## 2024-12-11 NOTE — QUICK NOTE
Met with pt's son discussed images, exam, hopes going forward. Explained the stent reocclusion, PE, lasix, empiric antibiotics, location of bleed and edema causing decrease wakefulness. We discussed that most peek swelling is 5 days, that the hope is that she does not require intubation. They asked if swelling worsens what other treatment are available. Explained to them that at time, sometimes hemicrani is offered to give the brain room to swell.  Discussed why she is on low dose precedex was for the intermittent agitation that the plan is to wean it off for better exam during each eval.  They verbalized understanding.

## 2024-12-11 NOTE — CONSULTS
Vancomycin IV Pharmacy-to-Dose Consultation    Bren Traylor is a 69 y.o. female who was receiving Vancomycin IV with management by the Pharmacy Consult service for treatment of Pneumonia (goal -600, trough >10), Bacteremia (goal -600, trough >10)  .       The patient’s Vancomycin therapy has been discontinued. Thank you for allowing us to take part in this patient's care. Pharmacy will sign-off now; please call or re-consult if there are any questions.        Josesito Corrales, PharmD, BCCCP  Critical Care Clinical Pharmacist  Available via [employer]

## 2024-12-11 NOTE — ASSESSMENT & PLAN NOTE
PPD#5 R M1 mechanical thrombectomy (TICI 3) and R ICA stenting with Dr. 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  Exam change on 12/8 and noted on CT head with acute BG hemorrhage with effacement of R lateral ventricle.  Noted 12/10 with interval occlusion of R ICA stent.    Imaging:  CTA head w/wo, 12/10/24: CT Brain: Stable large intraparenchymal hemorrhage centered within the right basal ganglia with surrounding vasogenic edema and localized mass effect including approximately 5 mm of right to left shift. Early subacute ischemia within the right caudate head and body extending into the corona radiata. 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 The vessel does reconstitute intracranially at the level of the clinoid segment but is significantly diminished in caliber as it extends to the ICA terminus. The right M1 segment is patent as are the M2 and M3 branches. However, the M1 segment is diminished in caliber compared to the opposite left hemisphere and compared to the prior examination. Stable moderate stenosis of the right subclavian origin from the brachiocephalic and left distal common carotid artery and proximal cervical internal carotid artery. Other findings: Interval development of large right and moderate left posterior layering pleural effusions within the lung apices. Suspected small filling defect identified within one of the left upper lobe pulmonary artery branches seen on series 303 image 25 suggesting small pulmonary embolus.     Plan:  Continue to monitor neuro exam closely.  STAT CTA head with decline in GCS > 2 pts in 1 hour.  Continue ASA 81 mg daily.  Maintain SBP <120.  Mobilize as tolerated.  DVT ppx: SCDs, holding pharmacologic DVT ppx in setting of acute hemorrhage, anemia.    Neurosurgery will continue to follow. Call with questions.

## 2024-12-11 NOTE — PROGRESS NOTES
Progress Note - Critical Care/ICU   Name: Bren Traylor 69 y.o. female I MRN: [id number]  Unit/Bed#: ICU 11 I Date of Admission: 12/6/2024   Date of Service: 12/11/2024 I Hospital Day: 4      Assessment & Plan   Neuro:   Diagnosis: Ischemic stroke w/ Right MCA M1 occlusion s/p mechanical thrombectomy, right carotid stenosis s/p right carotid angioplasty and stenting, hemorrhagic conversion   12/7 Mechanical thrombectomy and right carotid angioplasty and stenting - TICI 3   12/8 MRI Brain wo contrast: Large acute parenchymal hemorrhage within the right basal ganglia similar in size to most recent CT scan. Mild surrounding edema and localized mass effect with approximately 5 mm of right to left shift. Adjacent acute ischemia is seen within the caudate head and body. There is hyperintense T2 signal within the distal cervical and intracranial internal carotid artery on the right suggesting slow flow or occlusion. The M1 segment and middle cerebral artery branches on the right demonstrate appropriate flow voids. Consider repeat CT angiography.  12/9 CTH: Stable acute hemorrhage and residual contrast staining centered within the MCA territory infarct. Stable right to left midline shift of 5 mm.  12/10 CTA H/N: Stable large intraparenchymal hemorrhage within the right basal ganglia with 5 mm of right to left shift with early subacute ischemia within the right caudate head and body extending into the corona radiata, occlusion of the right ICA stent, possible suspected small filling defect within the left upper lobe pulmonary artery suggestive of small pulmonary embolism  Plan:   Continue ASA 81 mg daily  SBP goal 110-140  Sedation: Patient on Precedex @0.5  Neurochecks every hour in the daytime and every 2 hours at night  Neurology following, appreciate recommendations  Neurosurgery following, appreciate recommendations    CV:   Diagnosis: SBP goal 110-140 in the setting of IVH   Plan:   SBP goal <140   Patient on  Levophed @6, wean as appropriate     Diagnosis: Elevated Troponins with EKG changes, Afib on telemetry  ECHO 12/8: Left ventricular cavity size is normal. Wall thickness is normal. The left ventricular ejection fraction is 45-50 %. Systolic function is mildly reduced. Akinesis of the inferolateral portion of the left ventricle was noted. Diastolic function is normal.   ECHO 12/10: Left ventricular cavity size is normal. The left ventricular ejection fraction is 50 to 55%. Systolic function is low normal. Unable to assess diastolic function. The following segments are hypokinetic: basal inferior, basal inferolateral, mid inferior and mid inferolateral.  Troponins: 5343 --> 5149 --> Pending 4hr  Plan:   Cardiology consulted, appreciate recommendations    Diagnosis: CHF  BNP: 1164  Plan:   S/p 2 doses of Lasix 40 mg IV on 12/10    Pulm:  Diagnosis: Acute respiratory failure  Extubated on 12/8/2024  Chest x-ray 12/9/24: Congestive heart failure  Chest x-ray 12/10/24: Persistent pulmonary edema and pleural effusions.   Plan:   SpO2 goal >92%  Airway precautions  Respiratory protocol  CPAP overnight, switching to BiPAP for increased work of breathing    Diagnosis: B/l PEs  CTA PE Study 12/10/24: Bilateral pulmonary embolism as described above.  Measured RV/LV ratio is within normal limits at less than 0.9 and there is no CT evidence for right heart strain. Peripherally situated wedge-shaped areas of groundglass within the bilateral upper lobes likely representing areas of developing pulmonary infarction. Pulmonary edema with layering right greater than left bilateral pleural effusions.  VAS lower extremity study bilaterally: No DVTs, non-vascularized cystic-type structure noted in the popliteal fossa in the R lower extremity  Plan:   IR consulted for thrombectomy given ICH but too small to intervene  Monitor for R heart strain    GI:   Diagnosis: No acute issues   Last BM: 12/10/24  Plan:   MiraLAX 17 mg daily  Senokot 2  tablets twice daily  Dulcolax suppository 10 mg daily as needed    :   Diagnosis: No issues   Plan:   Monitor I's and O's is appropriat     F/E/N:     F: None  E: Replete as appropriate  N: Tube feeds, stopped while on BiPAP    Heme/Onc:   Diagnosis: DVT prophylaxis, hemoglobin 7.1  Plan:   Heparin 5000 units every 8 hours initiated on 12/11/24  Blood consent obtained verbally over the phone, plan to transfuse 1 unit of blood  H&H ordered for 1130 AM to re-assess    Endo:   Diagnosis: No issues   A1c: 5.3  Glucose: 112-203  Plan:   BG goal 140-180  Hypoglycemia protocol in place    ID:   Diagnosis: Leukocytosis   WBCs: 18.56 --> 18.31  MRSA: Negative  Blood cultures x2: No growth at 24 hours  Sputum culture: 2+ Polys, 3+ Gram positive cocci in pairs  Plan:   Trend fever curve and monitor WBCs  Patient started on Vanc/Zosyn on 12/9/2024, possibly de-escalate Vanc given negative MRSA swab    MSK/Skin:   Diagnosis: Ambulatory dysfunction   Plan:   PT/OT  Out of bed as appropriate    Disposition: Critical care    ICU Core Measures     A: Assess, Prevent, and Manage Pain Has pain been assessed? Yes  Need for changes to pain regimen? No   B: Both SAT/SAT  N/A   C: Choice of Sedation RASS Goal: 0 Alert and Calm  Need for changes to sedation or analgesia regimen? No   D: Delirium CAM-ICU: Negative   E: Early Mobility  Plan for early mobility? Yes   F: Family Engagement Plan for family engagement today? Yes       Antibiotic Review: Continue broad spectrum secondary to severity of illness.     Review of Invasive Devices:      Central access plan: Medications requiring central line Hemodynamic monitoring  Qi Plan: Keep arterial line for hemodynamic monitoring, frequent ABGs, and frequent labs    Prophylaxis:  VTE VTE covered by:  heparin (porcine), Subcutaneous, 5,000 Units at 12/11/24 0531       Stress Ulcer  not ordered         24 Hour Events : Patient had elevated troponins overnight and needed to be on  CPAP.    Subjective     Patient following commands with frequent redirection but unable to provide history or proper ROS.    Review of Systems: Review of Systems not obtainable due to Clinical Condition    Objective :                   Vitals I/O      Most Recent Min/Max in 24hrs   Temp 98 °F (36.7 °C) Temp  Min: 97.7 °F (36.5 °C)  Max: 99.9 °F (37.7 °C)   Pulse (!) 124 Pulse  Min: 56  Max: 134   Resp (!) 42 Resp  Min: 20  Max: 48   /86 BP  Min: 84/39  Max: 155/77   O2 Sat 96 % SpO2  Min: 86 %  Max: 100 %      Intake/Output Summary (Last 24 hours) at 12/11/2024 0759  Last data filed at 12/11/2024 0600  Gross per 24 hour   Intake 2930.4 ml   Output 3605 ml   Net -674.6 ml       Diet Enteral/Parenteral; Tube Feeding No Oral Diet; Jevity 1.2 Navarro; Continuous; 55; Prosource Protein Liquid - Two Packets; 60; Water; Every 4 hours    Invasive Monitoring   Arterial Line  Qi /54  Arterial Line BP  Min: 72/28  Max: 204/70   MAP 78 mmHg  Arterial Line MAP (mmHg)  Min: 40 mmHg  Max: 110 mmHg           Physical Exam   Physical Exam  Vitals reviewed.   Eyes:      Conjunctiva/sclera: Conjunctivae normal.      Pupils: Pupils are equal, round, and reactive to light.   Skin:     General: Skin is warm.   HENT:      Head: Normocephalic.   Cardiovascular:      Rate and Rhythm: Regular rhythm. Tachycardia present.   Abdominal: General: Bowel sounds are normal.      Palpations: Abdomen is soft.      Tenderness: There is no abdominal tenderness.   Constitutional:       Appearance: She is ill-appearing.   Pulmonary:      Effort: Tachypnea, accessory muscle usage, respiratory distress and accessory muscle usage present.   Neurological:      Comments: Patient following commands in the RUE/RLE with re-direction. PERRL, unable to test for EOM, CPAP being used so unable to assess for facial asymmetry. No spontaneous movement in the LUE/LLE. Pain stimulus not applied to LUE/LLE given patient is already agitated.           Diagnostic  Studies        Lab Results: I have reviewed the following results:     Medications:  Scheduled PRN   aspirin, 81 mg, Daily  atorvastatin, 20 mg, QPM  chlorhexidine, 15 mL, Q12H DANIS  divalproex sodium, 500 mg, Q12H DANIS  heparin (porcine), 5,000 Units, Q8H DANIS  insulin lispro, 1-6 Units, Q6H DANIS  ipratropium, 0.5 mg, Q6H  levalbuterol, 1.25 mg, Q6H  piperacillin-tazobactam, 4.5 g, Q8H  polyethylene glycol, 17 g, Daily  QUEtiapine, 50 mg, BID  senna-docusate sodium, 2 tablet, BID  sertraline, 50 mg, Daily  vancomycin, 1,000 mg, Q12H      acetaminophen, 650 mg, Q6H PRN  bisacodyl, 10 mg, Daily PRN       Continuous    dexmedetomidine, 0.1-0.5 mcg/kg/hr, Last Rate: 0.4 mcg/kg/hr (12/11/24 0746)  norepinephrine, 1-30 mcg/min, Last Rate: 9 mcg/min (12/11/24 0747)         Labs:   CBC    Recent Labs     12/10/24  0457 12/11/24  0425   WBC 18.56* 18.31*   HGB 7.9* 7.0*   HCT 24.4* 21.8*    158     BMP    Recent Labs     12/11/24  0027 12/11/24  0407   SODIUM 156* 157*   K 3.7 3.7   * 128*   CO2 22 22   AGAP 7 7   BUN 20 20   CREATININE 0.64 0.63   CALCIUM 7.9* 7.9*       Coags    No recent results     Additional Electrolytes  Recent Labs     12/10/24  0457 12/11/24  0455   MG 1.9 2.1   PHOS 2.9 3.2          Blood Gas    Recent Labs     12/11/24  0527   PHART 7.425   IHJ1CWB 26.1*   PO2ART 84.1   BKB5TRR 16.7*   BEART -6.7   SOURCE Line, Arterial     Recent Labs     12/11/24  0527   SOURCE Line, Arterial    LFTs  Recent Labs     12/10/24  0457   *   *   ALKPHOS 108*   ALB 3.3*   TBILI 0.78       Infectious  Recent Labs     12/09/24  1612   PROCALCITONI 0.10     Glucose  Recent Labs     12/10/24  1147 12/10/24  1807 12/11/24  0027 12/11/24  0407   GLUC 196* 121 186* 196*

## 2024-12-11 NOTE — QUICK NOTE
The patient's son, Severin, was contacted to obtain consent for a thoracentesis. The risks and benefits were discussed with him and he was agreeable to the procedure. Consent form was filled out and will be scanned into the chart later.

## 2024-12-11 NOTE — ASSESSMENT & PLAN NOTE
CTA of the head revealed acute thrombosis in the mid-distal right M1 segment with severe near occlusion of the proximal BILL with severe tandem stenosis intracranially and high-grade stenosis of the last distal common carotid artery, moderate high-grade stenosis of the right subclavian origin.   12/6 s/p emergent right carotid angioplasty/stenting and placed on Integrilin  Repeat imaging 12/7 revealed hemorrhagic conversion and patient was given DDAVP administered  CTA 12/10 revealed BILL stent occlusion  Currently on aspirin and statin per NG tube

## 2024-12-11 NOTE — PLAN OF CARE
Problem: PAIN - ADULT  Goal: Verbalizes/displays adequate comfort level or baseline comfort level  Description: Interventions:  - Encourage patient to monitor pain and request assistance  - Assess pain using appropriate pain scale  - Administer analgesics based on type and severity of pain and evaluate response  - Implement non-pharmacological measures as appropriate and evaluate response  - Consider cultural and social influences on pain and pain management  - Notify physician/advanced practitioner if interventions unsuccessful or patient reports new pain  Outcome: Progressing     Problem: INFECTION - ADULT  Goal: Absence or prevention of progression during hospitalization  Description: INTERVENTIONS:  - Assess and monitor for signs and symptoms of infection  - Monitor lab/diagnostic results  - Monitor all insertion sites, i.e. indwelling lines, tubes, and drains  - Monitor endotracheal if appropriate and nasal secretions for changes in amount and color  - [place] appropriate cooling/warming therapies per order  - Administer medications as ordered  - Instruct and encourage patient and family to use good hand hygiene technique  - Identify and instruct in appropriate isolation precautions for identified infection/condition  Outcome: Progressing     Problem: SAFETY ADULT  Goal: Patient will remain free of falls  Description: INTERVENTIONS:  - Educate patient/family on patient safety including physical limitations  - Instruct patient to call for assistance with activity   - Consult OT/PT to assist with strengthening/mobility   - Keep Call bell within reach  - Keep bed low and locked with side rails adjusted as appropriate  - Keep care items and personal belongings within reach  - Initiate and maintain comfort rounds  - Make Fall Risk Sign visible to staff  - Offer Toileting every 2 Hours, in advance of need  - Apply yellow socks and bracelet for high fall risk patients  - Consider moving patient to room near nurses  station  Outcome: Progressing  Goal: Maintain or return to baseline ADL function  Description: INTERVENTIONS:  -  Assess patient's ability to carry out ADLs; assess patient's baseline for ADL function and identify physical deficits which impact ability to perform ADLs (bathing, care of mouth/teeth, toileting, grooming, dressing, etc.)  - Assess/evaluate cause of self-care deficits   - Assess range of motion  - Assess patient's mobility; develop plan if impaired  - Assess patient's need for assistive devices and provide as appropriate  - Encourage maximum independence but intervene and supervise when necessary  - Involve family in performance of ADLs  - Assess for home care needs following discharge   - Consider OT consult to assist with ADL evaluation and planning for discharge  - Provide patient education as appropriate  Outcome: Progressing  Goal: Maintains/Returns to pre admission functional level  Description: INTERVENTIONS:  - Perform AM-PAC 6 Click Basic Mobility/ Daily Activity assessment daily.  - Set and communicate daily mobility goal to care team and patient/family/caregiver.   - Collaborate with rehabilitation services on mobility goals if consulted  - Reposition patient every 2 hours.  - Out of bed for toileting  - Record patient progress and toleration of activity level   Outcome: Progressing     Problem: DISCHARGE PLANNING  Goal: Discharge to home or other facility with appropriate resources  Description: INTERVENTIONS:  - Identify barriers to discharge w/patient and caregiver  - Arrange for needed discharge resources and transportation as appropriate  - Identify discharge learning needs (meds, wound care, etc.)  - Arrange for interpretive services to assist at discharge as needed  - Refer to Case Management Department for coordinating discharge planning if the patient needs post-hospital services based on physician/advanced practitioner order or complex needs related to functional status, cognitive  ability, or social support system  Outcome: Progressing     Problem: Knowledge Deficit  Goal: Patient/family/caregiver demonstrates understanding of disease process, treatment plan, medications, and discharge instructions  Description: Complete learning assessment and assess knowledge base.  Interventions:  - Provide teaching at level of understanding  - Provide teaching via preferred learning methods  Outcome: Progressing     Problem: Prexisting or High Potential for Compromised Skin Integrity  Goal: Skin integrity is maintained or improved  Description: INTERVENTIONS:  - Identify patients at risk for skin breakdown  - Assess and monitor skin integrity  - Assess and monitor nutrition and hydration status  - Monitor labs   - Assess for incontinence   - Turn and reposition patient  - Assist with mobility/ambulation  - Relieve pressure over bony prominences  - Avoid friction and shearing  - Provide appropriate hygiene as needed including keeping skin clean and dry  - Evaluate need for skin moisturizer/barrier cream  - Collaborate with interdisciplinary team   - Patient/family teaching  - Consider wound care consult   Outcome: Progressing     Problem: SAFETY,RESTRAINT: NV/NON-SELF DESTRUCTIVE BEHAVIOR  Goal: Remains free of harm/injury (restraint for non violent/non self-detsructive behavior)  Description: INTERVENTIONS:  - Instruct patient/family regarding restraint use   - Assess and monitor physiologic and psychological status   - Provide interventions and comfort measures to meet assessed patient needs   - Identify and implement measures to help patient regain control  - Assess readiness for release of restraint   Outcome: Progressing  Goal: Returns to optimal restraint-free functioning  Description: INTERVENTIONS:  - Assess the patient's behavior and symptoms that indicate continued need for restraint  - Identify and implement measures to help patient regain control  - Assess readiness for release of restraint    Outcome: Progressing     Problem: Potential for Falls  Goal: Patient will remain free of falls  Description: INTERVENTIONS:  - Educate patient/family on patient safety including physical limitations  - Instruct patient to call for assistance with activity   - Consult OT/PT to assist with strengthening/mobility   - Keep Call bell within reach  - Keep bed low and locked with side rails adjusted as appropriate  - Keep care items and personal belongings within reach  - Initiate and maintain comfort rounds  - Make Fall Risk Sign visible to staff  - Offer Toileting every 2 Hours, in advance of need  - Apply yellow socks and bracelet for high fall risk patients  - Consider moving patient to room near nurses station  Outcome: Progressing     Problem: Nutrition/Hydration-ADULT  Goal: Nutrient/Hydration intake appropriate for improving, restoring or maintaining nutritional needs  Description: Monitor and assess patient's nutrition/hydration status for malnutrition. Collaborate with interdisciplinary team and initiate plan and interventions as ordered.  Monitor patient's weight and dietary intake as ordered or per policy. Utilize nutrition screening tool and intervene as necessary. Determine patient's food preferences and provide high-protein, high-caloric foods as appropriate.     INTERVENTIONS:  - Monitor oral intake, urinary output, labs, and treatment plans  - Assess nutrition and hydration status and recommend course of action  - Evaluate amount of meals eaten  - Assist patient with eating if necessary   - Allow adequate time for meals  - Recommend/ encourage appropriate diets, oral nutritional supplements, and vitamin/mineral supplements  - Order, calculate, and assess calorie counts as needed  - Recommend, monitor, and adjust tube feedings and TPN/PPN based on assessed needs  - Assess need for intravenous fluids  - Provide specific nutrition/hydration education as appropriate  - Include patient/family/caregiver in  decisions related to nutrition  Outcome: Progressing     Problem: NEUROSENSORY - ADULT  Goal: Achieves stable or improved neurological status  Description: INTERVENTIONS  - Monitor and report changes in neurological status  - Monitor vital signs such as temperature, blood pressure, glucose, and any other labs ordered   - Initiate measures to prevent increased intracranial pressure  - Monitor for seizure activity and implement precautions if appropriate      Outcome: Progressing  Goal: Achieves maximal functionality and self care  Description: INTERVENTIONS  - Monitor swallowing and airway patency with patient fatigue and changes in neurological status  - Encourage and assist patient to increase activity and self care.   - Encourage visually impaired, hearing impaired and aphasic patients to use assistive/communication devices  Outcome: Progressing     Problem: CARDIOVASCULAR - ADULT  Goal: Maintains optimal cardiac output and hemodynamic stability  Description: INTERVENTIONS:  - Monitor I/O, vital signs and rhythm  - Monitor for S/S and trends of decreased cardiac output  - Administer and titrate ordered vasoactive medications to optimize hemodynamic stability  - Assess quality of pulses, skin color and temperature  - Assess for signs of decreased coronary artery perfusion  - Instruct patient to report change in severity of symptoms  Outcome: Progressing  Goal: Absence of cardiac dysrhythmias or at baseline rhythm  Description: INTERVENTIONS:  - Continuous cardiac monitoring, vital signs, obtain 12 lead EKG if ordered  - Administer antiarrhythmic and heart rate control medications as ordered  - Monitor electrolytes and administer replacement therapy as ordered  Outcome: Progressing     Problem: RESPIRATORY - ADULT  Goal: Achieves optimal ventilation and oxygenation  Description: INTERVENTIONS:  - Assess for changes in respiratory status  - Assess for changes in mentation and behavior  - Position to facilitate  oxygenation and minimize respiratory effort  - Oxygen administered by appropriate delivery if ordered  - Initiate smoking cessation education as indicated  - Encourage broncho-pulmonary hygiene including cough, deep breathe, Incentive Spirometry  - Assess the need for suctioning and aspirate as needed  - Assess and instruct to report SOB or any respiratory difficulty  - Respiratory Therapy support as indicated  Outcome: Progressing     Problem: GENITOURINARY - ADULT  Goal: Maintains or returns to baseline urinary function  Description: INTERVENTIONS:  - Assess urinary function  - Encourage oral fluids to ensure adequate hydration if ordered  - Administer IV fluids as ordered to ensure adequate hydration  - Administer ordered medications as needed  - Offer frequent toileting  - Follow urinary retention protocol if ordered  Outcome: Progressing  Goal: Absence of urinary retention  Description: INTERVENTIONS:  - Assess patient’s ability to void and empty bladder  - Monitor I/O  - Bladder scan as needed  - Discuss with physician/AP medications to alleviate retention as needed  - Discuss catheterization for long term situations as appropriate  Outcome: Progressing     Problem: METABOLIC, FLUID AND ELECTROLYTES - ADULT  Goal: Electrolytes maintained within normal limits  Description: INTERVENTIONS:  - Monitor labs and assess patient for signs and symptoms of electrolyte imbalances  - Administer electrolyte replacement as ordered  - Monitor response to electrolyte replacements, including repeat lab results as appropriate  - Instruct patient on fluid and nutrition as appropriate  Outcome: Progressing     Problem: SKIN/TISSUE INTEGRITY - ADULT  Goal: Skin Integrity remains intact(Skin Breakdown Prevention)  Description: Assess:  -Inspect skin when repositioning, toileting, and assisting with ADLS  -Assess extremities for adequate circulation and sensation     Bed Management:  -Have minimal linens on bed & keep smooth,  unwrinkled  -Change linens as needed when moist or perspiring    Activity:  -Encourage activity and walks on unit  -Encourage or provide ROM exercises   -Turn and reposition patient every 2 Hours  -Use appropriate equipment to lift or move patient in bed    Skin Care:  -Avoid use of baby powder, tape, friction and shearing, hot water or constrictive clothing  -Do not massage red bony areas  Outcome: Progressing  Goal: Incision(s), wounds(s) or drain site(s) healing without S/S of infection  Description: INTERVENTIONS  - Assess and document dressing, incision, wound bed, drain sites and surrounding tissue  - Provide patient and family education  Outcome: Progressing     Problem: Neurological Deficit  Goal: Neurological status is stable or improving  Description: Interventions:  - Monitor and assess patient's level of consciousness, motor function, sensory function, and level of assistance needed for ADLs.   - Monitor and report changes from baseline. Collaborate with interdisciplinary team to initiate plan and implement interventions as ordered.   - Provide and maintain a safe environment.  - Consider seizure precautions.  - Consider fall precautions.  - Consider aspiration precautions.  - Consider bleeding precautions.  Outcome: Progressing     Problem: Activity Intolerance/Impaired Mobility  Goal: Mobility/activity is maintained at optimum level for patient  Description: Interventions:  - Assess and monitor patient  barriers to mobility and need for assistive/adaptive devices.  - Assess patient's emotional response to limitations.  - Collaborate with interdisciplinary team and initiate plans and interventions as ordered.  - Encourage independent activity per ability.  - Maintain proper body alignment.  - Perform active/passive rom as tolerated/ordered.  - Plan activities to conserve energy.  - Turn patient as appropriate  Outcome: Progressing     Problem: Communication Impairment  Goal: Ability to express needs and  understand communication  Description: Assess patient's communication skills and ability to understand information.  Patient will demonstrate use of effective communication techniques, alternative methods of communication and understanding even if not able to speak.     - Encourage communication and provide alternate methods of communication as needed.  - Collaborate with case management/ for discharge needs.  - Include patient/family/caregiver in decisions related to communication.  Outcome: Progressing     Problem: Potential for Aspiration  Goal: Non-ventilated patient's risk of aspiration is minimized  Description: Assess and monitor vital signs, respiratory status, and labs (WBC).  Monitor for signs of aspiration (tachypnea, cough, rales, wheezing, cyanosis, fever).    - Assess and monitor patient's ability to swallow.  - Place patient up in chair to eat if possible.  - HOB up at 90 degrees to eat if unable to get patient up into chair.  - Supervise patient during oral intake.   - Instruct patient/ family to take small bites.  - Instruct patient/ family to take small single sips when taking liquids.  - Follow patient-specific strategies generated by speech pathologist.  Outcome: Progressing     Problem: Nutrition  Goal: Nutrition/Hydration status is improving  Description: Monitor and assess patient's nutrition/hydration status for malnutrition (ex- brittle hair, bruises, dry skin, pale skin and conjunctiva, muscle wasting, smooth red tongue, and disorientation). Collaborate with interdisciplinary team and initiate plan and interventions as ordered.  Monitor patient's weight and dietary intake as ordered or per policy. Utilize nutrition screening tool and intervene per policy. Determine patient's food preferences and provide high-protein, high-caloric foods as appropriate.     - Assist patient with eating.  - Allow adequate time for meals.  - Encourage patient to take dietary supplement as  ordered.  - Collaborate with clinical nutritionist.  - Include patient/family/caregiver in decisions related to nutrition.  Outcome: Progressing

## 2024-12-11 NOTE — PROGRESS NOTES
Father Ernesto gave a blessing with prayer.    12/11/24 1500   Clinical Encounter Type   Visited With Patient and family together   [facility] Encounters   [facility] Needs Prayer

## 2024-12-11 NOTE — PROCEDURES
Thoracentesis (Bedside)    Date/Time: 12/11/2024 1:25 PM    Performed by: Fernie Richardson DO  Authorized by: Fernie Richardson DO    Patient location:  ICU  Other Assisting Provider: Yes (comment) (Dr. Santamaria)    Consent:     Consent obtained:  Written    Consent given by:  Healthcare agent    Risks discussed:  Bleeding, infection, pain, pneumothorax, nerve damage and incomplete drainage    Alternatives discussed:  No treatment  Universal protocol:     Procedure explained and questions answered to patient or proxy's satisfaction: yes      Patient identity confirmed:  Arm band  Indications:     Procedure Purpose: diagnostic and therapeutic      Indications: pleural effusion    Sedation:     Sedation type:  Anxiolysis (fentanyl)  Anesthesia (see MAR for exact dosages):     Anesthesia method:  None  Procedure details:     Preparation: Patient was prepped and draped in usual sterile fashion      Standard thoracentesis cath kit used: Yes      Patient position:  Supine    Laterality:  Right and unilateral    Location:  Midaxillary line    Intercostal space:  7th    Puncture method:  Over-the-needle catheter    Ultrasound guidance: yes      Reason for ultrasound: Identify fluid collection and guide catheter placement.      Ultrasound image availability:  Not saved    Sterile ultrasound techniques: Sterile gel and sterile probe covers were used      Indwelling catheter placed: no      Number of attempts:  1    Needle gauge:  20    Catheter size:  6 Fr    Drainage color:  [place]    Drainage characteristics:  Clear    Fluid removed amount:  500  Post-procedure details:     Chest x-ray performed: yes      Chest x-ray findings:  Pleural effusion improved (Possible small pneumothorax identified per my read, will repeat cxr in the morning)    Patient tolerance of procedure:  Tolerated well, no immediate complications

## 2024-12-11 NOTE — OCCUPATIONAL THERAPY NOTE
OT CANCEL NOTE    Pt chart reviewed. Pt is in bipap and getting blood transfusion; requested to hold OT tx per RN. Will continue to follow pt on caseload and see pt when medically stable and as clinically appropriate.       12/11/24 1314   OT Last Visit   OT Visit Date 12/11/24   Note Type   Note Type Cancelled Session   Cancel Reasons Medical status       Chantelle Swenson MS, OTR/L

## 2024-12-11 NOTE — PROGRESS NOTES
Progress Note - Neurosurgery   Name: Bren Traylor 69 y.o. female I MRN: [id number]  Unit/Bed#: ICU 11 I Date of Admission: 12/6/2024   Date of Service: 12/11/2024 I Hospital Day: 4    Assessment & Plan  Cerebrovascular accident (CVA) due to embolism of right middle cerebral artery (HCC)  PPD#5 R M1 mechanical thrombectomy (TICI 3) and R ICA stenting with Dr. 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  Exam change on 12/8 and noted on CT head with acute BG hemorrhage with effacement of R lateral ventricle.  Noted 12/10 with interval occlusion of R ICA stent.    Imaging:  CTA head w/wo, 12/10/24: CT Brain: Stable large intraparenchymal hemorrhage centered within the right basal ganglia with surrounding vasogenic edema and localized mass effect including approximately 5 mm of right to left shift. Early subacute ischemia within the right caudate head and body extending into the corona radiata. 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 The vessel does reconstitute intracranially at the level of the clinoid segment but is significantly diminished in caliber as it extends to the ICA terminus. The right M1 segment is patent as are the M2 and M3 branches. However, the M1 segment is diminished in caliber compared to the opposite left hemisphere and compared to the prior examination. Stable moderate stenosis of the right subclavian origin from the brachiocephalic and left distal common carotid artery and proximal cervical internal carotid artery. Other findings: Interval development of large right and moderate left posterior layering pleural effusions within the lung apices. Suspected small filling defect identified within one of the left upper lobe pulmonary artery branches seen on series 303 image 25 suggesting small pulmonary embolus.     Plan:  Continue to monitor neuro exam closely.  STAT CTA head with decline in  GCS > 2 pts in 1 hour.  Continue ASA 81 mg daily.  Maintain SBP <120.  Mobilize as tolerated.  DVT ppx: SCDs, holding pharmacologic DVT ppx in setting of acute hemorrhage, anemia.    Neurosurgery will continue to follow. Call with questions.  Acute pulmonary embolism (HCC)  Small central upper lobe PE  Defer anticoagulation at this time.  IR consulted, no plans for intervention.  Encephalopathy    Hypotension    Cardiomyopathy (HCC)    Hypertension    Hyperlipidemia    Paroxysmal atrial fibrillation (HCC)    Carotid artery stenosis    Elevated troponin  Cardiology following.  Concern for ST elevation MI    I have discussed the above management plan in detail with the primary service.   Neurosurgery service will follow.  Please contact the SecureChat role for the Neurosurgery service with any questions/concerns.    Subjective   On bipap  Remains plegic to left side.  Moving RUE/RLE spontaneously, and to command in Polish.  Attempt to open eyes to command.    Objective :  Temp:  [97.7 °F (36.5 °C)-101.7 °F (38.7 °C)] 101.1 °F (38.4 °C)  HR:  [] 94  BP: ()/(51-86) 88/56  Resp:  [20-42] 31  SpO2:  [86 %-100 %] 100 %  O2 Device: CPAP  Nasal Cannula O2 Flow Rate (L/min):  [4 L/min-6 L/min] 6 L/min    I/O         12/09 0701  12/10 0700 12/10 0701  12/11 0700 12/11 0701 12/12 0700    I.V. (mL/kg) 1415.7 (25.4) 615.2 (11)     NG/GT 1035 380     IV Piggyback 1600 800.2     Feedings 226 1135     Total Intake(mL/kg) 4276.7 (76.6) 2930.4 (52.5)     Urine (mL/kg/hr)  3605 (2.7)     Emesis/NG output  0     Stool  0     Total Output  3605     Net +4276.7 -674.6            Unmeasured Urine Occurrence 5 x 1 x     Unmeasured Stool Occurrence 2 x 0 x           Physical Exam  Constitutional:       General: She is awake.      Appearance: She is ill-appearing.   Eyes:      Pupils: Pupils are equal, round, and reactive to light.   Pulmonary:      Effort: Respiratory distress present.      Breath sounds: Rhonchi present.       Comments: tachypneic     Neurological Exam  Mental Status  Awake.  Follows commands in Polish RUE.    Cranial Nerves  CN III, IV, VI: Pupils equal round and reactive to light bilaterally.  CN VII:  Left: There is central facial weakness.    Motor    Moves right side to command, left side plegic.        Lab Results: I have reviewed the following results:  Recent Labs     12/09/24  0527 12/09/24  1612 12/09/24  1745 12/10/24  0457 12/10/24  1147 12/11/24  0407 12/11/24  0425 12/11/24  0455 12/11/24  0602   WBC 12.51*  --   --  18.56*  --   --  18.31*  --   --    HGB 8.7*  --   --  7.9*  --   --  7.0*  --   --    HCT 27.0*  --   --  24.4*  --   --  21.8*  --   --    *  --   --  159  --   --  158  --   --    SODIUM 157*  --    < > 157*   < > 157*  --   --   --    K 3.6  --    < > 3.6   < > 3.7  --   --   --    *  --    < > 130*   < > 128*  --   --   --    CO2 24  --    < > 18*   < > 22  --   --   --    BUN 16  --    < > 22   < > 20  --   --   --    CREATININE 0.41*  --    < > 0.61   < > 0.63  --   --   --    GLUC 126  --    < > 203*   < > 196*  --   --   --    CAIONIZED 1.16  --   --   --   --   --   --   --   --    MG 2.1  --   --  1.9  --   --   --  2.1  --    PHOS 2.9  --   --  2.9  --   --   --  3.2  --    AST  --   --   --  189*  --   --   --   --   --    ALT  --   --   --  277*  --   --   --   --   --    ALB  --   --   --  3.3*  --   --   --   --   --    TBILI  --   --   --  0.78  --   --   --   --   --    ALKPHOS  --   --   --  108*  --   --   --   --   --    HSTNI0  --   --   --   --   --  5,343*  --   --   --    HSTNI2  --   --   --   --   --   --   --   --  5,149*   BNP  --   --   --  1,164*  --   --   --   --   --    LACTICACID  --  1.6  --   --   --   --   --   --   --     < > = values in this interval not displayed.       Imaging Results Review: No pertinent imaging studies reviewed.  Other Study Results Review: No additional pertinent studies reviewed.    VTE Pharmacologic Prophylaxis: VTE  covered by:  [Held by provider] heparin (porcine), Subcutaneous, 5,000 Units at 12/11/24 0531    and Sequential compression device (Venodyne)

## 2024-12-11 NOTE — ASSESSMENT & PLAN NOTE
Small central upper lobe PE  Defer anticoagulation at this time.  IR consulted, no plans for intervention.

## 2024-12-11 NOTE — ASSESSMENT & PLAN NOTE
CTA Chest 12/10 revealed bilateral PEs with developing pulmonary infarction  Venous duplex negative for DVT

## 2024-12-12 ENCOUNTER — TELEPHONE (OUTPATIENT)
Dept: NEUROSURGERY | Facility: CLINIC | Age: 69
End: 2024-12-12

## 2024-12-12 NOTE — PROGRESS NOTES
Progress Note - Neurosurgery   Name: Bren Traylor 69 y.o. female I MRN: [id number]  Unit/Bed#: ICU 11 I Date of Admission: 12/6/2024   Date of Service: 12/12/2024 I Hospital Day: 5    Assessment & Plan  Cerebrovascular accident (CVA) due to embolism of right middle cerebral artery (HCC)  PPD#6 R M1 mechanical thrombectomy (TICI 3) and R ICA stenting with Dr. 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  Exam change on 12/8 and noted on CT head with acute BG hemorrhage with effacement of R lateral ventricle.  Noted 12/10 with interval occlusion of R ICA stent.    Imaging:  CT head wo, 12/11/24: Grossly stable recent large hematoma centered in the right basal ganglia. Persistent surrounding edema with unchanged mass effect and 6 mm right to left midline shift.   CTA head w/wo, 12/10/24: CT Brain: Stable large intraparenchymal hemorrhage centered within the right basal ganglia with surrounding vasogenic edema and localized mass effect including approximately 5 mm of right to left shift. Early subacute ischemia within the right caudate head and body extending into the corona radiata. 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 The vessel does reconstitute intracranially at the level of the clinoid segment but is significantly diminished in caliber as it extends to the ICA terminus. The right M1 segment is patent as are the M2 and M3 branches. However, the M1 segment is diminished in caliber compared to the opposite left hemisphere and compared to the prior examination. Stable moderate stenosis of the right subclavian origin from the brachiocephalic and left distal common carotid artery and proximal cervical internal carotid artery. Other findings: Interval development of large right and moderate left posterior layering pleural effusions within the lung apices. Suspected small filling defect identified within one of  the left upper lobe pulmonary artery branches seen on series 303 image 25 suggesting small pulmonary embolus.     Plan:  Continue to monitor neuro exam closely.  STAT CTA head with decline in GCS > 2 pts in 1 hour.  Continue ASA 81 mg daily.  Agree with initiation of ECMO protocol heparin gtt, no bolus. Repeat CT head when ptt therapeutic.  Maintain SBP <120.  Mobilize as tolerated.  DVT ppx: SCDs, heparin SQ.    Neurosurgery will follow peripherally. Call with questions.  Acute pulmonary embolism (HCC)  Small central upper lobe PE  Defer anticoagulation at this time.  IR consulted, no plans for intervention.  Encephalopathy    Hypotension    Cardiomyopathy (HCC)    Hypertension    Hyperlipidemia    Paroxysmal atrial fibrillation (HCC)    Carotid artery stenosis    Elevated troponin  Cardiology following.  Concern for ST elevation MI  Troponin now downtrending.    I have discussed the above management plan in detail with the primary service.   Please contact the SecureChat role for the Neurosurgery service with any questions/concerns.    Subjective   Exam unchanged.  Attempts to open eyes to command.  Moving RLE/RUE to command.  Left side plegic.  Febrile.    Objective :  Temp:  [98.4 °F (36.9 °C)-101.6 °F (38.7 °C)] 101.6 °F (38.7 °C)  HR:  [] 104  BP: ()/(50-93) 104/58  Resp:  [0-42] 28  SpO2:  [94 %-100 %] 97 %  O2 Device: Nasal cannula  Nasal Cannula O2 Flow Rate (L/min):  [5 L/min-6 L/min] 5 L/min    I/O         12/10 0701  12/11 0700 12/11 0701  12/12 0700 12/12 0701  12/13 0700    I.V. (mL/kg) 615.2 (11) 686.9 (12.3) 24.2 (0.4)    Blood  350     NG/ 270 120    IV Piggyback 1000.2 874.2     Feedings 1135 833 110    Total Intake(mL/kg) 3130.4 (56.1) 3014.1 (54) 254.2 (4.6)    Urine (mL/kg/hr) 3605 (2.7) 2367 (1.8)     Emesis/NG output 0      Stool 0 0     Total Output 3605 2367     Net -474.6 +647.1 +254.2           Unmeasured Urine Occurrence 1 x 3 x     Unmeasured Stool Occurrence 0 x 2 x 1  x          Physical Exam  Constitutional:       General: She is awake.      Appearance: She is ill-appearing.   Eyes:      Pupils: Pupils are equal, round, and reactive to light.   Pulmonary:      Comments: tachypneic     Neurological Exam  Mental Status  Awake.  Follows commands in Polish RUE.    Cranial Nerves  CN III, IV, VI: Pupils equal round and reactive to light bilaterally.  CN VII:  Left: There is central facial weakness.    Motor    Moves right side to command, left side plegic.        Lab Results: I have reviewed the following results:  Recent Labs     12/09/24  1612 12/09/24  1745 12/10/24  0457 12/10/24  1147 12/11/24  1123 12/11/24  1317 12/12/24  0532   WBC  --   --  18.56*   < >  --   --  19.28*   HGB  --   --  7.9*   < > 8.6*  --  8.9*   HCT  --   --  24.4*   < > 27.1*  --  26.8*   PLT  --   --  159   < >  --   --  159   SODIUM  --    < > 157*   < >  --   --  155*   K  --    < > 3.6   < >  --   --  3.6   CL  --    < > 130*   < >  --   --  123*   CO2  --    < > 18*   < >  --   --  25   BUN  --    < > 22   < >  --   --  19   CREATININE  --    < > 0.61   < >  --   --  0.54*   GLUC  --    < > 203*   < >  --   --  144*   CAIONIZED  --   --   --   --   --   --  1.12   MG  --   --  1.9   < >  --   --  2.1   PHOS  --   --  2.9   < >  --   --  2.8   AST  --   --  189*  --   --   --   --    ALT  --   --  277*  --   --   --   --    ALB  --   --  3.3*  --   --   --   --    TBILI  --   --  0.78  --   --   --   --    ALKPHOS  --   --  108*  --   --   --   --    HSTNI0  --   --   --    < > 8,519*  --   --    HSTNI2  --   --   --    < >  --  10,864*  --    BNP  --   --  1,164*  --   --   --   --    LACTICACID 1.6  --   --   --   --   --   --     < > = values in this interval not displayed.       Imaging Results Review: I personally reviewed the following image studies in PACS and associated radiology reports: CT head. My interpretation of the radiology images/reports is: as above.  Other Study Results Review: No  additional pertinent studies reviewed.    VTE Pharmacologic Prophylaxis: VTE covered by:  heparin (porcine), Subcutaneous, 5,000 Units at 12/12/24 0554    and Sequential compression device (Venodyne)

## 2024-12-12 NOTE — PLAN OF CARE
Problem: PAIN - ADULT  Goal: Verbalizes/displays adequate comfort level or baseline comfort level  Description: Interventions:  - Encourage patient to monitor pain and request assistance  - Assess pain using appropriate pain scale  - Administer analgesics based on type and severity of pain and evaluate response  - Implement non-pharmacological measures as appropriate and evaluate response  - Consider cultural and social influences on pain and pain management  - Notify physician/advanced practitioner if interventions unsuccessful or patient reports new pain  Outcome: Progressing     Problem: INFECTION - ADULT  Goal: Absence or prevention of progression during hospitalization  Description: INTERVENTIONS:  - Assess and monitor for signs and symptoms of infection  - Monitor lab/diagnostic results  - Monitor all insertion sites, i.e. indwelling lines, tubes, and drains  - Monitor endotracheal if appropriate and nasal secretions for changes in amount and color  - [place] appropriate cooling/warming therapies per order  - Administer medications as ordered  - Instruct and encourage patient and family to use good hand hygiene technique  - Identify and instruct in appropriate isolation precautions for identified infection/condition  Outcome: Progressing  Goal: Absence of fever/infection during neutropenic period  Description: INTERVENTIONS:  - Monitor WBC    Outcome: Progressing     Problem: SAFETY ADULT  Goal: Patient will remain free of falls  Description: INTERVENTIONS:  - Educate patient/family on patient safety including physical limitations  - Instruct patient to call for assistance with activity   - Consult OT/PT to assist with strengthening/mobility   - Keep Call bell within reach  - Keep bed low and locked with side rails adjusted as appropriate  - Keep care items and personal belongings within reach  - Initiate and maintain comfort rounds  - Make Fall Risk Sign visible to staff  - Offer Toileting every  Hours, in  advance of need  - Initiate/Maintain alarm  - Obtain necessary fall risk management equipment:   - Apply yellow socks and bracelet for high fall risk patients  - Consider moving patient to room near nurses station  Outcome: Progressing  Goal: Maintain or return to baseline ADL function  Description: INTERVENTIONS:  -  Assess patient's ability to carry out ADLs; assess patient's baseline for ADL function and identify physical deficits which impact ability to perform ADLs (bathing, care of mouth/teeth, toileting, grooming, dressing, etc.)  - Assess/evaluate cause of self-care deficits   - Assess range of motion  - Assess patient's mobility; develop plan if impaired  - Assess patient's need for assistive devices and provide as appropriate  - Encourage maximum independence but intervene and supervise when necessary  - Involve family in performance of ADLs  - Assess for home care needs following discharge   - Consider OT consult to assist with ADL evaluation and planning for discharge  - Provide patient education as appropriate  Outcome: Progressing  Goal: Maintains/Returns to pre admission functional level  Description: INTERVENTIONS:  - Perform AM-PAC 6 Click Basic Mobility/ Daily Activity assessment daily.  - Set and communicate daily mobility goal to care team and patient/family/caregiver.   - Collaborate with rehabilitation services on mobility goals if consulted  - Perform Range of Motion  times a day.  - Reposition patient every  hours.  - Dangle patient  times a day  - Stand patient  times a day  - Ambulate patient  times a day  - Out of bed to chair  times a day   - Out of bed for meals  times a day  - Out of bed for toileting  - Record patient progress and toleration of activity level   Outcome: Progressing     Problem: DISCHARGE PLANNING  Goal: Discharge to home or other facility with appropriate resources  Description: INTERVENTIONS:  - Identify barriers to discharge w/patient and caregiver  - Arrange for  needed discharge resources and transportation as appropriate  - Identify discharge learning needs (meds, wound care, etc.)  - Arrange for interpretive services to assist at discharge as needed  - Refer to Case Management Department for coordinating discharge planning if the patient needs post-hospital services based on physician/advanced practitioner order or complex needs related to functional status, cognitive ability, or social support system  Outcome: Progressing     Problem: Knowledge Deficit  Goal: Patient/family/caregiver demonstrates understanding of disease process, treatment plan, medications, and discharge instructions  Description: Complete learning assessment and assess knowledge base.  Interventions:  - Provide teaching at level of understanding  - Provide teaching via preferred learning methods  Outcome: Progressing     Problem: Prexisting or High Potential for Compromised Skin Integrity  Goal: Skin integrity is maintained or improved  Description: INTERVENTIONS:  - Identify patients at risk for skin breakdown  - Assess and monitor skin integrity  - Assess and monitor nutrition and hydration status  - Monitor labs   - Assess for incontinence   - Turn and reposition patient  - Assist with mobility/ambulation  - Relieve pressure over bony prominences  - Avoid friction and shearing  - Provide appropriate hygiene as needed including keeping skin clean and dry  - Evaluate need for skin moisturizer/barrier cream  - Collaborate with interdisciplinary team   - Patient/family teaching  - Consider wound care consult   Outcome: Progressing     Problem: SAFETY,RESTRAINT: NV/NON-SELF DESTRUCTIVE BEHAVIOR  Goal: Remains free of harm/injury (restraint for non violent/non self-detsructive behavior)  Description: INTERVENTIONS:  - Instruct patient/family regarding restraint use   - Assess and monitor physiologic and psychological status   - Provide interventions and comfort measures to meet assessed patient needs   -  Identify and implement measures to help patient regain control  - Assess readiness for release of restraint   Outcome: Progressing  Goal: Returns to optimal restraint-free functioning  Description: INTERVENTIONS:  - Assess the patient's behavior and symptoms that indicate continued need for restraint  - Identify and implement measures to help patient regain control  - Assess readiness for release of restraint   Outcome: Progressing     Problem: Potential for Falls  Goal: Patient will remain free of falls  Description: INTERVENTIONS:  - Educate patient/family on patient safety including physical limitations  - Instruct patient to call for assistance with activity   - Consult OT/PT to assist with strengthening/mobility   - Keep Call bell within reach  - Keep bed low and locked with side rails adjusted as appropriate  - Keep care items and personal belongings within reach  - Initiate and maintain comfort rounds  - Make Fall Risk Sign visible to staff  - Offer Toileting every  Hours, in advance of need  - Initiate/Maintain alarm  - Obtain necessary fall risk management equipment:   - Apply yellow socks and bracelet for high fall risk patients  - Consider moving patient to room near nurses station  Outcome: Progressing     Problem: COPING  Goal: Pt/Family able to verbalize concerns and demonstrate effective coping strategies  Description: INTERVENTIONS:  - Assist patient/family to identify coping skills, available support systems and cultural and spiritual values  - Provide emotional support, including active listening and acknowledgement of concerns of patient and caregivers  - Reduce environmental stimuli, as able  - Provide patient education  - Assess for spiritual pain/suffering and initiate spiritual care, including notification of Pastoral Care or [place] based community as needed  - Assess effectiveness of coping strategies  Outcome: Progressing  Goal: Will report anxiety at manageable levels  Description:  INTERVENTIONS:  - Administer medication as ordered  - Teach and encourage coping skills  - Provide emotional support  - Assess patient/family for anxiety and ability to cope  Outcome: Progressing     Problem: Nutrition/Hydration-ADULT  Goal: Nutrient/Hydration intake appropriate for improving, restoring or maintaining nutritional needs  Description: Monitor and assess patient's nutrition/hydration status for malnutrition. Collaborate with interdisciplinary team and initiate plan and interventions as ordered.  Monitor patient's weight and dietary intake as ordered or per policy. Utilize nutrition screening tool and intervene as necessary. Determine patient's food preferences and provide high-protein, high-caloric foods as appropriate.     INTERVENTIONS:  - Monitor oral intake, urinary output, labs, and treatment plans  - Assess nutrition and hydration status and recommend course of action  - Evaluate amount of meals eaten  - Assist patient with eating if necessary   - Allow adequate time for meals  - Recommend/ encourage appropriate diets, oral nutritional supplements, and vitamin/mineral supplements  - Order, calculate, and assess calorie counts as needed  - Recommend, monitor, and adjust tube feedings and TPN/PPN based on assessed needs  - Assess need for intravenous fluids  - Provide specific nutrition/hydration education as appropriate  - Include patient/family/caregiver in decisions related to nutrition  Outcome: Progressing     Problem: NEUROSENSORY - ADULT  Goal: Achieves stable or improved neurological status  Description: INTERVENTIONS  - Monitor and report changes in neurological status  - Monitor vital signs such as temperature, blood pressure, glucose, and any other labs ordered   - Initiate measures to prevent increased intracranial pressure  - Monitor for seizure activity and implement precautions if appropriate      Outcome: Progressing  Goal: Achieves maximal functionality and self care  Description:  INTERVENTIONS  - Monitor swallowing and airway patency with patient fatigue and changes in neurological status  - Encourage and assist patient to increase activity and self care.   - Encourage visually impaired, hearing impaired and aphasic patients to use assistive/communication devices  Outcome: Progressing     Problem: CARDIOVASCULAR - ADULT  Goal: Maintains optimal cardiac output and hemodynamic stability  Description: INTERVENTIONS:  - Monitor I/O, vital signs and rhythm  - Monitor for S/S and trends of decreased cardiac output  - Administer and titrate ordered vasoactive medications to optimize hemodynamic stability  - Assess quality of pulses, skin color and temperature  - Assess for signs of decreased coronary artery perfusion  - Instruct patient to report change in severity of symptoms  Outcome: Progressing  Goal: Absence of cardiac dysrhythmias or at baseline rhythm  Description: INTERVENTIONS:  - Continuous cardiac monitoring, vital signs, obtain 12 lead EKG if ordered  - Administer antiarrhythmic and heart rate control medications as ordered  - Monitor electrolytes and administer replacement therapy as ordered  Outcome: Progressing     Problem: RESPIRATORY - ADULT  Goal: Achieves optimal ventilation and oxygenation  Description: INTERVENTIONS:  - Assess for changes in respiratory status  - Assess for changes in mentation and behavior  - Position to facilitate oxygenation and minimize respiratory effort  - Oxygen administered by appropriate delivery if ordered  - Initiate smoking cessation education as indicated  - Encourage broncho-pulmonary hygiene including cough, deep breathe, Incentive Spirometry  - Assess the need for suctioning and aspirate as needed  - Assess and instruct to report SOB or any respiratory difficulty  - Respiratory Therapy support as indicated  Outcome: Progressing     Problem: GASTROINTESTINAL - ADULT  Goal: Maintains or returns to baseline bowel function  Description:  INTERVENTIONS:  - Assess bowel function  - Encourage oral fluids to ensure adequate hydration  - Administer IV fluids if ordered to ensure adequate hydration  - Administer ordered medications as needed  - Encourage mobilization and activity  - Consider nutritional services referral to assist patient with adequate nutrition and appropriate food choices  Outcome: Progressing  Goal: Maintains adequate nutritional intake  Description: INTERVENTIONS:  - Monitor percentage of each meal consumed  - Identify factors contributing to decreased intake, treat as appropriate  - Assist with meals as needed  - Monitor I&O, weight, and lab values if indicated  - Obtain nutrition services referral as needed  Outcome: Progressing     Problem: GENITOURINARY - ADULT  Goal: Maintains or returns to baseline urinary function  Description: INTERVENTIONS:  - Assess urinary function  - Encourage oral fluids to ensure adequate hydration if ordered  - Administer IV fluids as ordered to ensure adequate hydration  - Administer ordered medications as needed  - Offer frequent toileting  - Follow urinary retention protocol if ordered  Outcome: Progressing  Goal: Absence of urinary retention  Description: INTERVENTIONS:  - Assess patient’s ability to void and empty bladder  - Monitor I/O  - Bladder scan as needed  - Discuss with physician/AP medications to alleviate retention as needed  - Discuss catheterization for long term situations as appropriate  Outcome: Progressing     Problem: METABOLIC, FLUID AND ELECTROLYTES - ADULT  Goal: Electrolytes maintained within normal limits  Description: INTERVENTIONS:  - Monitor labs and assess patient for signs and symptoms of electrolyte imbalances  - Administer electrolyte replacement as ordered  - Monitor response to electrolyte replacements, including repeat lab results as appropriate  - Instruct patient on fluid and nutrition as appropriate  Outcome: Progressing  Goal: Fluid balance  maintained  Description: INTERVENTIONS:  - Monitor labs   - Monitor I/O and WT  - Instruct patient on fluid and nutrition as appropriate  - Assess for signs & symptoms of volume excess or deficit  Outcome: Progressing  Goal: Glucose maintained within target range  Description: INTERVENTIONS:  - Monitor Blood Glucose as ordered  - Assess for signs and symptoms of hyperglycemia and hypoglycemia  - Administer ordered medications to maintain glucose within target range  - Assess nutritional intake and initiate nutrition service referral as needed  Outcome: Progressing     Problem: SKIN/TISSUE INTEGRITY - ADULT  Goal: Skin Integrity remains intact(Skin Breakdown Prevention)  Description: Assess:  -Perform Geovani assessment every   -Clean and moisturize skin every   -Inspect skin when repositioning, toileting, and assisting with ADLS  -Assess under medical devices such as  every   -Assess extremities for adequate circulation and sensation     Bed Management:  -Have minimal linens on bed & keep smooth, unwrinkled  -Change linens as needed when moist or perspiring  -Avoid sitting or lying in one position for more than  hours while in bed  -Keep HOB at degrees     Toileting:  -Offer bedside commode  -Assess for incontinence every   -Use incontinent care products after each incontinent episode such as     Activity:  -Mobilize patient  times a day  -Encourage activity and walks on unit  -Encourage or provide ROM exercises   -Turn and reposition patient every  Hours  -Use appropriate equipment to lift or move patient in bed  -Instruct/ Assist with weight shifting every  when out of bed in chair  -Consider limitation of chair time  hour intervals    Skin Care:  -Avoid use of baby powder, tape, friction and shearing, hot water or constrictive clothing  -Relieve pressure over bony prominences using   -Do not massage red bony areas    Next Steps:  -Teach patient strategies to minimize risks such as    -Consider consults to   interdisciplinary teams such as   Outcome: Progressing  Goal: Incision(s), wounds(s) or drain site(s) healing without S/S of infection  Description: INTERVENTIONS  - Assess and document dressing, incision, wound bed, drain sites and surrounding tissue  - Provide patient and family education  - Perform skin care/dressing changes ever  Outcome: Progressing     Problem: MUSCULOSKELETAL - ADULT  Goal: Maintain or return mobility to safest level of function  Description: INTERVENTIONS:  - Assess patient's ability to carry out ADLs; assess patient's baseline for ADL function and identify physical deficits which impact ability to perform ADLs (bathing, care of mouth/teeth, toileting, grooming, dressing, etc.)  - Assess/evaluate cause of self-care deficits   - Assess range of motion  - Assess patient's mobility  - Assess patient's need for assistive devices and provide as appropriate  - Encourage maximum independence but intervene and supervise when necessary  - Involve family in performance of ADLs  - Assess for home care needs following discharge   - Consider OT consult to assist with ADL evaluation and planning for discharge  - Provide patient education as appropriate  Outcome: Progressing  Goal: Maintain proper alignment of affected body part  Description: INTERVENTIONS:  - Support, maintain and protect limb and body alignment  - Provide patient/ family with appropriate education  Outcome: Progressing     Problem: HEMATOLOGIC - ADULT  Goal: Maintains hematologic stability  Description: INTERVENTIONS  - Assess for signs and symptoms of bleeding or hemorrhage  - Monitor labs  - Administer supportive blood products/factors as ordered and appropriate  Outcome: Progressing     Problem: Neurological Deficit  Goal: Neurological status is stable or improving  Description: Interventions:  - Monitor and assess patient's level of consciousness, motor function, sensory function, and level of assistance needed for ADLs.   - Monitor  and report changes from baseline. Collaborate with interdisciplinary team to initiate plan and implement interventions as ordered.   - Provide and maintain a safe environment.  - Consider seizure precautions.  - Consider fall precautions.  - Consider aspiration precautions.  - Consider bleeding precautions.  Outcome: Progressing     Problem: Activity Intolerance/Impaired Mobility  Goal: Mobility/activity is maintained at optimum level for patient  Description: Interventions:  - Assess and monitor patient  barriers to mobility and need for assistive/adaptive devices.  - Assess patient's emotional response to limitations.  - Collaborate with interdisciplinary team and initiate plans and interventions as ordered.  - Encourage independent activity per ability.  - Maintain proper body alignment.  - Perform active/passive rom as tolerated/ordered.  - Plan activities to conserve energy.  - Turn patient as appropriate  Outcome: Progressing     Problem: Communication Impairment  Goal: Ability to express needs and understand communication  Description: Assess patient's communication skills and ability to understand information.  Patient will demonstrate use of effective communication techniques, alternative methods of communication and understanding even if not able to speak.     - Encourage communication and provide alternate methods of communication as needed.  - Collaborate with case management/ for discharge needs.  - Include patient/family/caregiver in decisions related to communication.  Outcome: Progressing     Problem: Potential for Aspiration  Goal: Non-ventilated patient's risk of aspiration is minimized  Description: Assess and monitor vital signs, respiratory status, and labs (WBC).  Monitor for signs of aspiration (tachypnea, cough, rales, wheezing, cyanosis, fever).    - Assess and monitor patient's ability to swallow.  - Place patient up in chair to eat if possible.  - HOB up at 90 degrees to eat  if unable to get patient up into chair.  - Supervise patient during oral intake.   - Instruct patient/ family to take small bites.  - Instruct patient/ family to take small single sips when taking liquids.  - Follow patient-specific strategies generated by speech pathologist.  Outcome: Progressing     Problem: Nutrition  Goal: Nutrition/Hydration status is improving  Description: Monitor and assess patient's nutrition/hydration status for malnutrition (ex- brittle hair, bruises, dry skin, pale skin and conjunctiva, muscle wasting, smooth red tongue, and disorientation). Collaborate with interdisciplinary team and initiate plan and interventions as ordered.  Monitor patient's weight and dietary intake as ordered or per policy. Utilize nutrition screening tool and intervene per policy. Determine patient's food preferences and provide high-protein, high-caloric foods as appropriate.     - Assist patient with eating.  - Allow adequate time for meals.  - Encourage patient to take dietary supplement as ordered.  - Collaborate with clinical nutritionist.  - Include patient/family/caregiver in decisions related to nutrition.  Outcome: Progressing

## 2024-12-12 NOTE — ASSESSMENT & PLAN NOTE
PPD#6 R M1 mechanical thrombectomy (TICI 3) and R ICA stenting with Dr. 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  Exam change on 12/8 and noted on CT head with acute BG hemorrhage with effacement of R lateral ventricle.  Noted 12/10 with interval occlusion of R ICA stent.    Imaging:  CT head wo, 12/11/24: Grossly stable recent large hematoma centered in the right basal ganglia. Persistent surrounding edema with unchanged mass effect and 6 mm right to left midline shift.   CTA head w/wo, 12/10/24: CT Brain: Stable large intraparenchymal hemorrhage centered within the right basal ganglia with surrounding vasogenic edema and localized mass effect including approximately 5 mm of right to left shift. Early subacute ischemia within the right caudate head and body extending into the corona radiata. 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 The vessel does reconstitute intracranially at the level of the clinoid segment but is significantly diminished in caliber as it extends to the ICA terminus. The right M1 segment is patent as are the M2 and M3 branches. However, the M1 segment is diminished in caliber compared to the opposite left hemisphere and compared to the prior examination. Stable moderate stenosis of the right subclavian origin from the brachiocephalic and left distal common carotid artery and proximal cervical internal carotid artery. Other findings: Interval development of large right and moderate left posterior layering pleural effusions within the lung apices. Suspected small filling defect identified within one of the left upper lobe pulmonary artery branches seen on series 303 image 25 suggesting small pulmonary embolus.     Plan:  Continue to monitor neuro exam closely.  STAT CTA head with decline in GCS > 2 pts in 1 hour.  Continue ASA 81 mg daily.  Agree with initiation of ECMO protocol heparin gtt,  no bolus. Repeat CT head when ptt therapeutic.  Maintain SBP <120.  Mobilize as tolerated.  DVT ppx: SCDs, heparin SQ.    Neurosurgery will follow peripherally. Call with questions.

## 2024-12-12 NOTE — RESPIRATORY THERAPY NOTE
Resp care   12/12/24 0710   Inhalation Therapy Tx   $ Inhalation Therapy Performed Yes   Pre-Treatment Pulse 107   Breath Sounds Pre-Treatment Bilateral Expiratory wheeze;Inspiratory wheeze   Breath Sounds Post-Treatment Bilateral Coarse   Post-Treatment Pulse 112   Resp Comments Attempted to NT sx pt. Pt combative. Oral sx for small amt white. BS remain coarse after udn and vest therapy.

## 2024-12-12 NOTE — ASSESSMENT & PLAN NOTE
Currently hypotensive requiring Levophed on/off   PTA: Losartan 50 mg daily-holding due to hypotension

## 2024-12-12 NOTE — PLAN OF CARE
Problem: PAIN - ADULT  Goal: Verbalizes/displays adequate comfort level or baseline comfort level  Description: Interventions:  - Encourage patient to monitor pain and request assistance  - Assess pain using appropriate pain scale  - Administer analgesics based on type and severity of pain and evaluate response  - Implement non-pharmacological measures as appropriate and evaluate response  - Consider cultural and social influences on pain and pain management  - Notify physician/advanced practitioner if interventions unsuccessful or patient reports new pain  Outcome: Progressing     Problem: INFECTION - ADULT  Goal: Absence or prevention of progression during hospitalization  Description: INTERVENTIONS:  - Assess and monitor for signs and symptoms of infection  - Monitor lab/diagnostic results  - Monitor all insertion sites, i.e. indwelling lines, tubes, and drains  - Monitor endotracheal if appropriate and nasal secretions for changes in amount and color  - [place] appropriate cooling/warming therapies per order  - Administer medications as ordered  - Instruct and encourage patient and family to use good hand hygiene technique  - Identify and instruct in appropriate isolation precautions for identified infection/condition  Outcome: Progressing     Problem: INFECTION - ADULT  Goal: Absence of fever/infection during neutropenic period  Description: INTERVENTIONS:  - Monitor WBC    Outcome: Progressing

## 2024-12-12 NOTE — QUICK NOTE
Patient's sons were updated over the phone. I answered all the questions to the best of my abilities.

## 2024-12-12 NOTE — PLAN OF CARE
Problem: PHYSICAL THERAPY ADULT  Goal: Performs mobility at highest level of function for planned discharge setting.  See evaluation for individualized goals.  Description: Treatment/Interventions: OT, Spoke to case management, Spoke to nursing, Gait training, Bed mobility, Patient/family training, Endurance training, Therapeutic exercise, LE strengthening/ROM, Functional transfer training          See flowsheet documentation for full assessment, interventions and recommendations.  Outcome: Progressing  Note: Prognosis: Fair  Problem List: Decreased strength, Decreased range of motion, Decreased endurance, Impaired balance, Decreased mobility, Decreased coordination, Decreased cognition, Decreased safety awareness, Impaired judgement, Impaired vision, Impaired hearing, Impaired tone, Impaired sensation, Decreased skin integrity, Pain  Assessment: Pt alert this session and able to follow commands. Pt demonstrated progress w/ decreased assistance required for bed mobility and functional t/f. Sitting balance w/ Mod-MaxA w/ cues for trunk positioning and UE support. Pt limited to 10-15 sec standing w/ cues for full trunk extension w/ B HHA. From a PT standpoint continue to recommend rehab upon d/c.  Barriers to Discharge: Decreased caregiver support, Inaccessible home environment     Rehab Resource Intensity Level, PT: II (Moderate Resource Intensity)    See flowsheet documentation for full assessment.

## 2024-12-12 NOTE — ASSESSMENT & PLAN NOTE
Troponin 140--> 213--> 250--> 169--> 1164--> 5343--> 5149--> 6084--.[id number] in the setting of acute CVA and bilateral PEs  EKG with sinus rhythm and lateral ST-T wave abnormality  EF 50-55% with RWMA  In 10/2024 patient had reported exertional chest pain and dyspnea over the last month with some exercise intolerance and therefore a echocardiogram and stress test was ordered at that time however never completed   CTA reviewed with significant coronary calcifications

## 2024-12-12 NOTE — OCCUPATIONAL THERAPY NOTE
Occupational Therapy Progress Note     Patient Name: Bren Traylor  Today's Date: 12/12/2024  Problem List  Active Problems:    Cerebrovascular accident (CVA) due to embolism of right middle cerebral artery (HCC)    Acute pulmonary embolism (HCC)    Encephalopathy    Hypotension    Cardiomyopathy (HCC)    Hypertension    Hyperlipidemia    Paroxysmal atrial fibrillation (HCC)    Carotid artery stenosis    Elevated troponin          12/12/24 1159   OT Last Visit   OT Visit Date 12/12/24   Note Type   Note Type Treatment   Pain Assessment   Pain Assessment Tool FLACC   Pain Location/Orientation Location: Generalized   Pain Rating: FLACC (Rest) - Face 0   Pain Rating: FLACC (Rest) - Legs 0   Pain Rating: FLACC (Rest) - Activity 0   Pain Rating: FLACC (Rest) - Cry 0   Pain Rating: FLACC (Rest) - Consolability 0   Score: FLACC (Rest) 0   Pain Rating: FLACC (Activity) - Face 1   Pain Rating: FLACC (Activity) - Legs 1   Pain Rating: FLACC (Activity) - Activity 1   Pain Rating: FLACC (Activity) - Cry 1   Pain Rating: FLACC (Activity) - Consolability 1   Score: FLACC (Activity) 5   Restrictions/Precautions   Weight Bearing Precautions Per Order No   Other Precautions Impulsive;Cognitive;Chair Alarm;Bed Alarm;Multiple lines;Telemetry;O2;Fall Risk;Pain  (NGT, R mitt)   Lifestyle   Autonomy I w/ ADLS, transfers and functional mobility PTA   Reciprocal Relationships Pt lives alone   Service to Others Unable to report; will continue to assess   Intrinsic Gratification Unable to report; will continue to assess   ADL   Where Assessed Chair   Grooming Assistance 2  Maximal Assistance   Grooming Deficit Brushing hair  (+ washing hair with shampoo cap)   LB Dressing Assistance 1  Total Assistance   LB Dressing Deficit Don/doff L sock;Don/doff R sock   Bed Mobility   Supine to Sit 2  Maximal assistance   Additional items Assist x 2;HOB elevated;Bedrails;Increased time required;LE management   Sit to Supine Unable to assess  "  Transfers   Sit to Stand 2  Maximal assistance   Additional items Assist x 2;Increased time required   Stand to Sit 2  Maximal assistance   Additional items Assist x 2;Increased time required   Stand pivot 2  Maximal assistance   Additional items Assist x 2;Increased time required;Verbal cues   Additional Comments transfers with B/L HHA   Subjective   Subjective \"everywhere\"   Cognition   Overall Cognitive Status Impaired   Arousal/Participation Lethargic   Attention Attends with cues to redirect   Orientation Level Unable to assess   Memory Unable to assess   Following Commands Follows one step commands with increased time or repetition   Comments Pt with increased fatigue/lethargy, difficulty maintaining eyes open. Makes a few verbalizations, but mostly incomprehensible. Pt able to follow most commands with increased cueing   Activity Tolerance   Activity Tolerance Patient limited by pain;Patient limited by fatigue   Medical Staff Made Aware PT NOEMI Beth clearance for session   Assessment   Assessment Patient participated in Skilled OT session this date with interventions consisting of ADL re training with the use of correct body mechnaics, Energy Conservation techniques, safety awareness and fall prevention techniques,  therapeutic activities to: increase activity tolerance, increase dynamic sit/ stand balance during functional activity , and increase OOB/ sitting tolerance .Upon arrival patient was found supine in bed. Pt demonstrated the following tasks: MAX A x 2 sup to sit, STS, and SPT. Pt requires MAX A for grooming tasks, TOTAL A for LBD. Patient continues to be functioning below baseline level, occupational performance remains limited secondary to factors listed above and increased risk for falls and injury.   From OT standpoint, recommendation at time of d/c would be STR.  Patient to benefit from continued Occupational Therapy treatment while in the hospital to address deficits as defined above and " maximize level of functional independence with ADLs and functional mobility. Pt was left after session with all current needs met. The patient's raw score on the AM-PAC Daily Activity Inpatient Short Form is 9. A raw score of less than 19 suggests the patient may benefit from discharge to post-acute rehabilitation services. Please refer to the recommendation of the Occupational Therapist for safe discharge planning.   Plan   Treatment Interventions ADL retraining;Functional transfer training;Endurance training;Patient/family training;Equipment evaluation/education;Cognitive reorientation;Neuromuscular reeducation;Compensatory technique education;Continued evaluation;Activityengagement;Energy conservation   Goal Expiration Date 12/23/24   OT Treatment Day 1   OT Frequency 2-3x/wk   Discharge Recommendation   Rehab Resource Intensity Level, OT II (Moderate Resource Intensity)   AM-PAC Daily Activity Inpatient   Lower Body Dressing 1   Bathing 2   Toileting 1   Upper Body Dressing 2   Grooming 2   Eating 1   Daily Activity Raw Score 9   Turning Head Towards Sound 2   Follow Simple Instructions 2   Low Function Daily Activity Raw Score 13   Low Function Daily Activity Standardized Score  23.16   AM-PAC Applied Cognition Inpatient   Following a Speech/Presentation 1   Understanding Ordinary Conversation 3   Taking Medications 1   Remembering Where Things Are Placed or Put Away 1   Remembering List of 4-5 Errands 1   Taking Care of Complicated Tasks 1   Applied Cognition Raw Score 8   Applied Cognition Standardized Score 19.32     Ivett Hutchison MS, OTR/L

## 2024-12-12 NOTE — TELEPHONE ENCOUNTER
12/12/24 - PT IN [facility]  2 WK HFU W/CT HEAD PER LEILA  **WORKING ON SCHEDULING APT**    JORDI Saleh [facility] [employer] [facility]  Hello,    Patient needs follow up with Mihai in 2 weeks. I've ordered CT head. Thanks.

## 2024-12-12 NOTE — SPEECH THERAPY NOTE
Speech-Language Pathology Progress Note      Patient Name: Bren Traylor    Today's Date: 12/12/2024      Subjective:  Pt was awake and mostly alert, waxing/waning. She was sitting upright in bed.     Objective:  Pt was seen today for dysphagia therapy. She is currently NPO with NGT. Pt was on 5L O2 via nasal cannula. Oral care was extensive, took the majority of the session and required RN assistance. Dried mucous plates removed from palate and base of tongue. Pt resisted oral care which resulted in fatigue and decreased participation by the time PO trials (ice chips) were offered.  Swallow function:   Oral opening for ice chips was reduced. Anterior spill of liquid was significant from L side of mouth. Manipulation of ice chips was reduced. Pharyngeal swallows were weak and delayed. No overt coughing with trials however mild vocal wetness noted.    Assessment:  May be appropriate for MBS tomorrow if able to participate.     Plan:  NPO with NGT and ice chips.  Continue ST follow up. MBS when appropriate.

## 2024-12-12 NOTE — ASSESSMENT & PLAN NOTE
Echocardiogram 12/10/2024: EF 50-55% with basal inferior, basal inferolateral, mid inferior and mid inferolateral hypokinesis, normal RV function, moderate MR, moderate TR.  When compared to echocardiogram from 12/8/2024 EF was 45-50% with inferolateral akinesis with moderate-severe MR.  CXR 12/10: Persistent pulmonary edema and pleural effusions  BNP 1164  Received 2 doses of IV Lasix 40 mg yesterday.  Output 24 hours -474 mL.  Net + 6.8 L    Thoracentesis 12/11/2024 with removal  500 mL   CXR 12/11/2024 Unchanged pulmonary edema with small bibasilar pleural effusions.

## 2024-12-12 NOTE — PROGRESS NOTES
Progress Note - Cardiology   Name: Bren Traylor 69 y.o. female I MRN: [id number]  Unit/Bed#: ICU 11 I Date of Admission: 12/6/2024   Date of Service: 12/12/2024 I Hospital Day: 5     Assessment & Plan  Paroxysmal atrial fibrillation (HCC)  EKG on admission revealed sinus rhythm  EKG 12/8 noted to have A-fib with RVR at 113 with ST-T wave abnormality  [id number]: Not currently on anticoagulation in the setting of CVA with hemorrhagic conversion  Elevated troponin  Troponin 140--> 213--> 250--> 169--> 1164--> 5343--> 5149--> 6084--.87249 in the setting of acute CVA and bilateral PEs  EKG with sinus rhythm and lateral ST-T wave abnormality  EF 50-55% with RWMA  In 10/2024 patient had reported exertional chest pain and dyspnea over the last month with some exercise intolerance and therefore a echocardiogram and stress test was ordered at that time however never completed   CTA reviewed with significant coronary calcifications  Cardiomyopathy (HCC)  Echocardiogram 12/10/2024: EF 50-55% with basal inferior, basal inferolateral, mid inferior and mid inferolateral hypokinesis, normal RV function, moderate MR, moderate TR.  When compared to echocardiogram from 12/8/2024 EF was 45-50% with inferolateral akinesis with moderate-severe MR.  CXR 12/10: Persistent pulmonary edema and pleural effusions  BNP 1164  Received 2 doses of IV Lasix 40 mg yesterday.  Output 24 hours -474 mL.  Net + 6.8 L    Thoracentesis 12/11/2024 with removal  500 mL   CXR 12/11/2024 Unchanged pulmonary edema with small bibasilar pleural effusions.       Cerebrovascular accident (CVA) due to embolism of right middle cerebral artery (HCC)  Presented 12/6 with left-sided weakness, slurred speech and facial droop  CTH revealed infarction in the right lentiform nucleus with mild cytotoxic edema with hyperdensity in the right M1 segment suggesting M1 thrombosis.    CTA of the head revealed acute thrombosis in the mid-distal right M1 segment with  severe near occlusion of the proximal BILL with severe tandem stenosis intracranially and high-grade stenosis of the last distal common carotid artery, moderate high-grade stenosis of the right subclavian origin.   Underwent successful mechanical thrombectomy and right carotid angioplasty/stenting and placed on Integrilin.   12/7 CTA with hemorrhagic conversion  Carotid artery stenosis  CTA of the head revealed acute thrombosis in the mid-distal right M1 segment with severe near occlusion of the proximal BILL with severe tandem stenosis intracranially and high-grade stenosis of the last distal common carotid artery, moderate high-grade stenosis of the right subclavian origin.   12/6 s/p emergent right carotid angioplasty/stenting and placed on Integrilin  Repeat imaging 12/7 revealed hemorrhagic conversion and patient was given DDAVP administered  CTA 12/10 revealed BILL stent occlusion  Currently on aspirin and statin per NG tube  Acute pulmonary embolism (HCC)  CTA Chest 12/10 revealed bilateral PEs with developing pulmonary infarction  Venous duplex negative for DVT  Encephalopathy  In the setting of acute CVA with hemorrhagic conversion  Hypotension  Currently on Levophed  Hypertension  Currently hypotensive requiring Levophed on/off   PTA: Losartan 50 mg daily-holding due to hypotension   Hyperlipidemia  Lipid panel 12/7/2024 with , , HDL 42, LDL 76 on atorvastatin 20 mg daily per NG    Plan:  -Supportive measures for now.  -Once off levophed will adding beta block/medical management  -Antiplatelet therapy once cleared from a neurology standpoint  -Continue Lasix 40 mg IV twice daily      Subjective   Chief Complaint:  Patient seen and examined in chair.    Attempts to open eyes to commands.  Continues to require levophed on/off for blood pressure.          Objective :  Temp:  [98.4 °F (36.9 °C)-101.6 °F (38.7 °C)] 100.1 °F (37.8 °C)  HR:  [] 96  BP: ()/(50-80) 113/52  Resp:  [0-37]  30  SpO2:  [94 %-100 %] 97 %  O2 Device: Nasal cannula  Nasal Cannula O2 Flow Rate (L/min):  [5 L/min-6 L/min] 5 L/min  Orthostatic Blood Pressures      Flowsheet Row Most Recent Value   Blood Pressure 113/52 filed at 12/12/2024 1100          First Weight: Weight - Scale: 56.2 kg (123 lb 14.4 oz) (12/08/24 0200)  Vitals:    12/08/24 0735 12/10/24 1119   Weight: 55.8 kg (123 lb) 55.8 kg (123 lb)     Physical Exam  Vitals reviewed.   Constitutional:       General: She is not in acute distress.     Appearance: She is normal weight. She is not ill-appearing.   HENT:      Mouth/Throat:      Mouth: Mucous membranes are moist.   Eyes:      Conjunctiva/sclera: Conjunctivae normal.   Cardiovascular:      Rate and Rhythm: Normal rate and regular rhythm.      Pulses: Normal pulses.      Heart sounds: Normal heart sounds. No murmur heard.     No friction rub. No gallop.   Pulmonary:      Effort: Pulmonary effort is normal.      Breath sounds: Examination of the right-lower field reveals decreased breath sounds. Examination of the left-lower field reveals decreased breath sounds. Decreased breath sounds present. No rhonchi or rales.   Abdominal:      General: Bowel sounds are normal.      Palpations: Abdomen is soft.   Musculoskeletal:      Cervical back: Neck supple.      Right lower leg: No edema.      Left lower leg: No edema.   Skin:     General: Skin is warm.   Neurological:      Mental Status: She is oriented to person, place, and time. Mental status is at baseline. She is lethargic.      GCS: GCS eye subscore is 1. GCS verbal subscore is 1.      Comments: Left sided weakness    Psychiatric:         Mood and Affect: Mood normal.         Behavior: Behavior normal.         Thought Content: Thought content normal.         Judgment: Judgment normal.             Lab Results: I have reviewed the following results:CBC/BMP:   .     12/12/24  0532   WBC 19.28*   HGB 8.9*   HCT 26.8*      SODIUM 155*   K 3.6   *   CO2  "25   BUN 19   CREATININE 0.54*   GLUC 144*   CAIONIZED 1.12   MG 2.1   PHOS 2.8      Results from last 7 days   Lab Units 12/12/24  0532 12/11/24  1123 12/11/24  0425 12/10/24  0457   WBC Thousand/uL 19.28*  --  18.31* 18.56*   HEMOGLOBIN g/dL 8.9* 8.6* 7.0* 7.9*   HEMATOCRIT % 26.8* 27.1* 21.8* 24.4*   PLATELETS Thousands/uL 159  --  158 159     Results from last 7 days   Lab Units 12/12/24  0532 12/11/24  0407 12/11/24  0027   POTASSIUM mmol/L 3.6 3.7 3.7   CHLORIDE mmol/L 123* 128* 127*   CO2 mmol/L 25 22 22   BUN mg/dL 19 20 20   CREATININE mg/dL 0.54* 0.63 0.64   CALCIUM mg/dL 8.3* 7.9* 7.9*     Results from last 7 days   Lab Units 12/06/24 2045   INR  1.10   PTT seconds 36*     Lab Results   Component Value Date    HGBA1C 5.3 12/07/2024     No results found for: \"CKTOTAL\", \"CKMB\", \"CKMBINDEX\", \"TROPONINI\"    Imaging Results Review: No pertinent imaging studies reviewed.  Other Study Results Review: EKG was reviewed.     VTE Pharmacologic Prophylaxis: Sequential compression device (Venodyne)   VTE Mechanical Prophylaxis: sequential compression device      "

## 2024-12-12 NOTE — PROGRESS NOTES
Progress Note - Critical Care/ICU   Name: Bren Traylor 69 y.o. female I MRN: [id number]  Unit/Bed#: ICU 11 I Date of Admission: 12/6/2024   Date of Service: 12/12/2024 I Hospital Day: 5      Assessment & Plan   Neuro:   Diagnosis: Ischemic stroke w/ Right MCA M1 occlusion s/p mechanical thrombectomy, right carotid stenosis s/p right carotid angioplasty and stenting, hemorrhagic conversion   12/7 Mechanical thrombectomy and right carotid angioplasty and stenting - TICI 3   12/8 MRI Brain wo contrast: Large acute parenchymal hemorrhage within the right basal ganglia similar in size to most recent CT scan. Mild surrounding edema and localized mass effect with approximately 5 mm of right to left shift. Adjacent acute ischemia is seen within the caudate head and body. There is hyperintense T2 signal within the distal cervical and intracranial internal carotid artery on the right suggesting slow flow or occlusion. The M1 segment and middle cerebral artery branches on the right demonstrate appropriate flow voids. Consider repeat CT angiography.  12/9 CTH: Stable acute hemorrhage and residual contrast staining centered within the MCA territory infarct. Stable right to left midline shift of 5 mm.  12/10 CTA H/N: Stable large intraparenchymal hemorrhage within the right basal ganglia with 5 mm of right to left shift with early subacute ischemia within the right caudate head and body extending into the corona radiata, occlusion of the right ICA stent, possible suspected small filling defect within the left upper lobe pulmonary artery suggestive of small pulmonary embolism  12/12 CTH: Stable appearing, pending final read  Plan:   Continue ASA 81 mg daily  SBP goal 110-140  Sedation: Patient on Precedex, held at this time  Neurochecks every hour in the daytime and every 2 hours at night  Neurology following, appreciate recommendations  Neurosurgery following, appreciate recommendations    CV:   Diagnosis: SBP goal  110-140 in the setting of IVH   Plan:    SBP goal <140   Patient on Levophed @3, wean as appropriate    Diagnosis: Elevated Troponins with EKG changes, Afib on telemetry, tachycardia   ECHO 12/8: Left ventricular cavity size is normal. Wall thickness is normal. The left ventricular ejection fraction is 45-50 %. Systolic function is mildly reduced. Akinesis of the inferolateral portion of the left ventricle was noted. Diastolic function is normal.   ECHO 12/10: Left ventricular cavity size is normal. The left ventricular ejection fraction is 50 to 55%. Systolic function is low normal. Unable to assess diastolic function. The following segments are hypokinetic: basal inferior, basal inferolateral, mid inferior and mid inferolateral.  Troponins: 5343 --> 5149 --> 6084 --> 8519 --> 51896 --> 67732 --> 03003  Plan:   S/p amiodarone bolus 300 mg IV on 12/11/24  Cardiology consulted, appreciate recommendations   AC deferred until cleared by Neurology/Neurosurgery    Diagnosis: CHF  BNP: 1164  Plan:   S/p 2 doses of Lasix 40 mg IV on 12/10     Pulm:  Diagnosis: Acute respiratory failure, bilateral effusions  Extubated on 12/8/2024  Chest x-ray 12/9/24: Congestive heart failure  Chest x-ray 12/10/24: Persistent pulmonary edema and pleural effusions  R Thoracentesis 12/11/24: Removed 500 mL , cultures negative, , Triglycerides 34, Total Protein  <3.0, pH 7.5  Plan:   Nasal cannula, wean as appropriate   SpO2 goal >92%  Airway precautions  Respiratory protocol  CPAP overnight as needed    GI:   Diagnosis: No issues  Last BM: 12/10/24  Plan:   MiraLAX 17 mg daily  Senokot 2 tablets twice daily  Dulcolax suppository 10 mg x1 dose scheduled for today followed by daily as needed    :   Diagnosis: No issues   Plan:   Monitor I's and O's as appropriate    F/E/N:     F: None  E: Replete electrolytes as appropriate  N: Tube feeds ongoing    Heme/Onc:   Diagnosis: DVT prophylaxis, hemoglobin 8.9   Blood consent obtained  verbally over the phone, plan to transfuse 1 unit of blood on 12/11/24  Plan:   Heparin 5000 units every 8 hours initiated on 12/11/24    Endo:   Diagnosis: No issues  A1c: 5.3  Glucose: 112-196  Plan:   BG goal 140-180  Hypoglycemia protocol in place    ID:   Diagnosis: Leukocytosis  WBCs: 18.31 --> 19.28  MRSA: Negative  Blood cultures x2: No growth at 48 hours  Sputum culture: 2+ Polys, 3+ Gram positive cocci in pairs  Plan:   Trend fever curve and monitor WBCs  Continue Zosyn until 12/13 to complete 5 days  Vanc discontinued on 12/11/24 given negative MRSA swab    MSK/Skin:   Diagnosis: Ambulatory dysfunction   Plan:   PT/OT  Out of bed as appropriate    Disposition: Critical care    ICU Core Measures     A: Assess, Prevent, and Manage Pain Has pain been assessed? Yes  Need for changes to pain regimen? No   B: Both SAT/SAT  N/A   C: Choice of Sedation RASS Goal: 0 Alert and Calm  Need for changes to sedation or analgesia regimen? No   D: Delirium CAM-ICU: Negative   E: Early Mobility  Plan for early mobility? Yes   F: Family Engagement Plan for family engagement today? Yes       Antibiotic Review: Awaiting culture results.     Review of Invasive Devices:      Central access plan: Medications requiring central line  [place] Plan: Keep arterial line for hemodynamic monitoring, frequent ABGs, and frequent labs    Prophylaxis:  VTE VTE covered by:  heparin (porcine), Subcutaneous, 5,000 Units at 12/12/24 0554       Stress Ulcer  not ordered         24 Hour Events : Patient got a CTH overnight for which she required Ativan 0.5 mg and was on CPAP overnight.     Subjective     Patient following commands with re-direction but agitated still. Unable to obtain a ROS.    Review of Systems: Unable to be obtained given clinical condition of the patient    Objective :                   Vitals I/O      Most Recent Min/Max in 24hrs   Temp 98.4 °F (36.9 °C) Temp  Min: 98.4 °F (36.9 °C)  Max: 101.7 °F (38.7 °C)   Pulse 102 Pulse   Min: 86  Max: 136   Resp (!) 0 Resp  Min: 0  Max: 42   /67 BP  Min: 80/50  Max: 146/93   O2 Sat 97 % SpO2  Min: 97 %  Max: 100 %      Intake/Output Summary (Last 24 hours) at 12/12/2024 0752  Last data filed at 12/12/2024 0600  Gross per 24 hour   Intake 3014.09 ml   Output 2367 ml   Net 647.09 ml       Diet Enteral/Parenteral; Tube Feeding No Oral Diet; Jevity 1.2 [place]; Continuous; 55; Prosource Protein Liquid - Two Packets; 60; Water; Every 4 hours    Invasive Monitoring   Arterial Line  [place] /48  Arterial Line BP  Min: 78/32  Max: 168/78   MAP 74 mmHg  Arterial Line MAP (mmHg)  Min: 44 mmHg  Max: 108 mmHg           Physical Exam   Physical Exam  Vitals reviewed.   Eyes:      Pupils: Pupils are equal, round, and reactive to light.   Skin:     General: Skin is warm.   HENT:      Head: Normocephalic and atraumatic.      Mouth/Throat:      Mouth: Mucous membranes are moist.   Cardiovascular:      Rate and Rhythm: Normal rate. Rhythm irregular.   Abdominal: General: Bowel sounds are normal.      Palpations: Abdomen is soft.      Tenderness: There is no abdominal tenderness.   Constitutional:       Appearance: She is ill-appearing.   Pulmonary:      Effort: Tachypnea and respiratory distress present.   Neurological:      Comments: Patient following commands in the RUE/RLE with re-direction. PERRL, unable to test for EOM, L facial droop appreciated. No spontaneous movement in the LUE/LLE. Pain stimulus not applied to LUE/LLE given patient is already agitated.            Diagnostic Studies        Lab Results: I have reviewed the following results:     Medications:  Scheduled PRN   aspirin, 81 mg, Daily  atorvastatin, 20 mg, QPM  bisacodyl, 10 mg, Once  chlorhexidine, 15 mL, Q12H DANIS  heparin (porcine), 5,000 Units, Q8H DANIS  insulin lispro, 1-6 Units, Q6H DANIS  ipratropium, 0.5 mg, TID  levalbuterol, 1.25 mg, TID  melatonin, 6 mg, HS  midodrine, 5 mg, TID AC  piperacillin-tazobactam, 4.5 g, Q8H  polyethylene  glycol, 17 g, Daily  potassium chloride, 20 mEq, BID  [Held by provider] QUEtiapine, 50 mg, BID  senna-docusate sodium, 2 tablet, BID  sertraline, 50 mg, Daily  valproic acid, 250 mg, Q6H DANIS      acetaminophen, 650 mg, Q6H PRN  albuterol, 2.5 mg, Q4H PRN  bisacodyl, 10 mg, Daily PRN       Continuous    dexmedetomidine, 0.1-0.4 mcg/kg/hr, Last Rate: Stopped (12/12/24 0519)  norepinephrine, 1-30 mcg/min, Last Rate: 3 mcg/min (12/12/24 0626)         Labs:   CBC    Recent Labs     12/11/24  0425 12/11/24  1123 12/12/24  0532   WBC 18.31*  --  19.28*   HGB 7.0* 8.6* 8.9*   HCT 21.8* 27.1* 26.8*     --  159     BMP    Recent Labs     12/11/24  0407 12/12/24  0532   SODIUM 157* 155*   K 3.7 3.6   * 123*   CO2 22 25   AGAP 7 7   BUN 20 19   CREATININE 0.63 0.54*   CALCIUM 7.9* 8.3*       Coags    No recent results     Additional Electrolytes  Recent Labs     12/11/24  0455 12/12/24  0532   MG 2.1 2.1   PHOS 3.2 2.8   CAIONIZED  --  1.12          Blood Gas    Recent Labs     12/11/24  0527   PHART 7.425   IWH7NHV 26.1*   PO2ART 84.1   [id number] 16.7*   BEART -6.7   SOURCE Line, Arterial     Recent Labs     12/11/24  0527   SOURCE Line, Arterial    LFTs  No recent results    Infectious  No recent results  Glucose  Recent Labs     12/10/24  1807 12/11/24  0027 12/11/24  0407 12/12/24  0532   GLUC 121 186* 196* 144*

## 2024-12-12 NOTE — PHYSICAL THERAPY NOTE
Physical Therapy Treatment Note    Patient's Name: Bren Traylor  : 24 1200   PT Last Visit   PT Visit Date 24   Note Type   Note Type Treatment   Pain Assessment   Pain Assessment Tool FLACC   Pain Location/Orientation Location: Generalized   Pain Rating: FLACC (Rest) - Face 0   Pain Rating: FLACC (Rest) - Legs 0   Pain Rating: FLACC (Rest) - Activity 0   Pain Rating: FLACC (Rest) - Cry 0   Pain Rating: FLACC (Rest) - Consolability 0   Score: FLACC (Rest) 0   Pain Rating: FLACC (Activity) - Face 1   Pain Rating: FLACC (Activity) - Legs 1   Pain Rating: FLACC (Activity) - Activity 1   Pain Rating: FLACC (Activity) - Cry 1   Pain Rating: FLACC (Activity) - Consolability 1   Score: FLACC (Activity) 5   Restrictions/Precautions   Weight Bearing Precautions Per Order No   Other Precautions Cognitive;Chair Alarm;Bed Alarm;Aspiration;Multiple lines;Telemetry;Fall Risk;O2;Pain  (L hemiparesis, R hand mitt, NGT)   General   Chart Reviewed Yes   Response to Previous Treatment Patient reporting fatigue but able to participate.   Family/Caregiver Present No   Cognition   Orientation Level Unable to assess   Subjective   Subjective mostly incomprehensible   Bed Mobility   Supine to Sit 2  Maximal assistance   Additional items Assist x 2;HOB elevated;Increased time required;Verbal cues;LE management   Additional Comments Pt greeted in supine.   Transfers   Sit to Stand 2  Maximal assistance   Additional items Assist x 2;Increased time required;Verbal cues   Stand to Sit 2  Maximal assistance   Additional items Assist x 2;Increased time required;Verbal cues   Stand pivot 2  Maximal assistance   Additional items Assist x 2;Increased time required;Verbal cues   Additional Comments B HHA + B knee block   Balance   Static Sitting Poor   Dynamic Sitting Poor -   Static Standing Poor -   Dynamic Standing Poor -   Endurance Deficit   Endurance Deficit Yes   Endurance Deficit Description L  hemiparesis, fatigue   Activity Tolerance   Activity Tolerance Patient limited by fatigue   Medical Staff Made Aware OT [place]   Nurse Made Aware yes - cleared + updated   Exercises   Neuro re-ed Sitting EOB x5 min w/ cues for forward trunk lean and hand placement. Further sitting balance instruction sitting forward in chair w/o backrest support. Static standing for maximum of 10-15 sec.   Assessment   Prognosis Fair   Problem List Decreased strength;Decreased range of motion;Decreased endurance;Impaired balance;Decreased mobility;Decreased coordination;Decreased cognition;Decreased safety awareness;Impaired judgement;Impaired vision;Impaired hearing;Impaired tone;Impaired sensation;Decreased skin integrity;Pain   Assessment Pt alert this session and able to follow commands. Pt demonstrated progress w/ decreased assistance required for bed mobility and functional t/f. Sitting balance w/ Mod-MaxA w/ cues for trunk positioning and UE support. Pt limited to 10-15 sec standing w/ cues for full trunk extension w/ B HHA. From a PT standpoint continue to recommend rehab upon d/c.   Barriers to Discharge Decreased caregiver support;Inaccessible home environment   Goals   Patient Goals none expressed   Plan   Treatment/Interventions Functional transfer training;LE strengthening/ROM;Therapeutic exercise;Endurance training;Patient/family training;Equipment eval/education;Bed mobility;Gait training;Compensatory technique education;Spoke to nursing;OT   Progress Progressing toward goals   PT Frequency 3-5x/wk   Discharge Recommendation   Rehab Resource Intensity Level, PT II (Moderate Resource Intensity)   AM-PAC Basic Mobility Inpatient   Turning in Flat Bed Without Bedrails 2   Lying on Back to Sitting on Edge of Flat Bed Without Bedrails 1   Moving Bed to Chair 1   Standing Up From Chair Using Arms 1   Walk in Room 1   Climb 3-5 Stairs With Railing 1   Basic Mobility Inpatient Raw Score 7   [facility] Level Of  Mobility   JH-HLM Goal 2: Bed activities/Dependent transfer   JH-HLM Achieved 4: Move to chair/commode   Education   Education Provided Mobility training   Patient Demonstrates acceptance/verbal understanding;Reinforcement needed   End of Consult   Patient Position at End of Consult Bedside chair;Bed/Chair alarm activated;All needs within reach  (on waffle cushion, all lines in tact)     Ignacia Meredith, PT, DPT

## 2024-12-12 NOTE — PLAN OF CARE
Problem: OCCUPATIONAL THERAPY ADULT  Goal: Performs self-care activities at highest level of function for planned discharge setting.  See evaluation for individualized goals.  Description: Treatment Interventions: ADL retraining, Functional transfer training, UE strengthening/ROM, Endurance training, Cognitive reorientation, Patient/family training, Equipment evaluation/education, Compensatory technique education, Continued evaluation, Energy conservation, Activityengagement, Visual perceptual retraining, Fine motor coordination activities          See flowsheet documentation for full assessment, interventions and recommendations.   Outcome: Progressing  Note: Limitation: Decreased ADL status, Decreased UE strength, Decreased UE ROM, Decreased Safe judgement during ADL, Decreased cognition, Decreased endurance, Visual deficit, Decreased fine motor control, Decreased high-level ADLs, Decreased self-care trans  Prognosis: Fair  Assessment: Patient participated in Skilled OT session this date with interventions consisting of ADL re training with the use of correct body mechnaics, Energy Conservation techniques, safety awareness and fall prevention techniques,  therapeutic activities to: increase activity tolerance, increase dynamic sit/ stand balance during functional activity , and increase OOB/ sitting tolerance .Upon arrival patient was found supine in bed. Pt demonstrated the following tasks: MAX A x 2 sup to sit, STS, and SPT. Pt requires MAX A for grooming tasks, TOTAL A for LBD. Patient continues to be functioning below baseline level, occupational performance remains limited secondary to factors listed above and increased risk for falls and injury.   From OT standpoint, recommendation at time of d/c would be STR.  Patient to benefit from continued Occupational Therapy treatment while in the hospital to address deficits as defined above and maximize level of functional independence with ADLs and functional  mobility. Pt was left after session with all current needs met. The patient's raw score on the AM-PAC Daily Activity Inpatient Short Form is 9. A raw score of less than 19 suggests the patient may benefit from discharge to post-acute rehabilitation services. Please refer to the recommendation of the Occupational Therapist for safe discharge planning.     Rehab Resource Intensity Level, OT: II (Moderate Resource Intensity)

## 2024-12-12 NOTE — ASSESSMENT & PLAN NOTE
EKG on admission revealed sinus rhythm  EKG 12/8 noted to have A-fib with RVR at 113 with ST-T wave abnormality  [id number] 5: Not currently on anticoagulation in the setting of CVA with hemorrhagic conversion

## 2024-12-12 NOTE — ASSESSMENT & PLAN NOTE
Lipid panel 12/7/2024 with , , HDL 42, LDL 76 on atorvastatin 20 mg daily per     Plan:  -Supportive measures for now.  -Once off levophed will adding beta block/medical management  -Antiplatelet therapy once cleared from a neurology standpoint  -Continue Lasix 40 mg IV twice daily

## 2024-12-13 NOTE — PROCEDURES
Speech Pathology - Modified Barium Swallow Study    Patient Name: Bren Traylor    Today's Date: 12/13/2024     Problem List  Active Problems:    Cerebrovascular accident (CVA) due to embolism of right middle cerebral artery (HCC)    Acute pulmonary embolism (HCC)    Encephalopathy    Hypotension    Cardiomyopathy (HCC)    Hypertension    Hyperlipidemia    Paroxysmal atrial fibrillation (HCC)    Carotid artery stenosis    Elevated troponin      Past Medical History  Past Medical History:   Diagnosis Date    High cholesterol     Hypertension        Past Surgical History  Past Surgical History:   Procedure Laterality Date    IR PICC REPOSITION  12/10/2024    IR STROKE ALERT  12/7/2024       Assessment Summary:    Pt presents with severe oropharyngeal dysphagia characterized by significantly limited oral manipulation, control, and transfer, delayed swallow initiation, weak/reduced hyolaryngeal rise, incomplete pharyngeal stripping wave and contraction, and incomplete airway closure. Aspiration occurred across all textures, with inconsistent cough response. Alertness is a barrier to participation and success with PO trials. Will need to repeat MBS when pt is more consistently alert and able to participate. Note: Images are available for review in PACS as desired.    Recommendations:   Recommended Diet: NPO with tube feeds   Recommended Form of Medications: non-oral if possible   Aspiration precautions and compensatory swallowing strategies: upright posture  SLP Dysphagia therapy recommended: yes    Results Reviewed with: patient, RN, and MD   Pt/Family Education: initiated. Pt and caregivers would benefit from/require continued education.        General Information;  Pt is a 69 y.o. female with a PMH remarkable for R MCA CVA. Pt has waxing/waning alertness, AMS, and NGT in place. MBS was recommended to assess oropharyngeal stage swallowing skills at this time.     Pt was viewed sitting upright in the lateral and  AP positions. Due to concerns for patient safety / patient refusal, trials provided deviated from the MBSImP Validated Protocol. Pt was given 5-mL thin liquid x2, 5-mL nectar thick, 20-mL cup sip nectar thick, 5-mL honey thick, 5-mL pudding, and 5-mL nectar thick in the AP position.     Initial view observations/comments: Clear view of the upper airway and NGT in place      8-Point Penetration-Aspiration Scale   Thin liquid 8 - Material enters the airway, passes below the vocal folds, and no effort is made to eject     Nectar thick liquid 8 - Material enters the airway, passes below the vocal folds, and no effort is made to eject     Honey thick liquid 8 - Material enters the airway, passes below the vocal folds, and no effort is made to eject     Puree (pudding) 8 - Material enters the airway, passes below the vocal folds, and no effort is made to eject     Solid N/a     Strategies and Efficacy: Unable to follow directions for use of strategies    Aspiration Response and Efficacy: Inconsistent cough response    MBS IMP Rating    ORAL Impairment  Compinent 1--Lip Closure  Judged at any point during the swallow.  4 - Escape beyond mid-chin    Component 2--Tongue Control During Bolus Hold  Judged on held liquid boluses only and prior to productive tongue movement.   3 - Posterior escape of greater than half of bolus    Component 3--Bolus Preparation/Mastication  Judged only during presentation of 1/2 shortbread cookie coated in pudding.   N/a    Component 4--Bolus Transport/Lingual Motion  Judged after first productive tongue movement for oral bolus transport.  4 - Minimal to tongue motion    Component 5--Oral Residue  Judged after first swallow or after the last swallow of the sequential swallow task.  3 - Majority of bolus remaining   Location   A - Floor of Mouth, B - Palate, C - Tongue, and D - Lateral Sulci    Component 6--Initiation of Pharyngeal Swallow  Judged at first movement of the brisk superior-anterior  hyoid trajectory.  3 - Bolus head in pyriforms      PHARYNGEAL Impairment  Component 7--Soft Palate Elevation  Judged during maximum displacement of soft palate.  0 - No bolus between the soft palate (SP)/pharyngeal wall (PW)    Component 8--Laryngeal Elevation  Judged when epiglottis is in its most horizontal position.  1 - Partial superior movement of thyroid cartilage/partial approximation of arytenoids to epiglottic petiole    Component 9--Anterior Hyoid Excursion  Judged at height of swallow/maximal anterior hyoid displacement.  1 - Partial anterior movement    Component 10--Epiglottic Movement  Judged at height of swallow/maximal anterior hyoid displacement.  2 - No inversion    Component 11--Laryngeal Vestibular Closure  Judged at height of swallow/maximal anterior hyoid displacement.  1 - Incomplete; narrow column air/contrast in laryngeal vestibule    Component 12--Pharyngeal Stripping Wave  Judged during the full duration of the pharyngeal swallow.  1 - Present - diminished    Component 13--Pharyngeal Contraction  Judged in AP view at rest and throughout maximum movement of structures.  3 - Bilateral Bulging    Component 14--Pharyngoesophageal Segment Opening  Judged during maximum distension of PES and throughout opening and closure.  1 - Partial distension/partial duration; partial obstruction to flow    Component 15--Tongue Base (TB) Retraction  Judged during maximum retraction of the tongue base.  3 - Wide column of contrast or air between TB and PW    Component 16--Pharyngeal Residue  Judged after first swallow or after the last swallow of the sequential swallow task.  3 - Majority of contrast within or on pharyngeal structures   Location   F - Diffuse (>3 areas)      ESOPHAGEAL Impairment  Component 17--Esophageal Clearance Upright Position  Judged in AP view during bolus transit through the oral cavity to the LES  N/a

## 2024-12-13 NOTE — PLAN OF CARE
Problem: PHYSICAL THERAPY ADULT  Goal: Performs mobility at highest level of function for planned discharge setting.  See evaluation for individualized goals.  Description: Treatment/Interventions: OT, Spoke to case management, Spoke to nursing, Gait training, Bed mobility, Patient/family training, Endurance training, Therapeutic exercise, LE strengthening/ROM, Functional transfer training          See flowsheet documentation for full assessment, interventions and recommendations.  Outcome: Progressing  Note: Prognosis: Fair  Problem List: Decreased strength, Decreased range of motion, Decreased endurance, Impaired balance, Decreased mobility, Decreased coordination, Decreased cognition, Decreased safety awareness, Impaired judgement, Impaired vision, Impaired hearing, Impaired tone, Impaired sensation, Decreased skin integrity, Pain  Assessment: Pt initially lethargic but w/ increased alertness w/ mobility. Pt demonstrated progress this session w/ decreased assistance w/ bed mobility + STS t/f. Additionally pt did take 1 step this session towards chair. Attempts at having pt move LLE but unsuccessful. Continue to recommend rehab upon d/c.  Barriers to Discharge: Decreased caregiver support, Inaccessible home environment     Rehab Resource Intensity Level, PT: II (Moderate Resource Intensity)    See flowsheet documentation for full assessment.

## 2024-12-13 NOTE — ASSESSMENT & PLAN NOTE
Presented in sinus rhythm  A-fib noted 12/8  Currently remaining in normal sinus rhythm  On aspirin, heparin ECMO protocol started yesterday

## 2024-12-13 NOTE — PLAN OF CARE
Problem: Prexisting or High Potential for Compromised Skin Integrity  Goal: Skin integrity is maintained or improved  Description: INTERVENTIONS:  - Identify patients at risk for skin breakdown  - Assess and monitor skin integrity  - Assess and monitor nutrition and hydration status  - Monitor labs   - Assess for incontinence   - Turn and reposition patient  - Assist with mobility/ambulation  - Relieve pressure over bony prominences  - Avoid friction and shearing  - Provide appropriate hygiene as needed including keeping skin clean and dry  - Evaluate need for skin moisturizer/barrier cream  - Collaborate with interdisciplinary team   - Patient/family teaching  - Consider wound care consult   Outcome: Progressing     Problem: SAFETY,RESTRAINT: NV/NON-SELF DESTRUCTIVE BEHAVIOR  Goal: Remains free of harm/injury (restraint for non violent/non self-detsructive behavior)  Description: INTERVENTIONS:  - Instruct patient/family regarding restraint use   - Assess and monitor physiologic and psychological status   - Provide interventions and comfort measures to meet assessed patient needs   - Identify and implement measures to help patient regain control  - Assess readiness for release of restraint   Outcome: Progressing     Problem: Potential for Falls  Goal: Patient will remain free of falls  Description: INTERVENTIONS:  - Educate patient/family on patient safety including physical limitations  - Instruct patient to call for assistance with activity   - Consult OT/PT to assist with strengthening/mobility   - Keep Call bell within reach  - Keep bed low and locked with side rails adjusted as appropriate  - Keep care items and personal belongings within reach  - Initiate and maintain comfort rounds  - Make Fall Risk Sign visible to staff  - Offer Toileting every 2 Hours, in advance of need  - Initiate/Maintain bed alarm  - Apply yellow socks and bracelet for high fall risk patients  - Consider moving patient to room near  nurses station  Outcome: Progressing     Problem: Nutrition/Hydration-ADULT  Goal: Nutrient/Hydration intake appropriate for improving, restoring or maintaining nutritional needs  Description: Monitor and assess patient's nutrition/hydration status for malnutrition. Collaborate with interdisciplinary team and initiate plan and interventions as ordered.  Monitor patient's weight and dietary intake as ordered or per policy. Utilize nutrition screening tool and intervene as necessary. Determine patient's food preferences and provide high-protein, high-caloric foods as appropriate.     INTERVENTIONS:  - Monitor oral intake, urinary output, labs, and treatment plans  - Assess nutrition and hydration status and recommend course of action  - Evaluate amount of meals eaten  - Assist patient with eating if necessary   - Allow adequate time for meals  - Recommend/ encourage appropriate diets, oral nutritional supplements, and vitamin/mineral supplements  - Order, calculate, and assess calorie counts as needed  - Recommend, monitor, and adjust tube feedings and TPN/PPN based on assessed needs  - Assess need for intravenous fluids  - Provide specific nutrition/hydration education as appropriate  - Include patient/family/caregiver in decisions related to nutrition  Outcome: Progressing     Problem: CARDIOVASCULAR - ADULT  Goal: Maintains optimal cardiac output and hemodynamic stability  Description: INTERVENTIONS:  - Monitor I/O, vital signs and rhythm  - Monitor for S/S and trends of decreased cardiac output  - Administer and titrate ordered vasoactive medications to optimize hemodynamic stability  - Assess quality of pulses, skin color and temperature  - Assess for signs of decreased coronary artery perfusion  - Instruct patient to report change in severity of symptoms  Outcome: Progressing     Problem: RESPIRATORY - ADULT  Goal: Achieves optimal ventilation and oxygenation  Description: INTERVENTIONS:  - Assess for changes in  respiratory status  - Assess for changes in mentation and behavior  - Position to facilitate oxygenation and minimize respiratory effort  - Oxygen administered by appropriate delivery if ordered  - Initiate smoking cessation education as indicated  - Encourage broncho-pulmonary hygiene including cough, deep breathe, Incentive Spirometry  - Assess the need for suctioning and aspirate as needed  - Assess and instruct to report SOB or any respiratory difficulty  - Respiratory Therapy support as indicated  Outcome: Progressing

## 2024-12-13 NOTE — PROGRESS NOTES
Progress Note - Critical Care/ICU   Name: Bren Traylor 69 y.o. female I MRN: [id number]  Unit/Bed#: ICU 11 I Date of Admission: 12/6/2024   Date of Service: 12/13/2024 I Hospital Day: 6      Assessment & Plan   Neuro:   Diagnosis: Ischemic stroke w/ Right MCA M1 occlusion s/p mechanical thrombectomy, right carotid stenosis s/p right carotid angioplasty and stenting, hemorrhagic conversion   12/7 Mechanical thrombectomy and right carotid angioplasty and stenting - TICI 3   12/8 MRI Brain wo contrast: Large acute parenchymal hemorrhage within the right basal ganglia similar in size to most recent CT scan. Mild surrounding edema and localized mass effect with approximately 5 mm of right to left shift. Adjacent acute ischemia is seen within the caudate head and body. There is hyperintense T2 signal within the distal cervical and intracranial internal carotid artery on the right suggesting slow flow or occlusion. The M1 segment and middle cerebral artery branches on the right demonstrate appropriate flow voids. Consider repeat CT angiography.  12/9 CTH: Stable acute hemorrhage and residual contrast staining centered within the MCA territory infarct. Stable right to left midline shift of 5 mm.  12/10 CTA H/N: Stable large intraparenchymal hemorrhage within the right basal ganglia with 5 mm of right to left shift with early subacute ischemia within the right caudate head and body extending into the corona radiata, occlusion of the right ICA stent, possible suspected small filling defect within the left upper lobe pulmonary artery suggestive of small pulmonary embolism  12/11 CTH: Grossly stable recent large hematoma centered in the right basal ganglia. Persistent surrounding edema with unchanged mass effect and 6 mm right to left midline shift.   12/12 CTH: Stable appearing, pending final read  Plan:   Continue ASA 81 mg daily and heparin ECMO protocol  SBP goal 110-140  Patient started on valproic acid for seizure  prophylaxis, set to end tonight at midnight  Neurochecks every hour in the daytime and every 2 hours at night  Neurology following, appreciate recommendations  Neurosurgery following, appreciate recommendations    Diagnosis: Agitation  Plan:   D/c Seroquel 50 mg BID, held for couple days already  Use Precdex as needed, stopped at this time    CV:   Diagnosis: SBP goal 110-140 in the setting of IVH   Plan:   SBP goal <140   Patient on Levophed held, wean as appropriate     Diagnosis: Elevated Troponins with EKG changes, Afib on telemetry, tachycardia   ECHO 12/8: Left ventricular cavity size is normal. Wall thickness is normal. The left ventricular ejection fraction is 45-50 %. Systolic function is mildly reduced. Akinesis of the inferolateral portion of the left ventricle was noted. Diastolic function is normal.   ECHO 12/10: Left ventricular cavity size is normal. The left ventricular ejection fraction is 50 to 55%. Systolic function is low normal. Unable to assess diastolic function. The following segments are hypokinetic: basal inferior, basal inferolateral, mid inferior and mid inferolateral.  Troponins: 5343 --> 5149 --> 6084 --> 8519 --> 67517 --> 20536 --> 53498  Plan:   S/p amiodarone bolus 300 mg IV on 12/11/24  Cardiology consulted, appreciate recommendations   AC deferred until cleared by Neurology/Neurosurgery    Diagnosis: CHF   BNP: 1164  Urine Output in the last 24 hours: -1.898 L  Plan:   Lasix 40 mg BID IV  Monitor urine output as appropriate    Pulm:  Diagnosis: Acute respiratory failure, bilateral effusions   Extubated on 12/8/2024  Chest x-ray 12/9/24: Congestive heart failure  Chest x-ray 12/10/24: Persistent pulmonary edema and pleural effusions  R Thoracentesis 12/11/24: Removed 500 mL , cultures negative, , Triglycerides 34, Total Protein  <3.0, pH 7.5  Plan:   Nasal cannula, wean as appropriate   SpO2 goal >92%  Airway precautions  Respiratory protocol  CPAP overnight as needed    GI:    Diagnosis: No issues  Last BM: 12/12/24  Plan:   MiraLAX 17 mg daily  Senokot 2 tablets twice daily    :   Diagnosis: No issues  Urine Output in the last 24 hours: -1.898 L  Plan:   Monitor I's and O's as appropriate     F/E/N:     F: None  E: Replete electrolytes as appropriate  N: Tube feeds ongoing    Heme/Onc:   Diagnosis: DVT prophylaxis, hemoglobin 9.4   Blood consent obtained verbally over the phone, plan to transfuse 1 unit of blood on 12/11/24  Plan:   Heparin ECMO protocol PTT goal 50-60    Endo:   Diagnosis: No issues  A1c: 5.3  Glucose: 119-196  Plan:   BG goal 140-180  Hypoglycemia protocol in place    ID:   Diagnosis: Leukocytosis  WBCs: 19.28 --> 18.18  MRSA: Negative  Blood cultures x2: No growth at 72 hours  Sputum culture: 2+ Polys, 3+ Gram positive cocci in pairs  Plan:   Trend fever curve and monitor WBCs  Continue Zosyn until 12/13 midnight to complete 5 days  Vanc discontinued on 12/11/24 given negative MRSA swab    MSK/Skin:   Diagnosis: Ambulatory dysfunction   Plan:   PT/OT  Out of bed as appropriate    Disposition: Critical care    ICU Core Measures     A: Assess, Prevent, and Manage Pain Has pain been assessed? Yes  Need for changes to pain regimen? No   B: Both SAT/SAT  N/A   C: Choice of Sedation RASS Goal: 0 Alert and Calm  Need for changes to sedation or analgesia regimen? No   D: Delirium CAM-ICU: Negative   E: Early Mobility  Plan for early mobility? Yes   F: Family Engagement Plan for family engagement today? Yes       Antibiotic Review: Awaiting culture results.     Review of Invasive Devices:      Central access plan: Medications requiring central line Hemodynamic monitoring  Qi Plan: Keep arterial line for hemodynamic monitoring and frequent labs    Prophylaxis:  VTE VTE covered by:  heparin (porcine), Intravenous, 11 Units/kg/hr at 12/13/24 0537       Stress Ulcer  not ordered         24 Hour Events : Overnight, patient had therapeutic PTT x 1 and a CT head was done which  looks stable, final read is pending at this time.    Subjective     Patient is in bed stating that she is tired.  Upon asking the patient if she was in pain, patient stated yes but unable to tell me where.  Patient following commands minimally.    Review of Systems: Review of Systems not obtainable due to Clinical Condition    Objective :                   Vitals I/O      Most Recent Min/Max in 24hrs   Temp 98.5 °F (36.9 °C) Temp  Min: 98.5 °F (36.9 °C)  Max: 101.6 °F (38.7 °C)   Pulse 82 Pulse  Min: 80  Max: 116   Resp 20 Resp  Min: 20  Max: 34   /59 BP  Min: 99/46  Max: 156/80   O2 Sat 98 % SpO2  Min: 94 %  Max: 100 %      Intake/Output Summary (Last 24 hours) at 12/13/2024 0752  Last data filed at 12/13/2024 0600  Gross per 24 hour   Intake 2351.13 ml   Output 4029 ml   Net -1677.87 ml       Diet Enteral/Parenteral; Tube Feeding No Oral Diet; Jevity 1.2 Navarro; Continuous; 55; Prosource Protein Liquid - Two Packets; 60; Water; Every 4 hours    Invasive Monitoring   Arterial Line  Qi /36  Arterial Line BP  Min: 112/34  Max: 154/62   MAP (!) 62 mmHg  Arterial Line MAP (mmHg)  Min: 56 mmHg  Max: 90 mmHg           Physical Exam   Physical Exam  Vitals reviewed.   Eyes:      Conjunctiva/sclera: Conjunctivae normal.      Pupils: Pupils are equal, round, and reactive to light.   Skin:     General: Skin is warm.   HENT:      Head: Normocephalic and atraumatic.   Cardiovascular:      Rate and Rhythm: Normal rate and regular rhythm.   Abdominal: General: Bowel sounds are normal.      Palpations: Abdomen is soft.      Tenderness: There is no abdominal tenderness.   Constitutional:       Appearance: She is ill-appearing.      Interventions: She is not sedated.  Pulmonary:      Effort: Pulmonary effort is normal. No respiratory distress.      Breath sounds: Normal breath sounds.   Neurological:      Comments: PERRL, L facial droop appreciated, tongue midline. Patient following commands in the RUE/RLE with  re-direction. No spontaneous movement in the LUE/LLE.            Diagnostic Studies        Lab Results: I have reviewed the following results:     Medications:  Scheduled PRN   aspirin, 81 mg, Daily  atorvastatin, 20 mg, QPM  chlorhexidine, 15 mL, Q12H DANIS  furosemide, 40 mg, BID (diuretic)  insulin lispro, 1-6 Units, Q6H DANIS  ipratropium, 0.5 mg, TID  levalbuterol, 1.25 mg, TID  melatonin, 6 mg, HS  midodrine, 5 mg, TID AC  piperacillin-tazobactam, 4.5 g, Q8H  polyethylene glycol, 17 g, Daily  potassium chloride, 40 mEq, BID  senna-docusate sodium, 2 tablet, BID  sertraline, 50 mg, Daily  valproic acid, 250 mg, Q6H DANIS      acetaminophen, 650 mg, Q6H PRN  albuterol, 2.5 mg, Q4H PRN  bisacodyl, 10 mg, Daily PRN       Continuous    dexmedetomidine, 0.1-0.4 mcg/kg/hr, Last Rate: Stopped (12/12/24 0519)  heparin (porcine), 3-15 Units/kg/hr (Order-Specific), Last Rate: 11 Units/kg/hr (12/13/24 0537)  norepinephrine, 1-30 mcg/min, Last Rate: Stopped (12/13/24 0550)         Labs:   CBC    Recent Labs     12/12/24  0532 12/13/24  0435   WBC 19.28* 18.18*   HGB 8.9* 9.4*   HCT 26.8* 28.9*    176     BMP    Recent Labs     12/12/24  1753 12/13/24  0435   SODIUM 156* 155*   K 2.9* 3.4*   * 116*   CO2 31 32   AGAP 11 7   BUN 21 22   CREATININE 0.57* 0.54*   CALCIUM 8.9 8.8       Coags    Recent Labs     12/13/24  0018 12/13/24  0435   PTT 54* 48*        Additional Electrolytes  Recent Labs     12/12/24  0532 12/12/24  1753 12/13/24  0435   MG 2.1 1.9 2.1   PHOS 2.8  --  3.9   CAIONIZED 1.12  --   --           Blood Gas    No recent results  No recent results LFTs  Recent Labs     12/13/24  0435   *   AST 52*   ALKPHOS 116*   ALB 3.2*   TBILI 0.54       Infectious  No recent results  Glucose  Recent Labs     12/12/24  0532 12/12/24  1753 12/13/24  0435   GLUC 144* 163* 168*

## 2024-12-13 NOTE — ASSESSMENT & PLAN NOTE
Presented 12/6 with left-sided weakness, slurred speech and facial droop  CTH revealed infarction in the right lentiform nucleus with mild cytotoxic edema with hyperdensity in the right M1 segment suggesting M1 thrombosis.    CTA of the head revealed acute thrombosis in the mid-distal right M1 segment with severe near occlusion of the proximal BILL with severe tandem stenosis intracranially and high-grade stenosis of the last distal common carotid artery, moderate high-grade stenosis of the right subclavian origin.   Underwent successful mechanical thrombectomy and right carotid angioplasty/stenting and placed on Integrilin.   12/7 CTA with hemorrhagic conversion  On aspirin, low-dose ECMO heparin introduced yesterday

## 2024-12-13 NOTE — ASSESSMENT & PLAN NOTE
CTA of the head revealed acute thrombosis in the mid-distal right M1 segment with severe near occlusion of the proximal BILL with severe tandem stenosis intracranially and high-grade stenosis of the last distal common carotid artery, moderate high-grade stenosis of the right subclavian origin.   12/6 s/p emergent right carotid angioplasty/stenting and placed on Integrilin  Repeat imaging 12/7 revealed hemorrhagic conversion and patient was given DDAVP administered  CTA 12/10 revealed BILL stent occlusion  Aspirin per NG tube, statin

## 2024-12-13 NOTE — PROGRESS NOTES
General Cardiology Progress Note   Bren Traylor 69 y.o. female MRN: [id number]  Unit/Bed#: ICU 11 Encounter: [id number]      Assessment & Plan  Paroxysmal atrial fibrillation (HCC)  Presented in sinus rhythm  A-fib noted 12/8  Currently remaining in normal sinus rhythm  On aspirin, heparin ECMO protocol started yesterday  Elevated troponin  Troponin 140--> 213--> 250--> 169--> 1164--> 5343--> 5149--> 6084--.[id number] in the setting of acute CVA and bilateral PEs  Possibly ACS, but considering extensive medical issues, favor non-MI troponin elevation  EKG with sinus rhythm and lateral ST-T wave abnormality  EF 50-55% with RWMA  She had anginal symptoms 10/24 and was planned for outpatient ischemic testing  CTA notes significant diffuse coronary calcifications  Cardiomyopathy (HCC)  Echocardiogram 12/10/2024: EF 50-55% with basal inferior, basal inferolateral, mid inferior and mid inferolateral hypokinesis, normal RV function, moderate MR, moderate TR.  When compared to echocardiogram from 12/8/2024 EF was 45-50% with inferolateral akinesis with moderate-severe MR.  CXR 12/10: Persistent pulmonary edema and pleural effusions  BNP 1164  Currently receiving furosemide 40 mg IV twice daily per the ICU team, had a 4 L diuresis overnight  Underwent thoracentesis 12/11, 500 cc    Cerebrovascular accident (CVA) due to embolism of right middle cerebral artery (HCC)  Presented 12/6 with left-sided weakness, slurred speech and facial droop  CTH revealed infarction in the right lentiform nucleus with mild cytotoxic edema with hyperdensity in the right M1 segment suggesting M1 thrombosis.    CTA of the head revealed acute thrombosis in the mid-distal right M1 segment with severe near occlusion of the proximal BILL with severe tandem stenosis intracranially and high-grade stenosis of the last distal common carotid artery, moderate high-grade stenosis of the right subclavian origin.   Underwent successful mechanical  thrombectomy and right carotid angioplasty/stenting and placed on Integrilin.   12/7 CTA with hemorrhagic conversion  On aspirin, low-dose ECMO heparin introduced yesterday  Carotid artery stenosis  CTA of the head revealed acute thrombosis in the mid-distal right M1 segment with severe near occlusion of the proximal BILL with severe tandem stenosis intracranially and high-grade stenosis of the last distal common carotid artery, moderate high-grade stenosis of the right subclavian origin.   12/6 s/p emergent right carotid angioplasty/stenting and placed on Integrilin  Repeat imaging 12/7 revealed hemorrhagic conversion and patient was given DDAVP administered  CTA 12/10 revealed BILL stent occlusion  Aspirin per NG tube, statin  Acute pulmonary embolism (HCC)  CTA Chest 12/10 revealed bilateral PEs with developing pulmonary infarction  Venous duplex negative for DVT  Encephalopathy  In the context of CVA with hemorrhagic conversion  Hypotension  Continues to require Levophed  Hypertension  Home meds on hold, currently still requiring intermittent Levophed  Hyperlipidemia  Being given atorvastatin via NG tube         Plan:    Continue supportive measures  Currently on midodrine in an effort to get her off of Levophed, but still on Levophed 3 mcg  Continue aspirin, statin, diuresis as planned, and currently primary team observing on low-dose heparin drip, volume status improved, saturating 98% on room air  No additional cardiac workup planned in the context of her hemorrhagic conversion, and this was discussed at the bedside with her son Yosi    Subjective:   Patient seen and examined.      Patient unable to verbalize.  Current blood pressures stabilized  Diuresing well, 4 L urine output  Febrile yesterday morning, otherwise afebrile today  She got up with physical therapy and was able to take a single step, was a little bit more alert during their work with her.    Review of Systems   Unable to perform ROS:  "Patient nonverbal       Objective   Vitals: Blood pressure 113/56, pulse 92, temperature 98.5 °F (36.9 °C), temperature source Oral, resp. rate (!) 28, height 5' 4\" (1.626 m), weight 55.8 kg (123 lb), SpO2 98%., Body mass index is 21.11 kg/m²., I/O last 3 completed shifts:  In: 3860.8 [I.V.:677; NG/GT:510; IV Piggyback:823.8; Feedings:1850]  Out: 4327 [Urine:4327]  No intake/output data recorded.  Wt Readings from Last 3 Encounters:   12/10/24 55.8 kg (123 lb)   12/06/24 53.8 kg (118 lb 9.7 oz)   10/02/24 53.8 kg (118 lb 9.6 oz)       Intake/Output Summary (Last 24 hours) at 12/13/2024 0841  Last data filed at 12/13/2024 0600  Gross per 24 hour   Intake 2096.93 ml   Output 4029 ml   Net -1932.07 ml     I/O last 3 completed shifts:  In: 3860.8 [I.V.:677; NG/GT:510; IV Piggyback:823.8; Feedings:1850]  Out: 4327 [Urine:4327]    No significant arrhythmias seen on telemetry review.     Physical Exam  Constitutional:       General: She is not in acute distress.     Appearance: She is not diaphoretic.   HENT:      Head: Normocephalic.   Eyes:      Conjunctiva/sclera: Conjunctivae normal.   Neck:      Vascular: No JVD.   Cardiovascular:      Rate and Rhythm: Normal rate and regular rhythm.      Heart sounds: Normal heart sounds. No murmur heard.     No gallop.   Pulmonary:      Effort: Pulmonary effort is normal. No respiratory distress.      Breath sounds: Normal breath sounds. No wheezing or rales.   Abdominal:      General: Bowel sounds are normal. There is no distension.      Palpations: Abdomen is soft.      Tenderness: There is no abdominal tenderness.   Musculoskeletal:         General: Normal range of motion.      Cervical back: Normal range of motion and neck supple.      Right lower leg: No edema.      Left lower leg: No edema.   Skin:     General: Skin is warm and dry.         Meds/Allergies   No Known Allergies    Current Facility-Administered Medications:     acetaminophen (TYLENOL) oral suspension 650 mg, 650 " mg, Per NG Tube, Q6H PRN, Vanda Palacios PA-C, 650 mg at 12/12/24 0809    albuterol inhalation solution 2.5 mg, 2.5 mg, Nebulization, Q4H PRN, Bonita Santamaria MD    aspirin chewable tablet 81 mg, 81 mg, Per NG Tube, Daily, Mirza Holm MD, 81 mg at 12/13/24 0825    atorvastatin (LIPITOR) tablet 20 mg, 20 mg, Per NG Tube, QPM, Corbin Zimmerman DO, 20 mg at 12/12/24 1757    bisacodyl (DULCOLAX) rectal suppository 10 mg, 10 mg, Rectal, Daily PRN, Corbin Zimmerman DO    ceFAZolin (ANCEF) IVPB (premix in dextrose) 2,000 mg 50 mL, 2,000 mg, Intravenous, Q8H, Gino Escobar MD    chlorhexidine (PERIDEX) 0.12 % oral rinse 15 mL, 15 mL, Mouth/Throat, Q12H DANIS, Vanda Palacios PA-C, 15 mL at 12/13/24 0825    dexmedeTOMIDine (Precedex) 400 mcg in sodium chloride 0.9% 100 mL, 0.1-0.4 mcg/kg/hr, Intravenous, Titrated, Bonita Santamaria MD, Stopped at 12/12/24 0519    furosemide (LASIX) injection 40 mg, 40 mg, Intravenous, BID (diuretic), Corbin Zimmerman DO, 40 mg at 12/13/24 0825    heparin (porcine) 25,000 units in 0.45% NaCl 250 mL infusion (premix), 3-15 Units/kg/hr (Order-Specific), Intravenous, Titrated, Fernie Richardson DO, Last Rate: 6.1 mL/hr at 12/13/24 0537, 11 Units/kg/hr at 12/13/24 0537    insulin lispro (HumALOG/ADMELOG) 100 units/mL subcutaneous injection 1-6 Units, 1-6 Units, Subcutaneous, Q6H DANIS, 1 Units at 12/12/24 0013 **AND** Fingerstick Glucose (POCT), , , Q6H, Bonita Santamaria MD    ipratropium (ATROVENT) 0.02 % inhalation solution 0.5 mg, 0.5 mg, Nebulization, TID, Bonita Santamaria MD, 0.5 mg at 12/13/24 0741    levalbuterol (XOPENEX) inhalation solution 1.25 mg, 1.25 mg, Nebulization, TID, Bonita Santamaria MD, 1.25 mg at 12/13/24 0741    melatonin tablet 6 mg, 6 mg, Per NG Tube, HS, Bonita Santamaria MD, 6 mg at 12/12/24 2013    midodrine (PROAMATINE) tablet 5 mg, 5 mg, Per NG Tube, TID Fernie COLE DO, 5 mg at 12/13/24 0825    norepinephrine (LEVOPHED) 4 mg (STANDARD CONCENTRATION) IV in  sodium chloride 0.9% 250 mL, 1-30 mcg/min, Intravenous, Titrated, Bonita Santamaria MD, Stopped at 12/13/24 0550    polyethylene glycol (MIRALAX) packet 17 g, 17 g, Oral, Daily, Jonn Ortega PA-C, 17 g at 12/13/24 0825    potassium chloride oral solution 40 mEq, 40 mEq, Oral, BID, Krima A Zimmerman, DO, 40 mEq at 12/13/24 0825    senna-docusate sodium (SENOKOT S) 8.6-50 mg per tablet 2 tablet, 2 tablet, Oral, BID, Krima A Zimmerman, DO, 2 tablet at 12/13/24 0825    sertraline (ZOLOFT) tablet 50 mg, 50 mg, Oral, Daily, Jonn Ortega PA-C, 50 mg at 12/13/24 0825    valproic acid (DEPAKENE) oral soln 250 mg, 250 mg, Oral, Q6H DANIS, Bonita Santamaria MD, 250 mg at 12/13/24 0543    Laboratory Results:        CBC with diff:   Results from last 7 days   Lab Units 12/13/24  0435 12/12/24  0532 12/11/24  1123 12/11/24  0425 12/10/24  0457 12/09/24  0527 12/08/24  0511 12/07/24  0442   WBC Thousand/uL 18.18* 19.28*  --  18.31* 18.56* 12.51* 16.02* 10.01   HEMOGLOBIN g/dL 9.4* 8.9* 8.6* 7.0* 7.9* 8.7* 9.7* 10.2*   HEMATOCRIT % 28.9* 26.8* 27.1* 21.8* 24.4* 27.0* 29.6* 29.9*   MCV fL 95 94  --  96 96 97 96 91   PLATELETS Thousands/uL 176 159  --  158 159 146* 189 198   RBC Million/uL 3.04* 2.84*  --  2.28* 2.55* 2.79* 3.07* 3.30*   MCH pg 30.9 31.3  --  30.7 31.0 31.2 31.6 30.9   MCHC g/dL 32.5 33.2  --  32.1 32.4 32.2 32.8 34.1   RDW % 14.6 15.0  --  14.7 14.7 14.4 14.5 13.8   MPV fL 12.4 12.6  --  12.5 11.9 12.1 11.3 11.5   NRBC AUTO /100 WBCs  --  0  --  0 0 0 0  --        CMP:  Results from last 7 days   Lab Units 12/13/24  0435 12/12/24  1753 12/12/24  0532 12/11/24  0407 12/11/24  0027 12/10/24  1807 12/10/24  1147 12/10/24  0457   SODIUM mmol/L 155* 156* 155* 157* 156* 156* 155* 157*   POTASSIUM mmol/L 3.4* 2.9* 3.6 3.7 3.7 3.3* 2.9* 3.6   CHLORIDE mmol/L 116* 114* 123* 128* 127* 125* 126* 130*   CO2 mmol/L 32 31 25 22 22 22 20* 18*   BUN mg/dL 22 21 19 20 20 19 23 22   CREATININE mg/dL 0.54* 0.57* 0.54* 0.63 0.64  "0.64 0.65 0.61   CALCIUM mg/dL 8.8 8.9 8.3* 7.9* 7.9* 8.1* 8.0* 8.0*   AST U/L 52*  --   --   --   --   --   --  189*   ALT U/L 179*  --   --   --   --   --   --  277*   ALK PHOS U/L 116*  --   --   --   --   --   --  108*   EGFR ml/min/1.73sq m 97 96 97 93 92 92 92 94       BMP:  Results from last 7 days   Lab Units 12/13/24  0435 12/12/24  1753 12/12/24  0532 12/11/24  0407 12/11/24  0027 12/10/24  1807 12/10/24  1147   SODIUM mmol/L 155* 156* 155* 157* 156* 156* 155*   POTASSIUM mmol/L 3.4* 2.9* 3.6 3.7 3.7 3.3* 2.9*   CHLORIDE mmol/L 116* 114* 123* 128* 127* 125* 126*   CO2 mmol/L 32 31 25 22 22 22 20*   BUN mg/dL 22 21 19 20 20 19 23   CREATININE mg/dL 0.54* 0.57* 0.54* 0.63 0.64 0.64 0.65   CALCIUM mg/dL 8.8 8.9 8.3* 7.9* 7.9* 8.1* 8.0*       NT-proBNP: No results for input(s): \"NTBNP\" in the last 72 hours.     Magnesium:   Results from last 7 days   Lab Units 12/13/24 0435 12/12/24  1753 12/12/24  0532 12/11/24  0455 12/10/24  0457 12/09/24  0527 12/08/24  0511   MAGNESIUM mg/dL 2.1 1.9 2.1 2.1 1.9 2.1 2.3       Coags:   Results from last 7 days   Lab Units 12/13/24  0435 12/13/24  0018 12/12/24  1934 12/12/24  1427 12/06/24  2045   PTT seconds 48* 54* 49* 49* 36*   INR   --   --   --   --  1.10       TSH:        Hemoglobin A1C )  Results from last 7 days   Lab Units 12/07/24  0442   HEMOGLOBIN A1C % 5.3       Lipid Profile:   No results found for: \"CHOL\"  Lab Results   Component Value Date    HDL 42 (L) 12/07/2024    HDL 53 10/02/2024     Lab Results   Component Value Date    LDLCALC 76 12/07/2024    LDLCALC 116 (H) 10/02/2024     No results found for: \"LDLDIRECT\"  Lab Results   Component Value Date    TRIG 110 12/07/2024    TRIG 105 10/02/2024       Cardiac testing:   EKG personally reviewed by Pankaj Santana MD.     Cath:  ECHO:  Stress TEST:  Other:        Pankaj Santana MD    Portions of the record may have been created with voice recognition software.  Occasional wrong word or \"sound a like\" substitutions " may have occurred due to the inherent limitations of voice recognition software.  Read the chart carefully and recognize, using context, where substitutions have occurred.

## 2024-12-13 NOTE — UTILIZATION REVIEW
Continued Stay Review    Date: 12/13                          Current Patient Class: Inpatient  Current Level of Care: Critical Care    HPI:69 y.o. female initially admitted on 12/7     Assessment/Plan: Ischemic stroke w/ Right MCA M1 occlusion s/p mechanical thrombectomy, right carotid stenosis s/p right carotid angioplasty and stenting, hemorrhagic conversion   12/7 Mechanical thrombectomy and right carotid angioplasty and stenting  Currently on O2 2L NC. Started Valproic acid for seizure ppx.   Multiple continuous drips; Heparin, Norepinephrine and Precedex.  Iv Lasix bid. Iv antibiotics. Cultures pending. A line monitoring. Central line.   D/c Seroquel 50 mg BID, held for couple days already  Ongoing tube feeds.  On exam; L facial droop appreciated, tongue midline.  following commands in the RUE/RLE with re-direction. No spontaneous movement in the LUE/LLE.       Medications:   Scheduled Medications:  aspirin, 81 mg, Per NG Tube, Daily  atorvastatin, 20 mg, Per NG Tube, QPM  cefazolin, 2,000 mg, Intravenous, Q8H  chlorhexidine, 15 mL, Mouth/Throat, Q12H DANIS  [START ON 12/14/2024] furosemide, 40 mg, Intravenous, Daily  insulin lispro, 1-6 Units, Subcutaneous, Q6H DANIS  ipratropium, 0.5 mg, Nebulization, TID  levalbuterol, 1.25 mg, Nebulization, TID  melatonin, 6 mg, Per NG Tube, HS  midodrine, 5 mg, Per NG Tube, TID AC  [START ON 12/14/2024] modafinil, 100 mg, Oral, Daily  polyethylene glycol, 17 g, Oral, Daily  potassium chloride, 40 mEq, Oral, BID  senna-docusate sodium, 2 tablet, Oral, BID  sertraline, 50 mg, Oral, Daily  valproic acid, 250 mg, Oral, Q6H DANIS    furosemide (LASIX) injection 40 mg  Dose: 40 mg  Freq: 2 times daily (diuretic) Route: IV  Start: 12/12/24 1145 End: 12/13/24 0959    Continuous IV Infusions:  dexmedetomidine, 0.1-0.4 mcg/kg/hr, Intravenous, Titrated  heparin (porcine), 3-15 Units/kg/hr (Order-Specific), Intravenous, Titrated  norepinephrine, 1-30 mcg/min, Intravenous, Titrated      PRN  Meds:  acetaminophen, 650 mg, Per NG Tube, Q6H PRN  albuterol, 2.5 mg, Nebulization, Q4H PRN  bisacodyl, 10 mg, Rectal, Daily PRN      Discharge Plan: TBD    Vital Signs (last 3 days)       Date/Time Temp Pulse Resp BP MAP (mmHg) Arterial Line BP MAP SpO2 Calculated FIO2 (%) - Nasal Cannula Nasal Cannula O2 Flow Rate (L/min) O2 Device O2 Interface Device Patient Position - Orthostatic VS [place] Coma Scale Score Pain    12/13/24 1000 -- 90 29 108/54 69 120/44 64 mmHg 97 % -- -- -- -- -- 13 --    12/13/24 0900 -- 82 26 114/61 79 130/42 68 mmHg 97 % -- -- -- -- -- -- --    12/13/24 0805 -- 92 28 113/56 76 146/52 80 mmHg 98 % -- -- -- -- -- -- --    12/13/24 0800 -- -- -- -- -- -- -- 99 % -- -- -- -- -- 13 --    12/13/24 0743 -- -- -- -- -- -- -- 98 % 28 2 L/min Nasal cannula -- -- -- --    12/13/24 0730 -- 86 26 -- -- 108/54 70 mmHg 98 % -- -- -- -- -- -- --    12/13/24 0700 -- 82 20 -- -- 126/36 62 mmHg 98 % -- -- -- -- -- -- --    12/13/24 0600 -- 88 21 -- -- 142/44 74 mmHg 99 % -- -- -- -- -- 13 --    12/13/24 0500 -- 86 27 -- -- 144/48 76 mmHg 99 % -- -- -- -- -- -- --    12/13/24 0400 98.5 °F (36.9 °C) 86 27 -- -- 130/40 66 mmHg 98 % -- -- -- -- -- 13 --    12/13/24 0300 -- 86 29 -- -- 134/44 72 mmHg 99 % -- -- -- -- -- -- --    12/13/24 0200 -- -- -- -- -- -- -- -- -- -- -- -- -- 13 --    12/13/24 0100 -- 88 27 119/59 76 136/44 72 mmHg 99 % -- -- -- -- -- -- --    12/13/24 0000 99.2 °F (37.3 °C) 92 33 113/56 77 120/36 60 mmHg 95 % -- -- -- -- -- 13 --    12/12/24 2300 -- 84 28 112/57 74 134/40 68 mmHg 100 % -- -- -- -- -- -- --    12/12/24 2200 -- 106 26 118/59 77 128/42 70 mmHg 99 % -- -- -- -- -- 13 --    12/12/24 2100 -- 86 25 99/46 62 112/34 56 mmHg 98 % -- -- -- -- -- -- --    12/12/24 2000 99.7 °F (37.6 °C) 90 29 112/53 68 132/40 66 mmHg 98 % -- -- -- -- Lying 13 --    12/12/24 1928 -- -- -- -- -- -- -- 100 % -- -- -- -- -- -- --    12/12/24 1900 -- 92 32 118/59 75 148/44 72 mmHg 99 % -- -- -- -- -- -- --     12/12/24 1800 -- 80 27 108/56 67 140/42 68 mmHg 99 % 32 3 L/min -- -- -- -- --    12/12/24 1700 100.2 °F (37.9 °C) 88 28 118/56 76 138/44 72 mmHg 99 % -- -- -- -- -- -- --    12/12/24 1600 -- 92 25 108/58 74 138/42 66 mmHg 98 % -- -- -- -- -- 13 --    12/12/24 1500 -- 88 27 110/56 74 130/42 66 mmHg 98 % -- -- -- -- -- -- --    12/12/24 1400 -- 90 28 110/55 68 132/40 66 mmHg 98 % -- -- -- -- -- -- --    12/12/24 1200 -- 84 28 102/58 66 116/38 62 mmHg 99 % -- -- -- -- -- 9 --    12/12/24 1109 100.1 °F (37.8 °C) -- -- -- -- -- -- -- -- -- -- -- -- -- --    12/12/24 1100 -- 96 30 113/52 63 122/50 74 mmHg 97 % -- -- -- -- -- -- --    12/12/24 1000 -- 108 34 112/71 82 130/50 76 mmHg 97 % -- -- -- -- -- -- --    12/12/24 0900 -- 104 28 104/58 70 128/42 64 mmHg 97 % -- -- -- -- -- -- --    12/12/24 0800 101.6 °F (38.7 °C) 116 31 156/80 100 154/62 90 mmHg 94 % -- -- -- -- -- 9 --    12/12/24 0700 -- 104 22 118/66 81 134/46 74 mmHg 99 % -- -- Nasal cannula -- -- -- --    12/12/24 0600 -- 102 0 114/67 82 138/48 74 mmHg 97 % -- -- -- -- -- -- --    12/12/24 0500 -- 100 34 112/69 79 130/50 76 mmHg 98 % -- -- -- -- -- -- --    12/12/24 0400 98.4 °F (36.9 °C) -- -- -- -- -- -- -- -- -- -- -- -- 9 --    12/12/24 0350 -- 92 14 -- -- 114/42 64 mmHg 100 % -- -- -- -- -- -- --    12/12/24 0330 -- 94 37 119/66 84 130/48 72 mmHg 100 % -- -- -- -- -- -- --    12/12/24 0300 -- 88 31 118/65 81 110/50 72 mmHg 100 % -- -- -- -- -- -- --    12/12/24 0200 -- 98 35 120/75 88 128/50 76 mmHg 100 % -- -- -- -- -- 13 --    12/12/24 0100 -- 92 30 115/60 78 122/46 70 mmHg 99 % -- -- -- -- -- -- --    12/12/24 0000 98.9 °F (37.2 °C) 100 33 118/64 82 126/50 76 mmHg 99 % -- -- -- -- -- 8 --    12/11/24 2300 -- 98 31 113/63 78 128/68 82 mmHg 100 % -- -- -- -- -- -- --    12/11/24 2200 -- 100 35 113/65 80 112/42 66 mmHg 99 % -- -- -- -- -- -- --    12/11/24 2102 -- -- -- -- -- -- -- -- -- -- -- -- -- -- Med Not Given for Pain - for MAR use only     12/11/24 2100 -- 100 37 118/61 76 126/48 74 mmHg 99 % -- -- -- -- -- -- --    12/11/24 2000 100.8 °F (38.2 °C) 98 35 109/56 74 114/48 68 mmHg 99 % -- -- -- -- -- 8 --    12/11/24 1907 -- -- -- -- -- -- -- -- 40 5 L/min Nasal cannula -- -- -- --    12/11/24 1900 -- 94 25 92/59 67 106/42 62 mmHg 100 % -- -- -- -- -- -- --    12/11/24 1800 -- 136 34 86/61 74 90/42 56 mmHg 100 % -- -- -- -- -- -- --    12/11/24 1700 100.8 °F (38.2 °C) 126 34 80/50 60 78/32 44 mmHg 99 % -- -- -- -- -- -- --    12/11/24 1600 100.4 °F (38 °C) 90 31 112/57 67 120/38 62 mmHg 100 % -- -- -- -- -- 8 --    12/11/24 1500 100 °F (37.8 °C) 92 31 107/60 71 130/44 68 mmHg 100 % -- -- -- -- -- -- --    12/11/24 1400 99.7 °F (37.6 °C) 86 27 111/63 75 128/48 72 mmHg 99 % -- -- -- -- -- -- --    12/11/24 1355 -- -- -- -- -- -- -- -- 44 6 L/min Nasal cannula -- -- -- --    12/11/24 1350 -- -- -- -- -- -- -- -- -- -- -- Face mask -- -- --    12/11/24 1330 99.7 °F (37.6 °C) 92 26 90/53 65 100/42 62 mmHg 99 % -- -- -- -- -- -- --    12/11/24 1300 -- 86 31 100/59 67 104/46 66 mmHg 99 % -- -- -- -- -- -- --    12/11/24 1200 100.4 °F (38 °C) 106 42 -- -- 138/54 80 mmHg 100 % -- -- BiPAP -- -- 8 --    12/11/24 1100 100.4 °F (38 °C) 122 39 146/93 128 168/78 108 mmHg 98 % -- -- -- -- -- -- --    12/11/24 1030 100.4 °F (38 °C) 98 33 -- -- 148/62 88 mmHg 99 % -- -- -- -- -- -- --    12/11/24 1000 101.1 °F (38.4 °C) 94 31 -- -- 116/56 76 mmHg 100 % -- -- -- -- -- -- --    12/11/24 0900 101.5 °F (38.6 °C) 106 37 -- -- 110/52 72 mmHg 100 % -- -- -- -- -- -- --    12/11/24 0850 101.7 °F (38.7 °C) 108 39 88/56 64 94/48 64 mmHg 99 % -- -- -- -- -- -- --    12/11/24 0843 101.7 °F (38.7 °C) 125 35 88/56 -- -- -- -- -- -- -- -- -- -- --    12/11/24 0800 101.7 °F (38.7 °C) 108 39 128/62 83 100/44 64 mmHg 99 % -- -- CPAP -- -- 9 --    12/11/24 0756 -- -- -- -- -- -- -- -- -- -- -- Face mask -- -- --    12/11/24 0700 -- 124 42 115/86 105 142/54 78 mmHg 96 % -- -- -- -- -- --  --    12/11/24 0600 -- 120 40 130/65 91 124/48 72 mmHg 97 % -- -- -- -- -- -- --    12/11/24 0500 -- 134 42 133/67 88 148/56 82 mmHg 96 % -- -- -- -- -- -- --    12/11/24 0400 -- 100 30 103/51 67 108/40 60 mmHg 99 % -- -- -- -- -- 12 --    12/11/24 0300 -- 124 20 -- -- 152/58 88 mmHg 95 % -- -- -- -- -- -- --    12/11/24 0219 -- -- -- -- -- -- -- -- -- -- -- Face mask -- -- --    12/11/24 0200 -- 112 20 -- -- 114/46 68 mmHg 100 % -- -- -- -- -- -- --    12/11/24 0103 -- -- -- -- -- -- -- 99 % -- -- -- -- -- -- --    12/11/24 0100 -- 132 24 -- -- 142/56 80 mmHg 95 % -- -- -- -- -- -- --    12/11/24 0000 98 °F (36.7 °C) 104 30 -- -- 118/46 68 mmHg 100 % -- -- -- -- -- 12 --    12/10/24 2300 -- 92 32 -- -- 112/42 62 mmHg 100 % -- -- -- -- -- -- --    12/10/24 2200 -- 102 37 -- -- 108/38 58 mmHg 100 % -- -- -- -- -- -- --    12/10/24 2100 -- 92 30 -- -- 114/42 60 mmHg 100 % -- -- -- -- -- -- --    12/10/24 2000 97.7 °F (36.5 °C) 68 32 -- -- 110/38 56 mmHg 97 % -- -- CPAP -- -- 12 --    12/1957 -- -- -- -- -- -- -- 97 % -- -- -- Face mask -- -- --    12/10/24 1935 -- 56 31 -- -- 72/28 40 mmHg 100 % -- -- -- -- -- -- --    12/10/24 1910 -- 80 33 -- -- 119/42 64 mmHg 100 % 44 6 L/min Nasal cannula -- -- -- --    12/10/24 1800 -- 82 33 -- -- 130/40 64 mmHg 99 % 44 6 L/min Nasal cannula -- -- 12 --    12/10/24 1730 99.9 °F (37.7 °C) 82 36 -- -- 136/48 72 mmHg 99 % 44 6 L/min Nasal cannula -- -- -- --    12/10/24 1620 -- 64 29 -- -- 107/37 -- -- -- -- -- -- -- -- --    12/10/24 1530 -- -- -- -- -- -- -- -- -- -- -- -- -- 12 --    12/10/24 1500 -- 94 36 -- -- 122/40 60 mmHg 99 % 44 6 L/min Nasal cannula -- -- 12 --    12/10/24 1430 -- 100 28 -- -- 136/48 72 mmHg 100 % 44 6 L/min Nasal cannula -- -- -- --    12/10/24 1400 -- 94 31 -- -- 152/56 80 mmHg 99 % 44 6 L/min Nasal cannula -- -- 12 --    12/10/24 1300 -- 68 32 -- -- 128/46 66 mmHg 95 % 44 6 L/min Nasal cannula -- -- 12 --    12/10/24 1200 -- 78 34 -- -- 148/48 72  mmHg 99 % 44 6 L/min Nasal cannula -- -- 12 --    12/10/24 1138 -- 94 37 -- -- 144/51 76 mmHg 99 % 44 6 L/min Nasal cannula -- -- -- --    12/10/24 1125 -- 58 39 -- -- 90/36 48 mmHg 100 % 44 6 L/min Nasal cannula -- -- -- --    12/10/24 1119 -- 106 -- 118/62 -- -- -- -- -- -- -- -- -- -- --    12/10/24 1100 -- -- -- -- -- -- -- -- -- -- -- -- -- 12 --    12/10/24 1055 -- 110 34 -- -- 178/60 90 mmHg 100 % 44 6 L/min Nasal cannula -- -- -- --    12/10/24 1045 -- 90 36 -- -- 166/54 82 mmHg 99 % 44 6 L/min Nasal cannula -- -- -- --    12/10/24 1020 -- 106 39 -- -- 204/70 110 mmHg 86 % 36 4 L/min Nasal cannula -- -- -- --    12/10/24 1000 -- 96 48 -- -- 174/58 88 mmHg 96 % 44 6 L/min Nasal cannula -- -- 12 --    12/10/24 0940 99.6 °F (37.6 °C) 92 35 118/62 79 176/62 92 mmHg 90 % 36 4 L/min Nasal cannula -- -- -- --    12/10/24 0930 -- 58 30 84/39 53 118/40 58 mmHg 93 % 32 3 L/min Nasal cannula -- -- -- --    12/10/24 0922 -- 60 30 118/55 83 161/51 78 mmHg 92 % 32 3 L/min Nasal cannula -- -- -- --    12/10/24 0900 -- 58 31 136/52 81 156/54 80 mmHg 92 % 32 3 L/min Nasal cannula -- -- 12 --    12/10/24 0800 -- 82 35 155/77 113 176/62 96 mmHg 97 % 32 3 L/min Nasal cannula -- -- 12 --    12/10/24 0730 -- 90 34 134/63 89 172/62 94 mmHg 98 % 32 3 L/min Nasal cannula -- -- -- --    12/10/24 0710 -- 82 33 104/56 73 144/54 78 mmHg 97 % 32 3 L/min Nasal cannula -- -- -- --    12/10/24 0700 -- 72 32 85/65 75 144/56 78 mmHg 98 % 32 3 L/min Nasal cannula -- -- 12 --    12/10/24 0630 -- 82 34 -- 60 156/58 88 mmHg 97 % -- -- -- -- -- -- --    12/10/24 0600 -- 50 29 114/56 76 132/50 74 mmHg 99 % -- -- -- -- -- 13 --    12/10/24 0530 -- 54 29 116/59 81 122/52 72 mmHg 99 % -- -- -- -- -- -- --    12/10/24 0500 -- 54 30 118/60 72 154/50 80 mmHg 99 % -- -- -- -- -- 13 --    12/10/24 0430 99 °F (37.2 °C) 84 29 135/63 96 152/64 92 mmHg 96 % -- -- -- -- -- -- --    12/10/24 0400 -- -- -- 112/57 73 -- -- -- -- -- -- -- -- 13 --    12/10/24 0330  -- 52 30 106/56 71 132/44 66 mmHg 100 % -- -- -- -- -- -- --    12/10/24 0300 -- 58 33 114/60 77 148/52 78 mmHg 100 % -- -- -- -- -- 13 --    12/10/24 0230 -- 52 31 103/52 72 130/44 66 mmHg 100 % -- -- -- -- -- -- --    12/10/24 0200 -- 88 24 110/56 79 184/78 110 mmHg 96 % -- -- -- -- -- 13 --    12/10/24 0130 -- 66 33 102/55 82 148/52 80 mmHg 97 % -- -- -- -- -- -- --    12/10/24 0108 -- 64 38 87/49 63 120/44 62 mmHg 97 % -- -- -- -- -- -- --    12/10/24 0100 -- -- -- -- -- -- -- -- -- -- -- -- -- 13 --    12/10/24 0030 -- 50 29 104/56 71 122/40 62 mmHg 99 % -- -- -- -- -- -- --    12/10/24 0000 100.1 °F (37.8 °C) 62 30 142/68 101 154/48 78 mmHg 96 % -- -- -- -- -- 13 --          Weight (last 2 days)       None            Pertinent Labs/Diagnostic Results:   Radiology:  CT head wo contrast   Final Interpretation by Taylor Macdonald MD (12/13 0656)      No significant interval change.                  Workstation performed: [id number]         CT head wo contrast   Final Interpretation by Alisha Park MD (12/12 0811)      Grossly stable recent large hematoma centered in the right basal ganglia. Persistent surrounding edema with unchanged mass effect and 6 mm right to left midline shift.                  Workstation performed: [id number]         XR chest portable   Final Interpretation by Mike Dong MD (12/11 6472)      Unchanged pulmonary edema with small bibasilar pleural effusions.            Workstation performed: [id number]         IR PICC reposition   Final Interpretation by Mauricio Cervantes MD (12/10 4325)      Right arm PICC exchange and repositioning using 4 [place] single-lumen power PICC, with tip in the expected location of the cavoatrial junction.      Plan:      The catheter may be used immediately.      Workstation performed: [id number]          VAS VENOUS DUPLEX - LOWER LIMB BILATERAL   Final Interpretation by Candi Pollock MD (12/10 2958)      CTA chest pe study   Final Interpretation by  Ulises Chisholm MD (12/10 1400)      Bilateral pulmonary embolism as described above.  Measured RV/LV ratio is within normal limits at less than 0.9 and there is no CT evidence for right heart strain.      Peripherally situated wedge-shaped areas of groundglass within the bilateral upper lobes likely representing areas of developing pulmonary infarction.      Pulmonary edema with layering right greater than left bilateral pleural effusions.         I personally discussed this study with Dr. Santamaria on 12/10/2024 1:37 PM.            Workstation performed: [id number]         XR chest portable ICU   Final Interpretation by Kellee Mackey MD (12/10 1009)      Persistent pulmonary edema and pleural effusions.            Workstation performed: [id number]         CTA head and neck w wo contrast   Final Interpretation by Matthew Cortes DO (12/10 0918)      CT Brain: Stable large intraparenchymal hemorrhage centered within the right basal ganglia with surrounding vasogenic edema and localized mass effect including approximately 5 mm of right to left shift. Early subacute ischemia within the right caudate    head and body extending into the corona radiata.      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 The vessel does reconstitute intracranially at the level of the clinoid segment but is significantly diminished in caliber as it    extends to the ICA terminus. The right M1 segment is patent as are the M2 and M3 branches. However, the M1 segment is diminished in caliber compared to the opposite left hemisphere and compared to the prior examination.      Stable moderate stenosis of the right subclavian origin from the brachiocephalic and left distal common carotid artery and proximal cervical internal carotid artery.      Other findings: Interval development of large right and moderate left posterior layering pleural effusions within the lung  apices.      Suspected small filling defect identified within one of the left upper lobe pulmonary artery branches seen on series 303 image 25 suggesting small pulmonary embolus.         I personally discussed this study with LEILA ADLER on 12/10/2024 9:18 AM.                        Workstation performed: [id number]         CT head wo contrast   Final Interpretation by Italo Nassar MD (12/09 1930)         1. Stable acute hemorrhage and residual contrast staining centered within the MCA territory infarct.   2. Stable right to left midline shift of 5 mm.                  Workstation performed: [id number]         XR chest portable ICU   Final Interpretation by Carlos Burrell MD (12/10 0704)      Congestive heart failure.            Workstation performed: [id number]         XR chest portable   Final Interpretation by Oziel Lemus MD (12/09 1203)      1.  Enteric tube appears appropriately position.      2.  Findings of mild volume overload including pulmonary vascular congestion and interstitial edema. No amarilys alveolar edema or effusions.      3.  Peripheral location of PICC tip projecting over the right subclavian vein. Consider replacement.      Resident: Josué Adams I, the attending radiologist, have reviewed the images and agree with the final report above.      Workstation performed: [id number]         XR chest portable ICU   Final Interpretation by Lefty Siddiqui MD (12/08 1436)   Right PICC line tip noted as above without pneumothorax.   No pneumothorax after extubation and NG tube removal.   Mild central congestion without consolidation.            Workstation performed: [id number]         CT head wo contrast   Final Interpretation by Pedro Carlson DO (12/08 0829)      Stable CT of the brain with recent right MCA infarct status post mechanical thrombectomy with hematoma and contrast staining located in the right basal ganglia. Mass effect on the right lateral ventricle  "results in 5 mm right to left midline shift    unchanged from prior. Small volume intraventricular hemorrhage layering in the left occipital horn.                  Workstation performed: [id number]         MRI brain wo contrast   Final Interpretation by Matthew Cortes DO (12/08 0649)      Large acute parenchymal hemorrhage within the right basal ganglia similar in size to most recent CT scan. Mild surrounding edema and localized mass effect with approximately 5 mm of right to left shift. Adjacent acute ischemia is seen within the caudate    head and body.      There is hyperintense T2 signal within the distal cervical and intracranial internal carotid artery on the right suggesting slow flow or occlusion. The M1 segment and middle cerebral artery branches on the right demonstrate appropriate flow voids.    Consider repeat CT angiography.      This examination was marked \"immediate notification\" in Epic in order to begin the standard process by which the radiology reading room liaison alerts the referring practitioner.      Workstation performed: [id number]         XR chest portable ICU   Final Interpretation by Joseline Schneider MD (12/07 1708)      Right PICC over right superior mediastinum above the head of the right clavicle. It may be in the inferior aspect of the right internal jugular vein.      Improved left base atelectasis.            Workstation performed: [id number]         CT head wo contrast   Final Interpretation by Robert Douglas MD (12/07 1420)      Similar acute infarct in right MCA territory with large acute mixed-density intraparenchymal hematoma centered in right basal ganglia with small-volume intraventricular extension of blood products. Persistent compressive mass effect on right lateral    ventricle with similar approximately 0.5 cm leftward midline shift when remeasured by this user. Continued surveillance recommended.      The study was marked in EPIC for immediate " notification.                     Workstation performed: [id number]         CTA head and neck w wo contrast   Final Interpretation by Robert Douglas MD (12/07 4023)   Addendum (preliminary) 1 of 1 by Robert Douglas MD (12/07 1043)   ADDENDUM:         I personally discussed this study with ALLISON JARVIS on 12/7/2024 10:42    AM.            Final      CT Brain:   - Evolution of acute infarct in right MCA territory with large amount of hyperdense material in right basal ganglia likely combination of contrast staining and acute intraparenchymal hematoma measuring approximately 6.0 x 3.0 x 3.2 cm (estimated volume    29 mL), and small volume intraventricular extension of blood products. Worsened compressive mass effect on right lateral ventricle with approximately 0.3 cm leftward midline shift. Recommend reevaluation by neurosurgery and short-term follow-up CT head    without contrast.      CT Angiography:   - Interval resolution of right MCA M1 segment occlusion status post right MCA mechanical thrombectomy. No new large vessel occlusion in the head or neck.   -  Interval placement of right carotid stent with moderate-to-severe stenosis in the right ICA proximal cervical segment due to extrinsic compression from calcified atherosclerotic disease.   - Atherosclerotic disease of the head and neck, as detailed above.      Multiple scattered micronodular opacities in visualized upper lung zones, which may be infectious/inflammatory or aspiration. Consider follow-up CT chest without contrast.      Incidental thyroid nodule(s) for which nonemergent thyroid ultrasound is recommended.      Additional chronic/incidental findings as detailed above.                  Workstation performed: [id number]         X-ray chest 1 view   Final Interpretation by Joseline Schneider MD (12/07 0844)      ET tube 5 cm above the ion.      Mild left base opacity and elevation of the left diaphragm which could be due to  atelectasis and/or aspiration.            Workstation performed: [id number]         IR stroke alert   Final Interpretation by Mirza Holm MD (12/09 1707)      Right MCA M1 occlusion, TICI 0.      Right carotid bifurcation critical stenosis.      Successful mechanical thrombectomy with TICI 3 reperfusion.      Successful right carotid angioplasty and stenting.      Workstation performed: [id number]         CT head wo contrast    (Results Pending)   FL barium swallow video w speech    (Results Pending)     Cardiology:  ECG 12 lead   Final Result by Michael Hansen MD (12/11 0928)   Normal sinus rhythm with 1st degree A-V block with occasional Premature    atrial complexes   Nonspecific ST and T wave abnormality   Abnormal ECG   When compared with ECG of 11-Dec-2024 03:12, (unconfirmed)   Vent. rate has decreased by  44 bpm   Confirmed by Michael Hansen ([id number]) on 12/11/2024 9:28:34 AM      ECG 12 lead   Final Result by Michael Hansen MD (12/11 0929)   Sinus tachycardia with short CA with frequent Premature ectopic complexes   Marked ST abnormality, possible lateral subendocardial injury   Abnormal ECG   When compared with ECG of 10-Dec-2024 06:57, (unconfirmed)   Premature ectopic complexes are now Present   Vent. rate has increased by  49 bpm   ST now depressed in Lateral leads   Confirmed by Mihcael Hansen ([id number]) on 12/11/2024 9:29:27 AM      Echo follow up/limited w/ contrast if indicated   Final Result by Madhavi Champion DO (12/10 1319)        Left Ventricle: Left ventricular cavity size is normal. The left    ventricular ejection fraction is 50 to 55%. Systolic function is low    normal. Unable to assess diastolic function.     The following segments are hypokinetic: basal inferior, basal    inferolateral, mid inferior and mid inferolateral.     All other segments are normal.     Right Ventricle: Right ventricular cavity size is normal. Systolic    function is normal.     Aortic Valve: There is mild  regurgitation.     Mitral Valve: There is moderate regurgitation.     Tricuspid Valve: There is moderate regurgitation. The right ventricular    systolic pressure is mildly elevated. The estimated right ventricular    systolic pressure is 46.00 mmHg.     Pericardium: There is a left pleural effusion.         ECG 12 lead   Final Result by Pankaj Santana MD (12/11 0932)   Normal sinus rhythm   Nonspecific ST abnormality   Abnormal ECG   When compared with ECG of 08-Dec-2024 13:27, (unconfirmed)   Sinus rhythm has replaced Atrial fibrillation   ST less depressed in Anterior leads   Nonspecific T wave abnormality no longer evident in Inferior leads   T wave inversion no longer evident in Anterolateral leads   Confirmed by Pankaj Santana ([id number]) on 12/11/2024 9:32:46 AM      ECG 12 lead   Final Result by Pankaj Santana MD (12/11 0938)   Atrial fibrillation with rapid ventricular response with premature    ventricular or aberrantly conducted complexes   ST & T wave abnormality, consider inferior ischemia or digitalis effect   ST & T wave abnormality, consider anterolateral ischemia or digitalis    effect   Abnormal ECG   When compared with ECG of 08-Dec-2024 10:24, (unconfirmed)   Significant changes have occurred   Confirmed by Pankaj Santana ([id number]) on 12/11/2024 9:38:32 AM      Echo complete w/ contrast if indicated   Final Result by Colt Randall MD (12/08 1049)        Left Ventricle: Left ventricular cavity size is normal. Wall thickness    is normal. The left ventricular ejection fraction is 45-50 %. Systolic    function is mildly reduced. Akinesis of the inferolateral portion of the    left ventricle was noted. Diastolic function is normal.     Aortic Valve: There is mild regurgitation.     Mitral Valve: There is moderate to severe regurgitation.     Tricuspid Valve: There is mild to moderate regurgitation. The right    ventricular systolic pressure is mildly elevated.         ECG 12 lead   Final Result by Colt  Tyree Randall MD (12/08 0502)   Normal sinus rhythm   Possible Inferior infarct , age undetermined   Abnormal ECG      Confirmed by Colt Randall ([id number]) on 12/8/2024 5:01:55 AM        GI:  No orders to display       Results from last 7 days   Lab Units 12/09/24  1457   SARS-COV-2  Negative     Results from last 7 days   Lab Units 12/13/24  0435 12/12/24  0532 12/11/24  1123 12/11/24  0425 12/10/24  0457   WBC Thousand/uL 18.18* 19.28*  --  18.31* 18.56*   HEMOGLOBIN g/dL 9.4* 8.9* 8.6* 7.0* 7.9*   HEMATOCRIT % 28.9* 26.8* 27.1* 21.8* 24.4*   PLATELETS Thousands/uL 176 159  --  158 159   TOTAL NEUT ABS Thousands/µL  --  16.13*  --  16.05* 15.64*         Results from last 7 days   Lab Units 12/13/24  0435 12/12/24  1753 12/12/24  0532 12/11/24  0455 12/11/24  0407 12/11/24  0027 12/10/24  1147 12/10/24  0457 12/09/24  1745 12/09/24  0527 12/07/24  1159 12/07/24  0442   SODIUM mmol/L 155* 156* 155*  --  157* 156*   < > 157*   < > 157*   < > 141   POTASSIUM mmol/L 3.4* 2.9* 3.6  --  3.7 3.7   < > 3.6   < > 3.6   < > 4.0   CHLORIDE mmol/L 116* 114* 123*  --  128* 127*   < > 130*   < > 126*   < > 111*   CO2 mmol/L 32 31 25  --  22 22   < > 18*   < > 24   < > 23   ANION GAP mmol/L 7 11 7  --  7 7   < > 9   < > 7   < > 7   BUN mg/dL 22 21 19  --  20 20   < > 22   < > 16   < > 7   CREATININE mg/dL 0.54* 0.57* 0.54*  --  0.63 0.64   < > 0.61   < > 0.41*   < > 0.40*   EGFR ml/min/1.73sq m 97 96 97  --  93 92   < > 94   < > 107   < > 108   CALCIUM mg/dL 8.8 8.9 8.3*  --  7.9* 7.9*   < > 8.0*   < > 8.8   < > 8.0*   CALCIUM, IONIZED mmol/L  --   --  1.12  --   --   --   --   --   --  1.16  --  1.07*   MAGNESIUM mg/dL 2.1 1.9 2.1 2.1  --   --   --  1.9  --  2.1   < > 1.7*   PHOSPHORUS mg/dL 3.9  --  2.8 3.2  --   --   --  2.9  --  2.9   < > 2.8    < > = values in this interval not displayed.     Results from last 7 days   Lab Units 12/13/24  0435 12/10/24  0457 12/09/24  1503   AST U/L 52* 189*  --    ALT U/L 179* 277*  --     ALK PHOS U/L 116* 108*  --    TOTAL PROTEIN g/dL 6.0* 5.2*  --    ALBUMIN g/dL 3.2* 3.3*  --    TOTAL BILIRUBIN mg/dL 0.54 0.78  --    AMMONIA umol/L  --   --  35     Results from last 7 days   Lab Units 12/13/24  0542 12/13/24  0022 12/12/24  1732 12/12/24  1208 12/12/24  0532 12/11/24  2357 12/11/24  1753 12/11/24  1246 12/11/24  0556 12/11/24  0005 12/10/24  1805 12/10/24  1126   POC GLUCOSE mg/dl 149* 137 149* 119 127 157* 156* 147* 186* 170* 112 154*     Results from last 7 days   Lab Units 12/13/24  0435 12/12/24  1753 12/12/24  0532 12/11/24  0407 12/11/24  0027 12/10/24  1807 12/10/24  1147 12/10/24  0457 12/10/24  0028 12/09/24  2204 12/09/24  1745 12/09/24  0527   GLUCOSE RANDOM mg/dL 168* 163* 144* 196* 186* 121 196* 203* 179* 187* 165* 126     Results from last 7 days   Lab Units 12/11/24  0407 12/11/24  0027 12/10/24  1807 12/10/24  1147 12/10/24  0457 12/10/24  0028 12/09/24  2204 12/09/24  0527 12/08/24  2356 12/08/24  1748   OSMOLALITY, SERUM mmol/* 331* 329* 332* 339* 347* 344* 332* 333* 330*     Results from last 7 days   Lab Units 12/07/24  0442   HEMOGLOBIN A1C % 5.3   EAG mg/dl 105     Beta- Hydroxybutyrate   Date Value Ref Range Status   12/09/2024 0.63 (H) 0.02 - 0.27 mmol/L Final      Results from last 7 days   Lab Units 12/11/24  0527 12/10/24  0456 12/10/24  0151   PH ART  7.425 7.434 7.404   PCO2 ART mm Hg 26.1* 26.9* 30.0*   PO2 ART mm Hg 84.1 85.1 136.5*   HCO3 ART mmol/L 16.7* 17.6* 18.3*   BASE EXC ART mmol/L -6.7 -5.7 -5.5   O2 CONTENT ART mL/dL 10.9* 11.7* 14.3*   O2 HGB, ARTERIAL % 94.9 95.4 97.9*   ABG SOURCE  Line, Arterial Line, Arterial Line, Arterial                 Results from last 7 days   Lab Units 12/11/24  1532 12/11/24  1317 12/11/24  1123 12/11/24  0753 12/11/24  0602 12/11/24  0407 12/07/24  1200 12/07/24  0442 12/07/24  0220 12/06/24  2045   HS TNI 0HR ng/L  --   --  8,519*  --   --  5,343*  --   --  213* 140*   HS TNI 2HR ng/L  --  10,864*  --   --  5,149*   --   --  250*  --   --    HSTNI D2 ng/L  --  2,345*  --   --  -194  --   --  37*  --   --    HS TNI 4HR ng/L 12,788*  --   --  6,084*  --   --  169*  --   --   --    HSTNI D4 ng/L 4,269*  --   --  741*  --   --  -44  --   --   --          Results from last 7 days   Lab Units 12/13/24  0928 12/13/24  0435 12/13/24  0018 12/12/24  1427 12/06/24  2045   PROTIME seconds  --   --   --   --  14.9   INR   --   --   --   --  1.10   PTT seconds 53* 48* 54*   < > 36*    < > = values in this interval not displayed.         Results from last 7 days   Lab Units 12/09/24  1612   PROCALCITONIN ng/ml 0.10     Results from last 7 days   Lab Units 12/09/24  1612   LACTIC ACID mmol/L 1.6             Results from last 7 days   Lab Units 12/10/24  0457   BNP pg/mL 1,164*             Results from last 7 days   Lab Units 12/12/24  0555   UNIT PRODUCT CODE  [id number]   UNIT NUMBER  [id number]   UNITABO  A   UNITRH  NEG   CROSSMATCH  Compatible   UNIT DISPENSE STATUS  Presumed Trans   UNIT PRODUCT VOL mL 350         Results from last 7 days   Lab Units 12/09/24  1612   LIPASE u/L 18   AMYLASE IU/L 26*             Results from last 7 days   Lab Units 12/11/24  0407 12/11/24  0027 12/10/24  1807 12/10/24  1147 12/10/24  0457 12/10/24  0028 12/09/24  2204   OSMOLALITY, SERUM mmol/* 331* 329* 332* 339* 347* 344*     Results from last 7 days   Lab Units 12/09/24 2015   CLARITY UA  Turbid   COLOR UA  Yellow   SPEC GRAV UA  >=1.050*   PH UA  6.0   GLUCOSE UA mg/dl Negative   KETONES UA mg/dl 10 (1+)*   BLOOD UA  Small*   PROTEIN UA mg/dl 50 (1+)*   NITRITE UA  Negative   BILIRUBIN UA  Negative   UROBILINOGEN UA (BE) mg/dl 2.0*   LEUKOCYTES UA  Trace*   WBC UA /hpf 4-10*   RBC UA /hpf 20-30*   BACTERIA UA /hpf None Seen   EPITHELIAL CELLS WET PREP /hpf Occasional     Results from last 7 days   Lab Units 12/09/24  1457   INFLUENZA A PCR  Negative   INFLUENZA B PCR  Negative   RSV PCR  Negative                             Results from last  7 days   Lab Units 12/13/24  0711 12/11/24  1329 12/09/24  1529 12/09/24  1504   BLOOD CULTURE   --   --  No Growth at 72 hrs. No Growth at 72 hrs.   SPUTUM CULTURE  4+ Growth of Staphylococcus aureus*  --   --   --    GRAM STAIN RESULT  2+ Polys*  3+ Gram positive cocci in pairs* Rare Mononuclear Cells  No bacteria seen  --   --    BODY FLUID CULTURE, STERILE   --  No growth  --   --      Results from last 7 days   Lab Units 12/11/24  1329   TOTAL COUNTED  100         Results from last 7 days   Lab Units 12/10/24  0457   VANCOMYCIN RM ug/mL 9.0*       Network Utilization Review Department  ATTENTION: Please call with any questions or concerns to [phone] and carefully listen to the prompts so that you are directed to the right person. All voicemails are confidential.   For Discharge needs, contact Care Management DC Support Team at [phone] opt. 2  Send all requests for admission clinical reviews, approved or denied determinations and any other requests to dedicated fax number below belonging to the [place] where the patient is receiving treatment. List of dedicated fax numbers for the Facilities:  FACILITY NAME UR FAX NUMBER   ADMISSION DENIALS (Administrative/Medical Necessity) [phone]   DISCHARGE SUPPORT TEAM (NETWORK) [phone]   PARENT CHILD HEALTH (Maternity/NICU/Pediatrics) [phone]   [facility] [phone]   [facility] [phone]   formerly [facility] [phone]   [facility] [phone]   [facility] [phone]   [facility] [phone]   [facility] [phone]   [facility] [phone]   [facility] [phone]   [facility] [phone]   [facility] [phone]   [facility]  [facility] [phone]

## 2024-12-13 NOTE — ASSESSMENT & PLAN NOTE
Echocardiogram 12/10/2024: EF 50-55% with basal inferior, basal inferolateral, mid inferior and mid inferolateral hypokinesis, normal RV function, moderate MR, moderate TR.  When compared to echocardiogram from 12/8/2024 EF was 45-50% with inferolateral akinesis with moderate-severe MR.  CXR 12/10: Persistent pulmonary edema and pleural effusions  BNP 1164  Currently receiving furosemide 40 mg IV twice daily per the ICU team, had a 4 L diuresis overnight  Underwent thoracentesis 12/11, 500 cc

## 2024-12-13 NOTE — ASSESSMENT & PLAN NOTE
Troponin 140--> 213--> 250--> 169--> 1164--> 5343--> 5149--> 6084--.[id number] in the setting of acute CVA and bilateral PEs  Possibly ACS, but considering extensive medical issues, favor non-MI troponin elevation  EKG with sinus rhythm and lateral ST-T wave abnormality  EF 50-55% with RWMA  She had anginal symptoms 10/24 and was planned for outpatient ischemic testing  CTA notes significant diffuse coronary calcifications

## 2024-12-13 NOTE — PHYSICAL THERAPY NOTE
"   Physical Therapy Treatment Note    Patient's Name: Bren Traylor  : 24 1508   PT Last Visit   PT Visit Date 24   Note Type   Note Type Treatment   Pain Assessment   Pain Assessment Tool 0-10   Pain Score No Pain   Restrictions/Precautions   Weight Bearing Precautions Per Order No   Other Precautions Cognitive;Chair Alarm;Bed Alarm;Multiple lines;Telemetry;Fall Risk  (L hemiparesis, R hand mitt, NGT, L inattention)   General   Chart Reviewed Yes   Response to Previous Treatment Patient reporting fatigue but able to participate.   Family/Caregiver Present No   Subjective   Subjective Agreeable to mobilize. \"I'm tired.\"   Bed Mobility   Supine to Sit 3  Moderate assistance   Additional items Assist x 2;Increased time required;Verbal cues;LE management;HOB elevated   Additional Comments Pt greeted in supine.   Transfers   Sit to Stand 3  Moderate assistance   Additional items Assist x 2;Increased time required;Verbal cues   Stand to Sit 3  Moderate assistance   Additional items Assist x 2;Increased time required;Verbal cues   Ambulation/Elevation   Gait pattern Excessively slow;Short stride;Decreased foot clearance;Improper Weight shift   Gait Assistance 2  Maximal assist   Additional items Assist x 2;Verbal cues;Tactile cues   Assistive Device   (HHA)   Distance 1'   Balance   Static Sitting Poor +   Dynamic Sitting Poor   Static Standing Poor -   Dynamic Standing Poor -   Ambulatory Zero  (HHA)   Endurance Deficit   Endurance Deficit Yes   Endurance Deficit Description L hemiparesis, fatigue   Activity Tolerance   Activity Tolerance Patient limited by fatigue   Medical Staff Made Aware restorative Kaela   Nurse Made Aware yes   Exercises   Knee AROM Long Arc Quad   (attempts at having pt perform LAQ on LLE but pt would instead perform on RLE)   Neuro re-ed Sitting EOB 8 min w/ Rhina, cues for RUE hand positioning, pt w/ L inattention / R gaze preference.   Assessment   Prognosis " Fair   Problem List Decreased strength;Decreased range of motion;Decreased endurance;Impaired balance;Decreased mobility;Decreased coordination;Decreased cognition;Decreased safety awareness;Impaired judgement;Impaired vision;Impaired hearing;Impaired tone;Impaired sensation;Decreased skin integrity;Pain   Assessment Pt initially lethargic but w/ increased alertness w/ mobility. Pt demonstrated progress this session w/ decreased assistance w/ bed mobility + STS t/f. Additionally pt did take 1 step this session towards chair. Attempts at having pt move LLE but unsuccessful. Continue to recommend rehab upon d/c.   Barriers to Discharge Decreased caregiver support;Inaccessible home environment   Goals   Patient Goals none expressed   Plan   Treatment/Interventions Functional transfer training;LE strengthening/ROM;Therapeutic exercise;Endurance training;Patient/family training;Equipment eval/education;Bed mobility;Compensatory technique education;Gait training;Spoke to nursing;Family;Spoke to MD   Progress Progressing toward goals   PT Frequency 3-5x/wk   Discharge Recommendation   Rehab Resource Intensity Level, PT II (Moderate Resource Intensity)   AM-PAC Basic Mobility Inpatient   Turning in Flat Bed Without Bedrails 2   Lying on Back to Sitting on Edge of Flat Bed Without Bedrails 2   Moving Bed to Chair 1   Standing Up From Chair Using Arms 2   Walk in Room 1   Climb 3-5 Stairs With Railing 1   Basic Mobility Inpatient Raw Score 9   [facility] Highest Level Of Mobility   -HLM Goal 3: Sit at edge of bed   -HL Achieved 4: Move to chair/commode   Education   Education Provided Mobility training   Patient Reinforcement needed   End of Consult   Patient Position at End of Consult Bedside chair;Bed/Chair alarm activated;All needs within reach  (on waffle cushion, all lines in tact, BLE elevated, BUE elevated on pillows, R hand mitt in place, family member + MD at bedside)       Ignacia Meredith, PT, DPT

## 2024-12-13 NOTE — PLAN OF CARE
Problem: Nutrition/Hydration-ADULT  Goal: Nutrient/Hydration intake appropriate for improving, restoring or maintaining nutritional needs  Description: Monitor and assess patient's nutrition/hydration status for malnutrition. Collaborate with interdisciplinary team and initiate plan and interventions as ordered.  Monitor patient's weight and dietary intake as ordered or per policy. Utilize nutrition screening tool and intervene as necessary. Determine patient's food preferences and provide high-protein, high-caloric foods as appropriate.     INTERVENTIONS:  - Monitor oral intake, urinary output, labs, and treatment plans  - Assess nutrition and hydration status and recommend course of action  - Evaluate amount of meals eaten  - Assist patient with eating if necessary   - Allow adequate time for meals  - Recommend/ encourage appropriate diets, oral nutritional supplements, and vitamin/mineral supplements  - Order, calculate, and assess calorie counts as needed  - Recommend, monitor, and adjust tube feedings and TPN/PPN based on assessed needs  - Assess need for intravenous fluids  - Provide specific nutrition/hydration education as appropriate  - Include patient/family/caregiver in decisions related to nutrition  Outcome: Progressing   Pending MBS, currently on TF via NG tube meeting estimated pro, anthony needs, please obtain current weight. Last BM 12/12, reviewed I/O's I/O's 8431/4028 (output from urine). Reviewed labs: Na 155 (consider adjusting lasix or possibly increasing water flushes), K 3.4 (continue to monitor and replete as needed).

## 2024-12-13 NOTE — SPEECH THERAPY NOTE
Speech-Language Pathology Progress Note      Patient Name: Bren Traylor    Today's Date: 12/13/2024      Subjective:  Pt was lethargic but responsive, waxing/waning alertness. She was sitting upright in bed.     Objective:  Pt was seen today for dysphagia therapy. She is currently NPO with NG tube. Pt was on 1L O2 via nasal cannula. Oral care was completed with use of oral care kits. Focus of today's session was  to assess PO trials and determine readiness for MBS . Textures offered today included ice chips via cup and thin liquid via cup.  Swallow function:   Pt was dependent for bolus delivery/retrieval. Anterior spillage noted bilaterally. Manipulation of ice chips was reduced. Pharyngeal swallow was delayed and weak but appeared functional. Mild coughing noted initially. After several trials, a significant amount of additional hard mucous was removed from the back of pt's oral cavity. Subsequent sips of thin were tolerated without s/s aspiration.     Assessment:  Swallow function and alertness/participation are improving. Will plan for MBS this afternoon    Plan:  NPO now. MBS 1330 today. Additional recs to follow.      [phone]

## 2024-12-14 NOTE — PLAN OF CARE
Problem: PAIN - ADULT  Goal: Verbalizes/displays adequate comfort level or baseline comfort level  Description: Interventions:  - Encourage patient to monitor pain and request assistance  - Assess pain using appropriate pain scale  - Administer analgesics based on type and severity of pain and evaluate response  - Implement non-pharmacological measures as appropriate and evaluate response  - Consider cultural and social influences on pain and pain management  - Notify physician/advanced practitioner if interventions unsuccessful or patient reports new pain  Outcome: Progressing     Problem: INFECTION - ADULT  Goal: Absence or prevention of progression during hospitalization  Description: INTERVENTIONS:  - Assess and monitor for signs and symptoms of infection  - Monitor lab/diagnostic results  - Monitor all insertion sites, i.e. indwelling lines, tubes, and drains  - Monitor endotracheal if appropriate and nasal secretions for changes in amount and color  - [place] appropriate cooling/warming therapies per order  - Administer medications as ordered  - Instruct and encourage patient and family to use good hand hygiene technique  - Identify and instruct in appropriate isolation precautions for identified infection/condition  Outcome: Progressing  Goal: Absence of fever/infection during neutropenic period  Description: INTERVENTIONS:  - Monitor WBC    Outcome: Progressing     Problem: SAFETY ADULT  Goal: Patient will remain free of falls  Description: INTERVENTIONS:  - Educate patient/family on patient safety including physical limitations  - Instruct patient to call for assistance with activity   - Consult OT/PT to assist with strengthening/mobility   - Keep Call bell within reach  - Keep bed low and locked with side rails adjusted as appropriate  - Keep care items and personal belongings within reach  - Initiate and maintain comfort rounds  - Make Fall Risk Sign visible to staff  - Offer Toileting every  Hours,  in advance of need  - Initiate/Maintain alarm  - Obtain necessary fall risk management equipment:   - Apply yellow socks and bracelet for high fall risk patients  - Consider moving patient to room near nurses station  Outcome: Progressing  Goal: Maintain or return to baseline ADL function  Description: INTERVENTIONS:  -  Assess patient's ability to carry out ADLs; assess patient's baseline for ADL function and identify physical deficits which impact ability to perform ADLs (bathing, care of mouth/teeth, toileting, grooming, dressing, etc.)  - Assess/evaluate cause of self-care deficits   - Assess range of motion  - Assess patient's mobility; develop plan if impaired  - Assess patient's need for assistive devices and provide as appropriate  - Encourage maximum independence but intervene and supervise when necessary  - Involve family in performance of ADLs  - Assess for home care needs following discharge   - Consider OT consult to assist with ADL evaluation and planning for discharge  - Provide patient education as appropriate  Outcome: Progressing  Goal: Maintains/Returns to pre admission functional level  Description: INTERVENTIONS:  - Perform AM-PAC 6 Click Basic Mobility/ Daily Activity assessment daily.  - Set and communicate daily mobility goal to care team and patient/family/caregiver.   - Collaborate with rehabilitation services on mobility goals if consulted  - Perform Range of Motion  times a day.  - Reposition patient every  hours.  - Dangle patient  times a day  - Stand patient  times a day  - Ambulate patient  times a day  - Out of bed to chair  times a day   - Out of bed for meals  times a day  - Out of bed for toileting  - Record patient progress and toleration of activity level   Outcome: Progressing     Problem: DISCHARGE PLANNING  Goal: Discharge to home or other facility with appropriate resources  Description: INTERVENTIONS:  - Identify barriers to discharge w/patient and caregiver  - Arrange for  needed discharge resources and transportation as appropriate  - Identify discharge learning needs (meds, wound care, etc.)  - Arrange for interpretive services to assist at discharge as needed  - Refer to Case Management Department for coordinating discharge planning if the patient needs post-hospital services based on physician/advanced practitioner order or complex needs related to functional status, cognitive ability, or social support system  Outcome: Progressing     Problem: Knowledge Deficit  Goal: Patient/family/caregiver demonstrates understanding of disease process, treatment plan, medications, and discharge instructions  Description: Complete learning assessment and assess knowledge base.  Interventions:  - Provide teaching at level of understanding  - Provide teaching via preferred learning methods  Outcome: Progressing     Problem: Prexisting or High Potential for Compromised Skin Integrity  Goal: Skin integrity is maintained or improved  Description: INTERVENTIONS:  - Identify patients at risk for skin breakdown  - Assess and monitor skin integrity  - Assess and monitor nutrition and hydration status  - Monitor labs   - Assess for incontinence   - Turn and reposition patient  - Assist with mobility/ambulation  - Relieve pressure over bony prominences  - Avoid friction and shearing  - Provide appropriate hygiene as needed including keeping skin clean and dry  - Evaluate need for skin moisturizer/barrier cream  - Collaborate with interdisciplinary team   - Patient/family teaching  - Consider wound care consult   Outcome: Progressing     Problem: SAFETY,RESTRAINT: NV/NON-SELF DESTRUCTIVE BEHAVIOR  Goal: Remains free of harm/injury (restraint for non violent/non self-detsructive behavior)  Description: INTERVENTIONS:  - Instruct patient/family regarding restraint use   - Assess and monitor physiologic and psychological status   - Provide interventions and comfort measures to meet assessed patient needs   -  Identify and implement measures to help patient regain control  - Assess readiness for release of restraint   Outcome: Progressing  Goal: Returns to optimal restraint-free functioning  Description: INTERVENTIONS:  - Assess the patient's behavior and symptoms that indicate continued need for restraint  - Identify and implement measures to help patient regain control  - Assess readiness for release of restraint   Outcome: Progressing     Problem: Potential for Falls  Goal: Patient will remain free of falls  Description: INTERVENTIONS:  - Educate patient/family on patient safety including physical limitations  - Instruct patient to call for assistance with activity   - Consult OT/PT to assist with strengthening/mobility   - Keep Call bell within reach  - Keep bed low and locked with side rails adjusted as appropriate  - Keep care items and personal belongings within reach  - Initiate and maintain comfort rounds  - Make Fall Risk Sign visible to staff  - Offer Toileting every  Hours, in advance of need  - Initiate/Maintain alarm  - Obtain necessary fall risk management equipment:   - Apply yellow socks and bracelet for high fall risk patients  - Consider moving patient to room near nurses station  Outcome: Progressing     Problem: COPING  Goal: Pt/Family able to verbalize concerns and demonstrate effective coping strategies  Description: INTERVENTIONS:  - Assist patient/family to identify coping skills, available support systems and cultural and spiritual values  - Provide emotional support, including active listening and acknowledgement of concerns of patient and caregivers  - Reduce environmental stimuli, as able  - Provide patient education  - Assess for spiritual pain/suffering and initiate spiritual care, including notification of Pastoral Care or [place] based community as needed  - Assess effectiveness of coping strategies  Outcome: Progressing  Goal: Will report anxiety at manageable levels  Description:  INTERVENTIONS:  - Administer medication as ordered  - Teach and encourage coping skills  - Provide emotional support  - Assess patient/family for anxiety and ability to cope  Outcome: Progressing     Problem: Nutrition/Hydration-ADULT  Goal: Nutrient/Hydration intake appropriate for improving, restoring or maintaining nutritional needs  Description: Monitor and assess patient's nutrition/hydration status for malnutrition. Collaborate with interdisciplinary team and initiate plan and interventions as ordered.  Monitor patient's weight and dietary intake as ordered or per policy. Utilize nutrition screening tool and intervene as necessary. Determine patient's food preferences and provide high-protein, high-caloric foods as appropriate.     INTERVENTIONS:  - Monitor oral intake, urinary output, labs, and treatment plans  - Assess nutrition and hydration status and recommend course of action  - Evaluate amount of meals eaten  - Assist patient with eating if necessary   - Allow adequate time for meals  - Recommend/ encourage appropriate diets, oral nutritional supplements, and vitamin/mineral supplements  - Order, calculate, and assess calorie counts as needed  - Recommend, monitor, and adjust tube feedings and TPN/PPN based on assessed needs  - Assess need for intravenous fluids  - Provide specific nutrition/hydration education as appropriate  - Include patient/family/caregiver in decisions related to nutrition  Outcome: Progressing     Problem: NEUROSENSORY - ADULT  Goal: Achieves stable or improved neurological status  Description: INTERVENTIONS  - Monitor and report changes in neurological status  - Monitor vital signs such as temperature, blood pressure, glucose, and any other labs ordered   - Initiate measures to prevent increased intracranial pressure  - Monitor for seizure activity and implement precautions if appropriate      Outcome: Progressing  Goal: Achieves maximal functionality and self care  Description:  INTERVENTIONS  - Monitor swallowing and airway patency with patient fatigue and changes in neurological status  - Encourage and assist patient to increase activity and self care.   - Encourage visually impaired, hearing impaired and aphasic patients to use assistive/communication devices  Outcome: Progressing     Problem: CARDIOVASCULAR - ADULT  Goal: Maintains optimal cardiac output and hemodynamic stability  Description: INTERVENTIONS:  - Monitor I/O, vital signs and rhythm  - Monitor for S/S and trends of decreased cardiac output  - Administer and titrate ordered vasoactive medications to optimize hemodynamic stability  - Assess quality of pulses, skin color and temperature  - Assess for signs of decreased coronary artery perfusion  - Instruct patient to report change in severity of symptoms  Outcome: Progressing  Goal: Absence of cardiac dysrhythmias or at baseline rhythm  Description: INTERVENTIONS:  - Continuous cardiac monitoring, vital signs, obtain 12 lead EKG if ordered  - Administer antiarrhythmic and heart rate control medications as ordered  - Monitor electrolytes and administer replacement therapy as ordered  Outcome: Progressing     Problem: RESPIRATORY - ADULT  Goal: Achieves optimal ventilation and oxygenation  Description: INTERVENTIONS:  - Assess for changes in respiratory status  - Assess for changes in mentation and behavior  - Position to facilitate oxygenation and minimize respiratory effort  - Oxygen administered by appropriate delivery if ordered  - Initiate smoking cessation education as indicated  - Encourage broncho-pulmonary hygiene including cough, deep breathe, Incentive Spirometry  - Assess the need for suctioning and aspirate as needed  - Assess and instruct to report SOB or any respiratory difficulty  - Respiratory Therapy support as indicated  Outcome: Progressing     Problem: GASTROINTESTINAL - ADULT  Goal: Maintains or returns to baseline bowel function  Description:  INTERVENTIONS:  - Assess bowel function  - Encourage oral fluids to ensure adequate hydration  - Administer IV fluids if ordered to ensure adequate hydration  - Administer ordered medications as needed  - Encourage mobilization and activity  - Consider nutritional services referral to assist patient with adequate nutrition and appropriate food choices  Outcome: Progressing  Goal: Maintains adequate nutritional intake  Description: INTERVENTIONS:  - Monitor percentage of each meal consumed  - Identify factors contributing to decreased intake, treat as appropriate  - Assist with meals as needed  - Monitor I&O, weight, and lab values if indicated  - Obtain nutrition services referral as needed  Outcome: Progressing     Problem: GENITOURINARY - ADULT  Goal: Maintains or returns to baseline urinary function  Description: INTERVENTIONS:  - Assess urinary function  - Encourage oral fluids to ensure adequate hydration if ordered  - Administer IV fluids as ordered to ensure adequate hydration  - Administer ordered medications as needed  - Offer frequent toileting  - Follow urinary retention protocol if ordered  Outcome: Progressing  Goal: Absence of urinary retention  Description: INTERVENTIONS:  - Assess patient’s ability to void and empty bladder  - Monitor I/O  - Bladder scan as needed  - Discuss with physician/AP medications to alleviate retention as needed  - Discuss catheterization for long term situations as appropriate  Outcome: Progressing     Problem: METABOLIC, FLUID AND ELECTROLYTES - ADULT  Goal: Electrolytes maintained within normal limits  Description: INTERVENTIONS:  - Monitor labs and assess patient for signs and symptoms of electrolyte imbalances  - Administer electrolyte replacement as ordered  - Monitor response to electrolyte replacements, including repeat lab results as appropriate  - Instruct patient on fluid and nutrition as appropriate  Outcome: Progressing  Goal: Fluid balance  maintained  Description: INTERVENTIONS:  - Monitor labs   - Monitor I/O and WT  - Instruct patient on fluid and nutrition as appropriate  - Assess for signs & symptoms of volume excess or deficit  Outcome: Progressing  Goal: Glucose maintained within target range  Description: INTERVENTIONS:  - Monitor Blood Glucose as ordered  - Assess for signs and symptoms of hyperglycemia and hypoglycemia  - Administer ordered medications to maintain glucose within target range  - Assess nutritional intake and initiate nutrition service referral as needed  Outcome: Progressing     Problem: SKIN/TISSUE INTEGRITY - ADULT  Goal: Skin Integrity remains intact(Skin Breakdown Prevention)  Description: Assess:  -Perform Geovani assessment every   -Clean and moisturize skin every   -Inspect skin when repositioning, toileting, and assisting with ADLS  -Assess under medical devices such as  every   -Assess extremities for adequate circulation and sensation     Bed Management:  -Have minimal linens on bed & keep smooth, unwrinkled  -Change linens as needed when moist or perspiring  -Avoid sitting or lying in one position for more than  hours while in bed  -Keep HOB at degrees     Toileting:  -Offer bedside commode  -Assess for incontinence every   -Use incontinent care products after each incontinent episode such as     Activity:  -Mobilize patient  times a day  -Encourage activity and walks on unit  -Encourage or provide ROM exercises   -Turn and reposition patient every  Hours  -Use appropriate equipment to lift or move patient in bed  -Instruct/ Assist with weight shifting every  when out of bed in chair  -Consider limitation of chair time  hour intervals    Skin Care:  -Avoid use of baby powder, tape, friction and shearing, hot water or constrictive clothing  -Relieve pressure over bony prominences using   -Do not massage red bony areas    Next Steps:  -Teach patient strategies to minimize risks such as    -Consider consults to   interdisciplinary teams such as  Outcome: Progressing  Goal: Incision(s), wounds(s) or drain site(s) healing without S/S of infection  Description: INTERVENTIONS  - Assess and document dressing, incision, wound bed, drain sites and surrounding tissue  - Provide patient and family education  - Perform skin care/dressing changes every   Outcome: Progressing     Problem: HEMATOLOGIC - ADULT  Goal: Maintains hematologic stability  Description: INTERVENTIONS  - Assess for signs and symptoms of bleeding or hemorrhage  - Monitor labs  - Administer supportive blood products/factors as ordered and appropriate  Outcome: Progressing     Problem: MUSCULOSKELETAL - ADULT  Goal: Maintain or return mobility to safest level of function  Description: INTERVENTIONS:  - Assess patient's ability to carry out ADLs; assess patient's baseline for ADL function and identify physical deficits which impact ability to perform ADLs (bathing, care of mouth/teeth, toileting, grooming, dressing, etc.)  - Assess/evaluate cause of self-care deficits   - Assess range of motion  - Assess patient's mobility  - Assess patient's need for assistive devices and provide as appropriate  - Encourage maximum independence but intervene and supervise when necessary  - Involve family in performance of ADLs  - Assess for home care needs following discharge   - Consider OT consult to assist with ADL evaluation and planning for discharge  - Provide patient education as appropriate  Outcome: Progressing  Goal: Maintain proper alignment of affected body part  Description: INTERVENTIONS:  - Support, maintain and protect limb and body alignment  - Provide patient/ family with appropriate education  Outcome: Progressing     Problem: Neurological Deficit  Goal: Neurological status is stable or improving  Description: Interventions:  - Monitor and assess patient's level of consciousness, motor function, sensory function, and level of assistance needed for ADLs.   - Monitor  and report changes from baseline. Collaborate with interdisciplinary team to initiate plan and implement interventions as ordered.   - Provide and maintain a safe environment.  - Consider seizure precautions.  - Consider fall precautions.  - Consider aspiration precautions.  - Consider bleeding precautions.  Outcome: Progressing     Problem: Activity Intolerance/Impaired Mobility  Goal: Mobility/activity is maintained at optimum level for patient  Description: Interventions:  - Assess and monitor patient  barriers to mobility and need for assistive/adaptive devices.  - Assess patient's emotional response to limitations.  - Collaborate with interdisciplinary team and initiate plans and interventions as ordered.  - Encourage independent activity per ability.  - Maintain proper body alignment.  - Perform active/passive rom as tolerated/ordered.  - Plan activities to conserve energy.  - Turn patient as appropriate  Outcome: Progressing     Problem: Communication Impairment  Goal: Ability to express needs and understand communication  Description: Assess patient's communication skills and ability to understand information.  Patient will demonstrate use of effective communication techniques, alternative methods of communication and understanding even if not able to speak.     - Encourage communication and provide alternate methods of communication as needed.  - Collaborate with case management/ for discharge needs.  - Include patient/family/caregiver in decisions related to communication.  Outcome: Progressing     Problem: Potential for Aspiration  Goal: Non-ventilated patient's risk of aspiration is minimized  Description: Assess and monitor vital signs, respiratory status, and labs (WBC).  Monitor for signs of aspiration (tachypnea, cough, rales, wheezing, cyanosis, fever).    - Assess and monitor patient's ability to swallow.  - Place patient up in chair to eat if possible.  - HOB up at 90 degrees to eat  if unable to get patient up into chair.  - Supervise patient during oral intake.   - Instruct patient/ family to take small bites.  - Instruct patient/ family to take small single sips when taking liquids.  - Follow patient-specific strategies generated by speech pathologist.  Outcome: Progressing     Problem: Nutrition  Goal: Nutrition/Hydration status is improving  Description: Monitor and assess patient's nutrition/hydration status for malnutrition (ex- brittle hair, bruises, dry skin, pale skin and conjunctiva, muscle wasting, smooth red tongue, and disorientation). Collaborate with interdisciplinary team and initiate plan and interventions as ordered.  Monitor patient's weight and dietary intake as ordered or per policy. Utilize nutrition screening tool and intervene per policy. Determine patient's food preferences and provide high-protein, high-caloric foods as appropriate.     - Assist patient with eating.  - Allow adequate time for meals.  - Encourage patient to take dietary supplement as ordered.  - Collaborate with clinical nutritionist.  - Include patient/family/caregiver in decisions related to nutrition.  Outcome: Progressing

## 2024-12-14 NOTE — RESPIRATORY THERAPY NOTE
You have been given medicine by vein to sedate you during your procedure today. This may have included both a pain medicine and sleeping medicine. Most of the effects have worn off; however, you may continue to have some drowsiness for the next 6-8 hours.  HOME CARE:  Have a responsible adult for the next 8 hours for worsening of your condition  Do not take any oral medicine for pain/sleep during the next 4 hours since this might react with the other medicines you were given  Do not drink any alcohol for the next 24 hours  Do not drive or operate dangerous machinery during next 24 hours.  Follow up with your doctor if not alert and back to your usual level of activity in 24 hours    SEEK PROMPT MEDICAL ATTENTION FOR:  Increased drowsiness, weakness, or dizziness  Repeated vomiting  If care giver is unable to wake you     patient has no worn/needed bipap in 48+ hours. Machine pulled from room and order D/C'd

## 2024-12-14 NOTE — PLAN OF CARE
Problem: PAIN - ADULT  Goal: Verbalizes/displays adequate comfort level or baseline comfort level  Description: Interventions:  - Encourage patient to monitor pain and request assistance  - Assess pain using appropriate pain scale  - Administer analgesics based on type and severity of pain and evaluate response  - Implement non-pharmacological measures as appropriate and evaluate response  - Consider cultural and social influences on pain and pain management  - Notify physician/advanced practitioner if interventions unsuccessful or patient reports new pain  Outcome: Progressing     Problem: INFECTION - ADULT  Goal: Absence or prevention of progression during hospitalization  Description: INTERVENTIONS:  - Assess and monitor for signs and symptoms of infection  - Monitor lab/diagnostic results  - Monitor all insertion sites, i.e. indwelling lines, tubes, and drains  - Monitor endotracheal if appropriate and nasal secretions for changes in amount and color  - Millis appropriate cooling/warming therapies per order  - Administer medications as ordered  - Instruct and encourage patient and family to use good hand hygiene technique  - Identify and instruct in appropriate isolation precautions for identified infection/condition  Outcome: Progressing  Goal: Absence of fever/infection during neutropenic period  Description: INTERVENTIONS:  - Monitor WBC    Outcome: Progressing     Problem: SAFETY ADULT  Goal: Patient will remain free of falls  Description: INTERVENTIONS:  - Educate patient/family on patient safety including physical limitations  - Instruct patient to call for assistance with activity   - Consult OT/PT to assist with strengthening/mobility   - Keep Call bell within reach  - Keep bed low and locked with side rails adjusted as appropriate  - Keep care items and personal belongings within reach  - Initiate and maintain comfort rounds  - Apply yellow socks and bracelet for high fall risk patients  - Consider  moving patient to room near nurses station  Outcome: Progressing  Goal: Maintain or return to baseline ADL function  Description: INTERVENTIONS:  -  Assess patient's ability to carry out ADLs; assess patient's baseline for ADL function and identify physical deficits which impact ability to perform ADLs (bathing, care of mouth/teeth, toileting, grooming, dressing, etc.)  - Assess/evaluate cause of self-care deficits   - Assess range of motion  - Assess patient's mobility; develop plan if impaired  - Assess patient's need for assistive devices and provide as appropriate  - Encourage maximum independence but intervene and supervise when necessary  - Involve family in performance of ADLs  - Assess for home care needs following discharge   - Consider OT consult to assist with ADL evaluation and planning for discharge  - Provide patient education as appropriate  Outcome: Progressing  Goal: Maintains/Returns to pre admission functional level  Description: INTERVENTIONS:  - Perform AM-PAC 6 Click Basic Mobility/ Daily Activity assessment daily.  - Set and communicate daily mobility goal to care team and patient/family/caregiver.   - Collaborate with rehabilitation services on mobility goals if consulted  - Out of bed for toileting  - Record patient progress and toleration of activity level   Outcome: Progressing     Problem: DISCHARGE PLANNING  Goal: Discharge to home or other facility with appropriate resources  Description: INTERVENTIONS:  - Identify barriers to discharge w/patient and caregiver  - Arrange for needed discharge resources and transportation as appropriate  - Identify discharge learning needs (meds, wound care, etc.)  - Arrange for interpretive services to assist at discharge as needed  - Refer to Case Management Department for coordinating discharge planning if the patient needs post-hospital services based on physician/advanced practitioner order or complex needs related to functional status, cognitive  ability, or social support system  Outcome: Progressing     Problem: Knowledge Deficit  Goal: Patient/family/caregiver demonstrates understanding of disease process, treatment plan, medications, and discharge instructions  Description: Complete learning assessment and assess knowledge base.  Interventions:  - Provide teaching at level of understanding  - Provide teaching via preferred learning methods  Outcome: Progressing     Problem: Prexisting or High Potential for Compromised Skin Integrity  Goal: Skin integrity is maintained or improved  Description: INTERVENTIONS:  - Identify patients at risk for skin breakdown  - Assess and monitor skin integrity  - Assess and monitor nutrition and hydration status  - Monitor labs   - Assess for incontinence   - Turn and reposition patient  - Assist with mobility/ambulation  - Relieve pressure over bony prominences  - Avoid friction and shearing  - Provide appropriate hygiene as needed including keeping skin clean and dry  - Evaluate need for skin moisturizer/barrier cream  - Collaborate with interdisciplinary team   - Patient/family teaching  - Consider wound care consult   Outcome: Progressing     Problem: SAFETY,RESTRAINT: NV/NON-SELF DESTRUCTIVE BEHAVIOR  Goal: Remains free of harm/injury (restraint for non violent/non self-detsructive behavior)  Description: INTERVENTIONS:  - Instruct patient/family regarding restraint use   - Assess and monitor physiologic and psychological status   - Provide interventions and comfort measures to meet assessed patient needs   - Identify and implement measures to help patient regain control  - Assess readiness for release of restraint   Outcome: Progressing  Goal: Returns to optimal restraint-free functioning  Description: INTERVENTIONS:  - Assess the patient's behavior and symptoms that indicate continued need for restraint  - Identify and implement measures to help patient regain control  - Assess readiness for release of restraint    Outcome: Progressing     Problem: Potential for Falls  Goal: Patient will remain free of falls  Description: INTERVENTIONS:  - Educate patient/family on patient safety including physical limitations  - Instruct patient to call for assistance with activity   - Consult OT/PT to assist with strengthening/mobility   - Keep Call bell within reach  - Keep bed low and locked with side rails adjusted as appropriate  - Keep care items and personal belongings within reach  - Initiate and maintain comfort rounds  - Make Fall Risk Sign visible to staff  - Apply yellow socks and bracelet for high fall risk patients  - Consider moving patient to room near nurses station  Outcome: Progressing     Problem: COPING  Goal: Pt/Family able to verbalize concerns and demonstrate effective coping strategies  Description: INTERVENTIONS:  - Assist patient/family to identify coping skills, available support systems and cultural and spiritual values  - Provide emotional support, including active listening and acknowledgement of concerns of patient and caregivers  - Reduce environmental stimuli, as able  - Provide patient education  - Assess for spiritual pain/suffering and initiate spiritual care, including notification of Pastoral Care or april based community as needed  - Assess effectiveness of coping strategies  Outcome: Progressing  Goal: Will report anxiety at manageable levels  Description: INTERVENTIONS:  - Administer medication as ordered  - Teach and encourage coping skills  - Provide emotional support  - Assess patient/family for anxiety and ability to cope  Outcome: Progressing     Problem: Nutrition/Hydration-ADULT  Goal: Nutrient/Hydration intake appropriate for improving, restoring or maintaining nutritional needs  Description: Monitor and assess patient's nutrition/hydration status for malnutrition. Collaborate with interdisciplinary team and initiate plan and interventions as ordered.  Monitor patient's weight and dietary  intake as ordered or per policy. Utilize nutrition screening tool and intervene as necessary. Determine patient's food preferences and provide high-protein, high-caloric foods as appropriate.     INTERVENTIONS:  - Monitor oral intake, urinary output, labs, and treatment plans  - Assess nutrition and hydration status and recommend course of action  - Evaluate amount of meals eaten  - Assist patient with eating if necessary   - Allow adequate time for meals  - Recommend/ encourage appropriate diets, oral nutritional supplements, and vitamin/mineral supplements  - Order, calculate, and assess calorie counts as needed  - Recommend, monitor, and adjust tube feedings and TPN/PPN based on assessed needs  - Assess need for intravenous fluids  - Provide specific nutrition/hydration education as appropriate  - Include patient/family/caregiver in decisions related to nutrition  Outcome: Progressing     Problem: NEUROSENSORY - ADULT  Goal: Achieves stable or improved neurological status  Description: INTERVENTIONS  - Monitor and report changes in neurological status  - Monitor vital signs such as temperature, blood pressure, glucose, and any other labs ordered   - Initiate measures to prevent increased intracranial pressure  - Monitor for seizure activity and implement precautions if appropriate      Outcome: Progressing  Goal: Achieves maximal functionality and self care  Description: INTERVENTIONS  - Monitor swallowing and airway patency with patient fatigue and changes in neurological status  - Encourage and assist patient to increase activity and self care.   - Encourage visually impaired, hearing impaired and aphasic patients to use assistive/communication devices  Outcome: Progressing     Problem: CARDIOVASCULAR - ADULT  Goal: Maintains optimal cardiac output and hemodynamic stability  Description: INTERVENTIONS:  - Monitor I/O, vital signs and rhythm  - Monitor for S/S and trends of decreased cardiac output  - Administer  and titrate ordered vasoactive medications to optimize hemodynamic stability  - Assess quality of pulses, skin color and temperature  - Assess for signs of decreased coronary artery perfusion  - Instruct patient to report change in severity of symptoms  Outcome: Progressing  Goal: Absence of cardiac dysrhythmias or at baseline rhythm  Description: INTERVENTIONS:  - Continuous cardiac monitoring, vital signs, obtain 12 lead EKG if ordered  - Administer antiarrhythmic and heart rate control medications as ordered  - Monitor electrolytes and administer replacement therapy as ordered  Outcome: Progressing     Problem: RESPIRATORY - ADULT  Goal: Achieves optimal ventilation and oxygenation  Description: INTERVENTIONS:  - Assess for changes in respiratory status  - Assess for changes in mentation and behavior  - Position to facilitate oxygenation and minimize respiratory effort  - Oxygen administered by appropriate delivery if ordered  - Initiate smoking cessation education as indicated  - Encourage broncho-pulmonary hygiene including cough, deep breathe, Incentive Spirometry  - Assess the need for suctioning and aspirate as needed  - Assess and instruct to report SOB or any respiratory difficulty  - Respiratory Therapy support as indicated  Outcome: Progressing     Problem: GASTROINTESTINAL - ADULT  Goal: Maintains or returns to baseline bowel function  Description: INTERVENTIONS:  - Assess bowel function  - Encourage oral fluids to ensure adequate hydration  - Administer IV fluids if ordered to ensure adequate hydration  - Administer ordered medications as needed  - Encourage mobilization and activity  - Consider nutritional services referral to assist patient with adequate nutrition and appropriate food choices  Outcome: Progressing  Goal: Maintains adequate nutritional intake  Description: INTERVENTIONS:  - Monitor percentage of each meal consumed  - Identify factors contributing to decreased intake, treat as  appropriate  - Assist with meals as needed  - Monitor I&O, weight, and lab values if indicated  - Obtain nutrition services referral as needed  Outcome: Progressing     Problem: GENITOURINARY - ADULT  Goal: Maintains or returns to baseline urinary function  Description: INTERVENTIONS:  - Assess urinary function  - Encourage oral fluids to ensure adequate hydration if ordered  - Administer IV fluids as ordered to ensure adequate hydration  - Administer ordered medications as needed  - Offer frequent toileting  - Follow urinary retention protocol if ordered  Outcome: Progressing  Goal: Absence of urinary retention  Description: INTERVENTIONS:  - Assess patient’s ability to void and empty bladder  - Monitor I/O  - Bladder scan as needed  - Discuss with physician/AP medications to alleviate retention as needed  - Discuss catheterization for long term situations as appropriate  Outcome: Progressing     Problem: METABOLIC, FLUID AND ELECTROLYTES - ADULT  Goal: Electrolytes maintained within normal limits  Description: INTERVENTIONS:  - Monitor labs and assess patient for signs and symptoms of electrolyte imbalances  - Administer electrolyte replacement as ordered  - Monitor response to electrolyte replacements, including repeat lab results as appropriate  - Instruct patient on fluid and nutrition as appropriate  Outcome: Progressing  Goal: Fluid balance maintained  Description: INTERVENTIONS:  - Monitor labs   - Monitor I/O and WT  - Instruct patient on fluid and nutrition as appropriate  - Assess for signs & symptoms of volume excess or deficit  Outcome: Progressing  Goal: Glucose maintained within target range  Description: INTERVENTIONS:  - Monitor Blood Glucose as ordered  - Assess for signs and symptoms of hyperglycemia and hypoglycemia  - Administer ordered medications to maintain glucose within target range  - Assess nutritional intake and initiate nutrition service referral as needed  Outcome: Progressing      Problem: SKIN/TISSUE INTEGRITY - ADULT  Goal: Skin Integrity remains intact(Skin Breakdown Prevention)  Description: Assess:  -Inspect skin when repositioning, toileting, and assisting with ADLS  -Assess extremities for adequate circulation and sensation     Bed Management:  -Have minimal linens on bed & keep smooth, unwrinkled  -Change linens as needed when moist or perspiring    Toileting:  -Offer bedside commode    Activity:  -Encourage activity and walks on unit  -Encourage or provide ROM exercises   Skin Care:  -Avoid use of baby powder, tape, friction and shearing, hot water or constrictive clothing  Outcome: Progressing  Goal: Incision(s), wounds(s) or drain site(s) healing without S/S of infection  Description: INTERVENTIONS  - Assess and document dressing, incision, wound bed, drain sites and surrounding tissue  - Provide patient and family education  Outcome: Progressing     Problem: HEMATOLOGIC - ADULT  Goal: Maintains hematologic stability  Description: INTERVENTIONS  - Assess for signs and symptoms of bleeding or hemorrhage  - Monitor labs  - Administer supportive blood products/factors as ordered and appropriate  Outcome: Progressing     Problem: MUSCULOSKELETAL - ADULT  Goal: Maintain or return mobility to safest level of function  Description: INTERVENTIONS:  - Assess patient's ability to carry out ADLs; assess patient's baseline for ADL function and identify physical deficits which impact ability to perform ADLs (bathing, care of mouth/teeth, toileting, grooming, dressing, etc.)  - Assess/evaluate cause of self-care deficits   - Assess range of motion  - Assess patient's mobility  - Assess patient's need for assistive devices and provide as appropriate  - Encourage maximum independence but intervene and supervise when necessary  - Involve family in performance of ADLs  - Assess for home care needs following discharge   - Consider OT consult to assist with ADL evaluation and planning for  discharge  - Provide patient education as appropriate  Outcome: Progressing  Goal: Maintain proper alignment of affected body part  Description: INTERVENTIONS:  - Support, maintain and protect limb and body alignment  - Provide patient/ family with appropriate education  Outcome: Progressing     Problem: Neurological Deficit  Goal: Neurological status is stable or improving  Description: Interventions:  - Monitor and assess patient's level of consciousness, motor function, sensory function, and level of assistance needed for ADLs.   - Monitor and report changes from baseline. Collaborate with interdisciplinary team to initiate plan and implement interventions as ordered.   - Provide and maintain a safe environment.  - Consider seizure precautions.  - Consider fall precautions.  - Consider aspiration precautions.  - Consider bleeding precautions.  Outcome: Progressing     Problem: Activity Intolerance/Impaired Mobility  Goal: Mobility/activity is maintained at optimum level for patient  Description: Interventions:  - Assess and monitor patient  barriers to mobility and need for assistive/adaptive devices.  - Assess patient's emotional response to limitations.  - Collaborate with interdisciplinary team and initiate plans and interventions as ordered.  - Encourage independent activity per ability.  - Maintain proper body alignment.  - Perform active/passive rom as tolerated/ordered.  - Plan activities to conserve energy.  - Turn patient as appropriate  Outcome: Progressing     Problem: Communication Impairment  Goal: Ability to express needs and understand communication  Description: Assess patient's communication skills and ability to understand information.  Patient will demonstrate use of effective communication techniques, alternative methods of communication and understanding even if not able to speak.     - Encourage communication and provide alternate methods of communication as needed.  - Collaborate with case  management/ for discharge needs.  - Include patient/family/caregiver in decisions related to communication.  Outcome: Progressing     Problem: Potential for Aspiration  Goal: Non-ventilated patient's risk of aspiration is minimized  Description: Assess and monitor vital signs, respiratory status, and labs (WBC).  Monitor for signs of aspiration (tachypnea, cough, rales, wheezing, cyanosis, fever).    - Assess and monitor patient's ability to swallow.  - Place patient up in chair to eat if possible.  - HOB up at 90 degrees to eat if unable to get patient up into chair.  - Supervise patient during oral intake.   - Instruct patient/ family to take small bites.  - Instruct patient/ family to take small single sips when taking liquids.  - Follow patient-specific strategies generated by speech pathologist.  Outcome: Progressing     Problem: Nutrition  Goal: Nutrition/Hydration status is improving  Description: Monitor and assess patient's nutrition/hydration status for malnutrition (ex- brittle hair, bruises, dry skin, pale skin and conjunctiva, muscle wasting, smooth red tongue, and disorientation). Collaborate with interdisciplinary team and initiate plan and interventions as ordered.  Monitor patient's weight and dietary intake as ordered or per policy. Utilize nutrition screening tool and intervene per policy. Determine patient's food preferences and provide high-protein, high-caloric foods as appropriate.     - Assist patient with eating.  - Allow adequate time for meals.  - Encourage patient to take dietary supplement as ordered.  - Collaborate with clinical nutritionist.  - Include patient/family/caregiver in decisions related to nutrition.  Outcome: Progressing

## 2024-12-14 NOTE — QUICK NOTE
Patient's son was updated at bedside and I answered all the questions to the best of my abilities.

## 2024-12-14 NOTE — PROGRESS NOTES
Progress Note - Critical Care/ICU   Name: Bren Traylor 69 y.o. female I MRN: [id number]  Unit/Bed#: ICU 11 I Date of Admission: 12/6/2024   Date of Service: 12/14/2024 I Hospital Day: 7      Assessment & Plan   Neuro:   Diagnosis: Ischemic stroke w/ Right MCA M1 occlusion s/p mechanical thrombectomy, right carotid stenosis s/p right carotid angioplasty and stenting, hemorrhagic conversion   12/7 Mechanical thrombectomy and right carotid angioplasty and stenting - TICI 3   12/8 MRI Brain wo contrast: Large acute parenchymal hemorrhage within the right basal ganglia similar in size to most recent CT scan. Mild surrounding edema and localized mass effect with approximately 5 mm of right to left shift. Adjacent acute ischemia is seen within the caudate head and body. There is hyperintense T2 signal within the distal cervical and intracranial internal carotid artery on the right suggesting slow flow or occlusion. The M1 segment and middle cerebral artery branches on the right demonstrate appropriate flow voids. Consider repeat CT angiography.  12/9 CTH: Stable acute hemorrhage and residual contrast staining centered within the MCA territory infarct. Stable right to left midline shift of 5 mm.  12/10 CTA H/N: Stable large intraparenchymal hemorrhage within the right basal ganglia with 5 mm of right to left shift with early subacute ischemia within the right caudate head and body extending into the corona radiata, occlusion of the right ICA stent, possible suspected small filling defect within the left upper lobe pulmonary artery suggestive of small pulmonary embolism  12/11 CTH: Grossly stable recent large hematoma centered in the right basal ganglia. Persistent surrounding edema with unchanged mass effect and 6 mm right to left midline shift.   12/12 CTH: Stable appearing, pending final read  Plan:   Continue ASA 81 mg daily and heparin ECMO protocol  Plan to switch to stroke protocol heparin today  SBP goal  110-140  Valproic acid d/c-ed on 12/13  Provigil started 12/13, 1st dose 200 mg and then 100 mg from 12/14  Neurochecks every hour in the daytime and every 2 hours at night  Neurology following, appreciate recommendations  Neurosurgery following, appreciate recommendations    Diagnosis: Agitation  Plan:   Use Precdex as needed, stopped at this time      CV:   Diagnosis: SBP goal 110-140 in the setting of IVH   Plan:   SBP goal <140   Patient on Levophed held, wean as appropriate     Diagnosis: Elevated Troponins with EKG changes, Afib on telemetry, tachycardia   ECHO 12/8: Left ventricular cavity size is normal. Wall thickness is normal. The left ventricular ejection fraction is 45-50 %. Systolic function is mildly reduced. Akinesis of the inferolateral portion of the left ventricle was noted. Diastolic function is normal.   ECHO 12/10: Left ventricular cavity size is normal. The left ventricular ejection fraction is 50 to 55%. Systolic function is low normal. Unable to assess diastolic function. The following segments are hypokinetic: basal inferior, basal inferolateral, mid inferior and mid inferolateral.  Troponins: 5343 --> 5149 --> 6084 --> 8519 --> 68331 --> 45396 --> 06331  Plan:   S/p amiodarone bolus 300 mg IV on 12/11/24  On heparin ECMO protocol, plan to change to heparin stroke protocol today  Cardiology consulted, appreciate recommendations     Diagnosis: CHF  BNP: 1164  Urine Output: (+) 1456.3L  Plan:   Continue Lasix 40 mg daily  Monitor urine output as appropriate    Pulm:  Diagnosis: Acute respiratory failure, bilateral effusions   Extubated on 12/8/2024  Chest x-ray 12/9/24: Congestive heart failure  Chest x-ray 12/10/24: Persistent pulmonary edema and pleural effusions  R Thoracentesis 12/11/24: Removed 500 mL , cultures negative, , Triglycerides 34, Total Protein  <3.0, pH 7.5  Plan:   Nasal cannula, wean as appropriate   SpO2 goal >92%  Airway precautions  Respiratory protocol  CPAP  overnight as needed    GI:   Diagnosis: No issues  Last BM: 12/13/24  Plan:   MiraLAX 17 mg daily  Senokot 2 tablets twice daily    :   Diagnosis: No issues  Urine Output: (+) 1456.3L  Plan:   Monitor I's and O's as appropriate     F/E/N:     F: None  E: Replete electrolytes as appropriate  N: Tube feeds ongoing    Heme/Onc:   Diagnosis: DVT prophylaxis, hemoglobin 9.7   Blood consent obtained verbally over the phone, plan to transfuse 1 unit of blood on 12/11/24   Plan:   Heparin ECMO protocol PTT goal 50-60  Plan to switch to stroke protocol heparin    Endo:   Diagnosis: No issues  A1c: 5.3  Glucose: 119-188  Plan:   BG goal 140-180  Hypoglycemia protocol in place    ID:   Diagnosis: Leukocytosis  WBCs: 18.18 --> 13.63  MRSA: Negative  Blood cultures x2: No growth at 4 days  Sputum culture: 2+ Polys, 3+ Gram positive cocci in pairs, 4+ growth of S. Aureus  Vanc discontinued on 12/11/24 given negative MRSA swab   Plan:   Antibiotics changed to Ancef, end date of 12/15  Trend fever curve and monitor WBCs     MSK/Skin:   Diagnosis: Ambulatory dysfunction  Plan:   PT/OT  Out of bed as appropriate    Disposition: Critical care    ICU Core Measures     A: Assess, Prevent, and Manage Pain Has pain been assessed? Yes  Need for changes to pain regimen? No   B: Both SAT/SAT  N/A   C: Choice of Sedation RASS Goal: 0 Alert and Calm  Need for changes to sedation or analgesia regimen? No   D: Delirium CAM-ICU: Negative   E: Early Mobility  Plan for early mobility? Yes   F: Family Engagement Plan for family engagement today? Yes       Antibiotic Review: Patient on appropriate coverage based on culture data.     Review of Invasive Devices:      Central access plan: Medications requiring central line      Prophylaxis:  VTE VTE covered by:  heparin (porcine), Intravenous, 12 Units/kg/hr at 12/14/24 0540       Stress Ulcer  not ordered         24 Hour Events : Nothing overnight.    Subjective     Patient denies being in pain and  lying in bed comfortably.    Review of Systems: Review of Systems not obtainable due to Clinical Condition    Objective :                   Vitals I/O      Most Recent Min/Max in 24hrs   Temp 98.8 °F (37.1 °C) Temp  Min: 98 °F (36.7 °C)  Max: 98.8 °F (37.1 °C)   Pulse 90 Pulse  Min: 72  Max: 92   Resp (!) 29 Resp  Min: 20  Max: 29   /51 BP  Min: 86/42  Max: 119/53   O2 Sat 94 % SpO2  Min: 91 %  Max: 99 %      Intake/Output Summary (Last 24 hours) at 12/14/2024 0743  Last data filed at 12/14/2024 0600  Gross per 24 hour   Intake 2756.25 ml   Output 1300 ml   Net 1456.25 ml       Diet Enteral/Parenteral; Tube Feeding No Oral Diet; Jevity 1.2 Navarro; Continuous; 55; Prosource Protein Liquid - Two Packets; 100; Water; Every 4 hours    Invasive Monitoring   Arterial Line  Qi /44  Arterial Line BP  Min: 100/44  Max: 146/52   MAP (!) 64 mmHg  Arterial Line MAP (mmHg)  Min: 54 mmHg  Max: 80 mmHg           Physical Exam   Physical Exam  Vitals reviewed.   Eyes:      Conjunctiva/sclera: Conjunctivae normal.      Pupils: Pupils are equal, round, and reactive to light.   Skin:     General: Skin is warm.   HENT:      Head: Normocephalic and atraumatic.   Cardiovascular:      Rate and Rhythm: Normal rate and regular rhythm.   Abdominal: General: Bowel sounds are normal.      Palpations: Abdomen is soft.      Tenderness: There is no abdominal tenderness.   Constitutional:       General: She is in acute distress.      Appearance: She is not ill-appearing.      Interventions: She is not sedated.  Pulmonary:      Effort: Pulmonary effort is normal. No respiratory distress.      Breath sounds: Normal breath sounds.   Neurological:      Comments: PERRL, L facial droop appreciated, tongue midline. Patient following commands in the RUE/RLE with re-direction. No spontaneous movement in the LUE/LLE.            Diagnostic Studies        Lab Results: I have reviewed the following results:     Medications:  Scheduled PRN    aspirin, 81 mg, Daily  atorvastatin, 20 mg, QPM  cefazolin, 2,000 mg, Q8H  chlorhexidine, 15 mL, Q12H DANIS  furosemide, 40 mg, Daily  insulin lispro, 1-6 Units, Q6H DANIS  ipratropium, 0.5 mg, TID  levalbuterol, 1.25 mg, TID  melatonin, 6 mg, HS  midodrine, 5 mg, Q8H  modafinil, 100 mg, Daily  polyethylene glycol, 17 g, Daily  potassium chloride, 40 mEq, Once  senna-docusate sodium, 2 tablet, BID  sertraline, 50 mg, Daily      acetaminophen, 650 mg, Q6H PRN  albuterol, 2.5 mg, Q4H PRN  bisacodyl, 10 mg, Daily PRN       Continuous    dexmedetomidine, 0.1-0.4 mcg/kg/hr, Last Rate: Stopped (12/12/24 0519)  heparin (porcine), 3-15 Units/kg/hr (Order-Specific), Last Rate: 12 Units/kg/hr (12/14/24 0540)  norepinephrine, 1-30 mcg/min, Last Rate: Stopped (12/14/24 0200)         Labs:   CBC    Recent Labs     12/13/24  0435 12/14/24  0432   WBC 18.18* 13.63*   HGB 9.4* 9.7*   HCT 28.9* 30.2*    168     BMP    Recent Labs     12/13/24 0435 12/14/24  0432   SODIUM 155* 154*   K 3.4* 3.5   * 116*   CO2 32 31   AGAP 7 7   BUN 22 21   CREATININE 0.54* 0.57*   CALCIUM 8.8 8.6       Coags    Recent Labs     12/13/24  1434 12/14/24  0207   PTT 50* 48*        Additional Electrolytes  Recent Labs     12/13/24  0435 12/14/24  0432   MG 2.1 2.3   PHOS 3.9 4.2*          Blood Gas    No recent results  No recent results LFTs  Recent Labs     12/13/24  0435 12/14/24  0432   * 92*   AST 52* 32   ALKPHOS 116* 101   ALB 3.2* 3.0*   TBILI 0.54 0.34       Infectious  No recent results  Glucose  Recent Labs     12/12/24  1753 12/13/24  0435 12/14/24  0432   GLUC 163* 168* 133

## 2024-12-15 NOTE — RESTORATIVE TECHNICIAN NOTE
Restorative Technician Note      Patient Name: Bren Traylor     Restorative Tech Visit Date: 12/15/24  Note Type: Bracing, Initial consult  Patient Position Upon Consult: Bedside chair  Brace Applied: Multipodous Boot (left leg)  Additional Brace Ordered: No  Patient Position When Brace Applied: Seated  Patient Position at End of Consult: Bed/Chair alarm activated; All needs within reach; Bedside chair  Nurse Communication: Nurse aware of consult, application of brace    Please contact [facility] Restorative tech on Epic Secure Chat  in regards to bracing instruction and/or adjustment.         CFo Azael

## 2024-12-15 NOTE — PLAN OF CARE
Problem: PAIN - ADULT  Goal: Verbalizes/displays adequate comfort level or baseline comfort level  Description: Interventions:  - Encourage patient to monitor pain and request assistance  - Assess pain using appropriate pain scale  - Administer analgesics based on type and severity of pain and evaluate response  - Implement non-pharmacological measures as appropriate and evaluate response  - Consider cultural and social influences on pain and pain management  - Notify physician/advanced practitioner if interventions unsuccessful or patient reports new pain  Outcome: Progressing     Problem: INFECTION - ADULT  Goal: Absence or prevention of progression during hospitalization  Description: INTERVENTIONS:  - Assess and monitor for signs and symptoms of infection  - Monitor lab/diagnostic results  - Monitor all insertion sites, i.e. indwelling lines, tubes, and drains  - Monitor endotracheal if appropriate and nasal secretions for changes in amount and color  - Center appropriate cooling/warming therapies per order  - Administer medications as ordered  - Instruct and encourage patient and family to use good hand hygiene technique  - Identify and instruct in appropriate isolation precautions for identified infection/condition  Outcome: Progressing  Goal: Absence of fever/infection during neutropenic period  Description: INTERVENTIONS:  - Monitor WBC    Outcome: Progressing     Problem: SAFETY ADULT  Goal: Patient will remain free of falls  Description: INTERVENTIONS:  - Educate patient/family on patient safety including physical limitations  - Instruct patient to call for assistance with activity   - Consult OT/PT to assist with strengthening/mobility   - Keep Call bell within reach  - Keep bed low and locked with side rails adjusted as appropriate  - Keep care items and personal belongings within reach  - Initiate and maintain comfort rounds  - Make Fall Risk Sign visible to staff  - Offer Toileting every  Hours,  in advance of need  - Initiate/Maintain alarm  - Obtain necessary fall risk management equipment:   - Apply yellow socks and bracelet for high fall risk patients  - Consider moving patient to room near nurses station  Outcome: Progressing  Goal: Maintain or return to baseline ADL function  Description: INTERVENTIONS:  -  Assess patient's ability to carry out ADLs; assess patient's baseline for ADL function and identify physical deficits which impact ability to perform ADLs (bathing, care of mouth/teeth, toileting, grooming, dressing, etc.)  - Assess/evaluate cause of self-care deficits   - Assess range of motion  - Assess patient's mobility; develop plan if impaired  - Assess patient's need for assistive devices and provide as appropriate  - Encourage maximum independence but intervene and supervise when necessary  - Involve family in performance of ADLs  - Assess for home care needs following discharge   - Consider OT consult to assist with ADL evaluation and planning for discharge  - Provide patient education as appropriate  Outcome: Progressing  Goal: Maintains/Returns to pre admission functional level  Description: INTERVENTIONS:  - Perform AM-PAC 6 Click Basic Mobility/ Daily Activity assessment daily.  - Set and communicate daily mobility goal to care team and patient/family/caregiver.   - Collaborate with rehabilitation services on mobility goals if consulted  - Perform Range of Motion  times a day.  - Reposition patient every  hours.  - Dangle patient  times a day  - Stand patient  times a day  - Ambulate patient times a day  - Out of bed to chair  times a day   - Out of bed for meals  times a day  - Out of bed for toileting  - Record patient progress and toleration of activity level   Outcome: Progressing     Problem: DISCHARGE PLANNING  Goal: Discharge to home or other facility with appropriate resources  Description: INTERVENTIONS:  - Identify barriers to discharge w/patient and caregiver  - Arrange for  needed discharge resources and transportation as appropriate  - Identify discharge learning needs (meds, wound care, etc.)  - Arrange for interpretive services to assist at discharge as needed  - Refer to Case Management Department for coordinating discharge planning if the patient needs post-hospital services based on physician/advanced practitioner order or complex needs related to functional status, cognitive ability, or social support system  Outcome: Progressing     Problem: Knowledge Deficit  Goal: Patient/family/caregiver demonstrates understanding of disease process, treatment plan, medications, and discharge instructions  Description: Complete learning assessment and assess knowledge base.  Interventions:  - Provide teaching at level of understanding  - Provide teaching via preferred learning methods  Outcome: Progressing     Problem: Prexisting or High Potential for Compromised Skin Integrity  Goal: Skin integrity is maintained or improved  Description: INTERVENTIONS:  - Identify patients at risk for skin breakdown  - Assess and monitor skin integrity  - Assess and monitor nutrition and hydration status  - Monitor labs   - Assess for incontinence   - Turn and reposition patient  - Assist with mobility/ambulation  - Relieve pressure over bony prominences  - Avoid friction and shearing  - Provide appropriate hygiene as needed including keeping skin clean and dry  - Evaluate need for skin moisturizer/barrier cream  - Collaborate with interdisciplinary team   - Patient/family teaching  - Consider wound care consult   Outcome: Progressing     Problem: SAFETY,RESTRAINT: NV/NON-SELF DESTRUCTIVE BEHAVIOR  Goal: Remains free of harm/injury (restraint for non violent/non self-detsructive behavior)  Description: INTERVENTIONS:  - Instruct patient/family regarding restraint use   - Assess and monitor physiologic and psychological status   - Provide interventions and comfort measures to meet assessed patient needs   -  Identify and implement measures to help patient regain control  - Assess readiness for release of restraint   Outcome: Progressing  Goal: Returns to optimal restraint-free functioning  Description: INTERVENTIONS:  - Assess the patient's behavior and symptoms that indicate continued need for restraint  - Identify and implement measures to help patient regain control  - Assess readiness for release of restraint   Outcome: Progressing     Problem: Potential for Falls  Goal: Patient will remain free of falls  Description: INTERVENTIONS:  - Educate patient/family on patient safety including physical limitations  - Instruct patient to call for assistance with activity   - Consult OT/PT to assist with strengthening/mobility   - Keep Call bell within reach  - Keep bed low and locked with side rails adjusted as appropriate  - Keep care items and personal belongings within reach  - Initiate and maintain comfort rounds  - Make Fall Risk Sign visible to staff  - Offer Toileting every  Hours, in advance of need  - Initiate/Maintain alarm  - Obtain necessary fall risk management equipment:   - Apply yellow socks and bracelet for high fall risk patients  - Consider moving patient to room near nurses station  Outcome: Progressing     Problem: COPING  Goal: Pt/Family able to verbalize concerns and demonstrate effective coping strategies  Description: INTERVENTIONS:  - Assist patient/family to identify coping skills, available support systems and cultural and spiritual values  - Provide emotional support, including active listening and acknowledgement of concerns of patient and caregivers  - Reduce environmental stimuli, as able  - Provide patient education  - Assess for spiritual pain/suffering and initiate spiritual care, including notification of Pastoral Care or april based community as needed  - Assess effectiveness of coping strategies  Outcome: Progressing  Goal: Will report anxiety at manageable levels  Description:  INTERVENTIONS:  - Administer medication as ordered  - Teach and encourage coping skills  - Provide emotional support  - Assess patient/family for anxiety and ability to cope  Outcome: Progressing     Problem: Nutrition/Hydration-ADULT  Goal: Nutrient/Hydration intake appropriate for improving, restoring or maintaining nutritional needs  Description: Monitor and assess patient's nutrition/hydration status for malnutrition. Collaborate with interdisciplinary team and initiate plan and interventions as ordered.  Monitor patient's weight and dietary intake as ordered or per policy. Utilize nutrition screening tool and intervene as necessary. Determine patient's food preferences and provide high-protein, high-caloric foods as appropriate.     INTERVENTIONS:  - Monitor oral intake, urinary output, labs, and treatment plans  - Assess nutrition and hydration status and recommend course of action  - Evaluate amount of meals eaten  - Assist patient with eating if necessary   - Allow adequate time for meals  - Recommend/ encourage appropriate diets, oral nutritional supplements, and vitamin/mineral supplements  - Order, calculate, and assess calorie counts as needed  - Recommend, monitor, and adjust tube feedings and TPN/PPN based on assessed needs  - Assess need for intravenous fluids  - Provide specific nutrition/hydration education as appropriate  - Include patient/family/caregiver in decisions related to nutrition  Outcome: Progressing     Problem: NEUROSENSORY - ADULT  Goal: Achieves stable or improved neurological status  Description: INTERVENTIONS  - Monitor and report changes in neurological status  - Monitor vital signs such as temperature, blood pressure, glucose, and any other labs ordered   - Initiate measures to prevent increased intracranial pressure  - Monitor for seizure activity and implement precautions if appropriate      Outcome: Progressing  Goal: Achieves maximal functionality and self care  Description:  INTERVENTIONS  - Monitor swallowing and airway patency with patient fatigue and changes in neurological status  - Encourage and assist patient to increase activity and self care.   - Encourage visually impaired, hearing impaired and aphasic patients to use assistive/communication devices  Outcome: Progressing     Problem: CARDIOVASCULAR - ADULT  Goal: Maintains optimal cardiac output and hemodynamic stability  Description: INTERVENTIONS:  - Monitor I/O, vital signs and rhythm  - Monitor for S/S and trends of decreased cardiac output  - Administer and titrate ordered vasoactive medications to optimize hemodynamic stability  - Assess quality of pulses, skin color and temperature  - Assess for signs of decreased coronary artery perfusion  - Instruct patient to report change in severity of symptoms  Outcome: Progressing  Goal: Absence of cardiac dysrhythmias or at baseline rhythm  Description: INTERVENTIONS:  - Continuous cardiac monitoring, vital signs, obtain 12 lead EKG if ordered  - Administer antiarrhythmic and heart rate control medications as ordered  - Monitor electrolytes and administer replacement therapy as ordered  Outcome: Progressing     Problem: RESPIRATORY - ADULT  Goal: Achieves optimal ventilation and oxygenation  Description: INTERVENTIONS:  - Assess for changes in respiratory status  - Assess for changes in mentation and behavior  - Position to facilitate oxygenation and minimize respiratory effort  - Oxygen administered by appropriate delivery if ordered  - Initiate smoking cessation education as indicated  - Encourage broncho-pulmonary hygiene including cough, deep breathe, Incentive Spirometry  - Assess the need for suctioning and aspirate as needed  - Assess and instruct to report SOB or any respiratory difficulty  - Respiratory Therapy support as indicated  Outcome: Progressing     Problem: GASTROINTESTINAL - ADULT  Goal: Maintains or returns to baseline bowel function  Description:  INTERVENTIONS:  - Assess bowel function  - Encourage oral fluids to ensure adequate hydration  - Administer IV fluids if ordered to ensure adequate hydration  - Administer ordered medications as needed  - Encourage mobilization and activity  - Consider nutritional services referral to assist patient with adequate nutrition and appropriate food choices  Outcome: Progressing  Goal: Maintains adequate nutritional intake  Description: INTERVENTIONS:  - Monitor percentage of each meal consumed  - Identify factors contributing to decreased intake, treat as appropriate  - Assist with meals as needed  - Monitor I&O, weight, and lab values if indicated  - Obtain nutrition services referral as needed  Outcome: Progressing     Problem: GENITOURINARY - ADULT  Goal: Maintains or returns to baseline urinary function  Description: INTERVENTIONS:  - Assess urinary function  - Encourage oral fluids to ensure adequate hydration if ordered  - Administer IV fluids as ordered to ensure adequate hydration  - Administer ordered medications as needed  - Offer frequent toileting  - Follow urinary retention protocol if ordered  Outcome: Progressing  Goal: Absence of urinary retention  Description: INTERVENTIONS:  - Assess patient’s ability to void and empty bladder  - Monitor I/O  - Bladder scan as needed  - Discuss with physician/AP medications to alleviate retention as needed  - Discuss catheterization for long term situations as appropriate  Outcome: Progressing     Problem: METABOLIC, FLUID AND ELECTROLYTES - ADULT  Goal: Electrolytes maintained within normal limits  Description: INTERVENTIONS:  - Monitor labs and assess patient for signs and symptoms of electrolyte imbalances  - Administer electrolyte replacement as ordered  - Monitor response to electrolyte replacements, including repeat lab results as appropriate  - Instruct patient on fluid and nutrition as appropriate  Outcome: Progressing  Goal: Fluid balance  maintained  Description: INTERVENTIONS:  - Monitor labs   - Monitor I/O and WT  - Instruct patient on fluid and nutrition as appropriate  - Assess for signs & symptoms of volume excess or deficit  Outcome: Progressing  Goal: Glucose maintained within target range  Description: INTERVENTIONS:  - Monitor Blood Glucose as ordered  - Assess for signs and symptoms of hyperglycemia and hypoglycemia  - Administer ordered medications to maintain glucose within target range  - Assess nutritional intake and initiate nutrition service referral as needed  Outcome: Progressing     Problem: SKIN/TISSUE INTEGRITY - ADULT  Goal: Skin Integrity remains intact(Skin Breakdown Prevention)  Description: Assess:  -Perform Geovani assessment every   -Clean and moisturize skin every  -Inspect skin when repositioning, toileting, and assisting with ADLS  -Assess under medical devices such as  every   -Assess extremities for adequate circulation and sensation     Bed Management:  -Have minimal linens on bed & keep smooth, unwrinkled  -Change linens as needed when moist or perspiring  -Avoid sitting or lying in one position for more than  hours while in bed  -Keep HOB at degrees     Toileting:  -Offer bedside commode  -Assess for incontinence every   -Use incontinent care products after each incontinent episode such as     Activity:  -Mobilize patient  times a day  -Encourage activity and walks on unit  -Encourage or provide ROM exercises   -Turn and reposition patient every  Hours  -Use appropriate equipment to lift or move patient in bed  -Instruct/ Assist with weight shifting every  when out of bed in chair  -Consider limitation of chair time  hour intervals    Skin Care:  -Avoid use of baby powder, tape, friction and shearing, hot water or constrictive clothing  -Relieve pressure over bony prominences using   -Do not massage red bony areas    Next Steps:  -Teach patient strategies to minimize risks such as    -Consider consults to   interdisciplinary teams such as   Outcome: Progressing  Goal: Incision(s), wounds(s) or drain site(s) healing without S/S of infection  Description: INTERVENTIONS  - Assess and document dressing, incision, wound bed, drain sites and surrounding tissue  - Provide patient and family education  - Perform skin care/dressing changes every   Outcome: Progressing     Problem: HEMATOLOGIC - ADULT  Goal: Maintains hematologic stability  Description: INTERVENTIONS  - Assess for signs and symptoms of bleeding or hemorrhage  - Monitor labs  - Administer supportive blood products/factors as ordered and appropriate  Outcome: Progressing     Problem: MUSCULOSKELETAL - ADULT  Goal: Maintain or return mobility to safest level of function  Description: INTERVENTIONS:  - Assess patient's ability to carry out ADLs; assess patient's baseline for ADL function and identify physical deficits which impact ability to perform ADLs (bathing, care of mouth/teeth, toileting, grooming, dressing, etc.)  - Assess/evaluate cause of self-care deficits   - Assess range of motion  - Assess patient's mobility  - Assess patient's need for assistive devices and provide as appropriate  - Encourage maximum independence but intervene and supervise when necessary  - Involve family in performance of ADLs  - Assess for home care needs following discharge   - Consider OT consult to assist with ADL evaluation and planning for discharge  - Provide patient education as appropriate  Outcome: Progressing  Goal: Maintain proper alignment of affected body part  Description: INTERVENTIONS:  - Support, maintain and protect limb and body alignment  - Provide patient/ family with appropriate education  Outcome: Progressing     Problem: Neurological Deficit  Goal: Neurological status is stable or improving  Description: Interventions:  - Monitor and assess patient's level of consciousness, motor function, sensory function, and level of assistance needed for ADLs.   -  Monitor and report changes from baseline. Collaborate with interdisciplinary team to initiate plan and implement interventions as ordered.   - Provide and maintain a safe environment.  - Consider seizure precautions.  - Consider fall precautions.  - Consider aspiration precautions.  - Consider bleeding precautions.  Outcome: Progressing     Problem: Activity Intolerance/Impaired Mobility  Goal: Mobility/activity is maintained at optimum level for patient  Description: Interventions:  - Assess and monitor patient  barriers to mobility and need for assistive/adaptive devices.  - Assess patient's emotional response to limitations.  - Collaborate with interdisciplinary team and initiate plans and interventions as ordered.  - Encourage independent activity per ability.  - Maintain proper body alignment.  - Perform active/passive rom as tolerated/ordered.  - Plan activities to conserve energy.  - Turn patient as appropriate  Outcome: Progressing     Problem: Communication Impairment  Goal: Ability to express needs and understand communication  Description: Assess patient's communication skills and ability to understand information.  Patient will demonstrate use of effective communication techniques, alternative methods of communication and understanding even if not able to speak.     - Encourage communication and provide alternate methods of communication as needed.  - Collaborate with case management/ for discharge needs.  - Include patient/family/caregiver in decisions related to communication.  Outcome: Progressing     Problem: Potential for Aspiration  Goal: Non-ventilated patient's risk of aspiration is minimized  Description: Assess and monitor vital signs, respiratory status, and labs (WBC).  Monitor for signs of aspiration (tachypnea, cough, rales, wheezing, cyanosis, fever).    - Assess and monitor patient's ability to swallow.  - Place patient up in chair to eat if possible.  - HOB up at 90  degrees to eat if unable to get patient up into chair.  - Supervise patient during oral intake.   - Instruct patient/ family to take small bites.  - Instruct patient/ family to take small single sips when taking liquids.  - Follow patient-specific strategies generated by speech pathologist.  Outcome: Progressing     Problem: Nutrition  Goal: Nutrition/Hydration status is improving  Description: Monitor and assess patient's nutrition/hydration status for malnutrition (ex- brittle hair, bruises, dry skin, pale skin and conjunctiva, muscle wasting, smooth red tongue, and disorientation). Collaborate with interdisciplinary team and initiate plan and interventions as ordered.  Monitor patient's weight and dietary intake as ordered or per policy. Utilize nutrition screening tool and intervene per policy. Determine patient's food preferences and provide high-protein, high-caloric foods as appropriate.     - Assist patient with eating.  - Allow adequate time for meals.  - Encourage patient to take dietary supplement as ordered.  - Collaborate with clinical nutritionist.  - Include patient/family/caregiver in decisions related to nutrition.  Outcome: Progressing

## 2024-12-15 NOTE — PROGRESS NOTES
Progress Note - Critical Care/ICU   Name: Bren Traylor 69 y.o. female I MRN: [id number]  Unit/Bed#: ICU 11 I Date of Admission: 12/6/2024   Date of Service: 12/15/2024 I Hospital Day: 8      Assessment & Plan   Neuro:   Diagnosis: Ischemic stroke w/ Right MCA M1 occlusion s/p mechanical thrombectomy, right carotid stenosis s/p right carotid angioplasty and stenting, hemorrhagic conversion   12/7 Mechanical thrombectomy and right carotid angioplasty and stenting - TICI 3   12/8 MRI Brain wo contrast: Large acute parenchymal hemorrhage within the right basal ganglia similar in size to most recent CT scan. Mild surrounding edema and localized mass effect with approximately 5 mm of right to left shift. Adjacent acute ischemia is seen within the caudate head and body. There is hyperintense T2 signal within the distal cervical and intracranial internal carotid artery on the right suggesting slow flow or occlusion. The M1 segment and middle cerebral artery branches on the right demonstrate appropriate flow voids. Consider repeat CT angiography.  12/9 CTH: Stable acute hemorrhage and residual contrast staining centered within the MCA territory infarct. Stable right to left midline shift of 5 mm.  12/10 CTA H/N: Stable large intraparenchymal hemorrhage within the right basal ganglia with 5 mm of right to left shift with early subacute ischemia within the right caudate head and body extending into the corona radiata, occlusion of the right ICA stent, possible suspected small filling defect within the left upper lobe pulmonary artery suggestive of small pulmonary embolism  12/11 CTH: Grossly stable recent large hematoma centered in the right basal ganglia. Persistent surrounding edema with unchanged mass effect and 6 mm right to left midline shift.   12/12 CTH: Grossly stable recent large hematoma centered in the right basal ganglia. Persistent surrounding edema with unchanged mass effect and 6 mm right to left midline  shift.   12/13 CTH: No significant interval change.   12/14 CTH: Stable size of the known right basal ganglia parenchymal hemorrhage. Mass effect is unchanged.   Plan:   Continue stroke protocol heparin and ASA 81 mg daily  PTT goal 50-70  SBP goal 110-140  Valproic acid d/c-ed on 12/13  Provigil started 12/13, 1st dose 200 mg and then 100 mg from 12/14  Neurochecks every hour in the daytime and every 2 hours at night  Neurology following, appreciate recommendations  Neurosurgery signed off    Diagnosis: Agitation  Plan:   Use Precedex as needed     CV:   Diagnosis: SBP goal 110-140 in the setting of IVH   Plan:   SBP goal <140   Patient on Levophed held, wean as appropriate     Diagnosis: Elevated Troponins with EKG changes, Afib on telemetry, tachycardia   ECHO 12/8: Left ventricular cavity size is normal. Wall thickness is normal. The left ventricular ejection fraction is 45-50 %. Systolic function is mildly reduced. Akinesis of the inferolateral portion of the left ventricle was noted. Diastolic function is normal.   ECHO 12/10: Left ventricular cavity size is normal. The left ventricular ejection fraction is 50 to 55%. Systolic function is low normal. Unable to assess diastolic function. The following segments are hypokinetic: basal inferior, basal inferolateral, mid inferior and mid inferolateral.  Troponins: 5343 --> 5149 --> 6084 --> 8519 --> 18808 --> 31540 --> 79256  Plan:   S/p amiodarone bolus 300 mg IV on 12/11/24  On stroke protocol heparin, PTT goal 50-70  Cardiology consulted, appreciate recommendations     Diagnosis: CHF  BNP: 1164  Urine Output: (-)615.9mL  Plan:   Continue Lasix 40 mg daily, then use as needed to do more doses  Monitor urine output as appropriate    Pulm:  Diagnosis: Acute respiratory failure, bilateral effusions   Extubated on 12/8/2024  Chest x-ray 12/9/24: Congestive heart failure  Chest x-ray 12/10/24: Persistent pulmonary edema and pleural effusions  R Thoracentesis 12/11/24:  Removed 500 mL , cultures negative, , Triglycerides 34, Total Protein  <3.0, pH 7.5  Plan:   Nasal cannula, wean as appropriate   SpO2 goal >92%  Airway precautions  Respiratory protocol  CPAP overnight as needed    GI:   Diagnosis: No issues  Last BM: 12/14/24  Plan:   MiraLAX 17 mg daily  Senokot 2 tablets twice daily    :   Diagnosis: No issues  Urine Output: (-)615.9mL  Plan:   Monitor I's and O's as appropriate     F/E/N:     F: No fluids  E: Replete electrolytes as appropriate   N: Tube feeds ongoing    Heme/Onc:   Diagnosis: DVT prophylaxis, hemoglobin 10.9   Blood consent obtained verbally over the phone, plan to transfuse 1 unit of blood on 12/11/24   Plan:   On stroke protocol heparin, PTT goal 50-70    Endo:   Diagnosis: No issues  A1c: 5.3  Glucose: 121-188  Plan:   SSI in place  BG goal 140-180  Hypoglycemia protocol in place    ID:   Diagnosis: Leukocytosis  WBCs: 13.63 --> 14.85  MRSA: Negative  Blood cultures x2: No growth at 5 days  Sputum culture: 2+ Polys, 3+ Gram positive cocci in pairs, 4+ growth of S. Aureus  Vanc discontinued on 12/11/24 given negative MRSA swab   Plan:   Antibiotics changed to Ancef, end date of 12/15  Trend fever curve and monitor WBCs    MSK/Skin:   Diagnosis: Ambulatory dysfunction  Plan:   PT/OT  Out of bed as appropriate    Disposition: Critical care    ICU Core Measures     A: Assess, Prevent, and Manage Pain Has pain been assessed? Yes  Need for changes to pain regimen? No   B: Both SAT/SAT  N/A   C: Choice of Sedation RASS Goal: 0 Alert and Calm  Need for changes to sedation or analgesia regimen? No   D: Delirium CAM-ICU: Negative   E: Early Mobility  Plan for early mobility? Yes   F: Family Engagement Plan for family engagement today? Yes       Antibiotic Review: Patient on appropriate coverage based on culture data.     Review of Invasive Devices:      Central access plan: Medications requiring central line Hemodynamic monitoring      Prophylaxis:  VTE VTE  covered by:  heparin (porcine), Intravenous, 17 Units/kg/hr at 12/15/24 0622       Stress Ulcer  not ordered         24 Hour Events : No events overnight    Subjective     Patient lying in bed comfortably and asking for her son.  Upon asking her if she had any pain, she said yes but unable to voice where her pain was.  When asking her to follow commands, patient at times confused.    Review of Systems: Review of Systems not obtainable due to Clinical Condition    Objective :                   Vitals I/O      Most Recent Min/Max in 24hrs   Temp 99.4 °F (37.4 °C) Temp  Min: 97.8 °F (36.6 °C)  Max: 99.4 °F (37.4 °C)   Pulse 96 Pulse  Min: 82  Max: 128   Resp 20 Resp  Min: 20  Max: 27   /58 BP  Min: 99/53  Max: 128/61   O2 Sat 96 % SpO2  Min: 94 %  Max: 97 %      Intake/Output Summary (Last 24 hours) at 12/15/2024 0731  Last data filed at 12/15/2024 0600  Gross per 24 hour   Intake 2254.06 ml   Output 3054 ml   Net -799.94 ml       Diet Enteral/Parenteral; Tube Feeding No Oral Diet; Jevity 1.2 Navarro; Continuous; 55; Prosource Protein Liquid - Two Packets; 200; Water; Every 4 hours    Invasive Monitoring   Arterial Line  [place] /44  No data recorded   MAP (!) 64 mmHg  No data recorded           Physical Exam   Physical Exam  Vitals reviewed.   Eyes:      Conjunctiva/sclera: Conjunctivae normal.      Pupils: Pupils are equal, round, and reactive to light.   Skin:     General: Skin is warm.   HENT:      Head: Normocephalic and atraumatic.   Cardiovascular:      Rate and Rhythm: Normal rate and regular rhythm.   Abdominal: General: Bowel sounds are normal.      Palpations: Abdomen is soft.      Tenderness: There is no abdominal tenderness.   Constitutional:       General: She is in acute distress.      Appearance: She is not ill-appearing.      Interventions: She is not sedated.  Pulmonary:      Effort: Pulmonary effort is normal. No respiratory distress.      Breath sounds: Normal breath sounds.   Neurological:       Comments: PERRL, L facial droop appreciated, tongue midline. Patient following commands in the RUE/RLE with re-direction. No spontaneous movement in the LUE/LLE.  Some movement in the LLE with pain stimulus but none in the LUE.          Diagnostic Studies        Lab Results: I have reviewed the following results:     Medications:  Scheduled PRN   aspirin, 81 mg, Daily  atorvastatin, 20 mg, QPM  cefazolin, 2,000 mg, Q8H  chlorhexidine, 15 mL, Q12H DANIS  furosemide, 40 mg, Once  insulin lispro, 1-6 Units, Q6H DANIS  ipratropium, 0.5 mg, TID  levalbuterol, 1.25 mg, TID  melatonin, 6 mg, HS  midodrine, 5 mg, Q8H  modafinil, 100 mg, Daily  polyethylene glycol, 17 g, Daily  potassium chloride, 40 mEq, Once  senna-docusate sodium, 2 tablet, BID  sertraline, 50 mg, Daily      acetaminophen, 650 mg, Q6H PRN  albuterol, 2.5 mg, Q4H PRN  bisacodyl, 10 mg, Daily PRN       Continuous    heparin (porcine), 3-24 Units/kg/hr (Order-Specific), Last Rate: 17 Units/kg/hr (12/15/24 0622)         Labs:   CBC    Recent Labs     12/14/24  0432 12/15/24  0452   WBC 13.63* 14.85*   HGB 9.7* 10.9*   HCT 30.2* 34.0*    198     BMP    Recent Labs     12/14/24  1350 12/15/24  0452   SODIUM 152* 152*   K 3.8 3.5   * 116*   CO2 31 28   AGAP 7 8   BUN 20 26*   CREATININE 0.59* 0.49*   CALCIUM 9.2 8.9       Coags    Recent Labs     12/14/24  1350 12/15/24  0452   PTT 53* 49*        Additional Electrolytes  Recent Labs     12/14/24  0432 12/15/24  0452   MG 2.3 2.6   PHOS 4.2* 4.1          Blood Gas    No recent results  No recent results LFTs  Recent Labs     12/14/24  0432 12/15/24  0452   ALT 92* 41   AST 32 25   ALKPHOS 101 95   ALB 3.0* 3.3*   TBILI 0.34 0.34       Infectious  No recent results  Glucose  Recent Labs     12/14/24  0432 12/14/24  1350 12/15/24  0452   GLUC 133 140 125

## 2024-12-15 NOTE — PROGRESS NOTES
Progress Note - Critical Care/ICU   Name: Bren Traylor 69 y.o. female I MRN: [id number]  Unit/Bed#: ICU 11 I Date of Admission: 12/6/2024   Date of Service: 12/15/2024 I Hospital Day: 8      Critical Care Interval Transfer Note:    Brief Hospital Summary: 69 y.o. with a PMHx hypertension, hyperlipidemia, and anxiety who presents to [facility] as an endovascular alert for Right MCA M1 occlusion. Patient originally presented to [facility] as a stroke alert for left sided weakness and altered mental status. Son reports he found her altered and slumped over with significant left sided facial droop and weakness. Patient underwent mechanical thrombectomy, TICI 3 and right carotid angioplasty and stent placement. She had a hemorrhagic conversion. She then developed bilateral PEs along with pleural effusions. Patient had a thoracentesis on the right side and has been stable since then with some diuresis. She was also found to have A-fib on telemetry. Cardiology was consulted. Everybody's recommendations were to start anticoagulation and she was initially started on ECMO protocol heparin with PTT goals of 50-60. On 12/14/2024, patient was switched to stroke protocol heparin with PTT goals of 50-70. She was therapeutic x 2 and received a CT head which was stable. She will need transitioning to oral anticoagulation in the next few days. Speech is following her right now. At this time, she is stable to be downgraded to stepdown 2.     Barriers to discharge:   Transition to oral anticoagulation  PT/OT/Speech     Consults: IP CONSULT TO NEUROLOGY  IP CONSULT TO CASE MANAGEMENT  IP CONSULT TO NEUROSURGERY  IP CONSULT TO VENOUS ACCESS TEAM  IP CONSULT TO NUTRITION SERVICES  IP CONSULT TO PHARMACY  INPATIENT CONSULT TO IR  IP CONSULT TO CARDIOLOGY    Recommended to review admission imaging for incidental findings and document in discharge navigator: Chart reviewed, no known incidental findings noted at this time.      Discharge  Plan: TBD.  Central access plan: No peripheral access able to be obtained.  Plan to remove when appropriate.    Patient seen and evaluated by Critical Care today and deemed to be appropriate for transfer to Stepdown Level 2. Spoke to Dr. Arambula from [facility] to accept transfer. Critical care can be contacted via SecureChat with any questions or concerns. Please use the Critical Care AP Role in Secure Chat for any provider inquires until the patient is transferred out of the ICU or until tomorrow at 0600.

## 2024-12-15 NOTE — PLAN OF CARE
Problem: Prexisting or High Potential for Compromised Skin Integrity  Goal: Skin integrity is maintained or improved  Description: INTERVENTIONS:  - Identify patients at risk for skin breakdown  - Assess and monitor skin integrity  - Assess and monitor nutrition and hydration status  - Monitor labs   - Assess for incontinence   - Turn and reposition patient  - Assist with mobility/ambulation  - Relieve pressure over bony prominences  - Avoid friction and shearing  - Provide appropriate hygiene as needed including keeping skin clean and dry  - Evaluate need for skin moisturizer/barrier cream  - Collaborate with interdisciplinary team   - Patient/family teaching  - Consider wound care consult   Outcome: Progressing     Problem: Communication Impairment  Goal: Ability to express needs and understand communication  Description: Assess patient's communication skills and ability to understand information.  Patient will demonstrate use of effective communication techniques, alternative methods of communication and understanding even if not able to speak.     - Encourage communication and provide alternate methods of communication as needed.  - Collaborate with case management/ for discharge needs.  - Include patient/family/caregiver in decisions related to communication.  Outcome: Progressing     Problem: Neurological Deficit  Goal: Neurological status is stable or improving  Description: Interventions:  - Monitor and assess patient's level of consciousness, motor function, sensory function, and level of assistance needed for ADLs.   - Monitor and report changes from baseline. Collaborate with interdisciplinary team to initiate plan and implement interventions as ordered.   - Provide and maintain a safe environment.  - Consider seizure precautions.  - Consider fall precautions.  - Consider aspiration precautions.  - Consider bleeding precautions.  Outcome: Progressing

## 2024-12-16 PROBLEM — K59.01 SLOW TRANSIT CONSTIPATION: Status: ACTIVE | Noted: 2024-12-16

## 2024-12-16 PROBLEM — D72.829 LEUKOCYTOSIS: Status: ACTIVE | Noted: 2024-12-16

## 2024-12-16 PROBLEM — J96.00 ACUTE RESPIRATORY FAILURE (HCC): Status: ACTIVE | Noted: 2024-12-16

## 2024-12-16 NOTE — ASSESSMENT & PLAN NOTE
Presented in sinus rhythm  A-fib noted 12/8  Currently remaining in normal sinus rhythm  On aspirin, heparin ECMO protocol initially started and then switched to stroke protocol on 12/14

## 2024-12-16 NOTE — ASSESSMENT & PLAN NOTE
Patient presented in sinus rhythm, atrial fibrillation noted on 12/8  S/p amiodarone bolus  On aspirin and heparin drip   Cardiology input reviewed.  Started metoprolol 12.5 mg twice daily  Plan to transition to DOAC once cleared by neurology

## 2024-12-16 NOTE — ASSESSMENT & PLAN NOTE
Suspect reactive from multiple acute active medical issues  MRSA negative  Blood cultures with no growth after 5 days  Sputum cultures with normal respiratory roberto  Completed 3 days of IV cefazolin and vancomycin, discontinued as low suspicion of infection  Continue to monitor hemodynamics, patient has remained afebrile.  WBC 18

## 2024-12-16 NOTE — RESPIRATORY THERAPY NOTE
12/15/24 2014   Respiratory Assessment   Assessment Type During-treatment   General Appearance Alert;Awake   Respiratory Pattern Normal   Chest Assessment Chest expansion symmetrical   Bilateral Breath Sounds Clear   Cough None   Resp Comments Lungs remain clear, no indication for continuation of bronchodilators, will dicontinue per respiratory protocol.   O2 Device RA   Additional Assessments   SpO2 94 %

## 2024-12-16 NOTE — PLAN OF CARE
Problem: PAIN - ADULT  Goal: Verbalizes/displays adequate comfort level or baseline comfort level  Description: Interventions:  - Encourage patient to monitor pain and request assistance  - Assess pain using appropriate pain scale  - Administer analgesics based on type and severity of pain and evaluate response  - Implement non-pharmacological measures as appropriate and evaluate response  - Consider cultural and social influences on pain and pain management  - Notify physician/advanced practitioner if interventions unsuccessful or patient reports new pain  Outcome: Progressing     Problem: INFECTION - ADULT  Goal: Absence or prevention of progression during hospitalization  Description: INTERVENTIONS:  - Assess and monitor for signs and symptoms of infection  - Monitor lab/diagnostic results  - Monitor all insertion sites, i.e. indwelling lines, tubes, and drains  - Monitor endotracheal if appropriate and nasal secretions for changes in amount and color  - [place] appropriate cooling/warming therapies per order  - Administer medications as ordered  - Instruct and encourage patient and family to use good hand hygiene technique  - Identify and instruct in appropriate isolation precautions for identified infection/condition  Outcome: Progressing     Problem: SAFETY ADULT  Goal: Patient will remain free of falls  Description: INTERVENTIONS:  - Educate patient/family on patient safety including physical limitations  - Instruct patient to call for assistance with activity   - Consult OT/PT to assist with strengthening/mobility   - Keep Call bell within reach  - Keep bed low and locked with side rails adjusted as appropriate  - Keep care items and personal belongings within reach  - Initiate and maintain comfort rounds  - Make Fall Risk Sign visible to staff  - Apply yellow socks and bracelet for high fall risk patients  - Consider moving patient to room near nurses station  Outcome: Progressing     Problem: Knowledge  Deficit  Goal: Patient/family/caregiver demonstrates understanding of disease process, treatment plan, medications, and discharge instructions  Description: Complete learning assessment and assess knowledge base.  Interventions:  - Provide teaching at level of understanding  - Provide teaching via preferred learning methods  Outcome: Progressing

## 2024-12-16 NOTE — ASSESSMENT & PLAN NOTE
CT chest PE in 12/10 with bilateral pulmonary embolism, no evidence of right heart strain  TTE 12/10With LVEF 50-55%, hypokinetic segments, no heart strain  Venous duplex negative for DVT  On heparin drip as above until clearance by neurology

## 2024-12-16 NOTE — PROGRESS NOTES
Progress Note - Hospitalist   Name: Bren Traylor 69 y.o. female I MRN: [id number]  [facility] I Date of Admission: 12/6/2024   Date of Service: 12/16/2024 I Hospital Day: 9    Assessment & Plan  Cerebrovascular accident (CVA) due to embolism of right middle cerebral artery (HCC)  Presented as a stroke alert [facility] on 12/7 for left-sided weakness and altered mental status.  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  Also with right carotid stenosis s/p angioplasty with stenting.  She was admitted to the ICU where she required pressor support and Precedex for episodes of agitation  Most recent CT head on 12/14 with stable right basal ganglia parenchymal hemorrhage  Patient was started on heparin drip stroke protocol PTT goal 50-70 and aspirin 81 mg  Provigil started, currently on 100 mg daily  Continue neurochecks  Neurosurgery following peripherally  Neurology signed off on 12/9  Goal SBP below 140 mmHg  Acute pulmonary embolism (HCC)  CT chest PE in 12/10 with bilateral pulmonary embolism, no evidence of right heart strain  TTE 12/10With LVEF 50-55%, hypokinetic segments, no heart strain  Venous duplex negative for DVT  On heparin drip as above until clearance by neurology  Encephalopathy  In the setting of CVA with hemorrhagic conversion  Delirium precautions  Hypotension  Required Levophed briefly while in the ICU, discontinued in 12/14  Goal SBP less than 140  BP reviewed and stable  Cardiomyopathy (HCC)  Echocardiogram 12/10/2024: EF 50-55% with basal inferior, basal inferolateral, mid inferior and mid inferolateral hypokinesis, normal RV function, moderate MR, moderate TR.  CXR 12/10: Persistent pulmonary edema and pleural effusions  BNP 1164  Underwent thoracentesis 12/11, 500 cc  S/p IV Lasix, last dose on 12/15  Appears euvolemic on exam, no longer on diuretics per cardiology    Paroxysmal atrial  fibrillation (HCC)  Patient presented in sinus rhythm, atrial fibrillation noted on 12/8  S/p amiodarone bolus  On aspirin and heparin drip   Cardiology input reviewed.  Started metoprolol 12.5 mg twice daily  Plan to transition to DOAC once cleared by neurology  Hypertension  BP reviewed and acceptable  Started metoprolol 12.5 mg twice daily per cardiology recommendations for paroxysmal atrial fibrillation  Hyperlipidemia  Continue statin  Lipid panel reviewed  Carotid artery stenosis  CTA of the head revealed acute thrombosis in the mid-distal right M1 segment with severe near occlusion of the proximal BILL with severe tandem stenosis intracranially and high-grade stenosis of the last distal common carotid artery, moderate high-grade stenosis of the right subclavian origin.   12/6 s/p emergent right carotid angioplasty/stenting and placed on Integrilin  Repeat imaging 12/7 revealed hemorrhagic conversion and patient was given DDAVP administered  CTA 12/10 revealed BILL stent occlusion  On aspirin and statin  Elevated troponin  Initial troponin 140, with last 69227 in the setting of acute CVA and bilateral PEs   Possibly ACS however cardiology not pursuing ischemic evaluation at this time.  Suspect non-MI troponin elevation from multiple active medical conditions  EKG with sinus rhythm and lateral ST-T wave abnormality  TTE with LVEF 50-55% with with wall motion abnormality  CTA notes significant diffuse coronary calcifications  Outpatient cardiology follow-up  Slow transit constipation  Per chart review last BM today  Continue bowel regimen  Acute respiratory failure (HCC)  Intubated on admission for airway protection, extubated on 12/8/2024  Chest imaging with pulmonary edema bilateral pleural effusion  R Thoracentesis 12/11/24: Removed 500 mL , cultures negative, , Triglycerides 34, Total Protein  <3.0, pH 7.5  Continue to wean oxygen as tolerated  Airway precautions  Respiratory protocol  CPAP overnight  as needed  Leukocytosis  Suspect reactive from multiple acute active medical issues  MRSA negative  Blood cultures with no growth after 5 days  Sputum cultures with normal respiratory roberto  Completed 3 days of IV cefazolin and vancomycin, discontinued as low suspicion of infection  Continue to monitor hemodynamics, patient has remained afebrile.  WBC 18    VTE Pharmacologic Prophylaxis: VTE Score: 13 High Risk (Score >/= 5) - Pharmacological DVT Prophylaxis Ordered: heparin drip. Sequential Compression Devices Ordered.    Mobility:   Basic Mobility Inpatient Raw Score: 6  -HL Goal: 2: Bed activities/Dependent transfer  JH-HLM Achieved: 3: Sit at edge of bed  JH-HLM Goal NOT achieved. Continue with multidisciplinary rounding and encourage appropriate mobility to improve upon -HL goals.    Patient Centered Rounds: I performed bedside rounds with nursing staff today.   Discussions with Specialists or Other Care Team Provider: None    Education and Discussions with Family / Patient: Attempted to update  (son) via phone. Unable to contact.    Current Length of Stay: 9 day(s)  Current Patient Status: Inpatient   Certification Statement: The patient will continue to require additional inpatient hospital stay due to monitoring while on heparin drip, dispo planning  Discharge Plan: Anticipate discharge in 48-72 hrs to discharge location to be determined pending rehab evaluations.    Code Status: Level 1 - Full Code    Subjective   No events overnight.  Patient denies any pain.  Had 1 BM this morning    Objective :  Temp:  [97.5 °F (36.4 °C)-99.9 °F (37.7 °C)] 99.9 °F (37.7 °C)  HR:  [] 101  BP: (109-131)/(55-96) 131/68  Resp:  [16-38] 20  SpO2:  [94 %-100 %] 96 %  O2 Device: None (Room air)    Body mass index is 19.22 kg/m².     Input and Output Summary (last 24 hours):     Intake/Output Summary (Last 24 hours) at 12/16/2024 1648  Last data filed at 12/16/2024 0914  Gross per 24 hour   Intake 1422  ml   Output --   Net 1428 ml       Physical Exam  Vitals and nursing note reviewed.   Constitutional:       General: She is not in acute distress.     Appearance: She is ill-appearing.   HENT:      Head: Normocephalic and atraumatic.   Eyes:      Conjunctiva/sclera: Conjunctivae normal.   Cardiovascular:      Rate and Rhythm: Normal rate and regular rhythm.   Pulmonary:      Effort: No respiratory distress.      Breath sounds: No wheezing or rhonchi.   Abdominal:      General: Bowel sounds are normal. There is no distension.      Palpations: Abdomen is soft.      Tenderness: There is no abdominal tenderness.   Musculoskeletal:      Right lower leg: No edema.      Left lower leg: No edema.   Skin:     General: Skin is warm and dry.   Neurological:      Mental Status: She is alert.      Comments: Left facial droop  No spontaneous movement of left upper extremity and lower extremity  Following commands             Lines/Drains:  Lines/Drains/Airways       Active Status       Name Placement date Placement time Site Days    PICC Line 12/10/24 12/10/24  1657  --  5    NG/OG/Enteral Tube Nasogastric 12 Fr Right nare 12/08/24  2129  Right nare  7                    Central Line:  Goal for removal: Will discontinue when meds requiring line are completed.               Lab Results: I have reviewed the following results:   Results from last 7 days   Lab Units 12/16/24  0409   WBC Thousand/uL 18.33*   HEMOGLOBIN g/dL 10.3*   HEMATOCRIT % 32.0*   PLATELETS Thousands/uL 182   SEGS PCT % 82*   LYMPHO PCT % 9*   MONO PCT % 7   EOS PCT % 1     Results from last 7 days   Lab Units 12/16/24  0311   SODIUM mmol/L 145   POTASSIUM mmol/L 3.5   CHLORIDE mmol/L 110*   CO2 mmol/L 28   BUN mg/dL 24   CREATININE mg/dL 0.51*   ANION GAP mmol/L 7   CALCIUM mg/dL 8.9   ALBUMIN g/dL 3.3*   TOTAL BILIRUBIN mg/dL 0.44   ALK PHOS U/L 90   ALT U/L 34   AST U/L 33   GLUCOSE RANDOM mg/dL 137         Results from last 7 days   Lab Units 12/16/24  1204  12/16/24  0615 12/16/24  0011 12/15/24  1641 12/15/24  1300 12/14/24  2341 12/14/24  1755 12/14/24  1153 12/13/24  2335 12/13/24  1804 12/13/24  1131 12/13/24  0542   POC GLUCOSE mg/dl 132 138 142* 124 140 161* 128 152* 121 172* 188* 149*                 Recent Cultures (last 7 days):   Results from last 7 days   Lab Units 12/13/24  0711 12/11/24  1329   SPUTUM CULTURE  4+ Growth of Staphylococcus aureus*  --    GRAM STAIN RESULT  2+ Polys*  3+ Gram positive cocci in pairs* Rare Mononuclear Cells  No bacteria seen   BODY FLUID CULTURE, STERILE   --  No growth       Imaging Results Review: No pertinent imaging studies reviewed.  Other Study Results Review: No additional pertinent studies reviewed.    Last 24 Hours Medication List:     Current Facility-Administered Medications:     acetaminophen (TYLENOL) oral suspension 650 mg, Q6H PRN    albuterol inhalation solution 2.5 mg, Q4H PRN    aspirin chewable tablet 81 mg, Daily    atorvastatin (LIPITOR) tablet 20 mg, QPM    bisacodyl (DULCOLAX) rectal suppository 10 mg, Daily PRN    chlorhexidine (PERIDEX) 0.12 % oral rinse 15 mL, Q12H DANIS    furosemide (LASIX) tablet 40 mg, Daily PRN    heparin (porcine) 25,000 units in 0.45% NaCl 250 mL infusion (premix), Titrated, Last Rate: 22 Units/kg/hr (12/16/24 1042)    insulin lispro (HumALOG/ADMELOG) 100 units/mL subcutaneous injection 1-6 Units, Q6H DANIS **AND** Fingerstick Glucose (POCT), Q6H    melatonin tablet 6 mg, HS    metoprolol tartrate (LOPRESSOR) partial tablet 12.5 mg, Q12H DANIS    modafinil (PROVIGIL) tablet 100 mg, Daily    polyethylene glycol (MIRALAX) packet 17 g, Daily    senna-docusate sodium (SENOKOT S) 8.6-50 mg per tablet 2 tablet, BID    sertraline (ZOLOFT) tablet 50 mg, Daily    Administrative Statements   Today, Patient Was Seen By: Martha Dumont MD      **Please Note: This note may have been constructed using a voice recognition system.**

## 2024-12-16 NOTE — ASSESSMENT & PLAN NOTE
CTA of the head revealed acute thrombosis in the mid-distal right M1 segment with severe near occlusion of the proximal BILL with severe tandem stenosis intracranially and high-grade stenosis of the last distal common carotid artery, moderate high-grade stenosis of the right subclavian origin.   12/6 s/p emergent right carotid angioplasty/stenting and placed on Integrilin  Repeat imaging 12/7 revealed hemorrhagic conversion and patient was given DDAVP administered  CTA 12/10 revealed BILL stent occlusion  On aspirin and statin

## 2024-12-16 NOTE — ASSESSMENT & PLAN NOTE
Troponin 140--> 213--> 250--> 169--> 1164--> 5343--> 5149--> 6084--.[id number] in the setting of acute CVA and bilateral PEs  Possibly ACS, but considering extensive medical issues, favor non-MI troponin elevation  EKG with sinus rhythm and lateral ST-T wave abnormality  EF 50-55% with RWMA  She had anginal symptoms 10/24 and was planned for outpatient ischemic testing  CTA notes significant diffuse coronary calcifications

## 2024-12-16 NOTE — PROGRESS NOTES
Progress Note - Cardiology   Name: Bren Traylor 69 y.o. female I MRN: [id number]  [facility] Date of Admission: 12/6/2024   Date of Service: 12/16/2024 I Hospital Day: 9     Assessment & Plan  Paroxysmal atrial fibrillation (HCC)  Presented in sinus rhythm  A-fib noted 12/8  Currently remaining in normal sinus rhythm  On aspirin, heparin ECMO protocol initially started and then switched to stroke protocol on 12/14  Elevated troponin  Troponin 140--> 213--> 250--> 169--> 1164--> 5343--> 5149--> 6084--.[id number] in the setting of acute CVA and bilateral PEs  Possibly ACS, but considering extensive medical issues, favor non-MI troponin elevation  EKG with sinus rhythm and lateral ST-T wave abnormality  EF 50-55% with RWMA  She had anginal symptoms 10/24 and was planned for outpatient ischemic testing  CTA notes significant diffuse coronary calcifications  Cardiomyopathy (HCC)  Echocardiogram 12/10/2024: EF 50-55% with basal inferior, basal inferolateral, mid inferior and mid inferolateral hypokinesis, normal RV function, moderate MR, moderate TR.  When compared to echocardiogram from 12/8/2024 EF was 45-50% with inferolateral akinesis with moderate-severe MR.  CXR 12/10: Persistent pulmonary edema and pleural effusions  BNP 1164  Underwent thoracentesis 12/11, 500 cc  Last dose of IV Lasix was yesterday  Appears euvolemic on exam  Cerebrovascular accident (CVA) due to embolism of right middle cerebral artery (HCC)  Presented 12/6 with left-sided weakness, slurred speech and facial droop  CTH revealed infarction in the right lentiform nucleus with mild cytotoxic edema with hyperdensity in the right M1 segment suggesting M1 thrombosis.    CTA of the head revealed acute thrombosis in the mid-distal right M1 segment with severe near occlusion of the proximal BILL with severe tandem stenosis intracranially and high-grade stenosis of the last distal common carotid artery, moderate high-grade stenosis of the  "right subclavian origin.   Underwent successful mechanical thrombectomy and right carotid angioplasty/stenting and placed on Integrilin.   12/7 CTA with hemorrhagic conversion  On aspirin, low-dose ECMO heparin initially started and switched to stroke protocol on 12/14  Carotid artery stenosis  CTA of the head revealed acute thrombosis in the mid-distal right M1 segment with severe near occlusion of the proximal BILL with severe tandem stenosis intracranially and high-grade stenosis of the last distal common carotid artery, moderate high-grade stenosis of the right subclavian origin.   12/6 s/p emergent right carotid angioplasty/stenting and placed on Integrilin  Repeat imaging 12/7 revealed hemorrhagic conversion and patient was given DDAVP administered  CTA 12/10 revealed BILL stent occlusion  Aspirin per NG tube, statin  Acute pulmonary embolism (HCC)  CTA Chest 12/10 revealed bilateral PEs with developing pulmonary infarction  Venous duplex negative for DVT  Currently on heparin as above  Encephalopathy  In the context of CVA with hemorrhagic conversion  Hypotension  Levophed discontinued 12/14  Midodrine discontinued  Hypertension  Home meds on hold  Levophed discontinued 12/14  Hyperlipidemia  Being given atorvastatin via NG tube      Plan/Recommendations:  Start beta-blocker when able  Transition to oral anticoagulation when deemed safe from a neurology standpoint  Recommend eventual ischemic evaluation as an outpatient    ___________________________________________________________    Subjective    Patient seen and examined.  No acute events overnight.      Unable to complete ROS as patient has dysarthria and intermittently confused  ROS    Objective:   Vitals: Blood pressure 109/59, pulse 105, temperature 98.3 °F (36.8 °C), temperature source Axillary, resp. rate (!) 38, height 5' 4\" (1.626 m), weight 50.8 kg (111 lb 15.9 oz), SpO2 97%.,     Wt Readings from Last 3 Encounters:   12/16/24 50.8 kg (111 lb 15.9 " oz)   24 53.8 kg (118 lb 9.7 oz)   10/02/24 53.8 kg (118 lb 9.6 oz)        Lab Results   Component Value Date    CREATININE 0.51 (L) 2024    CREATININE 0.49 (L) 12/15/2024    CREATININE 0.59 (L) 2024         Body mass index is 19.22 kg/m².,     Systolic (24hrs), Av , Min:96 , Max:149     Diastolic (24hrs), Av, Min:50, Max:72          Intake/Output Summary (Last 24 hours) at 2024 0903  Last data filed at 12/15/2024 2201  Gross per 24 hour   Intake 573.14 ml   Output 820 ml   Net -246.86 ml     Weight (last 2 days)       Date/Time Weight    24 0600 50.8 (111.99)              Telemetry Review: Sinus rhythm with heart rates in the 100s      Physical Exam  Vitals and nursing note reviewed.   Constitutional:       General: She is not in acute distress.     Appearance: She is well-developed.      Comments: NG tube noted  On RA in NAD   HENT:      Head: Normocephalic and atraumatic.   Neck:      Vascular: No JVD.   Cardiovascular:      Rate and Rhythm: Normal rate and regular rhythm.      Heart sounds: Normal heart sounds. No murmur heard.     No friction rub.   Pulmonary:      Effort: Pulmonary effort is normal. No respiratory distress.      Breath sounds: Normal breath sounds. No wheezing or rales.   Abdominal:      General: Bowel sounds are normal. There is no distension.      Palpations: Abdomen is soft.      Tenderness: There is no abdominal tenderness.   Musculoskeletal:         General: No tenderness. Normal range of motion.      Cervical back: Normal range of motion and neck supple.      Right lower leg: No edema.      Left lower leg: No edema.   Skin:     General: Skin is warm and dry.      Findings: No erythema.   Neurological:      Mental Status: She is alert and oriented to person, place, and time.   Psychiatric:         Mood and Affect: Mood normal.         Behavior: Behavior normal.         Thought Content: Thought content normal.         Judgment: Judgment normal.          LABORATORY RESULTS      CBC with diff:   Results from last 7 days   Lab Units 12/16/24  0409 12/15/24  0452 12/14/24  0432 12/13/24  0435 12/12/24  0532 12/11/24  1123 12/11/24  0425 12/10/24  0457   WBC Thousand/uL 18.33* 14.85* 13.63* 18.18* 19.28*  --  18.31* 18.56*   HEMOGLOBIN g/dL 10.3* 10.9* 9.7* 9.4* 8.9* 8.6* 7.0* 7.9*   HEMATOCRIT % 32.0* 34.0* 30.2* 28.9* 26.8* 27.1* 21.8* 24.4*   MCV fL 98 99* 97 95 94  --  96 96   PLATELETS Thousands/uL 182 198 168 176 159  --  158 159   RBC Million/uL 3.28* 3.44* 3.12* 3.04* 2.84*  --  2.28* 2.55*   MCH pg 31.4 31.7 31.1 30.9 31.3  --  30.7 31.0   MCHC g/dL 32.2 32.1 32.1 32.5 33.2  --  32.1 32.4   RDW % 14.7 15.0 14.6 14.6 15.0  --  14.7 14.7   MPV fL 13.5* 12.9* 13.1* 12.4 12.6  --  12.5 11.9   NRBC AUTO /100 WBCs 0 0 0  --  0  --  0 0       CMP:  Results from last 7 days   Lab Units 12/16/24  0311 12/15/24  0452 12/14/24  1350 12/14/24  0432 12/13/24  0435 12/12/24  1753 12/12/24  0532 12/10/24  1147 12/10/24  0457   POTASSIUM mmol/L 3.5 3.5 3.8 3.5 3.4* 2.9* 3.6   < > 3.6   CHLORIDE mmol/L 110* 116* 114* 116* 116* 114* 123*   < > 130*   CO2 mmol/L 28 28 31 31 32 31 25   < > 18*   BUN mg/dL 24 26* 20 21 22 21 19   < > 22   CREATININE mg/dL 0.51* 0.49* 0.59* 0.57* 0.54* 0.57* 0.54*   < > 0.61   CALCIUM mg/dL 8.9 8.9 9.2 8.6 8.8 8.9 8.3*   < > 8.0*   AST U/L 33 25  --  32 52*  --   --   --  189*   ALT U/L 34 41  --  92* 179*  --   --   --  277*   ALK PHOS U/L 90 95  --  101 116*  --   --   --  108*   EGFR ml/min/1.73sq m 99 101 95 96 97 96 97   < > 94    < > = values in this interval not displayed.       BMP:  Results from last 7 days   Lab Units 12/16/24  0311 12/15/24  0452 12/14/24  1350 12/14/24  0432 12/13/24  0435 12/12/24  1753 12/12/24  0532   POTASSIUM mmol/L 3.5 3.5 3.8 3.5 3.4* 2.9* 3.6   CHLORIDE mmol/L 110* 116* 114* 116* 116* 114* 123*   CO2 mmol/L 28 28 31 31 32 31 25   BUN mg/dL 24 26* 20 21 22 21 19   CREATININE mg/dL 0.51* 0.49* 0.59* 0.57*  "0.54* 0.57* 0.54*   CALCIUM mg/dL 8.9 8.9 9.2 8.6 8.8 8.9 8.3*       No results found for: \"NTBNP\"          Results from last 7 days   Lab Units 12/16/24  0311 12/15/24  0452 12/14/24  0432 12/13/24  0435 12/12/24  1753 12/12/24  0532 12/11/24  0455   MAGNESIUM mg/dL 2.4 2.6 2.3 2.1 1.9 2.1 2.1         Lipid Profile:   No results found for: \"CHOL\"  Lab Results   Component Value Date    HDL 42 (L) 12/07/2024    HDL 53 10/02/2024     Lab Results   Component Value Date    LDLCALC 76 12/07/2024    LDLCALC 116 (H) 10/02/2024     Lab Results   Component Value Date    TRIG 110 12/07/2024    TRIG 105 10/02/2024         Meds/Allergies   all current active meds have been reviewed    Medications Prior to Admission:     atorvastatin (LIPITOR) 20 mg tablet    busPIRone (BUSPAR) 7.5 mg tablet    clonazePAM (KlonoPIN) 0.5 mg tablet    losartan (COZAAR) 50 mg tablet    sertraline (ZOLOFT) 100 mg tablet    heparin (porcine), 3-24 Units/kg/hr (Order-Specific), Last Rate: 19 Units/kg/hr (12/16/24 0337)        Counseling / Coordination of Care  Total floor / unit time spent today 20 minutes.  Greater than 50% of total time was spent with the patient and / or family counseling and / or coordination of care.      ** Please Note: Dragon 360 Dictation voice to text software may have been used in the creation of this document. **  "

## 2024-12-16 NOTE — SPEECH THERAPY NOTE
"Speech-Language Pathology Progress Note      Patient Name: Bren Traylor    Today's Date: 12/16/2024      Subjective:  Pt was awake and restless. She was partially reclined, difficult to position d/t restlessness.    Objective:  Pt was seen today for dysphagia therapy. Pt was on room air. She is currently NPO with NGT. Suctioning was hooked up in new room (transferred to 716 from ICU) and extensive oral care was attempted, partially successful d/t pt resisting. Oral care was completed with suction toothbrush kit, additional swabs, and yankauer suction. Following oral care, pt was given ice chips x2, and thin water small cup sips x2.  Cup biting noted, and dependent for retrieval of ice chips. No immediate s/s aspiration however she did have a delayed harsh cough. Suspect loosening of dried mucous with PO trials. Pt repeated \"Drink!\" However trials were limited due to high risk for aspiration.     Assessment:  Restlessness/agitation and poor participation continue to be a barrier to participation in dysphagia therapy. Swallow function is impaired, will need repeat MBS when pt is able to participate.     Plan:  Maintain NPO with NGT. Frequent and thorough oral care required.  Continue ST follow up.     "

## 2024-12-16 NOTE — ASSESSMENT & PLAN NOTE
Presented as a stroke alert [facility] on 12/7 for left-sided weakness and altered mental status.  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  Also with right carotid stenosis s/p angioplasty with stenting.  She was admitted to the ICU where she required pressor support and Precedex for episodes of agitation  Most recent CT head on 12/14 with stable right basal ganglia parenchymal hemorrhage  Patient was started on heparin drip stroke protocol PTT goal 50-70 and aspirin 81 mg  Provigil started, currently on 100 mg daily  Continue neurochecks  Neurosurgery following peripherally  Neurology signed off on 12/9  Goal SBP below 140 mmHg

## 2024-12-16 NOTE — ASSESSMENT & PLAN NOTE
BP reviewed and acceptable  Started metoprolol 12.5 mg twice daily per cardiology recommendations for paroxysmal atrial fibrillation

## 2024-12-16 NOTE — ASSESSMENT & PLAN NOTE
Intubated on admission for airway protection, extubated on 12/8/2024  Chest imaging with pulmonary edema bilateral pleural effusion  R Thoracentesis 12/11/24: Removed 500 mL , cultures negative, , Triglycerides 34, Total Protein  <3.0, pH 7.5  Continue to wean oxygen as tolerated  Airway precautions  Respiratory protocol  CPAP overnight as needed

## 2024-12-16 NOTE — ASSESSMENT & PLAN NOTE
CTA Chest 12/10 revealed bilateral PEs with developing pulmonary infarction  Venous duplex negative for DVT  Currently on heparin as above

## 2024-12-16 NOTE — ASSESSMENT & PLAN NOTE
Being given atorvastatin via NG tube     The patient is a 41y Female complaining of back pain general.

## 2024-12-16 NOTE — ASSESSMENT & PLAN NOTE
Initial troponin 140, with last 76946 in the setting of acute CVA and bilateral PEs   Possibly ACS however cardiology not pursuing ischemic evaluation at this time.  Suspect non-MI troponin elevation from multiple active medical conditions  EKG with sinus rhythm and lateral ST-T wave abnormality  TTE with LVEF 50-55% with with wall motion abnormality  CTA notes significant diffuse coronary calcifications  Outpatient cardiology follow-up

## 2024-12-16 NOTE — ASSESSMENT & PLAN NOTE
Presented 12/6 with left-sided weakness, slurred speech and facial droop  CTH revealed infarction in the right lentiform nucleus with mild cytotoxic edema with hyperdensity in the right M1 segment suggesting M1 thrombosis.    CTA of the head revealed acute thrombosis in the mid-distal right M1 segment with severe near occlusion of the proximal BILL with severe tandem stenosis intracranially and high-grade stenosis of the last distal common carotid artery, moderate high-grade stenosis of the right subclavian origin.   Underwent successful mechanical thrombectomy and right carotid angioplasty/stenting and placed on Integrilin.   12/7 CTA with hemorrhagic conversion  On aspirin, low-dose ECMO heparin initially started and switched to stroke protocol on 12/14

## 2024-12-16 NOTE — ASSESSMENT & PLAN NOTE
Echocardiogram 12/10/2024: EF 50-55% with basal inferior, basal inferolateral, mid inferior and mid inferolateral hypokinesis, normal RV function, moderate MR, moderate TR.  When compared to echocardiogram from 12/8/2024 EF was 45-50% with inferolateral akinesis with moderate-severe MR.  CXR 12/10: Persistent pulmonary edema and pleural effusions  BNP 1164  Underwent thoracentesis 12/11, 500 cc  Last dose of IV Lasix was yesterday  Appears euvolemic on exam

## 2024-12-16 NOTE — ASSESSMENT & PLAN NOTE
Echocardiogram 12/10/2024: EF 50-55% with basal inferior, basal inferolateral, mid inferior and mid inferolateral hypokinesis, normal RV function, moderate MR, moderate TR.  CXR 12/10: Persistent pulmonary edema and pleural effusions  BNP 1164  Underwent thoracentesis 12/11, 500 cc  S/p IV Lasix, last dose on 12/15  Appears euvolemic on exam, no longer on diuretics per cardiology

## 2024-12-16 NOTE — ASSESSMENT & PLAN NOTE
Required Levophed briefly while in the ICU, discontinued in 12/14  Goal SBP less than 140  BP reviewed and stable

## 2024-12-17 NOTE — ASSESSMENT & PLAN NOTE
Presented in sinus rhythm  A-fib noted 12/8  Currently remaining in sinus rhythm  On aspirin, heparin ECMO protocol initially started and then switched to stroke protocol on 12/14  Lopressor 12.5 mg BID initiated 12/16

## 2024-12-17 NOTE — PLAN OF CARE
Problem: OCCUPATIONAL THERAPY ADULT  Goal: Performs self-care activities at highest level of function for planned discharge setting.  See evaluation for individualized goals.  Description: Treatment Interventions: ADL retraining, Functional transfer training, UE strengthening/ROM, Endurance training, Cognitive reorientation, Patient/family training, Equipment evaluation/education, Compensatory technique education, Continued evaluation, Energy conservation, Activityengagement, Visual perceptual retraining, Fine motor coordination activities          See flowsheet documentation for full assessment, interventions and recommendations.   Outcome: Progressing  Note: Limitation: Decreased ADL status, Decreased UE strength, Decreased UE ROM, Decreased Safe judgement during ADL, Decreased cognition, Decreased endurance, Visual deficit, Decreased fine motor control, Decreased high-level ADLs, Decreased self-care trans  Prognosis: Fair  Assessment: Pt seen for Occupational Therapy session with focus on activity tolerance, bed mob, functional transfers/mob, sitting balance and tolerance for pt engagement in UB/LB self-care tasks.Pt cleared by NOEMI/Alistair for pt participated in OT session. Pt presented   supine/HOB raised pt awake to name and apt identifiers confirmed. Pt was unable to report  therapy goal 2* pt cognitive impairments. Pt required assist for bed mob, unsupported sitting balance and UB/LB self-care tasks 2* decreased strength, coordination and balance. She was able to tolerate sitting edge of pt bed with no untoward issues with pt vitals. She remains appropriate for  II (Moderate Resources) Pt return to bed post session bed alarm activated and all needs within reach.     Rehab Resource Intensity Level, OT: II (Moderate Resource Intensity)

## 2024-12-17 NOTE — ASSESSMENT & PLAN NOTE
Suspect reactive from multiple acute active medical issues  MRSA negative  Blood cultures with no growth after 5 days  Sputum cultures with normal respiratory roberto  Completed 3 days of IV cefazolin and vancomycin, discontinued on 12/15 as low suspicion of infection  Continue to monitor hemodynamics, will check procalcitonin in AM

## 2024-12-17 NOTE — PROGRESS NOTES
Progress Note - Hospitalist   Name: Bren Traylor 69 y.o. female I MRN: [id number]  Unit/Bed#: [facility] 716-01 I Date of Admission: 12/6/2024   Date of Service: 12/17/2024 I Hospital Day: 10    Assessment & Plan  Cerebrovascular accident (CVA) due to embolism of right middle cerebral artery (HCC)  Presented as a stroke alert [facility] on 12/7 for left-sided weakness and altered mental status.  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  Also with right carotid stenosis s/p angioplasty with stenting.  She was admitted to the ICU where she required pressor support and Precedex for episodes of agitation  Most recent CT head on 12/14 with stable right basal ganglia parenchymal hemorrhage  Patient was started on heparin drip stroke protocol PTT goal 50-70 and aspirin 81 mg  Provigil started, currently on 100 mg daily  Continue neurochecks  Neurosurgery following peripherally  Neurology signed off on 12/9  Goal SBP below 140 mmHg  Discussed with neurosurgery and neurology on 12/17 about continuation of heparin and transition to oral a/c: advised to continue stroke protocol heparin drip, repeat CT Head no contrast on 12/21/24 (2 weeks from date of initial bleeding), based on CT results would decide on therapeutic AC timing.  STAT CT Head no contrast if any acute worsening/ neurological change  Acute pulmonary embolism (HCC)  CT chest PE in 12/10 with bilateral pulmonary embolism, no evidence of right heart strain  TTE 12/10With LVEF 50-55%, hypokinetic segments, no heart strain  Venous duplex negative for DVT  On heparin drip as above until clearance by neurology  Encephalopathy  In the setting of CVA with hemorrhagic conversion  Delirium precautions  Hypotension  Required Levophed briefly while in the ICU, discontinued in 12/14  Goal SBP less than 140  BP reviewed and stable  Cardiomyopathy (HCC)  Echocardiogram 12/10/2024: EF 50-55% with  basal inferior, basal inferolateral, mid inferior and mid inferolateral hypokinesis, normal RV function, moderate MR, moderate TR.  CXR 12/10: Persistent pulmonary edema and pleural effusions  BNP 1164  Underwent thoracentesis 12/11, 500 cc  S/p IV Lasix, last dose on 12/15  Appears euvolemic on exam, no longer on diuretics per cardiology    Paroxysmal atrial fibrillation (HCC)  Patient presented in sinus rhythm, atrial fibrillation noted on 12/8  S/p amiodarone bolus  On aspirin and heparin drip   Cardiology input reviewed.  Started metoprolol 12.5 mg twice daily  Plan to transition to DOAC once cleared by neurology  Hypertension  BP reviewed and acceptable  Started metoprolol 12.5 mg twice daily per cardiology recommendations for paroxysmal atrial fibrillation  Hyperlipidemia  Continue statin  Lipid panel reviewed  Carotid artery stenosis  CTA of the head revealed acute thrombosis in the mid-distal right M1 segment with severe near occlusion of the proximal BILL with severe tandem stenosis intracranially and high-grade stenosis of the last distal common carotid artery, moderate high-grade stenosis of the right subclavian origin.   12/6 s/p emergent right carotid angioplasty/stenting and placed on Integrilin  Repeat imaging 12/7 revealed hemorrhagic conversion and patient was given DDAVP administered  CTA 12/10 revealed BILL stent occlusion  On aspirin and statin  Elevated troponin  Initial troponin 140, with last 23673 in the setting of acute CVA and bilateral PEs   Possibly ACS however cardiology not pursuing ischemic evaluation at this time.  Suspect non-MI troponin elevation from multiple active medical conditions  EKG with sinus rhythm and lateral ST-T wave abnormality  TTE with LVEF 50-55% with with wall motion abnormality  CTA notes significant diffuse coronary calcifications  Outpatient cardiology follow-up  Slow transit constipation  Per chart review last BM today  Continue bowel regimen  Acute respiratory  failure (HCC)  Intubated on admission for airway protection, extubated on 12/8/2024  Chest imaging with pulmonary edema bilateral pleural effusion  R Thoracentesis 12/11/24: Removed 500 mL , cultures negative, , Triglycerides 34, Total Protein  <3.0, pH 7.5  Continue to wean oxygen as tolerated  Airway precautions  Respiratory protocol  CPAP overnight as needed  Leukocytosis  Suspect reactive from multiple acute active medical issues  MRSA negative  Blood cultures with no growth after 5 days  Sputum cultures with normal respiratory roberto  Completed 3 days of IV cefazolin and vancomycin, discontinued on 12/15 as low suspicion of infection  Continue to monitor hemodynamics, will check procalcitonin in AM    VTE Pharmacologic Prophylaxis: VTE Score: 13 High Risk (Score >/= 5) - Pharmacological DVT Prophylaxis Ordered: heparin drip. Sequential Compression Devices Ordered.    Mobility:   Basic Mobility Inpatient Raw Score: 16  JH-HLM Goal: 5: Stand one or more mins  JH-HLM Achieved: 2: Bed activities/Dependent transfer  JH-HLM Goal NOT achieved. Continue with multidisciplinary rounding and encourage appropriate mobility to improve upon JH-HLM goals.    Patient Centered Rounds: I performed bedside rounds with nursing staff today.   Discussions with Specialists or Other Care Team Provider: neurology, neurosurgery    Education and Discussions with Family / Patient:   .     Current Length of Stay: 10 day(s)  Current Patient Status: Inpatient   Certification Statement: The patient will continue to require additional inpatient hospital stay due to need for IV heparin, neurological checks  Discharge Plan:  when cleared by neurology    Code Status: Level 1 - Full Code    Subjective   Seen examined at bedside.  She offers no complaints.    Objective :  Temp:  [99 °F (37.2 °C)-100 °F (37.8 °C)] 100 °F (37.8 °C)  HR:  [] 94  BP: (122-130)/(57-70) 122/66  Resp:  [18] 18  SpO2:  [94 %-96 %] 95 %  O2 Device: None (Room  air)    Body mass index is 18.92 kg/m².     Input and Output Summary (last 24 hours):     Intake/Output Summary (Last 24 hours) at 12/17/2024 1523  Last data filed at 12/17/2024 1100  Gross per 24 hour   Intake 1543.51 ml   Output --   Net 1543.51 ml       Physical Exam  Vitals and nursing note reviewed.   Constitutional:       General: She is not in acute distress.     Appearance: She is ill-appearing.   HENT:      Head: Normocephalic and atraumatic.   Eyes:      Conjunctiva/sclera: Conjunctivae normal.   Cardiovascular:      Rate and Rhythm: Normal rate and regular rhythm.   Pulmonary:      Effort: No respiratory distress.      Breath sounds: No wheezing or rhonchi.   Abdominal:      General: Bowel sounds are normal. There is no distension.      Palpations: Abdomen is soft.      Tenderness: There is no abdominal tenderness.   Musculoskeletal:      Right lower leg: No edema.      Left lower leg: No edema.   Skin:     General: Skin is warm and dry.   Neurological:      Mental Status: She is alert.      Motor: Weakness (LLE, LUE) present.      Comments: Left facial droop             Lines/Drains:  Lines/Drains/Airways       Active Status       Name Placement date Placement time Site Days    PICC Line 12/10/24 12/10/24  1657  --  6    NG/OG/Enteral Tube Nasogastric 12 Fr Right nare 12/08/24 2129  Right nare  8                    Central Line:  Goal for removal: Will discontinue when peripheral access obtained.                Lab Results: I have reviewed the following results:   Results from last 7 days   Lab Units 12/17/24  0443   WBC Thousand/uL 20.64*   HEMOGLOBIN g/dL 11.1*   HEMATOCRIT % 34.3*   PLATELETS Thousands/uL 172   SEGS PCT % 82*   LYMPHO PCT % 8*   MONO PCT % 8   EOS PCT % 1     Results from last 7 days   Lab Units 12/17/24  0443 12/16/24  0311   SODIUM mmol/L 144 145   POTASSIUM mmol/L 3.4* 3.5   CHLORIDE mmol/L 109* 110*   CO2 mmol/L 24 28   BUN mg/dL 22 24   CREATININE mg/dL 0.46* 0.51*   ANION GAP  mmol/L 11 7   CALCIUM mg/dL 9.3 8.9   ALBUMIN g/dL  --  3.3*   TOTAL BILIRUBIN mg/dL  --  0.44   ALK PHOS U/L  --  90   ALT U/L  --  34   AST U/L  --  33   GLUCOSE RANDOM mg/dL 119 137         Results from last 7 days   Lab Units 12/17/24  1346 12/17/24  0645 12/16/24  2356 12/16/24  1742 12/16/24  1204 12/16/24  0615 12/16/24  0011 12/15/24  1641 12/15/24  1300 12/14/24  2341 12/14/24  1755 12/14/24  1153   POC GLUCOSE mg/dl 128 139 133 126 132 138 142* 124 140 161* 128 152*               Recent Cultures (last 7 days):   Results from last 7 days   Lab Units 12/13/24  0711 12/11/24  1329   SPUTUM CULTURE  4+ Growth of Staphylococcus aureus*  --    GRAM STAIN RESULT  2+ Polys*  3+ Gram positive cocci in pairs* Rare Mononuclear Cells  No bacteria seen   BODY FLUID CULTURE, STERILE   --  No growth             Last 24 Hours Medication List:     Current Facility-Administered Medications:     acetaminophen (TYLENOL) oral suspension 650 mg, Q6H PRN    albuterol inhalation solution 2.5 mg, Q4H PRN    aspirin chewable tablet 81 mg, Daily    atorvastatin (LIPITOR) tablet 20 mg, QPM    bisacodyl (DULCOLAX) rectal suppository 10 mg, Daily PRN    chlorhexidine (PERIDEX) 0.12 % oral rinse 15 mL, Q12H DANIS    furosemide (LASIX) tablet 40 mg, Daily PRN    heparin (porcine) 25,000 units in 0.45% NaCl 250 mL infusion (premix), Titrated, Last Rate: 18 Units/kg/hr (12/17/24 1521)    insulin lispro (HumALOG/ADMELOG) 100 units/mL subcutaneous injection 1-6 Units, Q6H DANIS **AND** Fingerstick Glucose (POCT), Q6H    melatonin tablet 6 mg, HS    metoprolol tartrate (LOPRESSOR) partial tablet 12.5 mg, Q12H DANIS    modafinil (PROVIGIL) tablet 100 mg, Daily    polyethylene glycol (MIRALAX) packet 17 g, Daily    senna-docusate sodium (SENOKOT S) 8.6-50 mg per tablet 2 tablet, BID    sertraline (ZOLOFT) tablet 50 mg, Daily    Administrative Statements   Today, Patient Was Seen By: Mignon Sun MD  I have spent a total time of 45 minutes in  caring for this patient on the day of the visit/encounter including Patient and family education, Documenting in the medical record, Reviewing / ordering tests, medicine, procedures  , Obtaining or reviewing history  , and Communicating with other healthcare professionals .    **Please Note: This note may have been constructed using a voice recognition system.**

## 2024-12-17 NOTE — PLAN OF CARE
Problem: PAIN - ADULT  Goal: Verbalizes/displays adequate comfort level or baseline comfort level  Description: Interventions:  - Encourage patient to monitor pain and request assistance  - Assess pain using appropriate pain scale  - Administer analgesics based on type and severity of pain and evaluate response  - Implement non-pharmacological measures as appropriate and evaluate response  - Consider cultural and social influences on pain and pain management  - Notify physician/advanced practitioner if interventions unsuccessful or patient reports new pain  Outcome: Progressing     Problem: INFECTION - ADULT  Goal: Absence or prevention of progression during hospitalization  Description: INTERVENTIONS:  - Assess and monitor for signs and symptoms of infection  - Monitor lab/diagnostic results  - Monitor all insertion sites, i.e. indwelling lines, tubes, and drains  - Monitor endotracheal if appropriate and nasal secretions for changes in amount and color  - [place] appropriate cooling/warming therapies per order  - Administer medications as ordered  - Instruct and encourage patient and family to use good hand hygiene technique  - Identify and instruct in appropriate isolation precautions for identified infection/condition  Outcome: Progressing  Goal: Absence of fever/infection during neutropenic period  Description: INTERVENTIONS:  - Monitor WBC    Outcome: Progressing     Problem: SAFETY ADULT  Goal: Patient will remain free of falls  Description: INTERVENTIONS:  - Educate patient/family on patient safety including physical limitations  - Instruct patient to call for assistance with activity   - Consult OT/PT to assist with strengthening/mobility   - Keep Call bell within reach  - Keep bed low and locked with side rails adjusted as appropriate  - Keep care items and personal belongings within reach  - Initiate and maintain comfort rounds  - Make Fall Risk Sign visible to staff  - Offer Toileting every 2 Hours,  in advance of need  - Initiate/Maintain bed alarm  - Obtain necessary fall risk management equipment: non skid footwear  - Apply yellow socks and bracelet for high fall risk patients  - Consider moving patient to room near nurses station  Outcome: Progressing  Goal: Maintain or return to baseline ADL function  Description: INTERVENTIONS:  -  Assess patient's ability to carry out ADLs; assess patient's baseline for ADL function and identify physical deficits which impact ability to perform ADLs (bathing, care of mouth/teeth, toileting, grooming, dressing, etc.)  - Assess/evaluate cause of self-care deficits   - Assess range of motion  - Assess patient's mobility; develop plan if impaired  - Assess patient's need for assistive devices and provide as appropriate  - Encourage maximum independence but intervene and supervise when necessary  - Involve family in performance of ADLs  - Assess for home care needs following discharge   - Consider OT consult to assist with ADL evaluation and planning for discharge  - Provide patient education as appropriate  Outcome: Progressing  Goal: Maintains/Returns to pre admission functional level  Description: INTERVENTIONS:  - Perform AM-PAC 6 Click Basic Mobility/ Daily Activity assessment daily.  - Set and communicate daily mobility goal to care team and patient/family/caregiver.   - Collaborate with rehabilitation services on mobility goals if consulted  - Perform Range of Motion 3 times a day.  - Reposition patient every 2 hours.  - Dangle patient 3 times a day  - Record patient progress and toleration of activity level   Outcome: Progressing     Problem: DISCHARGE PLANNING  Goal: Discharge to home or other facility with appropriate resources  Description: INTERVENTIONS:  - Identify barriers to discharge w/patient and caregiver  - Arrange for needed discharge resources and transportation as appropriate  - Identify discharge learning needs (meds, wound care, etc.)  - Arrange for  interpretive services to assist at discharge as needed  - Refer to Case Management Department for coordinating discharge planning if the patient needs post-hospital services based on physician/advanced practitioner order or complex needs related to functional status, cognitive ability, or social support system  Outcome: Progressing     Problem: Knowledge Deficit  Goal: Patient/family/caregiver demonstrates understanding of disease process, treatment plan, medications, and discharge instructions  Description: Complete learning assessment and assess knowledge base.  Interventions:  - Provide teaching at level of understanding  - Provide teaching via preferred learning methods  Outcome: Progressing     Problem: Prexisting or High Potential for Compromised Skin Integrity  Goal: Skin integrity is maintained or improved  Description: INTERVENTIONS:  - Identify patients at risk for skin breakdown  - Assess and monitor skin integrity  - Assess and monitor nutrition and hydration status  - Monitor labs   - Assess for incontinence   - Turn and reposition patient  - Assist with mobility/ambulation  - Relieve pressure over bony prominences  - Avoid friction and shearing  - Provide appropriate hygiene as needed including keeping skin clean and dry  - Evaluate need for skin moisturizer/barrier cream  - Collaborate with interdisciplinary team   - Patient/family teaching  - Consider wound care consult   Outcome: Progressing     Problem: SAFETY,RESTRAINT: NV/NON-SELF DESTRUCTIVE BEHAVIOR  Goal: Remains free of harm/injury (restraint for non violent/non self-detsructive behavior)  Description: INTERVENTIONS:  - Instruct patient/family regarding restraint use   - Assess and monitor physiologic and psychological status   - Provide interventions and comfort measures to meet assessed patient needs   - Identify and implement measures to help patient regain control  - Assess readiness for release of restraint   Outcome: Completed  Goal:  Returns to optimal restraint-free functioning  Description: INTERVENTIONS:  - Assess the patient's behavior and symptoms that indicate continued need for restraint  - Identify and implement measures to help patient regain control  - Assess readiness for release of restraint   Outcome: Completed     Problem: Potential for Falls  Goal: Patient will remain free of falls  Description: INTERVENTIONS:  - Educate patient/family on patient safety including physical limitations  - Instruct patient to call for assistance with activity   - Consult OT/PT to assist with strengthening/mobility   - Keep Call bell within reach  - Keep bed low and locked with side rails adjusted as appropriate  - Keep care items and personal belongings within reach  - Initiate and maintain comfort rounds  - Make Fall Risk Sign visible to staff  - Offer Toileting every 2 Hours, in advance of need  - Initiate/Maintain bed alarm  - Obtain necessary fall risk management equipment: non skid footwear  - Apply yellow socks and bracelet for high fall risk patients  - Consider moving patient to room near nurses station  Outcome: Progressing     Problem: COPING  Goal: Pt/Family able to verbalize concerns and demonstrate effective coping strategies  Description: INTERVENTIONS:  - Assist patient/family to identify coping skills, available support systems and cultural and spiritual values  - Provide emotional support, including active listening and acknowledgement of concerns of patient and caregivers  - Reduce environmental stimuli, as able  - Provide patient education  - Assess for spiritual pain/suffering and initiate spiritual care, including notification of Pastoral Care or [facility] based community as needed  - Assess effectiveness of coping strategies  Outcome: Progressing  Goal: Will report anxiety at manageable levels  Description: INTERVENTIONS:  - Administer medication as ordered  - Teach and encourage coping skills  - Provide emotional support  - Assess  patient/family for anxiety and ability to cope  Outcome: Progressing     Problem: Nutrition/Hydration-ADULT  Goal: Nutrient/Hydration intake appropriate for improving, restoring or maintaining nutritional needs  Description: Monitor and assess patient's nutrition/hydration status for malnutrition. Collaborate with interdisciplinary team and initiate plan and interventions as ordered.  Monitor patient's weight and dietary intake as ordered or per policy. Utilize nutrition screening tool and intervene as necessary. Determine patient's food preferences and provide high-protein, high-caloric foods as appropriate.     INTERVENTIONS:  - Monitor oral intake, urinary output, labs, and treatment plans  - Assess nutrition and hydration status and recommend course of action  - Evaluate amount of meals eaten  - Assist patient with eating if necessary   - Allow adequate time for meals  - Recommend/ encourage appropriate diets, oral nutritional supplements, and vitamin/mineral supplements  - Order, calculate, and assess calorie counts as needed  - Recommend, monitor, and adjust tube feedings and TPN/PPN based on assessed needs  - Assess need for intravenous fluids  - Provide specific nutrition/hydration education as appropriate  - Include patient/family/caregiver in decisions related to nutrition  Outcome: Progressing     Problem: NEUROSENSORY - ADULT  Goal: Achieves stable or improved neurological status  Description: INTERVENTIONS  - Monitor and report changes in neurological status  - Monitor vital signs such as temperature, blood pressure, glucose, and any other labs ordered   - Initiate measures to prevent increased intracranial pressure  - Monitor for seizure activity and implement precautions if appropriate      Outcome: Progressing  Goal: Achieves maximal functionality and self care  Description: INTERVENTIONS  - Monitor swallowing and airway patency with patient fatigue and changes in neurological status  - Encourage and  assist patient to increase activity and self care.   - Encourage visually impaired, hearing impaired and aphasic patients to use assistive/communication devices  Outcome: Progressing     Problem: CARDIOVASCULAR - ADULT  Goal: Maintains optimal cardiac output and hemodynamic stability  Description: INTERVENTIONS:  - Monitor I/O, vital signs and rhythm  - Monitor for S/S and trends of decreased cardiac output  - Administer and titrate ordered vasoactive medications to optimize hemodynamic stability  - Assess quality of pulses, skin color and temperature  - Assess for signs of decreased coronary artery perfusion  - Instruct patient to report change in severity of symptoms  Outcome: Progressing  Goal: Absence of cardiac dysrhythmias or at baseline rhythm  Description: INTERVENTIONS:  - Continuous cardiac monitoring, vital signs, obtain 12 lead EKG if ordered  - Administer antiarrhythmic and heart rate control medications as ordered  - Monitor electrolytes and administer replacement therapy as ordered  Outcome: Progressing     Problem: RESPIRATORY - ADULT  Goal: Achieves optimal ventilation and oxygenation  Description: INTERVENTIONS:  - Assess for changes in respiratory status  - Assess for changes in mentation and behavior  - Position to facilitate oxygenation and minimize respiratory effort  - Oxygen administered by appropriate delivery if ordered  - Initiate smoking cessation education as indicated  - Encourage broncho-pulmonary hygiene including cough, deep breathe, Incentive Spirometry  - Assess the need for suctioning and aspirate as needed  - Assess and instruct to report SOB or any respiratory difficulty  - Respiratory Therapy support as indicated  Outcome: Progressing     Problem: GASTROINTESTINAL - ADULT  Goal: Maintains or returns to baseline bowel function  Description: INTERVENTIONS:  - Assess bowel function  - Encourage oral fluids to ensure adequate hydration  - Administer IV fluids if ordered to ensure  adequate hydration  - Administer ordered medications as needed  - Encourage mobilization and activity  - Consider nutritional services referral to assist patient with adequate nutrition and appropriate food choices  Outcome: Progressing  Goal: Maintains adequate nutritional intake  Description: INTERVENTIONS:  - Monitor percentage of each meal consumed  - Identify factors contributing to decreased intake, treat as appropriate  - Assist with meals as needed  - Monitor I&O, weight, and lab values if indicated  - Obtain nutrition services referral as needed  Outcome: Progressing     Problem: GENITOURINARY - ADULT  Goal: Maintains or returns to baseline urinary function  Description: INTERVENTIONS:  - Assess urinary function  - Encourage oral fluids to ensure adequate hydration if ordered  - Administer IV fluids as ordered to ensure adequate hydration  - Administer ordered medications as needed  - Offer frequent toileting  - Follow urinary retention protocol if ordered  Outcome: Progressing  Goal: Absence of urinary retention  Description: INTERVENTIONS:  - Assess patient's ability to void and empty bladder  - Monitor I/O  - Bladder scan as needed  - Discuss with physician/AP medications to alleviate retention as needed  - Discuss catheterization for long term situations as appropriate  Outcome: Progressing     Problem: METABOLIC, FLUID AND ELECTROLYTES - ADULT  Goal: Electrolytes maintained within normal limits  Description: INTERVENTIONS:  - Monitor labs and assess patient for signs and symptoms of electrolyte imbalances  - Administer electrolyte replacement as ordered  - Monitor response to electrolyte replacements, including repeat lab results as appropriate  - Instruct patient on fluid and nutrition as appropriate  Outcome: Progressing  Goal: Fluid balance maintained  Description: INTERVENTIONS:  - Monitor labs   - Monitor I/O and WT  - Instruct patient on fluid and nutrition as appropriate  - Assess for signs &  symptoms of volume excess or deficit  Outcome: Progressing  Goal: Glucose maintained within target range  Description: INTERVENTIONS:  - Monitor Blood Glucose as ordered  - Assess for signs and symptoms of hyperglycemia and hypoglycemia  - Administer ordered medications to maintain glucose within target range  - Assess nutritional intake and initiate nutrition service referral as needed  Outcome: Progressing     Problem: SKIN/TISSUE INTEGRITY - ADULT  Goal: Skin Integrity remains intact(Skin Breakdown Prevention)  Description: Assess:  -Perform Geovani assessment every shift   -Clean and moisturize skin every shift   -Inspect skin when repositioning, toileting, and assisting with ADLS  -Assess under medical devices such as paula every hour  -Assess extremities for adequate circulation and sensation     Bed Management:  -Have minimal linens on bed & keep smooth, unwrinkled  -Change linens as needed when moist or perspiring  -Avoid sitting or lying in one position for more than 45 hours while in bed  -Keep HOB at 45degrees     Toileting:  -Offer bedside commode  -Assess for incontinence every hour  -Use incontinent care products after each incontinent episode such as moisture barrier     Activity:  --Encourage or provide ROM exercises   -Turn and reposition patient every 2 Hours  -Use appropriate equipment to lift or move patient in bed  -Instruct/ Assist with weight shifting every hour when out of bed in chair  -Consider limitation of chair time 2 hour intervals    Skin Care:  -Avoid use of baby powder, tape, friction and shearing, hot water or constrictive clothing  -Relieve pressure over bony prominences using mepilx  -Do not massage red bony areas    Next Steps:  -Teach patient strategies to minimize risks such as weight shifting    -Consider consults to  interdisciplinary teams such as PT/OT  Outcome: Progressing  Goal: Incision(s), wounds(s) or drain site(s) healing without S/S of infection  Description:  INTERVENTIONS  - Assess and document dressing, incision, wound bed, drain sites and surrounding tissue  - Provide patient and family education  - Perform skin care/dressing changes every shift  Outcome: Progressing     Problem: HEMATOLOGIC - ADULT  Goal: Maintains hematologic stability  Description: INTERVENTIONS  - Assess for signs and symptoms of bleeding or hemorrhage  - Monitor labs  - Administer supportive blood products/factors as ordered and appropriate  Outcome: Progressing     Problem: MUSCULOSKELETAL - ADULT  Goal: Maintain or return mobility to safest level of function  Description: INTERVENTIONS:  - Assess patient's ability to carry out ADLs; assess patient's baseline for ADL function and identify physical deficits which impact ability to perform ADLs (bathing, care of mouth/teeth, toileting, grooming, dressing, etc.)  - Assess/evaluate cause of self-care deficits   - Assess range of motion  - Assess patient's mobility  - Assess patient's need for assistive devices and provide as appropriate  - Encourage maximum independence but intervene and supervise when necessary  - Involve family in performance of ADLs  - Assess for home care needs following discharge   - Consider OT consult to assist with ADL evaluation and planning for discharge  - Provide patient education as appropriate  Outcome: Progressing  Goal: Maintain proper alignment of affected body part  Description: INTERVENTIONS:  - Support, maintain and protect limb and body alignment  - Provide patient/ family with appropriate education  Outcome: Progressing     Problem: Neurological Deficit  Goal: Neurological status is stable or improving  Description: Interventions:  - Monitor and assess patient's level of consciousness, motor function, sensory function, and level of assistance needed for ADLs.   - Monitor and report changes from baseline. Collaborate with interdisciplinary team to initiate plan and implement interventions as ordered.   - Provide  and maintain a safe environment.  - Consider seizure precautions.  - Consider fall precautions.  - Consider aspiration precautions.  - Consider bleeding precautions.  Outcome: Progressing     Problem: Activity Intolerance/Impaired Mobility  Goal: Mobility/activity is maintained at optimum level for patient  Description: Interventions:  - Assess and monitor patient  barriers to mobility and need for assistive/adaptive devices.  - Assess patient's emotional response to limitations.  - Collaborate with interdisciplinary team and initiate plans and interventions as ordered.  - Encourage independent activity per ability.  - Maintain proper body alignment.  - Perform active/passive rom as tolerated/ordered.  - Plan activities to conserve energy.  - Turn patient as appropriate  Outcome: Progressing     Problem: Communication Impairment  Goal: Ability to express needs and understand communication  Description: Assess patient's communication skills and ability to understand information.  Patient will demonstrate use of effective communication techniques, alternative methods of communication and understanding even if not able to speak.     - Encourage communication and provide alternate methods of communication as needed.  - Collaborate with case management/ for discharge needs.  - Include patient/family/caregiver in decisions related to communication.  Outcome: Progressing     Problem: Potential for Aspiration  Goal: Non-ventilated patient's risk of aspiration is minimized  Description: Assess and monitor vital signs, respiratory status, and labs (WBC).  Monitor for signs of aspiration (tachypnea, cough, rales, wheezing, cyanosis, fever).    - Assess and monitor patient's ability to swallow.  - Place patient up in chair to eat if possible.  - HOB up at 90 degrees to eat if unable to get patient up into chair.  - Supervise patient during oral intake.   - Instruct patient/ family to take small bites.  - Instruct  patient/ family to take small single sips when taking liquids.  - Follow patient-specific strategies generated by speech pathologist.  Outcome: Progressing     Problem: Nutrition  Goal: Nutrition/Hydration status is improving  Description: Monitor and assess patient's nutrition/hydration status for malnutrition (ex- brittle hair, bruises, dry skin, pale skin and conjunctiva, muscle wasting, smooth red tongue, and disorientation). Collaborate with interdisciplinary team and initiate plan and interventions as ordered.  Monitor patient's weight and dietary intake as ordered or per policy. Utilize nutrition screening tool and intervene per policy. Determine patient's food preferences and provide high-protein, high-caloric foods as appropriate.     - Assist patient with eating.  - Allow adequate time for meals.  - Encourage patient to take dietary supplement as ordered.  - Collaborate with clinical nutritionist.  - Include patient/family/caregiver in decisions related to nutrition.  Outcome: Progressing

## 2024-12-17 NOTE — PHYSICAL THERAPY NOTE
Physical Therapy Progress Note     12/17/24 1045   PT Last Visit   PT Visit Date 12/17/24   Note Type   Note Type Treatment   Pain Assessment   Pain Assessment Tool FLACC   Pain Rating: FLACC (Rest) - Face 1   Pain Rating: FLACC (Rest) - Legs 1   Pain Rating: FLACC (Rest) - Activity 1   Pain Rating: FLACC (Rest) - Cry 1   Pain Rating: FLACC (Rest) - Consolability 1   Score: FLACC (Rest) 5   Pain Rating: FLACC (Activity) - Face 1   Pain Rating: FLACC (Activity) - Legs 1   Pain Rating: FLACC (Activity) - Activity 1   Pain Rating: FLACC (Activity) - Cry 1   Pain Rating: FLACC (Activity) - Consolability 1   Score: FLACC (Activity) 5   Restrictions/Precautions   Other Precautions Fall Risk;Pain;Telemetry;Multiple lines;Aspiration;Bed Alarm;Chair Alarm;Cognitive  (Alarm active post session.)   Subjective   Subjective The patient minimally verbal.   Bed Mobility   Supine to Sit 3  Moderate assistance   Additional items Assist x 2;Increased time required;Verbal cues;LE management   Sit to Supine 3  Moderate assistance   Additional items Assist x 2;Increased time required;Verbal cues;LE management   Transfers   Sit to Stand 3  Moderate assistance   Additional items Assist x 2;Increased time required;Verbal cues   Stand to Sit 3  Moderate assistance   Additional items Assist x 2;Increased time required;Verbal cues   Balance   Static Sitting Poor +   Dynamic Sitting Poor   Static Standing Poor -   Activity Tolerance   Activity Tolerance Patient tolerated treatment well;Patient limited by fatigue   Medical Staff Made Aware Co-treat with occupational therapy due to patient's high impulsivity as well as limited activity tolerance.   Nurse Made Aware NOEMI Sainz.   Assessment   Prognosis Fair   Problem List Decreased strength;Decreased range of motion;Decreased endurance;Impaired balance;Decreased mobility;Decreased coordination;Decreased cognition;Decreased safety awareness;Impaired judgement;Impaired vision;Impaired  hearing;Impaired tone;Impaired sensation;Decreased skin integrity;Pain   Assessment The patient was restless today, and continued assistance was necessary throughout both physically and for her cognitive impulsivity. She had varying levels of assistance due to these factors as well. The patient had difficulty following commands at times, and she was highly distractable. She was able to stand for forty-two seconds, but she was unable to take any steps today. She was returned to the bed after being cleaned and positioned with wedges as well as pillows elevating her arms and heels off of the bed.   Barriers to Discharge Inaccessible home environment;Decreased caregiver support   Goals   Patient Goals Pt. did not express.   STG Expiration Date 12/21/24   PT Treatment Day 1   Plan   Treatment/Interventions Functional transfer training;LE strengthening/ROM;Therapeutic exercise;Endurance training;Cognitive reorientation;Patient/family training;Bed mobility;Gait training   Progress Slow progress, cognitive deficits   PT Frequency 3-5x/wk   Discharge Recommendation   Rehab Resource Intensity Level, PT II (Moderate Resource Intensity)   AM-PAC Basic Mobility Inpatient   Turning in Flat Bed Without Bedrails 3   Lying on Back to Sitting on Edge of Flat Bed Without Bedrails 1   Moving Bed to Chair 1   Standing Up From Chair Using Arms 1   Walk in Room 1   Climb 3-5 Stairs With Railing 1   Basic Mobility Inpatient Raw Score 8   Turning Head Towards Sound 3   Follow Simple Instructions 2   Low Function Basic Mobility Raw Score  13   Low Function Basic Mobility Standardized Score  20.14   [facility] Highest Level Of Mobility   -HLM Goal 3: Sit at edge of bed   -HLM Achieved 3: Sit at edge of bed         An AM-PAC Basic Mobility raw score less than 16 suggests the patient may benefit from discharge to post-acute rehab services.    Braydon Botello, PTA

## 2024-12-17 NOTE — NUTRITION
12/17/24 0911   Recommendations/Interventions   Recommendations to Provider Consider adjusting tube feed to Jevity 1.2 @ 60 mL/hr x 24 hours. Remove prosource protein liquid. Provides 1440 mL formula, 1728 calories, 80 g protein, 1162 mL water from TF formula. Additonal flushes per primary team. Please obtain daily weights.

## 2024-12-17 NOTE — ASSESSMENT & PLAN NOTE
Presented as a stroke alert [facility] on 12/7 for left-sided weakness and altered mental status.  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  Also with right carotid stenosis s/p angioplasty with stenting.  She was admitted to the ICU where she required pressor support and Precedex for episodes of agitation  Most recent CT head on 12/14 with stable right basal ganglia parenchymal hemorrhage  Patient was started on heparin drip stroke protocol PTT goal 50-70 and aspirin 81 mg  Provigil started, currently on 100 mg daily  Continue neurochecks  Neurosurgery following peripherally  Neurology signed off on 12/9  Goal SBP below 140 mmHg  Discussed with neurosurgery and neurology on 12/17 about continuation of heparin and transition to oral a/c: advised to continue stroke protocol heparin drip, repeat CT Head no contrast on 12/21/24 (2 weeks from date of initial bleeding), based on CT results would decide on therapeutic AC timing.  STAT CT Head no contrast if any acute worsening/ neurological change

## 2024-12-17 NOTE — PLAN OF CARE
Problem: PAIN - ADULT  Goal: Verbalizes/displays adequate comfort level or baseline comfort level  Description: Interventions:  - Encourage patient to monitor pain and request assistance  - Assess pain using appropriate pain scale  - Administer analgesics based on type and severity of pain and evaluate response  - Implement non-pharmacological measures as appropriate and evaluate response  - Consider cultural and social influences on pain and pain management  - Notify physician/advanced practitioner if interventions unsuccessful or patient reports new pain  Outcome: Progressing     Problem: INFECTION - ADULT  Goal: Absence or prevention of progression during hospitalization  Description: INTERVENTIONS:  - Assess and monitor for signs and symptoms of infection  - Monitor lab/diagnostic results  - Monitor all insertion sites, i.e. indwelling lines, tubes, and drains  - Monitor endotracheal if appropriate and nasal secretions for changes in amount and color  - Maple appropriate cooling/warming therapies per order  - Administer medications as ordered  - Instruct and encourage patient and family to use good hand hygiene technique  - Identify and instruct in appropriate isolation precautions for identified infection/condition  Outcome: Progressing  Goal: Absence of fever/infection during neutropenic period  Description: INTERVENTIONS:  - Monitor WBC    Outcome: Progressing     Problem: SAFETY ADULT  Goal: Patient will remain free of falls  Description: INTERVENTIONS:  - Educate patient/family on patient safety including physical limitations  - Instruct patient to call for assistance with activity   - Consult OT/PT to assist with strengthening/mobility   - Keep Call bell within reach  - Keep bed low and locked with side rails adjusted as appropriate  - Keep care items and personal belongings within reach  - Initiate and maintain comfort rounds  - Make Fall Risk Sign visible to staff  - Offer Toileting every 2 Hours,  in advance of need  - Initiate/Maintain bed alarm  - Obtain necessary fall risk management equipment: bed alarm  - Apply yellow socks and bracelet for high fall risk patients  - Consider moving patient to room near nurses station  Outcome: Progressing  Goal: Maintain or return to baseline ADL function  Description: INTERVENTIONS:  -  Assess patient's ability to carry out ADLs; assess patient's baseline for ADL function and identify physical deficits which impact ability to perform ADLs (bathing, care of mouth/teeth, toileting, grooming, dressing, etc.)  - Assess/evaluate cause of self-care deficits   - Assess range of motion  - Assess patient's mobility; develop plan if impaired  - Assess patient's need for assistive devices and provide as appropriate  - Encourage maximum independence but intervene and supervise when necessary  - Involve family in performance of ADLs  - Assess for home care needs following discharge   - Consider OT consult to assist with ADL evaluation and planning for discharge  - Provide patient education as appropriate  Outcome: Progressing  Goal: Maintains/Returns to pre admission functional level  Description: INTERVENTIONS:  - Perform AM-PAC 6 Click Basic Mobility/ Daily Activity assessment daily.  - Set and communicate daily mobility goal to care team and patient/family/caregiver.   - Collaborate with rehabilitation services on mobility goals if consulted  - Perform Range of Motion 3 times a day.  - Reposition patient every 2 hours.  - Dangle patient 3 times a day  - Stand patient 3 times a day  - Ambulate patient 3 times a day  - Out of bed to chair 3 times a day   - Out of bed for meals 3 times a day  - Out of bed for toileting  - Record patient progress and toleration of activity level   Outcome: Progressing     Problem: DISCHARGE PLANNING  Goal: Discharge to home or other facility with appropriate resources  Description: INTERVENTIONS:  - Identify barriers to discharge w/patient and  caregiver  - Arrange for needed discharge resources and transportation as appropriate  - Identify discharge learning needs (meds, wound care, etc.)  - Arrange for interpretive services to assist at discharge as needed  - Refer to Case Management Department for coordinating discharge planning if the patient needs post-hospital services based on physician/advanced practitioner order or complex needs related to functional status, cognitive ability, or social support system  Outcome: Progressing     Problem: Knowledge Deficit  Goal: Patient/family/caregiver demonstrates understanding of disease process, treatment plan, medications, and discharge instructions  Description: Complete learning assessment and assess knowledge base.  Interventions:  - Provide teaching at level of understanding  - Provide teaching via preferred learning methods  Outcome: Progressing     Problem: Prexisting or High Potential for Compromised Skin Integrity  Goal: Skin integrity is maintained or improved  Description: INTERVENTIONS:  - Identify patients at risk for skin breakdown  - Assess and monitor skin integrity  - Assess and monitor nutrition and hydration status  - Monitor labs   - Assess for incontinence   - Turn and reposition patient  - Assist with mobility/ambulation  - Relieve pressure over bony prominences  - Avoid friction and shearing  - Provide appropriate hygiene as needed including keeping skin clean and dry  - Evaluate need for skin moisturizer/barrier cream  - Collaborate with interdisciplinary team   - Patient/family teaching  - Consider wound care consult   Outcome: Progressing     Problem: SAFETY,RESTRAINT: NV/NON-SELF DESTRUCTIVE BEHAVIOR  Goal: Remains free of harm/injury (restraint for non violent/non self-detsructive behavior)  Description: INTERVENTIONS:  - Instruct patient/family regarding restraint use   - Assess and monitor physiologic and psychological status   - Provide interventions and comfort measures to meet  assessed patient needs   - Identify and implement measures to help patient regain control  - Assess readiness for release of restraint   Outcome: Progressing  Goal: Returns to optimal restraint-free functioning  Description: INTERVENTIONS:  - Assess the patient's behavior and symptoms that indicate continued need for restraint  - Identify and implement measures to help patient regain control  - Assess readiness for release of restraint   Outcome: Progressing     Problem: Potential for Falls  Goal: Patient will remain free of falls  Description: INTERVENTIONS:  - Educate patient/family on patient safety including physical limitations  - Instruct patient to call for assistance with activity   - Consult OT/PT to assist with strengthening/mobility   - Keep Call bell within reach  - Keep bed low and locked with side rails adjusted as appropriate  - Keep care items and personal belongings within reach  - Initiate and maintain comfort rounds  - Make Fall Risk Sign visible to staff  - Offer Toileting every 2 Hours, in advance of need  - Initiate/Maintain bed alarm  - Obtain necessary fall risk management equipment: bed alarm   - Apply yellow socks and bracelet for high fall risk patients  - Consider moving patient to room near nurses station  Outcome: Progressing     Problem: COPING  Goal: Pt/Family able to verbalize concerns and demonstrate effective coping strategies  Description: INTERVENTIONS:  - Assist patient/family to identify coping skills, available support systems and cultural and spiritual values  - Provide emotional support, including active listening and acknowledgement of concerns of patient and caregivers  - Reduce environmental stimuli, as able  - Provide patient education  - Assess for spiritual pain/suffering and initiate spiritual care, including notification of Pastoral Care or april based community as needed  - Assess effectiveness of coping strategies  Outcome: Progressing  Goal: Will report anxiety at  manageable levels  Description: INTERVENTIONS:  - Administer medication as ordered  - Teach and encourage coping skills  - Provide emotional support  - Assess patient/family for anxiety and ability to cope  Outcome: Progressing     Problem: Nutrition/Hydration-ADULT  Goal: Nutrient/Hydration intake appropriate for improving, restoring or maintaining nutritional needs  Description: Monitor and assess patient's nutrition/hydration status for malnutrition. Collaborate with interdisciplinary team and initiate plan and interventions as ordered.  Monitor patient's weight and dietary intake as ordered or per policy. Utilize nutrition screening tool and intervene as necessary. Determine patient's food preferences and provide high-protein, high-caloric foods as appropriate.     INTERVENTIONS:  - Monitor oral intake, urinary output, labs, and treatment plans  - Assess nutrition and hydration status and recommend course of action  - Evaluate amount of meals eaten  - Assist patient with eating if necessary   - Allow adequate time for meals  - Recommend/ encourage appropriate diets, oral nutritional supplements, and vitamin/mineral supplements  - Order, calculate, and assess calorie counts as needed  - Recommend, monitor, and adjust tube feedings and TPN/PPN based on assessed needs  - Assess need for intravenous fluids  - Provide specific nutrition/hydration education as appropriate  - Include patient/family/caregiver in decisions related to nutrition  Outcome: Progressing     Problem: NEUROSENSORY - ADULT  Goal: Achieves stable or improved neurological status  Description: INTERVENTIONS  - Monitor and report changes in neurological status  - Monitor vital signs such as temperature, blood pressure, glucose, and any other labs ordered   - Initiate measures to prevent increased intracranial pressure  - Monitor for seizure activity and implement precautions if appropriate      Outcome: Progressing  Goal: Achieves maximal  functionality and self care  Description: INTERVENTIONS  - Monitor swallowing and airway patency with patient fatigue and changes in neurological status  - Encourage and assist patient to increase activity and self care.   - Encourage visually impaired, hearing impaired and aphasic patients to use assistive/communication devices  Outcome: Progressing     Problem: CARDIOVASCULAR - ADULT  Goal: Maintains optimal cardiac output and hemodynamic stability  Description: INTERVENTIONS:  - Monitor I/O, vital signs and rhythm  - Monitor for S/S and trends of decreased cardiac output  - Administer and titrate ordered vasoactive medications to optimize hemodynamic stability  - Assess quality of pulses, skin color and temperature  - Assess for signs of decreased coronary artery perfusion  - Instruct patient to report change in severity of symptoms  Outcome: Progressing  Goal: Absence of cardiac dysrhythmias or at baseline rhythm  Description: INTERVENTIONS:  - Continuous cardiac monitoring, vital signs, obtain 12 lead EKG if ordered  - Administer antiarrhythmic and heart rate control medications as ordered  - Monitor electrolytes and administer replacement therapy as ordered  Outcome: Progressing     Problem: RESPIRATORY - ADULT  Goal: Achieves optimal ventilation and oxygenation  Description: INTERVENTIONS:  - Assess for changes in respiratory status  - Assess for changes in mentation and behavior  - Position to facilitate oxygenation and minimize respiratory effort  - Oxygen administered by appropriate delivery if ordered  - Initiate smoking cessation education as indicated  - Encourage broncho-pulmonary hygiene including cough, deep breathe, Incentive Spirometry  - Assess the need for suctioning and aspirate as needed  - Assess and instruct to report SOB or any respiratory difficulty  - Respiratory Therapy support as indicated  Outcome: Progressing     Problem: GASTROINTESTINAL - ADULT  Goal: Maintains or returns to baseline  bowel function  Description: INTERVENTIONS:  - Assess bowel function  - Encourage oral fluids to ensure adequate hydration  - Administer IV fluids if ordered to ensure adequate hydration  - Administer ordered medications as needed  - Encourage mobilization and activity  - Consider nutritional services referral to assist patient with adequate nutrition and appropriate food choices  Outcome: Progressing  Goal: Maintains adequate nutritional intake  Description: INTERVENTIONS:  - Monitor percentage of each meal consumed  - Identify factors contributing to decreased intake, treat as appropriate  - Assist with meals as needed  - Monitor I&O, weight, and lab values if indicated  - Obtain nutrition services referral as needed  Outcome: Progressing     Problem: GENITOURINARY - ADULT  Goal: Maintains or returns to baseline urinary function  Description: INTERVENTIONS:  - Assess urinary function  - Encourage oral fluids to ensure adequate hydration if ordered  - Administer IV fluids as ordered to ensure adequate hydration  - Administer ordered medications as needed  - Offer frequent toileting  - Follow urinary retention protocol if ordered  Outcome: Progressing  Goal: Absence of urinary retention  Description: INTERVENTIONS:  - Assess patient’s ability to void and empty bladder  - Monitor I/O  - Bladder scan as needed  - Discuss with physician/AP medications to alleviate retention as needed  - Discuss catheterization for long term situations as appropriate  Outcome: Progressing     Problem: METABOLIC, FLUID AND ELECTROLYTES - ADULT  Goal: Electrolytes maintained within normal limits  Description: INTERVENTIONS:  - Monitor labs and assess patient for signs and symptoms of electrolyte imbalances  - Administer electrolyte replacement as ordered  - Monitor response to electrolyte replacements, including repeat lab results as appropriate  - Instruct patient on fluid and nutrition as appropriate  Outcome: Progressing  Goal: Fluid  balance maintained  Description: INTERVENTIONS:  - Monitor labs   - Monitor I/O and WT  - Instruct patient on fluid and nutrition as appropriate  - Assess for signs & symptoms of volume excess or deficit  Outcome: Progressing  Goal: Glucose maintained within target range  Description: INTERVENTIONS:  - Monitor Blood Glucose as ordered  - Assess for signs and symptoms of hyperglycemia and hypoglycemia  - Administer ordered medications to maintain glucose within target range  - Assess nutritional intake and initiate nutrition service referral as needed  Outcome: Progressing     Problem: SKIN/TISSUE INTEGRITY - ADULT  Goal: Skin Integrity remains intact(Skin Breakdown Prevention)  Description: Assess:  -Perform Geovani assessment every shift   -Clean and moisturize skin every 2 hours and PRN   -Inspect skin when repositioning, toileting, and assisting with ADLS  -Assess under medical devices such as BP cuff every 2 hours   -Assess extremities for adequate circulation and sensation     Bed Management:  -Have minimal linens on bed & keep smooth, unwrinkled  -Change linens as needed when moist or perspiring  -Avoid sitting or lying in one position for more than 2 hours while in bed  -Keep HOB at 35 degrees     Toileting:  -Offer bedside commode  -Assess for incontinence every 2 hrs     Activity:  -Mobilize patient 2 times a day  -Encourage activity and walks on unit  -Encourage or provide ROM exercises   -Turn and reposition patient every 2 Hours  -Use appropriate equipment to lift or move patient in bed  -Instruct/ Assist with weight shifting every 2 when out of bed in chair  -Consider limitation of chair time 2 hour intervals    Skin Care:  -Avoid use of baby powder, tape, friction and shearing, hot water or constrictive clothing  -Relieve pressure over bony prominences using foam wedges  -Do not massage red bony areas    Outcome: Progressing  Goal: Incision(s), wounds(s) or drain site(s) healing without S/S of  infection  Description: INTERVENTIONS  - Assess and document dressing, incision, wound bed, drain sites and surrounding tissue  - Provide patient and family education  - Perform skin care/dressing changes every shift and PRN   Outcome: Progressing     Problem: HEMATOLOGIC - ADULT  Goal: Maintains hematologic stability  Description: INTERVENTIONS  - Assess for signs and symptoms of bleeding or hemorrhage  - Monitor labs  - Administer supportive blood products/factors as ordered and appropriate  Outcome: Progressing     Problem: MUSCULOSKELETAL - ADULT  Goal: Maintain or return mobility to safest level of function  Description: INTERVENTIONS:  - Assess patient's ability to carry out ADLs; assess patient's baseline for ADL function and identify physical deficits which impact ability to perform ADLs (bathing, care of mouth/teeth, toileting, grooming, dressing, etc.)  - Assess/evaluate cause of self-care deficits   - Assess range of motion  - Assess patient's mobility  - Assess patient's need for assistive devices and provide as appropriate  - Encourage maximum independence but intervene and supervise when necessary  - Involve family in performance of ADLs  - Assess for home care needs following discharge   - Consider OT consult to assist with ADL evaluation and planning for discharge  - Provide patient education as appropriate  Outcome: Progressing  Goal: Maintain proper alignment of affected body part  Description: INTERVENTIONS:  - Support, maintain and protect limb and body alignment  - Provide patient/ family with appropriate education  Outcome: Progressing     Problem: Neurological Deficit  Goal: Neurological status is stable or improving  Description: Interventions:  - Monitor and assess patient's level of consciousness, motor function, sensory function, and level of assistance needed for ADLs.   - Monitor and report changes from baseline. Collaborate with interdisciplinary team to initiate plan and implement  interventions as ordered.   - Provide and maintain a safe environment.  - Consider seizure precautions.  - Consider fall precautions.  - Consider aspiration precautions.  - Consider bleeding precautions.  Outcome: Progressing     Problem: Activity Intolerance/Impaired Mobility  Goal: Mobility/activity is maintained at optimum level for patient  Description: Interventions:  - Assess and monitor patient  barriers to mobility and need for assistive/adaptive devices.  - Assess patient's emotional response to limitations.  - Collaborate with interdisciplinary team and initiate plans and interventions as ordered.  - Encourage independent activity per ability.  - Maintain proper body alignment.  - Perform active/passive rom as tolerated/ordered.  - Plan activities to conserve energy.  - Turn patient as appropriate  Outcome: Progressing     Problem: Communication Impairment  Goal: Ability to express needs and understand communication  Description: Assess patient's communication skills and ability to understand information.  Patient will demonstrate use of effective communication techniques, alternative methods of communication and understanding even if not able to speak.     - Encourage communication and provide alternate methods of communication as needed.  - Collaborate with case management/ for discharge needs.  - Include patient/family/caregiver in decisions related to communication.  Outcome: Progressing     Problem: Potential for Aspiration  Goal: Non-ventilated patient's risk of aspiration is minimized  Description: Assess and monitor vital signs, respiratory status, and labs (WBC).  Monitor for signs of aspiration (tachypnea, cough, rales, wheezing, cyanosis, fever).    - Assess and monitor patient's ability to swallow.  - Place patient up in chair to eat if possible.  - HOB up at 90 degrees to eat if unable to get patient up into chair.  - Supervise patient during oral intake.   - Instruct patient/  family to take small bites.  - Instruct patient/ family to take small single sips when taking liquids.  - Follow patient-specific strategies generated by speech pathologist.  Outcome: Progressing     Problem: Nutrition  Goal: Nutrition/Hydration status is improving  Description: Monitor and assess patient's nutrition/hydration status for malnutrition (ex- brittle hair, bruises, dry skin, pale skin and conjunctiva, muscle wasting, smooth red tongue, and disorientation). Collaborate with interdisciplinary team and initiate plan and interventions as ordered.  Monitor patient's weight and dietary intake as ordered or per policy. Utilize nutrition screening tool and intervene per policy. Determine patient's food preferences and provide high-protein, high-caloric foods as appropriate.     - Assist patient with eating.  - Allow adequate time for meals.  - Encourage patient to take dietary supplement as ordered.  - Collaborate with clinical nutritionist.  - Include patient/family/caregiver in decisions related to nutrition.  Outcome: Progressing

## 2024-12-17 NOTE — OCCUPATIONAL THERAPY NOTE
Occupational Therapy Progress Note     Patient Name: Bren Traylor  Today's Date: 12/17/2024  Problem List  Principal Problem:    Cerebrovascular accident (CVA) due to embolism of right middle cerebral artery (HCC)  Active Problems:    Acute pulmonary embolism (HCC)    Encephalopathy    Hypotension    Cardiomyopathy (HCC)    Hypertension    Hyperlipidemia    Paroxysmal atrial fibrillation (HCC)    Carotid artery stenosis    Elevated troponin    Slow transit constipation    Acute respiratory failure (HCC)    Leukocytosis         Occupational Therapy Treatment Note     12/17/24 1058   OT Last Visit   OT Visit Date 12/17/24   Note Type   Note Type Treatment   Pain Assessment   Pain Assessment Tool FLACC   Pain Rating: FLACC (Rest) - Face 1   Pain Rating: FLACC (Rest) - Legs 0   Pain Rating: FLACC (Rest) - Activity 0   Pain Rating: FLACC (Rest) - Cry 0   Pain Rating: FLACC (Rest) - Consolability 0   Score: FLACC (Rest) 1   Pain Rating: FLACC (Activity) - Face 1   Pain Rating: FLACC (Activity) - Legs 0   Pain Rating: FLACC (Activity) - Activity 0   Pain Rating: FLACC (Activity) - Cry 0   Pain Rating: FLACC (Activity) - Consolability 0   Score: FLACC (Activity) 1   Restrictions/Precautions   Weight Bearing Precautions Per Order No   Other Precautions Cognitive;Bed Alarm;Multiple lines;Fall Risk   Lifestyle   Autonomy I w/ ADLS, transfers and functional mobility PTA   Reciprocal Relationships Pt lives alone   Service to Others Unable to report; will continue to assess   Intrinsic Gratification Unable to report; will continue to assess   ADL   Where Assessed Supine, bed   Grooming Assistance 2  Maximal Assistance   Grooming Deficit Teeth care   UB Dressing Assistance 2  Maximal Assistance   UB Dressing Deficit Thread RUE;Thread LUE;Pull around back   LB Dressing Assistance 2  Maximal Assistance   LB Dressing Deficit Don/doff R sock;Don/doff L sock  (max VC to pull R sock over heel of foot)   Bed Mobility   Rolling R  3  Moderate assistance   Additional items Assist x 1   Rolling L 3  Moderate assistance   Additional items Assist x 1   Supine to Sit 3  Moderate assistance   Additional items Assist x 2   Sit to Supine 3  Moderate assistance   Additional items Assist x 2   Transfers   Sit to Stand 3  Moderate assistance   Additional items Assist x 2   Stand to Sit 3  Moderate assistance   Additional items Assist x 2   Cognition   Overall Cognitive Status Impaired   Arousal/Participation Lethargic   Attention Attends with cues to redirect   Orientation Level Disoriented X4   Memory Unable to assess   Following Commands Follows one step commands with increased time or repetition   Activity Tolerance   Activity Tolerance Patient limited by pain;Patient limited by fatigue   Assessment   Assessment Pt seen for Occupational Therapy session with focus on activity tolerance, bed mob, functional transfers/mob, sitting balance and tolerance for pt engagement in UB/LB self-care tasks.Pt cleared by NOEMI/Alistair for pt participated in OT session. Pt presented   supine/HOB raised pt awake to name and apt identifiers confirmed. Pt was unable to report  therapy goal 2* pt cognitive impairments. Pt required assist for bed mob, unsupported sitting balance and UB/LB self-care tasks 2* decreased strength, coordination and balance. She was able to tolerate sitting edge of pt bed with no untoward issues with pt vitals. She remains appropriate for  II (Moderate Resources) Pt return to bed post session bed alarm activated and all needs within reach.   Plan   Treatment Interventions ADL retraining   Goal Expiration Date 12/23/24   OT Treatment Day 2   OT Frequency 2-3x/wk   Discharge Recommendation   Rehab Resource Intensity Level, OT II (Moderate Resource Intensity)   AM-PAC Daily Activity Inpatient   Lower Body Dressing 3   Bathing 3   Toileting 3   Upper Body Dressing 3   Grooming 3   Eating 4   Daily Activity Raw Score 19   Daily Activity Standardized  Score (Calc for Raw Score >=11) 40.22   Turning Head Towards Sound 2   Follow Simple Instructions 2   Low Function Daily Activity Raw Score 23   Low Function Daily Activity Standardized Score  35.8   AM-PAC Applied Cognition Inpatient   Following a Speech/Presentation 1   Understanding Ordinary Conversation 3   Taking Medications 1   Remembering Where Things Are Placed or Put Away 1   Remembering List of 4-5 Errands 1   Taking Care of Complicated Tasks 1   Applied Cognition Raw Score 8   Applied Cognition Standardized Score 19.32   Barthel Index   Feeding 0   Bathing 0   Grooming Score 0   Dressing Score 0   Bladder Score 0   Bowels Score 0   Toilet Use Score 5   Transfers (Bed/Chair) Score 5   Mobility (Level Surface) Score 0   Stairs Score 0   Barthel Index Score 10       Carito NICE/LUZ MARINA

## 2024-12-17 NOTE — PROGRESS NOTES
Progress Note - Cardiology   Name: Bren Traylor 69 y.o. female I MRN: [id number]  Unit/Bed#: [facility] 716-01 I Date of Admission: 12/6/2024   Date of Service: 12/17/2024 I Hospital Day: 10     Assessment & Plan  Paroxysmal atrial fibrillation (HCC)  Presented in sinus rhythm  A-fib noted 12/8  Currently remaining in sinus rhythm  On aspirin, heparin ECMO protocol initially started and then switched to stroke protocol on 12/14  Lopressor 12.5 mg BID initiated 12/16  Elevated troponin  Troponin 140--> 213--> 250--> 169--> 1164--> 5343--> 5149--> 6084--.74361 in the setting of acute CVA and bilateral PEs  Possibly ACS, but considering extensive medical issues, favor non-MI troponin elevation  EKG with sinus rhythm and lateral ST-T wave abnormality  EF 50-55% with RWMA  She had anginal symptoms 10/24 and was planned for outpatient ischemic testing  CTA notes significant diffuse coronary calcifications  Cardiomyopathy (HCC)  Echocardiogram 12/10/2024: EF 50-55% with basal inferior, basal inferolateral, mid inferior and mid inferolateral hypokinesis, normal RV function, moderate MR, moderate TR.  When compared to echocardiogram from 12/8/2024 EF was 45-50% with inferolateral akinesis with moderate-severe MR.  CXR 12/10: Persistent pulmonary edema and pleural effusions  BNP 1164  Underwent thoracentesis 12/11, 500 cc  Last dose of IV Lasix was yesterday  Appears euvolemic on exam  Cerebrovascular accident (CVA) due to embolism of right middle cerebral artery (HCC)  Presented 12/6 with left-sided weakness, slurred speech and facial droop  CTH revealed infarction in the right lentiform nucleus with mild cytotoxic edema with hyperdensity in the right M1 segment suggesting M1 thrombosis.    CTA of the head revealed acute thrombosis in the mid-distal right M1 segment with severe near occlusion of the proximal BILL with severe tandem stenosis intracranially and high-grade stenosis of the last distal common carotid artery,  "moderate high-grade stenosis of the right subclavian origin.   Underwent successful mechanical thrombectomy and right carotid angioplasty/stenting and placed on Integrilin.   12/7 CTA with hemorrhagic conversion  On aspirin, low-dose ECMO heparin initially started and switched to stroke protocol on 12/14  Carotid artery stenosis  CTA of the head revealed acute thrombosis in the mid-distal right M1 segment with severe near occlusion of the proximal BILL with severe tandem stenosis intracranially and high-grade stenosis of the last distal common carotid artery, moderate high-grade stenosis of the right subclavian origin.   12/6 s/p emergent right carotid angioplasty/stenting and placed on Integrilin  Repeat imaging 12/7 revealed hemorrhagic conversion and patient was given DDAVP administered  CTA 12/10 revealed BILL stent occlusion  Aspirin per NG tube, statin  Acute pulmonary embolism (HCC)  CTA Chest 12/10 revealed bilateral PEs with developing pulmonary infarction  Venous duplex negative for DVT  Currently on heparin as above  Encephalopathy  In the context of CVA with hemorrhagic conversion  Hypotension  Resolved  Hypertension  Stable with initiation of Lopressor 12.5 mg BID on 12/16  Hyperlipidemia  Being given atorvastatin via NG tube      Plan/Recommendations:  Continue beta-blocker  Transition to oral anticoagulation when deemed safe from a neurosurgical standpoint  Recommend eventual ischemic evaluation as an outpatient    ___________________________________________________________    Subjective    Patient seen and examined.  No acute events overnight.    Review of Systems   Unable to perform ROS: Patient nonverbal       Objective:   Vitals: Blood pressure 126/57, pulse 89, temperature 99 °F (37.2 °C), temperature source Axillary, resp. rate 18, height 5' 4\" (1.626 m), weight 50 kg (110 lb 3.7 oz), SpO2 95%.,     Wt Readings from Last 3 Encounters:   12/17/24 50 kg (110 lb 3.7 oz)   12/06/24 53.8 kg (118 lb " 9.7 oz)   10/02/24 53.8 kg (118 lb 9.6 oz)        Lab Results   Component Value Date    CREATININE 0.46 (L) 2024    CREATININE 0.51 (L) 2024    CREATININE 0.49 (L) 12/15/2024         Body mass index is 18.92 kg/m².,     Systolic (24hrs), Av , Min:113 , Max:131     Diastolic (24hrs), Av, Min:57, Max:96          Intake/Output Summary (Last 24 hours) at 2024 0822  Last data filed at 2024 0719  Gross per 24 hour   Intake 2971.51 ml   Output --   Net 2971.51 ml     Weight (last 2 days)       Date/Time Weight    24 0536 50 (110.23)    24 0600 50.8 (111.99)              Telemetry Review: No significant arrhythmias seen on telemetry review.       Physical Exam  Vitals and nursing note reviewed.   Constitutional:       General: She is not in acute distress.     Appearance: She is well-developed.      Comments: NG tube noted  On RA in NAD   HENT:      Head: Normocephalic and atraumatic.   Neck:      Vascular: No JVD.   Cardiovascular:      Rate and Rhythm: Normal rate and regular rhythm.      Heart sounds: Normal heart sounds. No murmur heard.     No friction rub.   Pulmonary:      Effort: Pulmonary effort is normal. No respiratory distress.      Breath sounds: Normal breath sounds. No wheezing or rales.   Abdominal:      General: Bowel sounds are normal. There is no distension.      Palpations: Abdomen is soft.      Tenderness: There is no abdominal tenderness.   Musculoskeletal:         General: No tenderness. Normal range of motion.      Cervical back: Normal range of motion and neck supple.      Right lower leg: No edema.      Left lower leg: No edema.   Skin:     General: Skin is warm and dry.      Findings: No erythema.   Neurological:      Mental Status: She is alert.      Comments: Confused         LABORATORY RESULTS      CBC with diff:   Results from last 7 days   Lab Units 24  0443 24  0409 12/15/24  0452 24  0432 24  0435 24  0532  "12/11/24  1123 12/11/24  0425   WBC Thousand/uL 20.64* 18.33* 14.85* 13.63* 18.18* 19.28*  --  18.31*   HEMOGLOBIN g/dL 11.1* 10.3* 10.9* 9.7* 9.4* 8.9* 8.6* 7.0*   HEMATOCRIT % 34.3* 32.0* 34.0* 30.2* 28.9* 26.8* 27.1* 21.8*   MCV fL 96 98 99* 97 95 94  --  96   PLATELETS Thousands/uL 172 182 198 168 176 159  --  158   RBC Million/uL 3.57* 3.28* 3.44* 3.12* 3.04* 2.84*  --  2.28*   MCH pg 31.1 31.4 31.7 31.1 30.9 31.3  --  30.7   MCHC g/dL 32.4 32.2 32.1 32.1 32.5 33.2  --  32.1   RDW % 14.6 14.7 15.0 14.6 14.6 15.0  --  14.7   MPV fL 13.4* 13.5* 12.9* 13.1* 12.4 12.6  --  12.5   NRBC AUTO /100 WBCs 0 0 0 0  --  0  --  0       CMP:  Results from last 7 days   Lab Units 12/17/24  0443 12/16/24  0311 12/15/24  0452 12/14/24  1350 12/14/24  0432 12/13/24  0435 12/12/24  1753   POTASSIUM mmol/L 3.4* 3.5 3.5 3.8 3.5 3.4* 2.9*   CHLORIDE mmol/L 109* 110* 116* 114* 116* 116* 114*   CO2 mmol/L 24 28 28 31 31 32 31   BUN mg/dL 22 24 26* 20 21 22 21   CREATININE mg/dL 0.46* 0.51* 0.49* 0.59* 0.57* 0.54* 0.57*   CALCIUM mg/dL 9.3 8.9 8.9 9.2 8.6 8.8 8.9   AST U/L  --  33 25  --  32 52*  --    ALT U/L  --  34 41  --  92* 179*  --    ALK PHOS U/L  --  90 95  --  101 116*  --    EGFR ml/min/1.73sq m 103 99 101 95 96 97 96       BMP:  Results from last 7 days   Lab Units 12/17/24  0443 12/16/24  0311 12/15/24  0452 12/14/24  1350 12/14/24  0432 12/13/24  0435 12/12/24  1753   POTASSIUM mmol/L 3.4* 3.5 3.5 3.8 3.5 3.4* 2.9*   CHLORIDE mmol/L 109* 110* 116* 114* 116* 116* 114*   CO2 mmol/L 24 28 28 31 31 32 31   BUN mg/dL 22 24 26* 20 21 22 21   CREATININE mg/dL 0.46* 0.51* 0.49* 0.59* 0.57* 0.54* 0.57*   CALCIUM mg/dL 9.3 8.9 8.9 9.2 8.6 8.8 8.9       No results found for: \"NTBNP\"          Results from last 7 days   Lab Units 12/16/24  0311 12/15/24  0452 12/14/24  0432 12/13/24  0435 12/12/24  1753 12/12/24  0532 12/11/24  0455   MAGNESIUM mg/dL 2.4 2.6 2.3 2.1 1.9 2.1 2.1       Lipid Profile:   No results found for: \"CHOL\"  Lab " Results   Component Value Date    HDL 42 (L) 12/07/2024    HDL 53 10/02/2024     Lab Results   Component Value Date    LDLCALC 76 12/07/2024    LDLCALC 116 (H) 10/02/2024     Lab Results   Component Value Date    TRIG 110 12/07/2024    TRIG 105 10/02/2024         Meds/Allergies   all current active meds have been reviewed    Medications Prior to Admission:     atorvastatin (LIPITOR) 20 mg tablet    busPIRone (BUSPAR) 7.5 mg tablet    clonazePAM (KlonoPIN) 0.5 mg tablet    losartan (COZAAR) 50 mg tablet    sertraline (ZOLOFT) 100 mg tablet    heparin (porcine), 3-24 Units/kg/hr, Last Rate: 20 Units/kg/hr (12/17/24 0629)        Counseling / Coordination of Care  Total floor / unit time spent today 20 minutes.  Greater than 50% of total time was spent with the patient and / or family counseling and / or coordination of care.      ** Please Note: Dragon 360 Dictation voice to text software may have been used in the creation of this document. **

## 2024-12-17 NOTE — PLAN OF CARE
Problem: PHYSICAL THERAPY ADULT  Goal: Performs mobility at highest level of function for planned discharge setting.  See evaluation for individualized goals.  Description: Treatment/Interventions: OT, Spoke to case management, Spoke to nursing, Gait training, Bed mobility, Patient/family training, Endurance training, Therapeutic exercise, LE strengthening/ROM, Functional transfer training          See flowsheet documentation for full assessment, interventions and recommendations.  Outcome: Progressing  Note: Prognosis: Fair  Problem List: Decreased strength, Decreased range of motion, Decreased endurance, Impaired balance, Decreased mobility, Decreased coordination, Decreased cognition, Decreased safety awareness, Impaired judgement, Impaired vision, Impaired hearing, Impaired tone, Impaired sensation, Decreased skin integrity, Pain  Assessment: The patient was restless today, and continued assistance was necessary throughout both physically and for her cognitive impulsivity. She had varying levels of assistance due to these factors as well. The patient had difficulty following commands at times, and she was highly distractable. She was able to stand for forty-two seconds, but she was unable to take any steps today. She was returned to the bed after being cleaned and positioned with wedges as well as pillows elevating her arms and heels off of the bed.  Barriers to Discharge: Inaccessible home environment, Decreased caregiver support     Rehab Resource Intensity Level, PT: II (Moderate Resource Intensity)    See flowsheet documentation for full assessment.

## 2024-12-17 NOTE — SPEECH THERAPY NOTE
Speech Language/Pathology    Speech/Language Pathology Progress Note    Patient Name: Bren Traylor  Today's Date: 12/17/2024           Summary:  Pt seen for follow up dysphagia therapy. Pt continues on RA with NGT in place. Pt required occasional cueing to not pull on NG during session. Pt agreeable to being assisted with thorough oral care. SLP attempted trials of ice chips with pt, however after initial trial pt became orally defensive, closed mouth, and turned head away. No immediate s/s aspiration observed with single, initial ice chips, however swallow appeared effortful. Pt declined all additional PO trials despite encouragement.   Given results of prior VFSS, pt remains at high risk of aspiration at this time. Will continue NPO.     Assessment:  Restlessness/agitation and poor participation continue to be a barrier to participation in dysphagia therapy. Swallow function is impaired, will need repeat MBS when pt is able to participate.     Plan/Recommendations:  NPO   Frequent, thorough oral care  SLP to follow         Lab Results   Component Value Date    WBC 20.64 (H) 12/17/2024    HGB 11.1 (L) 12/17/2024    HCT 34.3 (L) 12/17/2024    MCV 96 12/17/2024     12/17/2024           Problem List  Principal Problem:    Cerebrovascular accident (CVA) due to embolism of right middle cerebral artery (HCC)  Active Problems:    Acute pulmonary embolism (HCC)    Encephalopathy    Hypotension    Cardiomyopathy (HCC)    Hypertension    Hyperlipidemia    Paroxysmal atrial fibrillation (HCC)    Carotid artery stenosis    Elevated troponin    Slow transit constipation    Acute respiratory failure (HCC)    Leukocytosis       Past Medical History  Past Medical History:   Diagnosis Date    High cholesterol     Hypertension         Past Surgical History  Past Surgical History:   Procedure Laterality Date    IR PICC REPOSITION  12/10/2024    IR STROKE ALERT  12/7/2024

## 2024-12-17 NOTE — ASSESSMENT & PLAN NOTE
Troponin 140--> 213--> 250--> 169--> 1164--> 5343--> 5149--> 6084--.72218 in the setting of acute CVA and bilateral PEs  Possibly ACS, but considering extensive medical issues, favor non-MI troponin elevation  EKG with sinus rhythm and lateral ST-T wave abnormality  EF 50-55% with RWMA  She had anginal symptoms 10/24 and was planned for outpatient ischemic testing  CTA notes significant diffuse coronary calcifications

## 2024-12-17 NOTE — ASSESSMENT & PLAN NOTE
Initial troponin 140, with last 47079 in the setting of acute CVA and bilateral PEs   Possibly ACS however cardiology not pursuing ischemic evaluation at this time.  Suspect non-MI troponin elevation from multiple active medical conditions  EKG with sinus rhythm and lateral ST-T wave abnormality  TTE with LVEF 50-55% with with wall motion abnormality  CTA notes significant diffuse coronary calcifications  Outpatient cardiology follow-up

## 2024-12-18 ENCOUNTER — TELEPHONE (OUTPATIENT)
Age: 69
End: 2024-12-18

## 2024-12-18 PROBLEM — I63.9 CEREBROVASCULAR ACCIDENT (CVA) (HCC): Status: ACTIVE | Noted: 2024-12-18

## 2024-12-18 PROBLEM — I46.9 CARDIAC ARREST (HCC): Status: ACTIVE | Noted: 2024-12-18

## 2024-12-18 NOTE — CASE MANAGEMENT
Case Management Discharge Planning Note    Patient name Bren Traylor  Location ICU 11/ICU 11 MRN [id number]  : 1955 Date 2024       Current Admission Date: 2024  Current Admission Diagnosis:Cerebrovascular accident (CVA) due to embolism of right middle cerebral artery (HCC)   Patient Active Problem List    Diagnosis Date Noted Date Diagnosed    Slow transit constipation 2024     Acute respiratory failure (HCC) 2024     Leukocytosis 2024     Encephalopathy 2024     Hypotension 2024     Cardiomyopathy (HCC) 2024     Hypertension 2024     Hyperlipidemia 2024     Paroxysmal atrial fibrillation (HCC) 2024     Carotid artery stenosis 2024     Elevated troponin 2024     Acute pulmonary embolism (HCC) 12/10/2024     Cerebrovascular accident (CVA) due to embolism of right middle cerebral artery (HCC) 2024       LOS (days): 11  Geometric Mean LOS (GMLOS) (days):   Days to GMLOS:     OBJECTIVE:  Risk of Unplanned Readmission Score: 16.11         Current admission status: Inpatient   Preferred Pharmacy:   [id number] [place] - [address] [address]  Phone: [phone] Fax: [phone]    Primary Care Provider: Margarita Pedersen MD    Primary Insurance: AEMELO  REP  Secondary Insurance:     DISCHARGE DETAILS:         Additional Comments: Repeat CT  [facility]/[place] reserved, coded transfer to ICU

## 2024-12-18 NOTE — ASSESSMENT & PLAN NOTE
Presented in sinus rhythm, but developed atrial fibrillation on 12/8/2024.  Subsequently reverted to sinus rhythm.  Was being treated with aspirin 81 mg daily, heparin (stroke protocol).  She did not have events on telemetry yesterday 12/17/2024.  Unfortunately, telemetry was discontinued prior to above arrest

## 2024-12-18 NOTE — PROGRESS NOTES
Bren Traylor is a 69 y.o. female who is currently ordered Vancomycin IV with management by the Pharmacy Consult service.  Relevant clinical data and objective / subjective history reviewed.  Vancomycin Assessment:  Indication and Goal AUC/Trough: Pneumonia (goal -600, trough >10), -600, trough >10  Clinical Status: Critically ill (worsening)  Micro:     Renal Function:  SCr: 0.68 mg/dL  CrCl: 63.2 mL/min  Renal replacement: Not on dialysis  Days of Therapy: 0 (new start)  Current Dose: 1250 mg (loading dose)  Vancomycin Plan:  New Dosin mg q12  Estimated AUC: 453 mcg*hr/mL  Estimated Trough: 11.9 mcg/mL  Next Level:  0600  Renal Function Monitoring: Daily BMP and UOP  Pharmacy will continue to follow closely for s/sx of nephrotoxicity, infusion reactions and appropriateness of therapy.  BMP and CBC will be ordered per protocol. We will continue to follow the patient’s culture results and clinical progress daily.    Rose Leo, Pharmacist

## 2024-12-18 NOTE — PROCEDURES
Arterial Line Insertion    Date/Time: 12/18/2024 3:41 PM    Performed by: Richi Cain DO  Authorized by: Richi Cain DO    Patient location:  ICU  Other Assisting Provider: Yes (comment)    Consent:     Consent obtained:  Emergent situation  Universal protocol:     Procedure explained and questions answered to patient or proxy's satisfaction: yes      Required blood products, implants, devices, and special equipment available: yes      Site/side marked: yes      Patient identity confirmed:  Arm band and hospital-assigned identification number  Indications:     Indications: hemodynamic monitoring, multiple ABGs, continuous blood pressure monitoring and frequent labs / infusion    Pre-procedure details:     Skin preparation:  Chlorhexidine    Preparation: Patient was prepped and draped in sterile fashion    Anesthesia (see MAR for exact dosages):     Anesthesia method:  None  Procedure details:     Location / Tip of Catheter:  Femoral    Laterality:  Right    Needle gauge:  22 G    Placement technique:  Ultrasound guided    Ultrasound image availability:  Images available in PACS    Sterile ultrasound techniques: Sterile gel and sterile probe covers were used      Number of attempts:  1    Successful placement: yes      Transducer: waveform confirmed    Post-procedure details:     Post-procedure:  Biopatch applied and sutured    Patient tolerance of procedure:  Tolerated well, no immediate complications

## 2024-12-18 NOTE — PROGRESS NOTES
Progress Note - Cardiology   Name: Bren Traylor 69 y.o. female I MRN: [id number]  Unit/Bed#: ICU 11 I Date of Admission: 12/6/2024   Date of Service: 12/18/2024 I Hospital Day: 11     Assessment & Plan  Cardiac arrest (HCC)  Unclear etiology.  Unfortunately, patient was not on telemetry at time of cardiac arrest.  Did require defibrillation during code.  Would be concerned about possible arrhythmia given prior troponin elevations and concern for ischemic heart disease with plans to do cardiac catheterization/ischemic evaluation once she is more clinically stable.  Repeat stat echocardiogram done today after cardiac arrest.  It showed ejection fraction low normal 50 to 55% mild diastolic dysfunction.  She had similar hypokinesis in her basal inferior, mid inferior, basal anterolateral, mid anterolateral segments.  She did have some dyskinesis in mid and basal inferolateral segments.  Not significantly changed from prior echocardiogram.  Would recommend treating for possible ACS if able to from neuro/bleeding standpoint.  Currently undergoing further evaluation with CT head,, chest, abdomen, pelvis.  Limited with ACS treatment due to bleeding concerns.   Continue statin  Would resume aspirin and heparin once able from neuro/bleeding standpoint  Holding beta blocker while requiring pressors  Continue telemetry  Paroxysmal atrial fibrillation (HCC)  Presented in sinus rhythm, but developed atrial fibrillation on 12/8/2024.  Subsequently reverted to sinus rhythm.  Was being treated with aspirin 81 mg daily, heparin (stroke protocol).  She did not have events on telemetry yesterday 12/17/2024.  Unfortunately, telemetry was discontinued prior to above arrest  Elevated troponin  Troponin 140--> 213--> 250--> 169--> 1164--> 5343--> 5149--> 6084--.33716 in the setting of acute CVA and bilateral PEs. Possible ACS, vs secondary to extensive medical issues. CTA with significant diffuse coronary calcifications  Ischemic  evaluation once able  Cardiomyopathy (HCC)  Echocardiogram 12/18/2024: EF 50-55%. She had similar hypokinesis in her basal inferior, mid inferior, basal anterolateral, mid anterolateral segments.  She did have some dyskinesis in mid and basal inferolateral segments.  Moderate MR. Nos s/p thoracentesis 12/11, 500 cc. Appears euvolemic. Now requiring pressors.  Cerebrovascular accident (CVA) due to embolism of right middle cerebral artery (HCC)  Presented 12/6 with left-sided weakness, slurred speech and facial droop  CTH revealed infarction in the right lentiform nucleus with mild cytotoxic edema with hyperdensity in the right M1 segment suggesting M1 thrombosis.    CTA of the head revealed acute thrombosis in the mid-distal right M1 segment with severe near occlusion of the proximal BILL with severe tandem stenosis intracranially and high-grade stenosis of the last distal common carotid artery, moderate high-grade stenosis of the right subclavian origin.   Underwent successful mechanical thrombectomy and right carotid angioplasty/stenting and placed on Integrilin.   12/7 CTA with hemorrhagic conversion  On aspirin, low-dose ECMO heparin initially started and switched to stroke protocol on 12/14  Carotid artery stenosis  CTA of the head revealed acute thrombosis in the mid-distal right M1 segment with severe near occlusion of the proximal BILL with severe tandem stenosis intracranially and high-grade stenosis of the last distal common carotid artery, moderate high-grade stenosis of the right subclavian origin.   12/6 s/p emergent right carotid angioplasty/stenting and placed on Integrilin  Repeat imaging 12/7 revealed hemorrhagic conversion and patient was given DDAVP administered  CTA 12/10 revealed BILL stent occlusion  Aspirin per NG tube when able, statin  Acute pulmonary embolism (HCC)  CTA Chest 12/10 revealed bilateral PEs with developing pulmonary infarction  Venous duplex negative for DVT  Heparin when  able  Encephalopathy  In the context of CVA with hemorrhagic conversion  Hyperlipidemia  Continue atorvastatin    -----    Subjective:     Code blue today.  Unfortunately, patient's telemetry had been discontinued prior to event. Unknown down time. 10 min ACLS    ----      Current Facility-Administered Medications:     acetaminophen (TYLENOL) oral suspension 650 mg, 650 mg, Per NG Tube, Q6H PRN, Neva Salazar MD, 650 mg at 12/16/24 0315    albuterol inhalation solution 2.5 mg, 2.5 mg, Nebulization, Q4H PRN, Neva Salazar MD    [Held by provider] aspirin chewable tablet 81 mg, 81 mg, Per NG Tube, Daily, Neva Salazar MD, 81 mg at 12/18/24 0831    atorvastatin (LIPITOR) tablet 20 mg, 20 mg, Per NG Tube, QPM, Neva Salazar MD, 20 mg at 12/17/24 1828    bisacodyl (DULCOLAX) rectal suppository 10 mg, 10 mg, Rectal, Daily PRN, Neva Salazar MD    calcium chloride 10 % injection **ADS Override Pull**, , , ,     chlorhexidine (PERIDEX) 0.12 % oral rinse 15 mL, 15 mL, Mouth/Throat, Q12H DANIS, Neva Salazar MD    EPINEPHrine 5,000 mcg (STANDARD CONCENTRATION) IV in sodium chloride 0.9% 250 mL, 1-10 mcg/min, Intravenous, Titrated, Neva Salazar MD, Last Rate: 24 mL/hr at 12/18/24 1604, 8 mcg/min at 12/18/24 1604    furosemide (LASIX) tablet 40 mg, 40 mg, Oral, Daily PRN, Neva Salazar MD    heparin (porcine) 25,000 units in 0.45% NaCl 250 mL infusion (premix), 3-24 Units/kg/hr, Intravenous, Titrated, Neva Salazar MD, Last Rate: 9.1 mL/hr at 12/18/24 0800, 18 Units/kg/hr at 12/18/24 0800    insulin lispro (HumALOG/ADMELOG) 100 units/mL subcutaneous injection 1-6 Units, 1-6 Units, Subcutaneous, Q6H DANIS, 1 Units at 12/15/24 0135 **AND** Fingerstick Glucose (POCT), , , Q6H, Neva Salazar MD    melatonin tablet 6 mg, 6 mg, Per NG Tube, HS, Neva Salazar MD, 6 mg at 12/17/24 2144    [Held by provider] metoprolol tartrate (LOPRESSOR) partial tablet 12.5 mg, 12.5 mg, Oral, Q12H DANIS, Neva Salazar MD, 12.5 mg at 12/18/24  "0831    modafinil (PROVIGIL) tablet 100 mg, 100 mg, Oral, Daily, Neva Salazar MD, 100 mg at 24 0831    norepinephrine (LEVOPHED) 4 mg (STANDARD CONCENTRATION) IV in sodium chloride 0.9% 250 mL, 1-30 mcg/min, Intravenous, Titrated, Neva Salazar MD, Last Rate: 82.5 mL/hr at 24 1530, 22 mcg/min at 24 1530    polyethylene glycol (MIRALAX) packet 17 g, 17 g, Oral, Daily, Neva Salazar MD, 17 g at 24 0909    senna-docusate sodium (SENOKOT S) 8.6-50 mg per tablet 2 tablet, 2 tablet, Oral, BID, Neva Salazar MD, 2 tablet at 24 0909    sertraline (ZOLOFT) tablet 50 mg, 50 mg, Oral, Daily, Neva Salazar MD, 50 mg at 24 0831    sodium bicarbonate 8.4 % injection **ADS Override Pull**, , , ,     vasopressin (PITRESSIN) 20 Units in sodium chloride 0.9 % 100 mL infusion, 0.04 Units/min, Intravenous, Continuous, Neva Salazar MD, Last Rate: 12 mL/hr at 24 1412, 0.04 Units/min at 24 1412      Objective:     Vitals:   Blood pressure 151/64, pulse 96, temperature (!) 94.6 °F (34.8 °C), resp. rate (!) 33, height 5' 4\" (1.626 m), weight 49.9 kg (110 lb), SpO2 100%.  Body mass index is 18.88 kg/m².  Systolic (24hrs), Av , Min:127 , Max:171     Diastolic (24hrs), Av, Min:56, Max:87      Intake/Output Summary (Last 24 hours) at 2024 1633  Last data filed at 2024 0957  Gross per 24 hour   Intake 769.53 ml   Output --   Net 769.53 ml     Weight (last 2 days)       Date/Time Weight    12/18/24 1521 49.9 (110)    12/18/24 0600 49.9 (110.01)    24 0535 49.6 (109.3)    24 0536 50 (110.23)    24 0600 50.8 (111.99)              Physical Exam  Vitals reviewed.   Constitutional:       Appearance: She is well-developed. She is ill-appearing.   HENT:      Head: Normocephalic and atraumatic.   Eyes:      Conjunctiva/sclera: Conjunctivae normal.   Cardiovascular:      Rate and Rhythm: Normal rate and regular rhythm.      Heart sounds: No murmur " "heard.  Pulmonary:      Effort: Pulmonary effort is normal. No respiratory distress.      Breath sounds: Normal breath sounds.   Musculoskeletal:         General: No swelling.      Cervical back: Neck supple.   Skin:     General: Skin is warm and dry.   Neurological:      Comments: Not appropriately answering questions   Psychiatric:         Mood and Affect: Mood normal.           Labs & Results:    Lab Results   Component Value Date    SODIUM 153 (H) 12/18/2024    K 3.2 (L) 12/18/2024     12/18/2024    CO2 30 12/18/2024    BUN 21 12/18/2024    CREATININE 0.76 12/18/2024    GLUC 342 (H) 12/18/2024    CALCIUM 12.3 (H) 12/18/2024     Lab Results   Component Value Date    WBC 19.75 (H) 12/18/2024    RBC 2.54 (L) 12/18/2024    HGB 7.9 (L) 12/18/2024    HGB 6.8 (L) 12/18/2024    HCT 25.2 (L) 12/18/2024    HCT 20 (L) 12/18/2024    MCV 99 (H) 12/18/2024    MCH 31.1 12/18/2024    RDW 14.5 12/18/2024     12/18/2024     Results from last 7 days   Lab Units 12/18/24  1420 12/18/24  0908 12/17/24  1851   PTT seconds 33 69* 56*   INR  1.63*  --   --        Available cardiology studies, imaging and lab results independently reviewed today.    Taylor Delgado MD, PhD  Cardiology  [facility]    This note was completed in part utilizing voice recognition software.   Grammatical errors, random word insertion, spelling mistakes, occasional wrong word or \"sound-alike\" substitutions and incomplete sentences may be an occasional consequence of the system secondary to software limitations, ambient noise and hardware issues. At the time of dictation, efforts were made to edit, clarify and /or correct errors.  Please read the chart carefully and recognize, using context, where substitutions have occurred.  If you have any questions or concerns about the context, text or information contained within the body of this dictation, please contact myself, the provider, for further clarification.     "

## 2024-12-18 NOTE — ASSESSMENT & PLAN NOTE
Troponin 140--> 213--> 250--> 169--> 1164--> 5343--> 5149--> 6084--.57121 in the setting of acute CVA and bilateral PEs. Possible ACS, vs secondary to extensive medical issues. CTA with significant diffuse coronary calcifications  Ischemic evaluation once able

## 2024-12-18 NOTE — PROCEDURES
Intubation    Date/Time: 12/18/2024 1:44 PM    Performed by: Sanchez Nguyen DO  Authorized by: Sanchez Nguyne DO    Patient location:  Bedside  Other Assisting Provider: Yes (comment) (Dr. Palladino)    Consent:     Consent obtained:  Emergent situation  Universal protocol:     Patient identity confirmed:  Arm band  Pre-procedure details:     Patient status:  Unresponsive    Pretreatment medications:  None    Paralytics:  None  Indications:     Indications for intubation comment:  Cardiac arrest  Procedure details:     Intubation method:  Oral    Oral intubation technique: Cardenas.    Laryngoscope blade:  Mac 3    Tube size (mm):  8.0    Tube type:  Cuffed    Number of attempts:  1  Placement assessment:     ETT to lip:  21    Tube secured with:  ETT [place]    Breath sounds:  Equal and absent over the epigastrium    Placement verification: condensation, direct visualization, equal breath sounds and ETCO2 detector    Post-procedure details:     Patient tolerance of procedure:  Tolerated well, no immediate complications

## 2024-12-18 NOTE — OCCUPATIONAL THERAPY NOTE
Occupational Therapy Cancel Note         Patient Name: Bren Traylor  Today's Date: 12/18/2024 12/18/24 1451   OT Last Visit   OT Visit Date 12/18/24   Note Type   Note type Cancelled Session   Cancel Reasons Intubated/sedated       OT orders received. Chart reviewed. Pt with code blue this afternoon, now intubated and transferred to ICU. Will continue to follow and see pt as appropriate and able.     Ivett Hutchison MS, OTR/L

## 2024-12-18 NOTE — PROGRESS NOTES
Progress Note - Hospitalist   Name: Bren Traylor 69 y.o. female I MRN: [id number]  Unit/Bed#: ICU 11 I Date of Admission: 12/6/2024   Date of Service: 12/18/2024 I Hospital Day: 11    Assessment & Plan  Cerebrovascular accident (CVA) due to embolism of right middle cerebral artery (HCC)  Presented as a stroke alert [facility] on 12/7 for left-sided weakness and altered mental status.  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  Also with right carotid stenosis s/p angioplasty with stenting.  She was admitted to the ICU where she required pressor support and Precedex for episodes of agitation  Most recent CT head on 12/14 with stable right basal ganglia parenchymal hemorrhage  Patient was started on heparin drip stroke protocol PTT goal 50-70 and aspirin 81 mg  Provigil started, currently on 100 mg daily  Continue neurochecks  Neurosurgery following peripherally  Neurology signed off on 12/9  Goal SBP below 140 mmHg  Discussed with neurosurgery and neurology on 12/17 about continuation of heparin and transition to oral a/c: advised to continue stroke protocol heparin drip, repeat CT Head no contrast on 12/21/24 (2 weeks from date of initial bleeding), based on CT results would decide on therapeutic AC timing.  STAT CT Head no contrast if any acute worsening/ neurological change  Acute pulmonary embolism (HCC)  CT chest PE in 12/10 with bilateral pulmonary embolism, no evidence of right heart strain  TTE 12/10With LVEF 50-55%, hypokinetic segments, no heart strain  Venous duplex negative for DVT  On heparin drip as above until clearance by neurology  Encephalopathy  In the setting of CVA with hemorrhagic conversion  Delirium precautions  Neurochecks every 4 hours  Hypotension  Required Levophed briefly while in the ICU, discontinued in 12/14  Goal SBP less than 140  BP reviewed and stable  Cardiomyopathy (HCC)  Echocardiogram  12/10/2024: EF 50-55% with basal inferior, basal inferolateral, mid inferior and mid inferolateral hypokinesis, normal RV function, moderate MR, moderate TR.  CXR 12/10: Persistent pulmonary edema and pleural effusions  BNP 1164  Underwent thoracentesis 12/11, 500 cc  S/p IV Lasix, last dose on 12/15  Appears euvolemic on exam, no longer on diuretics per cardiology    Paroxysmal atrial fibrillation (HCC)  Patient presented in sinus rhythm, atrial fibrillation noted on 12/8  S/p amiodarone bolus  On aspirin and heparin drip   Cardiology input reviewed.  Started metoprolol 12.5 mg twice daily  Plan to transition to DOAC once cleared by neurology  Hypertension  BP reviewed and acceptable  Started metoprolol 12.5 mg twice daily per cardiology recommendations for paroxysmal atrial fibrillation  Hyperlipidemia  Continue statin  Lipid panel reviewed  Carotid artery stenosis  CTA of the head revealed acute thrombosis in the mid-distal right M1 segment with severe near occlusion of the proximal BILL with severe tandem stenosis intracranially and high-grade stenosis of the last distal common carotid artery, moderate high-grade stenosis of the right subclavian origin.   12/6 s/p emergent right carotid angioplasty/stenting and placed on Integrilin  Repeat imaging 12/7 revealed hemorrhagic conversion and patient was given DDAVP administered  CTA 12/10 revealed BILL stent occlusion  On aspirin and statin  Elevated troponin  Initial troponin 140, with last 39954 in the setting of acute CVA and bilateral PEs   Possibly ACS however cardiology not pursuing ischemic evaluation at this time.  Suspect non-MI troponin elevation from multiple active medical conditions  EKG with sinus rhythm and lateral ST-T wave abnormality  TTE with LVEF 50-55% with with wall motion abnormality  CTA notes significant diffuse coronary calcifications  Outpatient cardiology follow-up  Slow transit constipation  Per chart review last BM today  Continue bowel  regimen  Acute respiratory failure (HCC)  Intubated on admission for airway protection, extubated on 12/8/2024  Chest imaging with pulmonary edema bilateral pleural effusion  R Thoracentesis 12/11/24: Removed 500 mL , cultures negative, , Triglycerides 34, Total Protein  <3.0, pH 7.5  Continue to wean oxygen as tolerated  Airway precautions  Respiratory protocol  CPAP overnight as needed  Leukocytosis  Suspect reactive from multiple acute active medical issues  MRSA negative  Blood cultures with no growth after 5 days  Sputum cultures with normal respiratory roberto  Completed 3 days of IV cefazolin and vancomycin, discontinued on 12/15 as low suspicion of infection  Continue to monitor hemodynamics, will check procalcitonin in AM    VTE Pharmacologic Prophylaxis: VTE Score: 13 High Risk (Score >/= 5) - Pharmacological DVT Prophylaxis Ordered: heparin drip. Sequential Compression Devices Ordered.    Mobility:   Basic Mobility Inpatient Raw Score: 8  -HL Goal: 3: Sit at edge of bed  JH-HLM Achieved: 3: Sit at edge of bed  JH-HLM Goal NOT achieved. Continue with multidisciplinary rounding and encourage appropriate mobility to improve upon -HLM goals.    Patient Centered Rounds: I performed bedside rounds with nursing staff today.   Discussions with Specialists or Other Care Team Provider: neurology, neurosurgery    Education and Discussions with Family / Patient:   .     Current Length of Stay: 11 day(s)  Current Patient Status: Inpatient   Certification Statement: The patient will continue to require additional inpatient hospital stay due to need for IV heparin, neurological checks  Discharge Plan:  when cleared by neurology    Code Status: Level 1 - Full Code    Subjective   Seen examined at bedside.  Not in acute distress.    Objective :  Temp:  [93.9 °F (34.4 °C)-99 °F (37.2 °C)] 94.6 °F (34.8 °C)  HR:  [0-108] 96  BP: (127-171)/(56-87) 151/64  Resp:  [20-33] 33  SpO2:  [88 %-100 %] 100 %  O2 Device:  Ventilator    Body mass index is 18.88 kg/m².     Input and Output Summary (last 24 hours):     Intake/Output Summary (Last 24 hours) at 12/18/2024 1630  Last data filed at 12/18/2024 0957  Gross per 24 hour   Intake 769.53 ml   Output --   Net 769.53 ml       Physical Exam  Vitals and nursing note reviewed.   Constitutional:       General: She is not in acute distress.     Appearance: She is ill-appearing.      Comments: Sleepy, but arousable   HENT:      Head: Normocephalic and atraumatic.   Cardiovascular:      Rate and Rhythm: Normal rate and regular rhythm.   Pulmonary:      Effort: No respiratory distress.      Breath sounds: No wheezing or rhonchi.   Abdominal:      General: There is no distension.      Palpations: Abdomen is soft.   Musculoskeletal:      Right lower leg: No edema.      Left lower leg: No edema.   Skin:     General: Skin is warm and dry.   Neurological:      Comments:              Lines/Drains:  Lines/Drains/Airways       Active Status       Name Placement date Placement time Site Days    PICC Line 12/10/24 12/10/24  1657  --  7    CVC Central Lines 12/18/24 Left  12/18/24  1338  --  less than 1    CVC Central Lines 12/18/24 12/18/24  1440  --  less than 1    Arterial Line 12/18/24 Radial 12/18/24  1442  Radial  less than 1    Arterial Line 12/18/24 Femoral 12/18/24  1541  Femoral  less than 1    ETT  Cuffed 8 mm 12/18/24  1344  -- less than 1    NG/OG/Enteral Tube Nasogastric 12 Fr Right nare 12/08/24  2129  Right nare  9                    Central Line:  Goal for removal: Will discontinue when peripheral access obtained.                Lab Results: I have reviewed the following results:   Results from last 7 days   Lab Units 12/18/24  1420 12/18/24  1335 12/18/24  0908   WBC Thousand/uL 19.75*   < > 14.56*   HEMOGLOBIN g/dL 7.9*   < > 9.2*   I STAT HEMOGLOBIN   --    < >  --    HEMATOCRIT % 25.2*   < > 29.4*   HEMATOCRIT, ISTAT   --    < >  --    PLATELETS Thousands/uL 170   < > 180   SEGS  PCT %  --   --  80*   LYMPHO PCT %  --   --  10*   MONO PCT %  --   --  8   EOS PCT %  --   --  1    < > = values in this interval not displayed.     Results from last 7 days   Lab Units 12/18/24  1456   SODIUM mmol/L 153*   POTASSIUM mmol/L 3.2*   CHLORIDE mmol/L 106   CO2 mmol/L 30   BUN mg/dL 21   CREATININE mg/dL 0.76   ANION GAP mmol/L 17*   CALCIUM mg/dL 12.3*   ALBUMIN g/dL 2.3*   TOTAL BILIRUBIN mg/dL 0.42   ALK PHOS U/L 134*   ALT U/L 532*   AST U/L 572*   GLUCOSE RANDOM mg/dL 342*     Results from last 7 days   Lab Units 12/18/24  1420   INR  1.63*     Results from last 7 days   Lab Units 12/18/24  1124 12/18/24  0534 12/18/24  0117 12/17/24  1821 12/17/24  1346 12/17/24  0645 12/16/24  2356 12/16/24  1742 12/16/24  1204 12/16/24  0615 12/16/24  0011 12/15/24  1641   POC GLUCOSE mg/dl 136 114 129 128 128 139 133 126 132 138 142* 124         Results from last 7 days   Lab Units 12/18/24  1456 12/18/24  0908   LACTIC ACID mmol/L 13.0*  --    PROCALCITONIN ng/ml  --  0.07       Recent Cultures (last 7 days):   Results from last 7 days   Lab Units 12/13/24  0711   SPUTUM CULTURE  4+ Growth of Staphylococcus aureus*   GRAM STAIN RESULT  2+ Polys*  3+ Gram positive cocci in pairs*             Last 24 Hours Medication List:     Current Facility-Administered Medications:     acetaminophen (TYLENOL) oral suspension 650 mg, Q6H PRN    albuterol inhalation solution 2.5 mg, Q4H PRN    aspirin chewable tablet 81 mg, Daily    atorvastatin (LIPITOR) tablet 20 mg, QPM    bisacodyl (DULCOLAX) rectal suppository 10 mg, Daily PRN    calcium chloride 10 % injection **ADS Override Pull**,     chlorhexidine (PERIDEX) 0.12 % oral rinse 15 mL, Q12H DANIS    EPINEPHrine 5,000 mcg (STANDARD CONCENTRATION) IV in sodium chloride 0.9% 250 mL, Titrated, Last Rate: 8 mcg/min (12/18/24 1604)    furosemide (LASIX) tablet 40 mg, Daily PRN    heparin (porcine) 25,000 units in 0.45% NaCl 250 mL infusion (premix), Titrated, Last Rate: 18  Units/kg/hr (12/18/24 0800)    insulin lispro (HumALOG/ADMELOG) 100 units/mL subcutaneous injection 1-6 Units, Q6H DANIS **AND** Fingerstick Glucose (POCT), Q6H    melatonin tablet 6 mg, HS    [Held by provider] metoprolol tartrate (LOPRESSOR) partial tablet 12.5 mg, Q12H DANIS    modafinil (PROVIGIL) tablet 100 mg, Daily    norepinephrine (LEVOPHED) 4 mg (STANDARD CONCENTRATION) IV in sodium chloride 0.9% 250 mL, Titrated, Last Rate: 22 mcg/min (12/18/24 1530)    polyethylene glycol (MIRALAX) packet 17 g, Daily    senna-docusate sodium (SENOKOT S) 8.6-50 mg per tablet 2 tablet, BID    sertraline (ZOLOFT) tablet 50 mg, Daily    sodium bicarbonate 8.4 % injection **ADS Override Pull**,     vasopressin (PITRESSIN) 20 Units in sodium chloride 0.9 % 100 mL infusion, Continuous, Last Rate: 0.04 Units/min (12/18/24 1412)    Administrative Statements   Today, Patient Was Seen By: Mignon Sun MD  I have spent a total time of 45 minutes in caring for this patient on the day of the visit/encounter including Patient and family education, Documenting in the medical record, Reviewing / ordering tests, medicine, procedures  , Obtaining or reviewing history  , and Communicating with other healthcare professionals .    **Please Note: This note may have been constructed using a voice recognition system.**

## 2024-12-18 NOTE — RESPIRATORY THERAPY NOTE
Resp care   12/18/24 1703   Respiratory Assessment   Resp Comments Oett advanced to 24cm at lip   ETT  Cuffed 8 mm   Placement Date/Time: 12/18/24 (c) 0048   Type: Cuffed  Tube Size: 8 mm  Location: Oral  Insertion attempts: 1  Placement Verification: End tidal CO2;Symmetrical chest wall movement  Secured at (cm): 21   Secured at (cm) 24   Measured from Lips

## 2024-12-18 NOTE — ASSESSMENT & PLAN NOTE
Initial troponin 140, with last 92068 in the setting of acute CVA and bilateral PEs   Possibly ACS however cardiology not pursuing ischemic evaluation at this time.  Suspect non-MI troponin elevation from multiple active medical conditions  EKG with sinus rhythm and lateral ST-T wave abnormality  TTE with LVEF 50-55% with with wall motion abnormality  CTA notes significant diffuse coronary calcifications  Outpatient cardiology follow-up

## 2024-12-18 NOTE — RESPIRATORY THERAPY NOTE
RT Ventilator Management Note  Bren Traylor 69 y.o. female MRN: [id number]  Unit/Bed#: ICU 11 Encounter: [id number]      Daily Screen         12/8/2024  0744 12/18/2024  1400          Patient safety screen outcome:: Failed Failed (P)       Not Ready for Weaning due to:: Underline problem not resolved Underline problem not resolved;PEEP > 8cmH2O;FiO2 >60% (P)                 Physical Exam:   Assessment Type: Assess only  Respiratory Pattern: Normal  Chest Assessment: Chest expansion symmetrical  Bilateral Breath Sounds: Clear      Resp Comments: (P) attended code blue.  assisted in pt intubation and secured ett. yovanny placed inline during compressions. number read and relayed to charting rn. 22 mmhg pt ventilated with ambu bag at a rr of 30. pt transported from   to ICU at this time pt placed on c3 ventilator, pt taken off of vent  and bentilated with ambu bag during compressions.pt placed back on ventilator at this time on current vent settings. vbg ran on [email or web address]

## 2024-12-18 NOTE — ASSESSMENT & PLAN NOTE
CTA of the head revealed acute thrombosis in the mid-distal right M1 segment with severe near occlusion of the proximal BILL with severe tandem stenosis intracranially and high-grade stenosis of the last distal common carotid artery, moderate high-grade stenosis of the right subclavian origin.   12/6 s/p emergent right carotid angioplasty/stenting and placed on Integrilin  Repeat imaging 12/7 revealed hemorrhagic conversion and patient was given DDAVP administered  CTA 12/10 revealed BILL stent occlusion  Aspirin per NG tube when able, statin

## 2024-12-18 NOTE — ASSESSMENT & PLAN NOTE
CTA Chest 12/10 revealed bilateral PEs with developing pulmonary infarction  Venous duplex negative for DVT  Heparin when able

## 2024-12-18 NOTE — ASSESSMENT & PLAN NOTE
Echocardiogram 12/18/2024: EF 50-55%. She had similar hypokinesis in her basal inferior, mid inferior, basal anterolateral, mid anterolateral segments.  She did have some dyskinesis in mid and basal inferolateral segments.  Moderate MR. Nos s/p thoracentesis 12/11, 500 cc. Appears euvolemic. Now requiring pressors.

## 2024-12-18 NOTE — RESPIRATORY THERAPY NOTE
Resp care   12/18/24 1505   Respiratory Assessment   Resp Comments Oett advanced 3 cms. now 23 cm at lip   ETT  Cuffed 8 mm   Placement Date/Time: 12/18/24 (c) 3849   Type: Cuffed  Tube Size: 8 mm  Location: Oral  Insertion attempts: 1  Placement Verification: End tidal CO2;Symmetrical chest wall movement  Secured at (cm): 21   Secured at (cm) 23   Measured from Lips

## 2024-12-18 NOTE — SPEECH THERAPY NOTE
Speech-Language Pathology Progress Note      Patient Name: Bren Traylor    Today's Date: 12/18/2024    Pt was intubated and transferred to ICU. ST orders completed at this time. Please re consult as able.

## 2024-12-18 NOTE — PROCEDURES
Central Line Insertion    Date/Time: 12/18/2024 2:40 PM    Performed by: Khloe Szymanski MD  Authorized by: Khloe Szymanski MD    Consent:     Consent obtained:  Emergent situation  Universal protocol:     Patient identity confirmed:  Anonymous protocol, patient vented/unresponsive  Pre-procedure details:     Skin preparation:  2% chlorhexidine  Indications:     Central line indications: medications requiring central line, hemodynamic monitoring and no peripheral vascular access    Procedure details:     Location:  Left femoral    Vessel type: vein      Laterality:  Left    Approach: percutaneous technique used      Patient position:  Flat    Catheter type:  Triple lumen    Catheter size:  9 Fr    Landmarks identified: yes      Ultrasound guidance: no      Manometry confirmation: no      Number of attempts:  1    Successful placement: yes      Catheter tip vessel location: iliac vein    Post-procedure details:     Post-procedure:  Dressing applied and line sutured    Assessment:  Blood return through all ports and free fluid flow    Post-procedure complications: none      Patient tolerance of procedure:  Tolerated well, no immediate complications

## 2024-12-18 NOTE — ASSESSMENT & PLAN NOTE
Presented as a stroke alert [facility] on 12/7 for left-sided weakness and altered mental status.  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  Also with right carotid stenosis s/p angioplasty with stenting.  She was admitted to the ICU where she required pressor support and Precedex for episodes of agitation  Most recent CT head on 12/14 with stable right basal ganglia parenchymal hemorrhage  Patient was started on heparin drip stroke protocol PTT goal 50-70 and aspirin 81 mg  Provigil started, currently on 100 mg daily  Continue neurochecks  Neurosurgery following peripherally  Neurology signed off on 12/9  Goal SBP below 140 mmHg  Discussed with neurosurgery and neurology on 12/17 about continuation of heparin and transition to oral a/c: advised to continue stroke protocol heparin drip, repeat CT Head no contrast on 12/21/24 (2 weeks from date of initial bleeding), based on CT results would decide on therapeutic AC timing.  STAT CT Head no contrast if any acute worsening/ neurological change

## 2024-12-18 NOTE — NURSING NOTE
Called to room by unit clerk that patient had made a bowl movement. RN and PCA entered room and patient was turned over face down. RN rolled over and patient was found to be pulseless and RN started compressions and code blue was called at 1325.

## 2024-12-18 NOTE — ASSESSMENT & PLAN NOTE
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   12/6 s/p emergent right carotid angioplasty/stenting and placed on Integrilin  Repeat imaging 12/7 revealed hemorrhagic conversion and patient was given DDAVP administered  CTA 12/10 revealed BILL stent occlusion  On aspirin and statin

## 2024-12-18 NOTE — TELEPHONE ENCOUNTER
"Roberto ashford from [facility] called to verify information for patient. Roberto asked who patient provider is and if she is active with the office.     Roberto was very pushy. I asked Roberto if this is in regards to medication, and he stated \"no\" that he just needs the information.     No information was given. I advised he reach out to the patient to verify.     Please be cautious if Roberto reaches out again. Possible scam.       "

## 2024-12-18 NOTE — ASSESSMENT & PLAN NOTE
Unclear etiology.  Unfortunately, patient was not on telemetry at time of cardiac arrest.  Did require defibrillation during code.  Would be concerned about possible arrhythmia given prior troponin elevations and concern for ischemic heart disease with plans to do cardiac catheterization/ischemic evaluation once she is more clinically stable.  Repeat stat echocardiogram done today after cardiac arrest.  It showed ejection fraction low normal 50 to 55% mild diastolic dysfunction.  She had similar hypokinesis in her basal inferior, mid inferior, basal anterolateral, mid anterolateral segments.  She did have some dyskinesis in mid and basal inferolateral segments.  Not significantly changed from prior echocardiogram.  Would recommend treating for possible ACS if able to from neuro/bleeding standpoint.  Currently undergoing further evaluation with CT head,, chest, abdomen, pelvis.  Limited with ACS treatment due to bleeding concerns.   Continue statin  Would resume aspirin and heparin once able from neuro/bleeding standpoint  Holding beta blocker while requiring pressors  Continue telemetry

## 2024-12-18 NOTE — RESPIRATORY THERAPY NOTE
RT Ventilator Management Note  Bren Traylor 69 y.o. female MRN: [id number]  Unit/Bed#: ICU 11 Encounter: [id number]      Daily Screen         12/8/2024  0744 12/18/2024  1400          Patient safety screen outcome:: Failed Failed      Not Ready for Weaning due to:: Underline problem not resolved Underline problem not resolved;PEEP > 8cmH2O;FiO2 >60%                Physical Exam:   Assessment Type: Assess only  Respiratory Pattern: Normal  Chest Assessment: Chest expansion symmetrical  Bilateral Breath Sounds: Clear      Resp Comments: vbg ran on istat. rr increased at this time per md request.

## 2024-12-18 NOTE — PROGRESS NOTES
ICU Acceptance Note - Critical Care/ICU   Name: Bren Traylor 69 y.o. female I MRN: [id number]  Unit/Bed#: ICU 11 I Date of Admission: 12/6/2024   Date of Service: 12/18/2024 I Hospital Day: 11      Brief Summary:   69 y.o. with HTN, HLD, and anxiety who presents to [facility] as an endovascular alert for Right MCA M1 occlusion. Patient originally presented to [facility] as a stroke alert for left sided weakness, left facial droop and altered mental status s/p mechanical thrombectomy, TICI 3 and right carotid angioplasty and stent placement complicated with a hemorrhagic conversion. She then developed b/l PEs along with pleural effusions s/p thoracentesis on the right side and has been stable. She was also found to have A-fib on telemetry.  Cardiology was consulted.  She was initially started on ECMO protocol heparin with PTT goals of 50-60 then switched to stroke PTT goal 50-70. Stable CTH after PTT therapeutic x2 then downgraded to SD2 on 12/15/2024 then MedSurg afterward. Patient was coded today.  ROSC achieved after 10 minutes of ACLS protocol.  Patient had unknown downtime prior to CODE BLUE.  Patient is transferred back to ICU.    Assessment & Plan   Neuro:   Diagnosis: Right MCA M1 occlusion s/p thrombectomy TICI 3, R carotid stenting complicated with hemorrhagic conversion  Plan: Hold Hep gtt and aspirin 81 mg daily until CT head scan is stable  Check CT chest abdomen pelvis  SBP goal 110/140  On Provigil 100 mg daily  Neurochecks every hour  Stat CT head for any acute worsening neurological changes or GCS drops more than 2 points in 1 hour  Sertraline 50 mg daily      Diagnosis: Generalized myoclonus like jerking  S/p cardiac arrest  Likely anoxic brain injury  Plan: Will hook hook up video EEG    CV:   Diagnosis: Cardiac arrest   ROSC achieved after 10mins of ACLS protocol  Unknown downtime prior to Code Blue  Echo 12/10 EF 50-50%.  Hypokinetic inferior segments.  (Please refer to report for  details)  Diagnosis: A-fib  Diagnosis: Elevated troponin possible NSTEMI versus troponin MI elevation, Cardiomyopathy  Diagnosis: HTN, HLD    Plan: Hold metoprolol tartrate 12.5 mg twice daily  Continue atorvastatin 20 mg daily  Cardiology on board    Pulm:  Diagnosis: acute PE. No DVT  Plan: Holding Hep gtt for now  Pending chest abdomen pelvis result  Mechanical ventilation    GI:   No active issues    :   No active issues    F/E/N:   Replete electrolytes as needed    Heme/Onc:   Continue to monitor    Endo:   Will initiate insulin gtt    ID:   Diagnosis: pneumonia  Will initiate vancomycin and Zosyn  Infectious workup    MSK/Skin:   Frequent turning    Disposition: Critical care    ICU Core Measures     Vented Patient  VAP Bundle  VAP bundle ordered     A: Assess, Prevent, and Manage Pain Has pain been assessed? Yes  Need for changes to pain regimen? No   B: Both Spontaneous Awakening Trials (SATs) and Spontaneous Breathing Trials (SBTs) Plan to perform spontaneous awakening trial today? No secondary to critical condition  Plan to perform spontaneous breathing trial today? No secondary to critical condition  Obvious barriers to extubation? Yes   C: Choice of Sedation RASS Goal: 0 Alert and Calm  Need for changes to sedation or analgesia regimen? No   D: Delirium CAM-ICU: Negative   E: Early Mobility  Plan for early mobility? Yes   F: Family Engagement Plan for family engagement today? Yes       Review of Invasive Devices:      Central access plan: Patient has multiple central venous catheters.  Medications requiring central line Hemodynamic monitoring  [place] Plan: Keep arterial line for hemodynamic monitoring, frequent ABGs, and frequent labs    Prophylaxis:  VTE VTE covered by:    None       Stress Ulcer  not ordered         24 Hour Events : patient was coded today and was transferred back to ICU for close monitoring    Subjective   Review of Systems: Review of Systems not obtainable due to unresponsive and  intubated    Objective :                   Vitals I/O      Most Recent Min/Max in 24hrs   Temp (!) 94.6 °F (34.8 °C) Temp  Min: 93.9 °F (34.4 °C)  Max: 99 °F (37.2 °C)   Pulse 96 Pulse  Min: 0  Max: 108   Resp (!) 33 Resp  Min: 20  Max: 33   /64 BP  Min: 127/67  Max: 171/71   O2 Sat 100 % SpO2  Min: 88 %  Max: 100 %      Intake/Output Summary (Last 24 hours) at 12/18/2024 1701  Last data filed at 12/18/2024 0957  Gross per 24 hour   Intake 769.53 ml   Output --   Net 769.53 ml       Diet NPO    Invasive Monitoring   Arterial Line  [place] /66  Arterial Line BP  Min: 188/66  Max: 198/56    mmHg  Arterial Line MAP (mmHg)  Min: 92 mmHg  Max: 100 mmHg     Neuro Invasive Monitoring   Most Recent  Min/Max in 24hrs    GCS 14  [place] Coma Scale Score  Min: 14  Max: 14    ICP     No data recorded    CPP (cuff)    No data recorded    CPP(tushar)    No data recorded   CVP   No data recorded           Physical Exam   Physical Exam  Vitals and nursing note reviewed.   Eyes:      Pupils:      Right eye: Pupil is not reactive.      Left eye: Pupil is not reactive.      Comments: Small pupils, nonreactive.    Skin:     General: Skin is dry.      Coloration: Skin is pale.   HENT:      Head: Normocephalic and atraumatic.      Mouth/Throat:      Mouth: Mucous membranes are moist.   Cardiovascular:      Rate and Rhythm: Normal rate and regular rhythm.      Pulses: No decreased pulses.           Carotid pulses are 2+ on the right side and 2+ on the left side.       Radial pulses are 2+ on the right side and 2+ on the left side.        Femoral pulses are 2+ on the right side and 2+ on the left side.     Heart sounds: Normal heart sounds.   Abdominal:      Palpations: Abdomen is soft.   Constitutional:       General: She is in acute distress.      Appearance: She is ill-appearing.      Interventions: She is intubated. She is not sedated.     Comments: Not following commands   Pulmonary:      Effort: No tachypnea or  bradypnea. She is intubated.      Comments: Limited exam: on ventilator.  Anterior lung sounds clear.  Coarse and rhonchorous breath sounds bilateral bases.  Neurological:      Mental Status: She is unresponsive.      Comments: Not following commands          Diagnostic Studies        Lab Results: I have reviewed the following results:     Medications:  Scheduled PRN   [Held by provider] aspirin, 81 mg, Daily  atorvastatin, 20 mg, QPM  calcium chloride, ,   chlorhexidine, 15 mL, Q12H DANIS  insulin lispro, 1-6 Units, Q6H DANIS  melatonin, 6 mg, HS  [Held by provider] metoprolol tartrate, 12.5 mg, Q12H DANIS  modafinil, 100 mg, Daily  polyethylene glycol, 17 g, Daily  senna-docusate sodium, 2 tablet, BID  sertraline, 50 mg, Daily  sodium bicarbonate, ,       acetaminophen, 650 mg, Q6H PRN  albuterol, 2.5 mg, Q4H PRN  bisacodyl, 10 mg, Daily PRN  calcium chloride, ,   furosemide, 40 mg, Daily PRN  sodium bicarbonate, ,        Continuous    epinephrine, 1-10 mcg/min, Last Rate: 6 mcg/min (12/18/24 1649)  norepinephrine, 1-30 mcg/min, Last Rate: 22 mcg/min (12/18/24 1657)  vasopressin, 0.04 Units/min, Last Rate: 0.04 Units/min (12/18/24 1412)         Labs:   CBC    Recent Labs     12/18/24  1345 12/18/24  1414 12/18/24  1420   WBC 18.59*  --  19.75*   HGB 9.7* 6.8* 7.9*   HCT 32.7* 20* 25.2*     --  170   BANDSPCT 2  --   --      BMP    Recent Labs     12/18/24  0908 12/18/24  1414 12/18/24  1456   SODIUM 140  --  153*   K 3.5  --  3.2*   *  --  106   CO2 25 32 30   AGAP 6  --  17*   BUN 17  --  21   CREATININE 0.39*  --  0.76   CALCIUM 8.2*  --  12.3*       Coags    Recent Labs     12/18/24  0908 12/18/24  1420   INR  --  1.63*   PTT 69* 33        Additional Electrolytes  Recent Labs     12/18/24  1414 12/18/24  1423 12/18/24  1456   MG  --   --  3.7*   PHOS  --   --  9.8*   CAIONIZED 1.78* 1.55*  --           Blood Gas    Recent Labs     12/18/24  1602   PHART 7.390   [id number] 45.9*   PO2ART 287.5*   [id number]  27.2   BEART 1.9   SOURCE Line, Arterial     Recent Labs     12/18/24  1602   SOURCE Line, Arterial    LFTs  Recent Labs     12/18/24  1456   *   *   ALKPHOS 134*   ALB 2.3*   TBILI 0.42       Infectious  Recent Labs     12/18/24  0908   PROCALCITONI 0.07     Glucose  Recent Labs     12/17/24  0443 12/18/24  0908 12/18/24  1456   GLUC 119 124 342*

## 2024-12-18 NOTE — ASSESSMENT & PLAN NOTE
In the setting of CVA with hemorrhagic conversion  Delirium precautions  Neurochecks every 4 hours

## 2024-12-18 NOTE — PROCEDURES
Arterial Line Insertion    Date/Time: 12/18/2024 2:42 PM    Performed by: Richi Cain DO  Authorized by: Richi Cain DO    Patient location:  ICU  Other Assisting Provider: Yes (comment) (Dr. Thompson)    Consent:     Consent obtained:  Emergent situation  Universal protocol:     Required blood products, implants, devices, and special equipment available: yes      Site/side marked: yes      Patient identity confirmed:  Arm band  Indications:     Indications: hemodynamic monitoring, multiple ABGs, continuous blood pressure monitoring and frequent labs / infusion    Pre-procedure details:     Skin preparation:  Chlorhexidine  Anesthesia (see MAR for exact dosages):     Anesthesia method:  None  Procedure details:     Location / Tip of Catheter:  Radial    Laterality:  Right    Evan's test performed: no      Needle gauge:  22 G    Placement technique:  Percutaneous and ultrasound guided    Ultrasound image availability:  Images available in PACS    Sterile ultrasound techniques: Sterile gel and sterile probe covers were used      Number of attempts:  1    Successful placement: yes    Post-procedure details:     Post-procedure:  Biopatch applied, sutured and wrist guard applied    CMS:  Unable to assess    Patient tolerance of procedure:  Tolerated well, no immediate complications

## 2024-12-18 NOTE — PROGRESS NOTES
[facility] Progress Note             12/18/24 1300   Clinical Encounter Type   Visited With Patient;Health care provider   Crisis Visit Code  (Code Blue)

## 2024-12-18 NOTE — QUICK NOTE
Neurology on call contacted regarding this patient's AC.    Briefly this is a 70 yo female seen by neurology earlier this admission in the setting of acute ischemic RMCA M1 stroke in the setting of BILL stenosis status post carotid stenting with large R BG hemorrhagic conversion of ischemic stroke status post Integrilin drip, reversed with DDAVP then, subsequently found to be in atrial fibrillation with pulmonary emboli currently on heparin drip ischemic stroke protocol--neurology asked for recommendations on appropriate timing of therapeutic oral anticoagulation.    CT H head no contrast last 12/14/2024 reviewed personally in PACS with large right basal ganglia bleed noted again.  In d/w primary team, patient currently extubated and on room air on heparin drip ischemic stroke protocol dosing.    Plan  Recommend repeat CT H no contrast 12/21/24 which would be 2 weeks from date of initial bleeding (Certainly if any acute worsening would obtain STAT CT H no contrast), based on what this shows will decide on therapeutic AC timing.    D/w primary team attending above 12/17/2024.

## 2024-12-19 NOTE — RESPIRATORY THERAPY NOTE
12/19/24 0337   Respiratory Assessment   Resp Comments No adverse events overnight. Overbreathing set rate of 26.   Vent Information   Vent type Valderrama C3   Valderrama Vent Mode (S)CMV   $ Vent Daily Charge-Subsequent Yes   $ Pulse Oximetry Spot Check Charge Completed   (S)CMV Settings   Resp Rate (BPM) 26 BPM   VT (mL) 400 mL   FIO2 (%) 50 %   PEEP (cmH2O) 8 cmH2O   Insp Time (%)   (0.65 sec)   Flow Trigger (LPM) 5   Humidification Heater   Heater Temperature (Set) 95 °F (35 °C)   (S)CMV Actuals   Resp Rate (BPM) 30 BPM   VT (mL) 368   MV 11.5   MAP (cmH2O) 14 cmH2O   Peak Pressure (cmH2O) 28 cmH2O   I:E Ratio (Obs) 1:2   (S)CMV Alarms   High Peak Pressure (cmH2O) 45   Low Pressure (cmH2O) 6 cm H2O   High Resp Rate (BPM) 55 BPM   Low Resp Rate (BPM) 10 BPM   High MV (L/min) 20 L/min   Low MV (L/min) 3.5 L/min   Apnea Time (s) 20 S   Maintenance   Water bag changed No   Circuit changed No

## 2024-12-19 NOTE — ASSESSMENT & PLAN NOTE
Unclear etiology.  Unfortunately, patient was not on telemetry at time of cardiac arrest.  Did require defibrillation during code.  Further interrogation of [place] showed that the patient bradycardia down due to hypoxia.      Repeat echocardiogram done yesterday after cardiac arrest did not show significant change from prior echo.      No arrhythmias noted on telemetry in the last 24 hours    Limited with ACS treatment due to bleeding concerns.   Continue statin  Would resume aspirin and heparin once able from neuro/bleeding standpoint  Holding beta blocker while requiring pressors  Continue telemetry

## 2024-12-19 NOTE — PROGRESS NOTES
Progress Note - Critical Care/ICU   Name: Bren Traylor 69 y.o. female I MRN: [id number]  Unit/Bed#: ICU 11 I Date of Admission: 12/6/2024   Date of Service: 12/19/2024 I Hospital Day: 12       Assessment & Plan   Neuro:   Right MCA M1 occlusion s/p thrombectomy TICI 3, R carotid stenting complicated with hemorrhagic conversion  SBP goal 110/140  Neurochecks q1  Stat CT head for any acute worsening neurological changes or GCS drops more than 2 points in 1 hour  Hold Hep gtt and aspirin 81 mg daily until CT head scan is stable  Hold Provigil 100 mg daily, sertraline 50mg  Generalized myoclonus like jerking  S/p cardiac arrest  Likely anoxic brain injury  12/18 CT head - Diminished size of the known large right basal ganglia parenchymal hemorrhage. Increased edema and mass effect. Increased midline shift. No hydrocephalus.   Plan: vEEG, MRI  Sedation  Propofol gtt  Fentanyl pushes     CV:   Cardiac arrest   ROSC achieved after 10mins of ACLS protocol  Unknown downtime   TTM, rewarming 12/19 PM  Central fever ON  Bath temp low, require ice, tylenol IV  Cardiology on board  Pressors - epi, vaso, levo  Echo 12/10 EF 50-55%.  Hypokinetic inferior segments.  (Please refer to report for details)  Repeat ECHO 12/18 EF 55%.  Systolic function normal.  LV size, wall thickness normal.  Mildly abnormal diastolic function.  Basal inferior, basal anterolateral, mid inferior, mid anterolateral hypokinesis.  Basal inferolateral and mid inferolateral dyskinesia.  RV size, function, wall thickness normal.  A-fib  Amiodarone gtt for pphx  NSR s/p cardiac arrest  Elevated troponin   NSTEMI v. Post arrest v. Cardiomyopathy   Trend  Hx of HTN, HLD  Plan: Hold metoprolol tartrate 12.5 mg twice daily, atorvastatin 20 mg daily     Pulm:  Mechanical Ventilation  CMV 20/400/8/50  Pulmonary edema  ON - pinkish fluid in ETT  40 lasix given  Multi lobar PNA  12/18 CT CAP - Multi lobar pneumonia.   Vanc + Zosyn  12/18 Blood cx  pending  12/18 MRSA pending   12/19 sputum cx pending  12/19 strep antigen pending  Acute PE. No DVT  Plan: Holding Hep gtt      GI:   Elevated transaminases  Trending down, repeat in PM     :   Decreased urinary output  Post cardiac arrest until lasix - 40 ml  Post lasix - 850ml     F/E/N:   No fluids  Replete electrolytes as needed  Consider trickle feeds     Heme/Onc:   Continue to monitor     Endo:   BGL stable, did not require insulin gtt, dc insulin gtt     ID:   Multi lobar pna  vancomycin and Zosyn Day 2  12/18 Blood cx pending  12/18 MRSA pending   12/19 sputum cx pending  12/19 strep antigen pending       MSK/Skin:   Frequent turning     Tubes: sierra, ETT  Lines: R fem A line, L fem CVC  Drains: none  Disposition: Critical care    ICU Core Measures     Vented Patient  VAP Bundle  VAP bundle ordered     A: Assess, Prevent, and Manage Pain Has pain been assessed? NA  Need for changes to pain regimen? NA   B: Both Spontaneous Awakening Trials (SATs) and Spontaneous Breathing Trials (SBTs) Plan to perform spontaneous awakening trial today? No secondary to targeted temperature management  Plan to perform spontaneous breathing trial today? No secondary to targeted temperature management  Obvious barriers to extubation? Yes   C: Choice of Sedation RASS Goal: -3 Moderate Sedation or 0 Alert and Calm  Need for changes to sedation or analgesia regimen? NA   D: Delirium CAM-ICU: Negative   E: Early Mobility  Plan for early mobility? No   F: Family Engagement Plan for family engagement today? Yes       Antibiotic Review: Continue broad spectrum secondary to severity of illness.     Review of Invasive Devices:    [place] Plan: Continue for accurate I/O monitoring for 48 hours  Central access plan: Medications requiring central line Hemodynamic monitoring  [place] Plan: Keep arterial line for hemodynamic monitoring    Prophylaxis:  VTE Contraindicated secondary to: Cardiac arrest, TTM   Stress Ulcer  not ordered          24 Hour Events : Cardiac arrest, unknown downtime, ROSC achieved.  Transferred to ICU.  TTM initiated  Subjective   Review of Systems: Review of Systems not obtainable due to Clinical Condition, Altered mental status    Objective :                   Vitals I/O      Most Recent Min/Max in 24hrs   Temp (!) 97.2 °F (36.2 °C) Temp  Min: 93.9 °F (34.4 °C)  Max: 98.8 °F (37.1 °C)   Pulse 72 Pulse  Min: 0  Max: 124   Resp (!) 25 Resp  Min: 20  Max: 47   BP 93/63 BP  Min: 90/69  Max: 171/71   O2 Sat 100 % SpO2  Min: 88 %  Max: 100 %      Intake/Output Summary (Last 24 hours) at 12/19/2024 0737  Last data filed at 12/19/2024 0639  Gross per 24 hour   Intake 5698.74 ml   Output 1730 ml   Net 3968.74 ml       Diet NPO    Invasive Monitoring   Arterial Line  Qi /48  Arterial Line BP  Min: 108/54  Max: 198/56   MAP 74 mmHg  Arterial Line MAP (mmHg)  Min: 64 mmHg  Max: 100 mmHg           Physical Exam   Physical Exam  Vitals and nursing note reviewed.   Eyes:      Pupils: Pupils are equal.      Comments: Pupils brisk, reactive, 3 cm   Skin:     General: Skin is warm and dry.   HENT:      Head: Normocephalic and atraumatic. No right periorbital erythema or left periorbital erythema.      Mouth/Throat:      Mouth: Mucous membranes are moist.   Cardiovascular:      Rate and Rhythm: Normal rate and regular rhythm.      Pulses: No decreased pulses.           Carotid pulses are 2+ on the right side and 2+ on the left side.       Posterior tibial pulses are 2+ on the right side and 2+ on the left side.      Heart sounds: Normal heart sounds.   Musculoskeletal:      Right lower leg: No edema.      Left lower leg: No edema.   Abdominal:      Palpations: Abdomen is soft.   Constitutional:       General: She is in acute distress.      Appearance: She is ill-appearing.      Interventions: She is sedated and intubated.   Pulmonary:      Effort: Tachypnea present. She is intubated.      Breath sounds: No stridor or decreased  air movement. No decreased breath sounds, wheezing, rhonchi or rales.      Comments: On ventilator  Neurological:      Mental Status: She is unresponsive.      GCS: GCS eye subscore is 1. GCS verbal subscore is 1. GCS motor subscore is 1.      Comments: Myoclonus, intact cough and gag reflex  GCS 3T          Diagnostic Studies        Lab Results: I have reviewed the following results:     Medications:  Scheduled PRN   acetaminophen, 15 mg/kg, Q6H  [Held by provider] aspirin, 81 mg, Daily  [Held by provider] atorvastatin, 20 mg, QPM  chlorhexidine, 15 mL, Q12H DANIS  [Held by provider] melatonin, 6 mg, HS  [Held by provider] metoprolol tartrate, 12.5 mg, Q12H DANIS  [Held by provider] modafinil, 100 mg, Daily  piperacillin-tazobactam, 4.5 g, Q6H  polyethylene glycol, 17 g, Daily  senna-docusate sodium, 2 tablet, BID  [Held by provider] sertraline, 50 mg, Daily  vancomycin, 750 mg, Q12H      albuterol, 2.5 mg, Q4H PRN  bisacodyl, 10 mg, Daily PRN  [Held by provider] furosemide, 40 mg, Daily PRN       Continuous    amiodarone (CORDARONE) 900 mg in dextrose 5 % 500 mL infusion, 0.5 mg/min, Last Rate: 0.5 mg/min (12/19/24 0253)  epinephrine, 1-10 mcg/min, Last Rate: 5 mcg/min (12/19/24 0302)  insulin regular (HumuLIN R,NovoLIN R) 1 Units/mL in sodium chloride 0.9 % 100 mL infusion, 0.3-21 Units/hr, Last Rate: Stopped (12/18/24 1841)  norepinephrine, 1-30 mcg/min, Last Rate: 16 mcg/min (12/19/24 0552)  propofol, 5-50 mcg/kg/min, Last Rate: 30 mcg/kg/min (12/19/24 0332)  vasopressin, 0.04 Units/min, Last Rate: 0.04 Units/min (12/19/24 0552)         Labs:   CBC    Recent Labs     12/18/24  1345 12/18/24  1414 12/18/24  1700 12/19/24  0604   WBC 18.59*   < > 29.14* 42.07*   HGB 9.7*   < > 8.4* 8.5*   HCT 32.7*   < > 26.0* 25.7*      < > 200 197   BANDSPCT 2  --   --  5    < > = values in this interval not displayed.     BMP    Recent Labs     12/18/24  2337 12/19/24  0604   SODIUM 152* 152*   K 4.2 3.9   * 110*    CO2 28 28   AGAP 15* 14*   BUN 33* 40*   CREATININE 0.89 0.94   CALCIUM 9.1 8.7       Coags    Recent Labs     12/18/24  0908 12/18/24  1420   INR  --  1.63*   PTT 69* 33        Additional Electrolytes  Recent Labs     12/18/24  2337 12/19/24  0604   MG 2.6 2.5   PHOS 6.1* 7.0*   CAIONIZED 1.17 1.07*          Blood Gas    Recent Labs     12/19/24  0604   PHART 7.506*   PNH2QGH 30.8*   PO2ART 146.8*   PNG0BFQ 23.8   BEART 1.0   SOURCE Line, Arterial     Recent Labs     12/19/24  0604   SOURCE Line, Arterial    LFTs  Recent Labs     12/18/24  1700 12/19/24  0604   * 386*   * 266*   ALKPHOS 117* 106*   ALB 3.1* 2.9*   TBILI 0.51 0.59       Infectious  Recent Labs     12/18/24  0908   PROCALCITONI 0.07     Glucose  Recent Labs     12/18/24  1456 12/18/24  1700 12/18/24  2337 12/19/24  0604   GLUC 342* 229* 221* 172*

## 2024-12-19 NOTE — OCCUPATIONAL THERAPY NOTE
OT CANCEL NOTE    Pt chart reviewed. Pt is currently intubated and not appropriate to engage in skilled OT services at this time. Will continue to follow pt on caseload and see pt when medically stable and as clinically appropriate.       12/19/24 0816   OT Last Visit   OT Visit Date 12/19/24   Note Type   Note Type Cancelled Session   Cancel Reasons Intubated/sedated         Chantelle Swenson MS, OTR/L

## 2024-12-19 NOTE — PHYSICAL THERAPY NOTE
Physical Therapy Cancellation Note    PT orders received chart review completed. Pt is currently intubated/sedated and not appropriate to participate in skilled PT at this time. PT will follow and re-eval as medically appropriate.     12/19/24 1000   Note Type   Note type Cancelled Session   Cancel Reasons Intubated/sedated   Cognition   Orientation Level Unable to assess       Nori Denton, PT

## 2024-12-19 NOTE — PROGRESS NOTES
Bren Traylor is a 69 y.o. female who is currently ordered Vancomycin IV with management by the Pharmacy Consult service.  Relevant clinical data and objective / subjective history reviewed.  Vancomycin Assessment:  Indication and Goal AUC/Trough: Pneumonia (goal -600, trough >10)  Clinical Status:  critically ill, worsening leukocytosis, hypothermia   Micro:    Strep pneumoniae, urine: in process   Sputum culture: no bacteria seen   Blood cultures x 2: in process   Sputum culture: 4+ MSSA  Renal Function:  SCr: 1.05 mg/dL (from 0.94, baseline 0.4-0.6)  CrCl: 35 mL/min  Renal replacement: Not on dialysis  Days of Therapy: 2  Current Dose: 750 mg IV q12h  Vancomycin Plan:  New Dosin mg IV daily PRN when random level is less than 15 - give 750 mg IV x 1 today at 2100 when level is predicted to be below 15  Next Level: Random  at 0600  Renal Function Monitoring: Daily BMP and UOP  Pharmacy will continue to follow closely for s/sx of nephrotoxicity, infusion reactions and appropriateness of therapy.  BMP and CBC will be ordered per protocol. We will continue to follow the patient’s culture results and clinical progress daily.    Kaela Mendez, PharmD, [facility]  Critical Care Clinical Pharmacist  [phone]

## 2024-12-19 NOTE — NURSING NOTE
Attempted to exchange existing SL Rt basilic PICC placed in IR with TL PICC, unable to fully advance PICC, 23 cm left out on skin with blood return noted if able to take to IR for repo.  MD aware. If CVC line is placed, MD stated they will pull PICC

## 2024-12-19 NOTE — PROGRESS NOTES
Progress Note - Critical Care/ICU   Name: Bren Traylor 69 y.o. female I MRN: [id number]  Unit/Bed#: ICU 11 I Date of Admission: 12/6/2024   Date of Service: 12/19/2024 I Hospital Day: 12       Evaluated pt during evening rounds. Stimulus induce myoclonic jerks with eye opening  Pink forthy fluid from ET tube. Lasix given due to concern fore pulmonary edema   -propofol started  -amiodarone drip started  -pt's son at bedside comforted   -EEG placed, burst suppression with multiple muscle artifact, even before propofol was started     Critical care time 30 minutes

## 2024-12-19 NOTE — PROGRESS NOTES
Pastoral Care Progress Note          Chaplaincy Interventions Utilized:   Empowerment: Encouraged self-care and Provided anxiety containment    Exploration: Explored hope, Explored spiritual needs & resources, and Facilitated story telling     Relationship Building: Listened empathically, Hospitality, and Provided silent and supportive presence    Ritual: Provided prayer    Chaplaincy Outcomes Achieved:  Debriefed/defused experience, Distress reduced, Expressed gratitude, and Expressed peace      Spiritual Coping Strategies Utilized:   Spiritual struggle       12/18/24 1900   Clinical Encounter Type   Visited With Patient and family together   Routine Visit Follow-up   Referral From    Referral To    Nondenominational Encounters   Nondenominational Needs Prayer   Patient Spiritual Encounters   Spiritual Encounter Notes The Pt unresponsive. The Pt's son present during the visit. More family members on their way to the hospital. The family worried and concerned about the Pt's worsening condition. Spiritual and emotional support provided.

## 2024-12-19 NOTE — PROCEDURES
Central Line Insertion    Date/Time: 12/19/2024 3:14 PM    Performed by: Richi Cain DO  Authorized by: Richi Cain DO    Patient location:  ICU  Other Assisting Provider: Yes (comment) (Dr. Thompson)    Consent:     Consent obtained:  Verbal    Consent given by:  Healthcare agent    Risks discussed:  Arterial puncture, incorrect placement, nerve damage, pneumothorax, infection and bleeding    Alternatives discussed:  No treatment  Universal protocol:     Procedure explained and questions answered to patient or proxy's satisfaction: yes      Relevant documents present and verified: yes      Test results available and properly labeled: yes      Radiology Images displayed and confirmed.  If images not available, report reviewed: yes      Required blood products, implants, devices, and special equipment available: yes      Site/side marked: yes      Immediately prior to procedure, a time out was called: yes      Patient identity confirmed:  Hospital-assigned identification number and arm band  Pre-procedure details:     Hand hygiene: Hand hygiene performed prior to insertion      Sterile barrier technique: All elements of maximal sterile technique followed      Skin preparation:  ChloraPrep    Skin preparation agent: Skin preparation agent completely dried prior to procedure    Indications:     Central line indications: medications requiring central line    Anesthesia (see MAR for exact dosages):     Anesthesia method:  Local infiltration    Local anesthetic:  Lidocaine 1% w/o epi  Procedure details:     Location:  Left internal jugular    Vessel type: vein      Laterality:  Left    Approach: percutaneous technique used      Patient position:  Flat    Catheter type:  Triple lumen 20cm    Landmarks identified: yes      Ultrasound guidance: yes      Ultrasound image availability:  Images available in PACS and still images obtained    Sterile ultrasound techniques: Sterile gel and sterile probe covers  were used      Number of attempts:  1    Successful placement: yes      Catheter tip vessel location: atriocaval junction    Post-procedure details:     Post-procedure:  Dressing applied    Assessment:  Blood return through all ports, free fluid flow, no pneumothorax on x-ray and placement verified by x-ray    Post-procedure complications: none      Patient tolerance of procedure:  Tolerated well, no immediate complications

## 2024-12-19 NOTE — ACP (ADVANCE CARE PLANNING)
Advanced Care Planning Note:     Date: 12/18/2024  Start time: 2100  End time: 2120  Involved persons: Patient's two sons and daughter-in-law at bedside    I updated the family regarding the findings of the CT chest/abdomen/pelvis imaging that was completed post cardiac arrest today. Family re-affirmed the patient's code status as full code at this time.   Regarding her neurological function, I expressed my concerns that since the patient is intubated, not requiring continuous sedation, and has a poor neurological exam (present cough and reactive pupillary response to light, but no motor function to stimulation and the presence of myoclonus) but that it was still too early to prognosticate given she is currently on TTM. I discussed that there is a potential for the cerebral edema to worsen once the patient starts to be rewarmed per TTM protocol to normothermia with the potential for her neurological exam to worsen.     Family left grateful for the ICU update, but overall frustrated at the overall situation.       Meliton Young DNP, DANIELANP  Neuro Critical Care - B

## 2024-12-19 NOTE — PLAN OF CARE
Problem: PAIN - ADULT  Goal: Verbalizes/displays adequate comfort level or baseline comfort level  Description: Interventions:  - Encourage patient to monitor pain and request assistance  - Assess pain using appropriate pain scale  - Administer analgesics based on type and severity of pain and evaluate response  - Implement non-pharmacological measures as appropriate and evaluate response  - Consider cultural and social influences on pain and pain management  - Notify physician/advanced practitioner if interventions unsuccessful or patient reports new pain  Outcome: Progressing     Problem: INFECTION - ADULT  Goal: Absence or prevention of progression during hospitalization  Description: INTERVENTIONS:  - Assess and monitor for signs and symptoms of infection  - Monitor lab/diagnostic results  - Monitor all insertion sites, i.e. indwelling lines, tubes, and drains  - Monitor endotracheal if appropriate and nasal secretions for changes in amount and color  - [place] appropriate cooling/warming therapies per order  - Administer medications as ordered  - Instruct and encourage patient and family to use good hand hygiene technique  - Identify and instruct in appropriate isolation precautions for identified infection/condition  Outcome: Progressing  Goal: Absence of fever/infection during neutropenic period  Description: INTERVENTIONS:  - Monitor WBC    Outcome: Progressing     Problem: SAFETY ADULT  Goal: Patient will remain free of falls  Description: INTERVENTIONS:  - Educate patient/family on patient safety including physical limitations  - Instruct patient to call for assistance with activity   - Consult OT/PT to assist with strengthening/mobility   - Keep Call bell within reach  - Keep bed low and locked with side rails adjusted as appropriate  - Keep care items and personal belongings within reach  - Initiate and maintain comfort rounds  - Make Fall Risk Sign visible to staff  - Offer Toileting every 2 Hours,  in advance of need  - Initiate/Maintain bed alarm  - Obtain necessary fall risk management equipment: non skid footwear  - Apply yellow socks and bracelet for high fall risk patients  - Consider moving patient to room near nurses station  Outcome: Progressing  Goal: Maintain or return to baseline ADL function  Description: INTERVENTIONS:  -  Assess patient's ability to carry out ADLs; assess patient's baseline for ADL function and identify physical deficits which impact ability to perform ADLs (bathing, care of mouth/teeth, toileting, grooming, dressing, etc.)  - Assess/evaluate cause of self-care deficits   - Assess range of motion  - Assess patient's mobility; develop plan if impaired  - Assess patient's need for assistive devices and provide as appropriate  - Encourage maximum independence but intervene and supervise when necessary  - Involve family in performance of ADLs  - Assess for home care needs following discharge   - Consider OT consult to assist with ADL evaluation and planning for discharge  - Provide patient education as appropriate  Outcome: Progressing  Goal: Maintains/Returns to pre admission functional level  Description: INTERVENTIONS:  - Perform AM-PAC 6 Click Basic Mobility/ Daily Activity assessment daily.  - Set and communicate daily mobility goal to care team and patient/family/caregiver.   - Collaborate with rehabilitation services on mobility goals if consulted  - Perform Range of Motion 3 times a day.  - Reposition patient every 2 hours.  - Dangle patient 3 times a day  - Record patient progress and toleration of activity level   Outcome: Progressing     Problem: DISCHARGE PLANNING  Goal: Discharge to home or other facility with appropriate resources  Description: INTERVENTIONS:  - Identify barriers to discharge w/patient and caregiver  - Arrange for needed discharge resources and transportation as appropriate  - Identify discharge learning needs (meds, wound care, etc.)  - Arrange for  interpretive services to assist at discharge as needed  - Refer to Case Management Department for coordinating discharge planning if the patient needs post-hospital services based on physician/advanced practitioner order or complex needs related to functional status, cognitive ability, or social support system  Outcome: Progressing     Problem: Knowledge Deficit  Goal: Patient/family/caregiver demonstrates understanding of disease process, treatment plan, medications, and discharge instructions  Description: Complete learning assessment and assess knowledge base.  Interventions:  - Provide teaching at level of understanding  - Provide teaching via preferred learning methods  Outcome: Progressing     Problem: Prexisting or High Potential for Compromised Skin Integrity  Goal: Skin integrity is maintained or improved  Description: INTERVENTIONS:  - Identify patients at risk for skin breakdown  - Assess and monitor skin integrity  - Assess and monitor nutrition and hydration status  - Monitor labs   - Assess for incontinence   - Turn and reposition patient  - Assist with mobility/ambulation  - Relieve pressure over bony prominences  - Avoid friction and shearing  - Provide appropriate hygiene as needed including keeping skin clean and dry  - Evaluate need for skin moisturizer/barrier cream  - Collaborate with interdisciplinary team   - Patient/family teaching  - Consider wound care consult   Outcome: Progressing     Problem: Potential for Falls  Goal: Patient will remain free of falls  Description: INTERVENTIONS:  - Educate patient/family on patient safety including physical limitations  - Instruct patient to call for assistance with activity   - Consult OT/PT to assist with strengthening/mobility   - Keep Call bell within reach  - Keep bed low and locked with side rails adjusted as appropriate  - Keep care items and personal belongings within reach  - Initiate and maintain comfort rounds  - Make Fall Risk Sign visible  to staff  - Offer Toileting every 2 Hours, in advance of need  - Initiate/Maintain bed alarm  - Obtain necessary fall risk management equipment: non skid footwear  - Apply yellow socks and bracelet for high fall risk patients  - Consider moving patient to room near nurses station  Outcome: Progressing     Problem: COPING  Goal: Pt/Family able to verbalize concerns and demonstrate effective coping strategies  Description: INTERVENTIONS:  - Assist patient/family to identify coping skills, available support systems and cultural and spiritual values  - Provide emotional support, including active listening and acknowledgement of concerns of patient and caregivers  - Reduce environmental stimuli, as able  - Provide patient education  - Assess for spiritual pain/suffering and initiate spiritual care, including notification of Pastoral Care or [place] based community as needed  - Assess effectiveness of coping strategies  Outcome: Progressing  Goal: Will report anxiety at manageable levels  Description: INTERVENTIONS:  - Administer medication as ordered  - Teach and encourage coping skills  - Provide emotional support  - Assess patient/family for anxiety and ability to cope  Outcome: Progressing     Problem: Nutrition/Hydration-ADULT  Goal: Nutrient/Hydration intake appropriate for improving, restoring or maintaining nutritional needs  Description: Monitor and assess patient's nutrition/hydration status for malnutrition. Collaborate with interdisciplinary team and initiate plan and interventions as ordered.  Monitor patient's weight and dietary intake as ordered or per policy. Utilize nutrition screening tool and intervene as necessary. Determine patient's food preferences and provide high-protein, high-caloric foods as appropriate.     INTERVENTIONS:  - Monitor oral intake, urinary output, labs, and treatment plans  - Assess nutrition and hydration status and recommend course of action  - Evaluate amount of meals eaten  -  Assist patient with eating if necessary   - Allow adequate time for meals  - Recommend/ encourage appropriate diets, oral nutritional supplements, and vitamin/mineral supplements  - Order, calculate, and assess calorie counts as needed  - Recommend, monitor, and adjust tube feedings and TPN/PPN based on assessed needs  - Assess need for intravenous fluids  - Provide specific nutrition/hydration education as appropriate  - Include patient/family/caregiver in decisions related to nutrition  Outcome: Progressing     Problem: NEUROSENSORY - ADULT  Goal: Achieves stable or improved neurological status  Description: INTERVENTIONS  - Monitor and report changes in neurological status  - Monitor vital signs such as temperature, blood pressure, glucose, and any other labs ordered   - Initiate measures to prevent increased intracranial pressure  - Monitor for seizure activity and implement precautions if appropriate      Outcome: Progressing  Goal: Achieves maximal functionality and self care  Description: INTERVENTIONS  - Monitor swallowing and airway patency with patient fatigue and changes in neurological status  - Encourage and assist patient to increase activity and self care.   - Encourage visually impaired, hearing impaired and aphasic patients to use assistive/communication devices  Outcome: Progressing     Problem: CARDIOVASCULAR - ADULT  Goal: Maintains optimal cardiac output and hemodynamic stability  Description: INTERVENTIONS:  - Monitor I/O, vital signs and rhythm  - Monitor for S/S and trends of decreased cardiac output  - Administer and titrate ordered vasoactive medications to optimize hemodynamic stability  - Assess quality of pulses, skin color and temperature  - Assess for signs of decreased coronary artery perfusion  - Instruct patient to report change in severity of symptoms  Outcome: Progressing  Goal: Absence of cardiac dysrhythmias or at baseline rhythm  Description: INTERVENTIONS:  - Continuous  cardiac monitoring, vital signs, obtain 12 lead EKG if ordered  - Administer antiarrhythmic and heart rate control medications as ordered  - Monitor electrolytes and administer replacement therapy as ordered  Outcome: Progressing     Problem: RESPIRATORY - ADULT  Goal: Achieves optimal ventilation and oxygenation  Description: INTERVENTIONS:  - Assess for changes in respiratory status  - Assess for changes in mentation and behavior  - Position to facilitate oxygenation and minimize respiratory effort  - Oxygen administered by appropriate delivery if ordered  - Initiate smoking cessation education as indicated  - Encourage broncho-pulmonary hygiene including cough, deep breathe, Incentive Spirometry  - Assess the need for suctioning and aspirate as needed  - Assess and instruct to report SOB or any respiratory difficulty  - Respiratory Therapy support as indicated  Outcome: Progressing     Problem: GASTROINTESTINAL - ADULT  Goal: Maintains or returns to baseline bowel function  Description: INTERVENTIONS:  - Assess bowel function  - Encourage oral fluids to ensure adequate hydration  - Administer IV fluids if ordered to ensure adequate hydration  - Administer ordered medications as needed  - Encourage mobilization and activity  - Consider nutritional services referral to assist patient with adequate nutrition and appropriate food choices  Outcome: Progressing  Goal: Maintains adequate nutritional intake  Description: INTERVENTIONS:  - Monitor percentage of each meal consumed  - Identify factors contributing to decreased intake, treat as appropriate  - Assist with meals as needed  - Monitor I&O, weight, and lab values if indicated  - Obtain nutrition services referral as needed  Outcome: Progressing     Problem: GENITOURINARY - ADULT  Goal: Maintains or returns to baseline urinary function  Description: INTERVENTIONS:  - Assess urinary function  - Encourage oral fluids to ensure adequate hydration if ordered  -  Administer IV fluids as ordered to ensure adequate hydration  - Administer ordered medications as needed  - Offer frequent toileting  - Follow urinary retention protocol if ordered  Outcome: Progressing  Goal: Absence of urinary retention  Description: INTERVENTIONS:  - Assess patient's ability to void and empty bladder  - Monitor I/O  - Bladder scan as needed  - Discuss with physician/AP medications to alleviate retention as needed  - Discuss catheterization for long term situations as appropriate  Outcome: Progressing     Problem: METABOLIC, FLUID AND ELECTROLYTES - ADULT  Goal: Electrolytes maintained within normal limits  Description: INTERVENTIONS:  - Monitor labs and assess patient for signs and symptoms of electrolyte imbalances  - Administer electrolyte replacement as ordered  - Monitor response to electrolyte replacements, including repeat lab results as appropriate  - Instruct patient on fluid and nutrition as appropriate  Outcome: Progressing  Goal: Fluid balance maintained  Description: INTERVENTIONS:  - Monitor labs   - Monitor I/O and WT  - Instruct patient on fluid and nutrition as appropriate  - Assess for signs & symptoms of volume excess or deficit  Outcome: Progressing  Goal: Glucose maintained within target range  Description: INTERVENTIONS:  - Monitor Blood Glucose as ordered  - Assess for signs and symptoms of hyperglycemia and hypoglycemia  - Administer ordered medications to maintain glucose within target range  - Assess nutritional intake and initiate nutrition service referral as needed  Outcome: Progressing     Problem: SKIN/TISSUE INTEGRITY - ADULT  Goal: Skin Integrity remains intact(Skin Breakdown Prevention)  Description: Assess:  -Perform Geovani assessment every shift   -Clean and moisturize skin every shift   -Inspect skin when repositioning, toileting, and assisting with ADLS  -Assess under medical devices such as paula every hour  -Assess extremities for adequate circulation and  sensation     Bed Management:  -Have minimal linens on bed & keep smooth, unwrinkled  -Change linens as needed when moist or perspiring  -Avoid sitting or lying in one position for more than 45 hours while in bed  -Keep HOB at 45degrees     Toileting:  -Offer bedside commode  -Assess for incontinence every hour  -Use incontinent care products after each incontinent episode such as moisture barrier     Activity:  --Encourage or provide ROM exercises   -Turn and reposition patient every 2 Hours  -Use appropriate equipment to lift or move patient in bed  -Instruct/ Assist with weight shifting every hour when out of bed in chair  -Consider limitation of chair time 2 hour intervals    Skin Care:  -Avoid use of baby powder, tape, friction and shearing, hot water or constrictive clothing  -Relieve pressure over bony prominences using mepilx  -Do not massage red bony areas    Next Steps:  -Teach patient strategies to minimize risks such as weight shifting    -Consider consults to  interdisciplinary teams such as PT/OT  Outcome: Progressing  Goal: Incision(s), wounds(s) or drain site(s) healing without S/S of infection  Description: INTERVENTIONS  - Assess and document dressing, incision, wound bed, drain sites and surrounding tissue  - Provide patient and family education  - Perform skin care/dressing changes every shift  Outcome: Progressing     Problem: HEMATOLOGIC - ADULT  Goal: Maintains hematologic stability  Description: INTERVENTIONS  - Assess for signs and symptoms of bleeding or hemorrhage  - Monitor labs  - Administer supportive blood products/factors as ordered and appropriate  Outcome: Progressing     Problem: MUSCULOSKELETAL - ADULT  Goal: Maintain or return mobility to safest level of function  Description: INTERVENTIONS:  - Assess patient's ability to carry out ADLs; assess patient's baseline for ADL function and identify physical deficits which impact ability to perform ADLs (bathing, care of mouth/teeth,  toileting, grooming, dressing, etc.)  - Assess/evaluate cause of self-care deficits   - Assess range of motion  - Assess patient's mobility  - Assess patient's need for assistive devices and provide as appropriate  - Encourage maximum independence but intervene and supervise when necessary  - Involve family in performance of ADLs  - Assess for home care needs following discharge   - Consider OT consult to assist with ADL evaluation and planning for discharge  - Provide patient education as appropriate  Outcome: Progressing  Goal: Maintain proper alignment of affected body part  Description: INTERVENTIONS:  - Support, maintain and protect limb and body alignment  - Provide patient/ family with appropriate education  Outcome: Progressing     Problem: Neurological Deficit  Goal: Neurological status is stable or improving  Description: Interventions:  - Monitor and assess patient's level of consciousness, motor function, sensory function, and level of assistance needed for ADLs.   - Monitor and report changes from baseline. Collaborate with interdisciplinary team to initiate plan and implement interventions as ordered.   - Provide and maintain a safe environment.  - Consider seizure precautions.  - Consider fall precautions.  - Consider aspiration precautions.  - Consider bleeding precautions.  Outcome: Progressing     Problem: Activity Intolerance/Impaired Mobility  Goal: Mobility/activity is maintained at optimum level for patient  Description: Interventions:  - Assess and monitor patient  barriers to mobility and need for assistive/adaptive devices.  - Assess patient's emotional response to limitations.  - Collaborate with interdisciplinary team and initiate plans and interventions as ordered.  - Encourage independent activity per ability.  - Maintain proper body alignment.  - Perform active/passive rom as tolerated/ordered.  - Plan activities to conserve energy.  - Turn patient as appropriate  Outcome: Progressing      Problem: Communication Impairment  Goal: Ability to express needs and understand communication  Description: Assess patient's communication skills and ability to understand information.  Patient will demonstrate use of effective communication techniques, alternative methods of communication and understanding even if not able to speak.     - Encourage communication and provide alternate methods of communication as needed.  - Collaborate with case management/ for discharge needs.  - Include patient/family/caregiver in decisions related to communication.  Outcome: Progressing     Problem: Potential for Aspiration  Goal: Non-ventilated patient's risk of aspiration is minimized  Description: Assess and monitor vital signs, respiratory status, and labs (WBC).  Monitor for signs of aspiration (tachypnea, cough, rales, wheezing, cyanosis, fever).    - Assess and monitor patient's ability to swallow.  - Place patient up in chair to eat if possible.  - HOB up at 90 degrees to eat if unable to get patient up into chair.  - Supervise patient during oral intake.   - Instruct patient/ family to take small bites.  - Instruct patient/ family to take small single sips when taking liquids.  - Follow patient-specific strategies generated by speech pathologist.  Outcome: Progressing     Problem: Nutrition  Goal: Nutrition/Hydration status is improving  Description: Monitor and assess patient's nutrition/hydration status for malnutrition (ex- brittle hair, bruises, dry skin, pale skin and conjunctiva, muscle wasting, smooth red tongue, and disorientation). Collaborate with interdisciplinary team and initiate plan and interventions as ordered.  Monitor patient's weight and dietary intake as ordered or per policy. Utilize nutrition screening tool and intervene per policy. Determine patient's food preferences and provide high-protein, high-caloric foods as appropriate.     - Assist patient with eating.  - Allow adequate  time for meals.  - Encourage patient to take dietary supplement as ordered.  - Collaborate with clinical nutritionist.  - Include patient/family/caregiver in decisions related to nutrition.  Outcome: Progressing

## 2024-12-19 NOTE — NURSING NOTE
Provider Meliton Young notified that arctic sun water temp <10*C for about 1.5 hours, but patient not maintaining goal temperature of 36*C. Ice packs applied to patient per provider. Patient also tachypneic on ventilator. Propofol gtt increased to 30mcg/kg/min per provider at bedside.

## 2024-12-19 NOTE — ASSESSMENT & PLAN NOTE
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   12/6 s/p emergent right carotid angioplasty/stenting and placed on Integrilin  Repeat imaging 12/7 revealed hemorrhagic conversion and patient was given DDAVP administered  CTA 12/10 revealed BILL stent occlusion  Aspirin per NG tube when able, statin

## 2024-12-19 NOTE — PROGRESS NOTES
Progress Note - Cardiology   Name: Bren Traylor 69 y.o. female I MRN: [id number]  Unit/Bed#: ICU 11 I Date of Admission: 12/6/2024   Date of Service: 12/19/2024 I Hospital Day: 12    Assessment & Plan  Cardiac arrest (HCC)  Unclear etiology.  Unfortunately, patient was not on telemetry at time of cardiac arrest.  Did require defibrillation during code.  Further interrogation of [place] showed that the patient bradycardia down due to hypoxia.      Repeat echocardiogram done yesterday after cardiac arrest did not show significant change from prior echo.      No arrhythmias noted on telemetry in the last 24 hours    Limited with ACS treatment due to bleeding concerns.   Continue statin  Would resume aspirin and heparin once able from neuro/bleeding standpoint  Holding beta blocker while requiring pressors  Continue telemetry  Paroxysmal atrial fibrillation (HCC)  Presented in sinus rhythm, but developed atrial fibrillation on 12/8/2024.  Subsequently reverted to sinus rhythm.  Was being treated with aspirin 81 mg daily, heparin (stroke protocol).  She did not have events on telemetry yesterday 12/17/2024.  Unfortunately, telemetry was discontinued prior to above arrest  Elevated troponin  Troponin 140--> 213--> 250--> 169--> 1164--> 5343--> 5149--> 6084--.[id number] in the setting of acute CVA and bilateral PEs. Possible ACS, vs secondary to extensive medical issues. CTA with significant diffuse coronary calcifications  Ischemic evaluation once able  Cardiomyopathy (HCC)  Echocardiogram 12/18/2024: EF 50-55%. She had similar hypokinesis in her basal inferior, mid inferior, basal anterolateral, mid anterolateral segments.  She did have some dyskinesis in mid and basal inferolateral segments.  Moderate MR. Nos s/p thoracentesis 12/11, 500 cc. Appears euvolemic. Now requiring pressors.  Cerebrovascular accident (CVA) due to embolism of right middle cerebral artery (HCC)  Presented 12/6 with left-sided weakness, slurred  speech and facial droop  CTH revealed infarction in the right lentiform nucleus with mild cytotoxic edema with hyperdensity in the right M1 segment suggesting M1 thrombosis.    CTA of the head revealed acute thrombosis in the mid-distal right M1 segment with severe near occlusion of the proximal BILL with severe tandem stenosis intracranially and high-grade stenosis of the last distal common carotid artery, moderate high-grade stenosis of the right subclavian origin.   Underwent successful mechanical thrombectomy and right carotid angioplasty/stenting and placed on Integrilin.   12/7 CTA with hemorrhagic conversion  On aspirin, low-dose ECMO heparin initially started and switched to stroke protocol on 12/14  CT of the head shows increased edema and mass effect  Carotid artery stenosis  CTA of the head revealed acute thrombosis in the mid-distal right M1 segment with severe near occlusion of the proximal BILL with severe tandem stenosis intracranially and high-grade stenosis of the last distal common carotid artery, moderate high-grade stenosis of the right subclavian origin.   12/6 s/p emergent right carotid angioplasty/stenting and placed on Integrilin  Repeat imaging 12/7 revealed hemorrhagic conversion and patient was given DDAVP administered  CTA 12/10 revealed BILL stent occlusion  Aspirin per NG tube when able, statin  Acute pulmonary embolism (HCC)  CTA Chest 12/10 revealed bilateral PEs with developing pulmonary infarction  Venous duplex negative for DVT  Heparin when able  Encephalopathy  In the context of CVA with hemorrhagic conversion  Hyperlipidemia  Continue atorvastatin  Hypotension  Resolved  Cerebrovascular accident (CVA) (HCC)      Subjective     Patient is intubated.  His on EEG monitoring.  No arrhythmias noted on telemetry  Objective :  Temp:  [93.9 °F (34.4 °C)-98.8 °F (37.1 °C)] 96.4 °F (35.8 °C)  HR:  [0-124] 64  BP: ()/() 93/63  Resp:  [20-48] 20  SpO2:  [88 %-100 %] 100 %  O2  Device: Ventilator  FiO2 (%):  [40-50] 50  Orthostatic Blood Pressures      Flowsheet Row Most Recent Value   Blood Pressure 93/63 filed at 12/19/2024 0500   Patient Position - Orthostatic VS Lying filed at 12/19/2024 0400          First Weight: Weight - Scale: 56.2 kg (123 lb 14.4 oz) (12/08/24 0200)  Vitals:    12/18/24 0600 12/18/24 1521   Weight: 49.9 kg (110 lb 0.2 oz) 49.9 kg (110 lb)     Physical Exam  Constitutional:       Appearance: She is ill-appearing.   Cardiovascular:      Rate and Rhythm: Normal rate and regular rhythm.      Heart sounds: No murmur heard.  Pulmonary:      Comments: Intubated  Musculoskeletal:      Right lower leg: No edema.      Left lower leg: No edema.           Lab Results: I have reviewed the following results:CBC/BMP:   .     12/19/24  0604 12/19/24  1135   WBC 42.07*  --    HGB 8.5*  --    HCT 25.7*  --      --    BANDSPCT 5  --    SODIUM 152* 149*   K 3.9 4.4   * 111*   CO2 28 28   BUN 40* 45*   CREATININE 0.94 1.05   GLUC 172* 208*   CAIONIZED 1.07* 1.05*   MG 2.5 2.5   PHOS 7.0* 6.9*    , Creatinine Clearance: Estimated Creatinine Clearance: 41 mL/min (by C-G formula based on SCr of 1.05 mg/dL)., LFTs:   .     12/19/24  0604   *   *   ALB 2.9*   TBILI 0.59   ALKPHOS 106*      Results from last 7 days   Lab Units 12/19/24  0604 12/18/24  1700 12/18/24  1420   WBC Thousand/uL 42.07* 29.14* 19.75*   HEMOGLOBIN g/dL 8.5* 8.4* 7.9*   HEMATOCRIT % 25.7* 26.0* 25.2*   PLATELETS Thousands/uL 197 200 170     Results from last 7 days   Lab Units 12/19/24  0604 12/18/24  2337 12/18/24  1700 12/18/24  1456 12/18/24  1414   POTASSIUM mmol/L 3.9 4.2 2.8*   < >  --    CHLORIDE mmol/L 110* 109* 107   < >  --    CO2 mmol/L 28 28 32   < >  --    CO2, I-STAT mmol/L  --   --   --   --  32   BUN mg/dL 40* 33* 26*   < >  --    CREATININE mg/dL 0.94 0.89 0.68   < >  --    GLUCOSE, ISTAT mg/dl  --   --   --   --  498*   CALCIUM mg/dL 8.7 9.1 11.1*   < >  --     < > =  "values in this interval not displayed.     Results from last 7 days   Lab Units 12/18/24  1420 12/18/24  0908 12/17/24  1851   INR  1.63*  --   --    PTT seconds 33 69* 56*     Lab Results   Component Value Date    HGBA1C 5.3 12/07/2024     No results found for: \"CKTOTAL\", \"CKMB\", \"CKMBINDEX\", \"TROPONINI\"    Imaging Results Review: No pertinent imaging studies reviewed.    VTE Pharmacologic Prophylaxis: VTE covered by:    None     VTE Mechanical Prophylaxis:     "

## 2024-12-19 NOTE — ASSESSMENT & PLAN NOTE
Troponin 140--> 213--> 250--> 169--> 1164--> 5343--> 5149--> 6084--.14429 in the setting of acute CVA and bilateral PEs. Possible ACS, vs secondary to extensive medical issues. CTA with significant diffuse coronary calcifications  Ischemic evaluation once able

## 2024-12-19 NOTE — ASSESSMENT & PLAN NOTE
Presented 12/6 with left-sided weakness, slurred speech and facial droop  CTH revealed infarction in the right lentiform nucleus with mild cytotoxic edema with hyperdensity in the right M1 segment suggesting M1 thrombosis.    CTA of the head revealed acute thrombosis in the mid-distal right M1 segment with severe near occlusion of the proximal BILL with severe tandem stenosis intracranially and high-grade stenosis of the last distal common carotid artery, moderate high-grade stenosis of the right subclavian origin.   Underwent successful mechanical thrombectomy and right carotid angioplasty/stenting and placed on Integrilin.   12/7 CTA with hemorrhagic conversion  On aspirin, low-dose ECMO heparin initially started and switched to stroke protocol on 12/14  CT of the head shows increased edema and mass effect

## 2024-12-20 NOTE — PROGRESS NOTES
12/20/24 1600   Clinical Encounter Type   Visited With Patient   Sacramental Encounters   Sacrament of Sick-Anointing Anointed   Sacrament Other Confession     FR. Orozco pastoral visit.

## 2024-12-20 NOTE — PROGRESS NOTES
Progress Note - Critical Care/ICU   Name: Bren Traylor 69 y.o. female I MRN: [id number]  Unit/Bed#: ICU 11 I Date of Admission: 12/6/2024   Date of Service: 12/20/2024 I Hospital Day: 13       Assessment & Plan   Neuro:   Right MCA M1 occlusion s/p thrombectomy TICI 3, R carotid stenting c/b hemorrhagic conversion  SBP goal 110/140  Neurochecks q1  Hold Hep gtt and aspirin 81 mg daily until CT head scan is stable  Home meds  Hold Provigil 100 mg daily,   Continue sertraline 50mg  Continue keppra 1000mg BID  Generalized myoclonus like jerking  S/p cardiac arrest w likely anoxic brain injury  12/18 CT head - Diminished size of the known large right basal ganglia parenchymal hemorrhage. Increased edema and mass effect. Increased midline shift. No hydrocephalus.   12/20 CT head: Similar size of R basal ganglia IPH, similar mass effect w 8mm shift. Global edema/ loss of architecture.  Sedation  Propofol gtt stopped  Precedex gtt currently at .2  Plan: wean precedex, follow up CT head this AM shows significant loss of cerebral architecture, effacement of ventricles with some remaining patency.      CV:   Cardiac arrest   ROSC achieved after 10mins of ACLS protocol  Unknown downtime   TTM, rewarming 12/19 PM  Central fevers  Bath temp low, require ice, tylenol IV  Cardiology on board  Meds  ASA 81mg   Epi gtt off  Vaso at .4  Levo at 5  Echo 12/10 EF 50-55%.  Hypokinetic inferior segments.  (Please refer to report for details)  Repeat ECHO 12/18 EF 55%.  Systolic function normal.  LV size, wall thickness normal.  Mildly abnormal diastolic function.  Basal inferior, basal anterolateral, mid inferior, mid anterolateral hypokinesis.  Basal inferolateral and mid inferolateral dyskinesia.  RV size, function, wall thickness normal.  V tach arrest  Amiodarone gtt off  Has been in NSR s/p cardiac arrest  Elevated troponin   NSTEMI v. Post arrest v. Cardiomyopathy   Toponins peaked yesterday 12/19  Hx of HTN,  HLD  Meds  atorvastatin 20 mg daily  Plan: Decreasing pressor requirement, continue to wean as able. Etiology of shock unlikely cardiogenic, on echo has normal systolic function. Ok to rewarm to 37. Holding home metoprolol tartrate 12.5 mg BID     Pulm:  Mechanical Ventilation  PC 12/12/8/30%   Pulmonary edema  On bronch pinkish fluid in ETT  40 lasix IV given overnight this am 12/20 at midnight and also 12/18  Multi lobar PNA  12/18 CT CAP - Multi lobar pneumonia.   Vanc + Zosyn  12/18 Blood cx pending  12/18 MRSA pending   12/19 sputum cx pending  12/19 strep antigen pending  Acute PE. No DVT  Meds  Hep gtt as below  Albuterol nebs     GI:   Elevated transaminases/shock liver  Peaked and now trending down     :   Decreased urinary output post cardiac arrest  Improved w lasix-- given 12/18 and early this AM 12/20 40mg lasix IV both times   Further evaluate volume status today  F/E/N:   No MIVF  Replete electrolytes as needed  Tube feeds now     Heme/Onc:   Continue to monitor     Endo:   Glucose has been stable, no insulin     ID:   Multi lobar pna  Meds  vancomycin and Zosyn Day 3  Studies  12/18 Blood cx pending  12/18 MRSA pending   12/19 sputum cx pending  12/19 strep antigen pending  Consider possible inadequate abx coverage       MSK/Skin:   Frequent turning     Tubes: sierra, ETT  Lines: R fem A line, L IJ CVC, PIV  Drains: none    Disposition: Critical care    ICU Core Measures     Vented Patient  VAP Bundle  VAP bundle ordered     A: Assess, Prevent, and Manage Pain Has pain been assessed? NA  Need for changes to pain regimen? NA   B: Both Spontaneous Awakening Trials (SATs) and Spontaneous Breathing Trials (SBTs) Plan to perform spontaneous awakening trial today? No secondary to targeted temperature management  Plan to perform spontaneous breathing trial today? No secondary to targeted temperature management  Obvious barriers to extubation? Yes   C: Choice of Sedation RASS Goal: -3 Moderate Sedation or 0  Alert and Calm  Need for changes to sedation or analgesia regimen? NA   D: Delirium CAM-ICU: Negative   E: Early Mobility  Plan for early mobility? No   F: Family Engagement Plan for family engagement today? Yes       Antibiotic Review: Continue broad spectrum secondary to severity of illness.     Review of Invasive Devices:    Silvestre Plan: Continue for accurate I/O monitoring for 48 hours  Central access plan: Medications requiring central line Hemodynamic monitoring  Qi Plan: Keep arterial line for hemodynamic monitoring    Prophylaxis:  VTE Contraindicated secondary to: Cardiac arrest, TTM   Stress Ulcer  not ordered         24 Hour Events :  TTM continued, epi off, levo at 5. Had desats to 80s overnight, CXR and ABG ordered, decreased RR    Subjective   Review of Systems: Review of Systems not obtainable due to Clinical Condition, Altered mental status    Objective :                   Vitals I/O      Most Recent Min/Max in 24hrs   Temp 98.6 °F (37 °C) Temp  Min: 95.7 °F (35.4 °C)  Max: 99.7 °F (37.6 °C)   Pulse 104 Pulse  Min: 64  Max: 104   Resp (!) 45 Resp  Min: 12  Max: 45   BP (!) 98/40 BP  Min: 98/40  Max: 98/40   O2 Sat 100 % SpO2  Min: 93 %  Max: 100 %      Intake/Output Summary (Last 24 hours) at 12/20/2024 1629  Last data filed at 12/20/2024 1602  Gross per 24 hour   Intake 2288.64 ml   Output 1270 ml   Net 1018.64 ml       Diet Enteral/Parenteral; Tube Feeding No Oral Diet; Jevity 1.2 Navarro; Continuous; 40; Prosource Protein Liquid - One Packet; BID; 100; Water; Every 6 hours    Invasive Monitoring   Arterial Line  Qi /46  Arterial Line BP  Min: 102/36  Max: 146/52   MAP 70 mmHg  Arterial Line MAP (mmHg)  Min: 58 mmHg  Max: 82 mmHg           Physical Exam   Physical Exam  Vitals and nursing note reviewed.   Eyes:      Pupils: Pupils are equal.      Comments: Pupils brisk, reactive, 3 cm   Skin:     General: Skin is warm and dry.   HENT:      Head: Normocephalic and atraumatic. No right  periorbital erythema or left periorbital erythema.      Mouth/Throat:      Mouth: Mucous membranes are moist.   Cardiovascular:      Rate and Rhythm: Normal rate and regular rhythm.      Pulses: No decreased pulses.           Carotid pulses are 2+ on the right side and 2+ on the left side.       Posterior tibial pulses are 2+ on the right side and 2+ on the left side.      Heart sounds: Normal heart sounds.   Musculoskeletal:      Right lower leg: No edema.      Left lower leg: No edema.   Abdominal:      Palpations: Abdomen is soft.   Constitutional:       General: She is in acute distress.      Appearance: She is ill-appearing.      Interventions: She is sedated and intubated.   Pulmonary:      Effort: Tachypnea present. She is intubated.      Breath sounds: No stridor or decreased air movement. No decreased breath sounds, wheezing, rhonchi or rales.      Comments: On ventilator  Neurological:      Mental Status: She is unresponsive.      GCS: GCS eye subscore is 1. GCS verbal subscore is 1. GCS motor subscore is 1.        Corneal reflex present, cough reflex and gag reflex not intact.      Comments: Intermittent jerking c/w myoclonus, intact cough, corneals, no gag  GCS 3T, no grimace or posturing in response to pain  2mm minimally reactive pupils          Diagnostic Studies        Lab Results: I have reviewed the following results:     Medications:  Scheduled PRN   acetaminophen, 15 mg/kg, Q6H  artificial tear, , HS  aspirin, 81 mg, Daily  atorvastatin, 20 mg, QPM  calcium gluconate, 3 g, Once  chlorhexidine, 15 mL, Q12H DANIS  HYDROmorphone, 1 mg, Once  levETIRAcetam, 500 mg, Q12H DANIS  piperacillin-tazobactam, 4.5 g, Q8H  polyethylene glycol, 17 g, Daily  senna-docusate sodium, 2 tablet, BID  sertraline, 50 mg, Daily      albuterol, 2.5 mg, Q4H PRN  bisacodyl, 10 mg, Daily PRN  vancomycin, 15 mg/kg, Daily PRN       Continuous    dexmedetomidine, 0.1-0.7 mcg/kg/hr, Last Rate: Stopped (12/20/24 1116)  epinephrine,  1-10 mcg/min, Last Rate: Stopped (12/19/24 2050)  heparin (porcine), 3-24 Units/kg/hr (Order-Specific), Last Rate: 11 Units/kg/hr (12/20/24 0927)  norepinephrine, 1-30 mcg/min, Last Rate: 10 mcg/min (12/20/24 1503)  vasopressin, 0.04 Units/min, Last Rate: 0.04 Units/min (12/20/24 1605)         Labs:   CBC    Recent Labs     12/19/24  0604 12/20/24  0538   WBC 42.07* 26.55*   HGB 8.5* 7.4*   HCT 25.7* 22.9*    195   BANDSPCT 5  --      BMP    Recent Labs     12/20/24  0538 12/20/24  1123   SODIUM 148* 145   K 3.8 3.5   * 111*   CO2 26 26   AGAP 12 8   BUN 54* 57*   CREATININE 1.21 1.16   CALCIUM 7.6* 7.2*       Coags    Recent Labs     12/19/24  1810 12/19/24  2257 12/20/24  0856 12/20/24  1343   INR 1.66*  --   --   --    PTT 41*   < > 68* 61*    < > = values in this interval not displayed.        Additional Electrolytes  Recent Labs     12/20/24  0538 12/20/24  1123   MG 2.3 2.5   PHOS 5.7* 5.3*   CAIONIZED 0.99* 1.01*          Blood Gas    Recent Labs     12/20/24  1343   PHART 7.512*   FVU4GEN 33.2*   PO2ART 158.0*   [id number] 26.0   BEART 2.9   SOURCE Line, Arterial     Recent Labs     12/20/24  1149 12/20/24  1343   PHVEN 7.448*  --    [id number] 40.7*  --    PO2VEN 36.0  --    [id number] 27.5  --    BEVEN 3.2  --    [id number] 63.5  --    SOURCE  --  Line, Arterial    LFTs  Recent Labs     12/19/24  0604 12/20/24  0538   * 302*   * 144*   ALKPHOS 106* 97   ALB 2.9* 2.8*   TBILI 0.59 0.40       Infectious  No recent results    Glucose  Recent Labs     12/19/24  1810 12/19/24  2355 12/20/24  0538 12/20/24  1123   GLUC 157* 142* 139 173*

## 2024-12-20 NOTE — PHYSICAL THERAPY NOTE
Physical Therapy Cancellation Note    PT orders received chart review completed. Pt is currently intubated/sedated and not appropriate to participate in skilled PT at this time. PT will follow and re-eval as medically appropriate.     12/20/24 0269   Note Type   Note type Cancelled Session;Evaluation   Cancel Reasons Intubated/sedated       Nori Denton, PT

## 2024-12-20 NOTE — PROGRESS NOTES
Bren Traylor is a 69 y.o. female who is currently ordered Vancomycin IV with management by the Pharmacy Consult service.  Relevant clinical data and objective / subjective history reviewed.  Vancomycin Assessment:  Indication and Goal AUC/Trough: Pneumonia (goal -600, trough >10)  Clinical Status:  critically ill, leukocytosis, hypothermic  Micro:    Strep pneumoniae, urine: negative   Sputum culture: no bacteria seen   Blood cultures x 2: no growth at 24 hours   Sputum culture: 4+ MSSA   Renal Function:  SCr: 1.21 mg/dL (from 1.13, baseline 0.4-0.6)  CrCl: 30 mL/min  Renal replacement: CYNTHIA, Not on dialysis  Days of Therapy: 3  Current Dose: 750 mg IV daily PRN when random level is less than 15  Vancomycin Plan:  Dosin mg IV daily PRN when random level is less than 15 - hold further vancomycin dosing today for level of 24  Next Level: Random  at 0600  Renal Function Monitoring: Daily BMP and UOP  Pharmacy will continue to follow closely for s/sx of nephrotoxicity, infusion reactions and appropriateness of therapy.  BMP and CBC will be ordered per protocol. We will continue to follow the patient’s culture results and clinical progress daily.    Kaela Mendez, PharmD, [facility]  Critical Care Clinical Pharmacist  [phone]

## 2024-12-20 NOTE — PROCEDURES
POC Ocular US    Date/Time: 12/20/2024 3:20 PM    Performed by: Khloe Szymanski MD  Authorized by: Khloe Szymanski MD    Patient location:  ICU  Performed by:  Resident  Other Assisting Provider: Yes (comment)    Procedure details:     Exam Type:  Diagnostic    Indications: evaluation for increased intracranial pressure      Assessment for: optic nerve sheath diameter      Eye(s) assessed:  Both eyes    Structures visualized: anterior chamber, posterior chamber, lens and optic nerve      Image quality: diagnostic      Image availability:  Images available in PACS, video obtained and still images obtained  Left eye findings:     Foreign body: not identified      Retinal contour: normal      Lens: normal      Vitreous body: anechoic    Right eye findings:     Foreign body: not identified      Retinal contour: normal      Lens: normal      Vitreous body: anechoic    Interpretation:     Ocular US impressions: abnormal (see above)    Comments:      L optic nerve sheath  Inner diameter 4.1mm  Outer diameter 4.9mm    R optic nerve sheath (tortuous)  Inner diameter 7.6mm  Outer diameter  11.1mm           -Patient reports no change to this mole\\n-Discussed he should W/M for changes and recommended he take a photo with his phone to monitor.\\n-Recommended RTC sooner if changing and he reports understanding. Detail Level: Zone Detail Level: Simple - Informed patient symptoms are consistent with a Dupuytren's Contracture\\n- Advised patient to see a hand surgeon to further evaluate and treat his symptoms\\n- Patient was agreeable

## 2024-12-20 NOTE — TELEPHONE ENCOUNTER
12/20/24 - PT STILL IN HOSPITAL    12/18/2024- PT STILL IN HOSPITAL    12/16/2024- PT STILL IN HOSPITAL  01/10/2025- 2 WK FREDU W/ Mihai W/ CT head     12/13/2024- PT STILL IN HOSPITAL

## 2024-12-20 NOTE — PLAN OF CARE
Problem: INFECTION - ADULT  Goal: Absence or prevention of progression during hospitalization  Description: INTERVENTIONS:  - Assess and monitor for signs and symptoms of infection  - Monitor lab/diagnostic results  - Monitor all insertion sites, i.e. indwelling lines, tubes, and drains  - Monitor endotracheal if appropriate and nasal secretions for changes in amount and color  - [place] appropriate cooling/warming therapies per order  - Administer medications as ordered  - Instruct and encourage patient and family to use good hand hygiene technique  - Identify and instruct in appropriate isolation precautions for identified infection/condition  Outcome: Progressing  Goal: Absence of fever/infection during neutropenic period  Description: INTERVENTIONS:  - Monitor WBC    Outcome: Progressing     Problem: Knowledge Deficit  Goal: Patient/family/caregiver demonstrates understanding of disease process, treatment plan, medications, and discharge instructions  Description: Complete learning assessment and assess knowledge base.  Interventions:  - Provide teaching at level of understanding  - Provide teaching via preferred learning methods  Outcome: Progressing     Problem: Nutrition/Hydration-ADULT  Goal: Nutrient/Hydration intake appropriate for improving, restoring or maintaining nutritional needs  Description: Monitor and assess patient's nutrition/hydration status for malnutrition. Collaborate with interdisciplinary team and initiate plan and interventions as ordered.  Monitor patient's weight and dietary intake as ordered or per policy. Utilize nutrition screening tool and intervene as necessary. Determine patient's food preferences and provide high-protein, high-caloric foods as appropriate.     INTERVENTIONS:  - Monitor oral intake, urinary output, labs, and treatment plans  - Assess nutrition and hydration status and recommend course of action  - Evaluate amount of meals eaten  - Assist patient with eating if  necessary   - Allow adequate time for meals  - Recommend/ encourage appropriate diets, oral nutritional supplements, and vitamin/mineral supplements  - Order, calculate, and assess calorie counts as needed  - Recommend, monitor, and adjust tube feedings and TPN/PPN based on assessed needs  - Assess need for intravenous fluids  - Provide specific nutrition/hydration education as appropriate  - Include patient/family/caregiver in decisions related to nutrition  Outcome: Progressing     Problem: RESPIRATORY - ADULT  Goal: Achieves optimal ventilation and oxygenation  Description: INTERVENTIONS:  - Assess for changes in respiratory status  - Assess for changes in mentation and behavior  - Position to facilitate oxygenation and minimize respiratory effort  - Oxygen administered by appropriate delivery if ordered  - Initiate smoking cessation education as indicated  - Encourage broncho-pulmonary hygiene including cough, deep breathe, Incentive Spirometry  - Assess the need for suctioning and aspirate as needed  - Assess and instruct to report SOB or any respiratory difficulty  - Respiratory Therapy support as indicated  Outcome: Progressing     Problem: Neurological Deficit  Goal: Neurological status is stable or improving  Description: Interventions:  - Monitor and assess patient's level of consciousness, motor function, sensory function, and level of assistance needed for ADLs.   - Monitor and report changes from baseline. Collaborate with interdisciplinary team to initiate plan and implement interventions as ordered.   - Provide and maintain a safe environment.  - Consider seizure precautions.  - Consider fall precautions.  - Consider aspiration precautions.  - Consider bleeding precautions.  Outcome: Progressing

## 2024-12-20 NOTE — PLAN OF CARE
Problem: Nutrition/Hydration-ADULT  Goal: Nutrient/Hydration intake appropriate for improving, restoring or maintaining nutritional needs  Description: Monitor and assess patient's nutrition/hydration status for malnutrition. Collaborate with interdisciplinary team and initiate plan and interventions as ordered.  Monitor patient's weight and dietary intake as ordered or per policy. Utilize nutrition screening tool and intervene as necessary. Determine patient's food preferences and provide high-protein, high-caloric foods as appropriate.     INTERVENTIONS:  - Monitor oral intake, urinary output, labs, and treatment plans  - Assess nutrition and hydration status and recommend course of action  - Evaluate amount of meals eaten  - Assist patient with eating if necessary   - Allow adequate time for meals  - Recommend/ encourage appropriate diets, oral nutritional supplements, and vitamin/mineral supplements  - Order, calculate, and assess calorie counts as needed  - Recommend, monitor, and adjust tube feedings and TPN/PPN based on assessed needs  - Assess need for intravenous fluids  - Provide specific nutrition/hydration education as appropriate  - Include patient/family/caregiver in decisions related to nutrition  Outcome: Not Progressing   PT is intubated, no propofol, on Jevity 1.2@10mL/hr, prosource BID, recommend advancing TF by 10mL every 6-8hrs to goal of 40mL/hr, continue with prosource BID, provides total volume 960mL, 1272cal, 83g pro, 775mL, water flushes per critical care or consider minimum of 30mL every 4hrs for tube patency would provide total of 1075mL (TF, water flushes and flushes from prosource).

## 2024-12-20 NOTE — OCCUPATIONAL THERAPY NOTE
Occupational Therapy         Patient Name: Bren Traylor  Today's Date: 12/20/2024 12/20/24 0900   OT Last Visit   OT Visit Date 12/20/24   Note Type   Note Type Cancelled Session   Cancel Reasons Intubated/sedated     Will continue to defer    Denise Bearden

## 2024-12-20 NOTE — PROGRESS NOTES
I have personally seen and examined patient and reviewed all data with resident. Agree with note, assessment and plan. Critical care time 89 minutes. Please refer to attending comments below. Critical care time does not include procedures, family meeting or teaching.       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  Pupils are reactive bilaterally.  Corneal reflexes are intact bilateral.  With noxious stimuli patient does not have any response in any of her extremities, she does not grimace either.  Breath sounds are coarse.  Minimal secretions.  Cough reflex intact.     Goal systolic blood pressure:  Normotensive with mean arterial pressure greater than 65    Respiratory support:  Assist-control rate 20, , FiO2 50%, PEEP 8     I/O +690 mL  UO 1.3L  BM 0     Patient was transferred back to the ICU after cardiac arrest event.  Epinephrine is presently off.  Norepinephrine at 5 mcg/min.  Decreased oxygen saturation overnight.  TTM continued.  Vital signs reviewed with 1 mean arterial pressure less than 65.  Laboratory data reviewed with sodium of 148 and chloride of 110.  Ionized calcium 0.99.  Potassium is 3.8.  Patient has anemia with a hemoglobin of 7.4 and a leukocytosis with WBC count of 26.55.  Leukocytosis  is improved from 42.07.    Discontinue precedex.     Monitor on EEG, patient did heave temporal lobe spokes yesterday     Continue Keppra, decrease with CYNTHIA    Check ONSD    CTH completed, concern for increasing edema and shift    Consider osmolar interventions    Check BMP and serum osm as Na 148    NE currently 7 mcg/min    Persist hypotension on vasopressors. Off Epi. Possible vasoplegia. Echo 12/18 EF 55% and normal systolic function    Check SVO2 and repeat cardiac POCUS    Doubt hypotension is due to PULMONARY EMBOLISM with low clot burden    Patient did receive lasix, check POCUS, consider IVF    Continue abx    Change IV acetaminophen to po possible hypotension    S/P  TTM 36 degrees, re warm to 37 degrees    Heparin for PULMONARY EMBOLISM, CT head without increase in ICH    Aggressive airway clearance protocol    Monitor UO and BUN and CR. Possible increase in CR related to lasix    MRI will be ordered for tomorrow to assess for any additional neurologic events that may have occurred with patient's decline.    Update 4:57 PM    Patient has been shivering events as well as biting on endotracheal tube.  During that time she had a decrease in her oxygen saturation that spontaneously improved.  Patient was taken off all sedation.    Plan to perform brain bronchoscopy to evaluate for secretions and assist with airway clearance.    Regarding hypotension, SvO2 63.5 which is not consistent with cardiac etiology.  POCUS cardiac did not show as significantly reduced EF.  Patient did have 51% collapsibility of IVC.  Consider volume challenge.    Point-of-care ultrasound was performed to evaluate optic nerve sheaths.  Patient appeared to have dilated optic nerve sheath on the right, left optic nerve she appeared to be enlarged but did not measure greater than 5 mm.    Repeat BMP with sodium of 145.  Will consider initiating osmolar therapy.  Patient's optic nerve sheaths were not bilaterally consistent with increased  ICP, considering patient's imaging and neurologic exam will weigh risk versus benefit of osmolar therapy.  CT head with stable right basal ganglia hemorrhage and unchanged mass effect and midline shift.    Family meeting was had with both patient's sons, critical care manager, P7 care manager, and Nikolay Islas.  All of patient's son's questions were answered regarding patient's clinical change and readmission to the ICU.  Family meeting lasted approximately 1 hour.  After family meeting patient was reassessed by critical care when she had her biting on the endotracheal tube events and decline in oxygen which improved spontaneously.  She was also noted at that time to have a decrease in her [place] water temperature consistent with fever event.  Likely patient was having shivering with clenching on her endotracheal tube.  Epileptologist was contacted and did not see any seizure events, low amplitude EEG was reported.  They did recommend possible paralytic trial to assess for underlying seizure events.  Patient's son was in the room when patient was reevaluated and asked additional questions which were all answered.

## 2024-12-20 NOTE — PROGRESS NOTES
PT is intubated, no propofol, on Jevity 1.2@10mL/hr, prosource BID, recommend advancing TF by 10mL every 6-8hrs to goal of 40mL/hr, continue with prosource BID, provides total volume 960mL, 1272cal, 83g pro, 775mL, water flushes per critical care or consider minimum of 30mL every 4hrs for tube patency would provide total of 1075mL (TF, water flushes and flushes from prosource).

## 2024-12-20 NOTE — PLAN OF CARE
Problem: PAIN - ADULT  Goal: Verbalizes/displays adequate comfort level or baseline comfort level  Description: Interventions:  - Encourage patient to monitor pain and request assistance  - Assess pain using appropriate pain scale  - Administer analgesics based on type and severity of pain and evaluate response  - Implement non-pharmacological measures as appropriate and evaluate response  - Consider cultural and social influences on pain and pain management  - Notify physician/advanced practitioner if interventions unsuccessful or patient reports new pain  Outcome: Progressing     Problem: INFECTION - ADULT  Goal: Absence or prevention of progression during hospitalization  Description: INTERVENTIONS:  - Assess and monitor for signs and symptoms of infection  - Monitor lab/diagnostic results  - Monitor all insertion sites, i.e. indwelling lines, tubes, and drains  - Monitor endotracheal if appropriate and nasal secretions for changes in amount and color  - [place] appropriate cooling/warming therapies per order  - Administer medications as ordered  - Instruct and encourage patient and family to use good hand hygiene technique  - Identify and instruct in appropriate isolation precautions for identified infection/condition  Outcome: Progressing  Goal: Absence of fever/infection during neutropenic period  Description: INTERVENTIONS:  - Monitor WBC    Outcome: Progressing     Problem: SAFETY ADULT  Goal: Patient will remain free of falls  Description: INTERVENTIONS:  - Educate patient/family on patient safety including physical limitations  - Instruct patient to call for assistance with activity   - Consult OT/PT to assist with strengthening/mobility   - Keep Call bell within reach  - Keep bed low and locked with side rails adjusted as appropriate  - Keep care items and personal belongings within reach  - Initiate and maintain comfort rounds  - Make Fall Risk Sign visible to staff  - Offer Toileting every 2 Hours,  in advance of need  - Initiate/Maintain bed alarm  - Obtain necessary fall risk management equipment: non skid footwear  - Apply yellow socks and bracelet for high fall risk patients  - Consider moving patient to room near nurses station  Outcome: Progressing  Goal: Maintain or return to baseline ADL function  Description: INTERVENTIONS:  -  Assess patient's ability to carry out ADLs; assess patient's baseline for ADL function and identify physical deficits which impact ability to perform ADLs (bathing, care of mouth/teeth, toileting, grooming, dressing, etc.)  - Assess/evaluate cause of self-care deficits   - Assess range of motion  - Assess patient's mobility; develop plan if impaired  - Assess patient's need for assistive devices and provide as appropriate  - Encourage maximum independence but intervene and supervise when necessary  - Involve family in performance of ADLs  - Assess for home care needs following discharge   - Consider OT consult to assist with ADL evaluation and planning for discharge  - Provide patient education as appropriate  Outcome: Progressing  Goal: Maintains/Returns to pre admission functional level  Description: INTERVENTIONS:  - Perform AM-PAC 6 Click Basic Mobility/ Daily Activity assessment daily.  - Set and communicate daily mobility goal to care team and patient/family/caregiver.   - Collaborate with rehabilitation services on mobility goals if consulted  - Perform Range of Motion 3 times a day.  - Reposition patient every 2 hours.  - Dangle patient 3 times a day  - Record patient progress and toleration of activity level   Outcome: Progressing     Problem: DISCHARGE PLANNING  Goal: Discharge to home or other facility with appropriate resources  Description: INTERVENTIONS:  - Identify barriers to discharge w/patient and caregiver  - Arrange for needed discharge resources and transportation as appropriate  - Identify discharge learning needs (meds, wound care, etc.)  - Arrange for  interpretive services to assist at discharge as needed  - Refer to Case Management Department for coordinating discharge planning if the patient needs post-hospital services based on physician/advanced practitioner order or complex needs related to functional status, cognitive ability, or social support system  Outcome: Progressing     Problem: Knowledge Deficit  Goal: Patient/family/caregiver demonstrates understanding of disease process, treatment plan, medications, and discharge instructions  Description: Complete learning assessment and assess knowledge base.  Interventions:  - Provide teaching at level of understanding  - Provide teaching via preferred learning methods  Outcome: Progressing     Problem: Prexisting or High Potential for Compromised Skin Integrity  Goal: Skin integrity is maintained or improved  Description: INTERVENTIONS:  - Identify patients at risk for skin breakdown  - Assess and monitor skin integrity  - Assess and monitor nutrition and hydration status  - Monitor labs   - Assess for incontinence   - Turn and reposition patient  - Assist with mobility/ambulation  - Relieve pressure over bony prominences  - Avoid friction and shearing  - Provide appropriate hygiene as needed including keeping skin clean and dry  - Evaluate need for skin moisturizer/barrier cream  - Collaborate with interdisciplinary team   - Patient/family teaching  - Consider wound care consult   Outcome: Progressing     Problem: Potential for Falls  Goal: Patient will remain free of falls  Description: INTERVENTIONS:  - Educate patient/family on patient safety including physical limitations  - Instruct patient to call for assistance with activity   - Consult OT/PT to assist with strengthening/mobility   - Keep Call bell within reach  - Keep bed low and locked with side rails adjusted as appropriate  - Keep care items and personal belongings within reach  - Initiate and maintain comfort rounds  - Make Fall Risk Sign visible  to staff  - Offer Toileting every 2 Hours, in advance of need  - Initiate/Maintain bed alarm  - Obtain necessary fall risk management equipment: non skid footwear  - Apply yellow socks and bracelet for high fall risk patients  - Consider moving patient to room near nurses station  Outcome: Progressing     Problem: COPING  Goal: Pt/Family able to verbalize concerns and demonstrate effective coping strategies  Description: INTERVENTIONS:  - Assist patient/family to identify coping skills, available support systems and cultural and spiritual values  - Provide emotional support, including active listening and acknowledgement of concerns of patient and caregivers  - Reduce environmental stimuli, as able  - Provide patient education  - Assess for spiritual pain/suffering and initiate spiritual care, including notification of Pastoral Care or [place] based community as needed  - Assess effectiveness of coping strategies  Outcome: Progressing  Goal: Will report anxiety at manageable levels  Description: INTERVENTIONS:  - Administer medication as ordered  - Teach and encourage coping skills  - Provide emotional support  - Assess patient/family for anxiety and ability to cope  Outcome: Progressing     Problem: Nutrition/Hydration-ADULT  Goal: Nutrient/Hydration intake appropriate for improving, restoring or maintaining nutritional needs  Description: Monitor and assess patient's nutrition/hydration status for malnutrition. Collaborate with interdisciplinary team and initiate plan and interventions as ordered.  Monitor patient's weight and dietary intake as ordered or per policy. Utilize nutrition screening tool and intervene as necessary. Determine patient's food preferences and provide high-protein, high-caloric foods as appropriate.     INTERVENTIONS:  - Monitor oral intake, urinary output, labs, and treatment plans  - Assess nutrition and hydration status and recommend course of action  - Evaluate amount of meals eaten  -  Assist patient with eating if necessary   - Allow adequate time for meals  - Recommend/ encourage appropriate diets, oral nutritional supplements, and vitamin/mineral supplements  - Order, calculate, and assess calorie counts as needed  - Recommend, monitor, and adjust tube feedings and TPN/PPN based on assessed needs  - Assess need for intravenous fluids  - Provide specific nutrition/hydration education as appropriate  - Include patient/family/caregiver in decisions related to nutrition  Outcome: Progressing     Problem: NEUROSENSORY - ADULT  Goal: Achieves stable or improved neurological status  Description: INTERVENTIONS  - Monitor and report changes in neurological status  - Monitor vital signs such as temperature, blood pressure, glucose, and any other labs ordered   - Initiate measures to prevent increased intracranial pressure  - Monitor for seizure activity and implement precautions if appropriate      Outcome: Progressing  Goal: Achieves maximal functionality and self care  Description: INTERVENTIONS  - Monitor swallowing and airway patency with patient fatigue and changes in neurological status  - Encourage and assist patient to increase activity and self care.   - Encourage visually impaired, hearing impaired and aphasic patients to use assistive/communication devices  Outcome: Progressing     Problem: CARDIOVASCULAR - ADULT  Goal: Maintains optimal cardiac output and hemodynamic stability  Description: INTERVENTIONS:  - Monitor I/O, vital signs and rhythm  - Monitor for S/S and trends of decreased cardiac output  - Administer and titrate ordered vasoactive medications to optimize hemodynamic stability  - Assess quality of pulses, skin color and temperature  - Assess for signs of decreased coronary artery perfusion  - Instruct patient to report change in severity of symptoms  Outcome: Progressing  Goal: Absence of cardiac dysrhythmias or at baseline rhythm  Description: INTERVENTIONS:  - Continuous  cardiac monitoring, vital signs, obtain 12 lead EKG if ordered  - Administer antiarrhythmic and heart rate control medications as ordered  - Monitor electrolytes and administer replacement therapy as ordered  Outcome: Progressing     Problem: GASTROINTESTINAL - ADULT  Goal: Maintains or returns to baseline bowel function  Description: INTERVENTIONS:  - Assess bowel function  - Encourage oral fluids to ensure adequate hydration  - Administer IV fluids if ordered to ensure adequate hydration  - Administer ordered medications as needed  - Encourage mobilization and activity  - Consider nutritional services referral to assist patient with adequate nutrition and appropriate food choices  Outcome: Progressing  Goal: Maintains adequate nutritional intake  Description: INTERVENTIONS:  - Monitor percentage of each meal consumed  - Identify factors contributing to decreased intake, treat as appropriate  - Assist with meals as needed  - Monitor I&O, weight, and lab values if indicated  - Obtain nutrition services referral as needed  Outcome: Progressing     Problem: GENITOURINARY - ADULT  Goal: Maintains or returns to baseline urinary function  Description: INTERVENTIONS:  - Assess urinary function  - Encourage oral fluids to ensure adequate hydration if ordered  - Administer IV fluids as ordered to ensure adequate hydration  - Administer ordered medications as needed  - Offer frequent toileting  - Follow urinary retention protocol if ordered  Outcome: Progressing  Goal: Absence of urinary retention  Description: INTERVENTIONS:  - Assess patient's ability to void and empty bladder  - Monitor I/O  - Bladder scan as needed  - Discuss with physician/AP medications to alleviate retention as needed  - Discuss catheterization for long term situations as appropriate  Outcome: Progressing     Problem: METABOLIC, FLUID AND ELECTROLYTES - ADULT  Goal: Electrolytes maintained within normal limits  Description: INTERVENTIONS:  - Monitor  labs and assess patient for signs and symptoms of electrolyte imbalances  - Administer electrolyte replacement as ordered  - Monitor response to electrolyte replacements, including repeat lab results as appropriate  - Instruct patient on fluid and nutrition as appropriate  Outcome: Progressing  Goal: Fluid balance maintained  Description: INTERVENTIONS:  - Monitor labs   - Monitor I/O and WT  - Instruct patient on fluid and nutrition as appropriate  - Assess for signs & symptoms of volume excess or deficit  Outcome: Progressing  Goal: Glucose maintained within target range  Description: INTERVENTIONS:  - Monitor Blood Glucose as ordered  - Assess for signs and symptoms of hyperglycemia and hypoglycemia  - Administer ordered medications to maintain glucose within target range  - Assess nutritional intake and initiate nutrition service referral as needed  Outcome: Progressing     Problem: SKIN/TISSUE INTEGRITY - ADULT  Goal: Skin Integrity remains intact(Skin Breakdown Prevention)  Description: Assess:  -Perform Geovani assessment every shift   -Clean and moisturize skin every shift   -Inspect skin when repositioning, toileting, and assisting with ADLS  -Assess under medical devices such as paula every hour  -Assess extremities for adequate circulation and sensation     Bed Management:  -Have minimal linens on bed & keep smooth, unwrinkled  -Change linens as needed when moist or perspiring  -Avoid sitting or lying in one position for more than 45 hours while in bed  -Keep HOB at 45degrees     Toileting:  -Offer bedside commode  -Assess for incontinence every hour  -Use incontinent care products after each incontinent episode such as moisture barrier     Activity:  --Encourage or provide ROM exercises   -Turn and reposition patient every 2 Hours  -Use appropriate equipment to lift or move patient in bed  -Instruct/ Assist with weight shifting every hour when out of bed in chair  -Consider limitation of chair time 2  hour intervals    Skin Care:  -Avoid use of baby powder, tape, friction and shearing, hot water or constrictive clothing  -Relieve pressure over bony prominences using mepilx  -Do not massage red bony areas    Next Steps:  -Teach patient strategies to minimize risks such as weight shifting    -Consider consults to  interdisciplinary teams such as PT/OT  Outcome: Progressing  Goal: Incision(s), wounds(s) or drain site(s) healing without S/S of infection  Description: INTERVENTIONS  - Assess and document dressing, incision, wound bed, drain sites and surrounding tissue  - Provide patient and family education  - Perform skin care/dressing changes every shift  Outcome: Progressing     Problem: HEMATOLOGIC - ADULT  Goal: Maintains hematologic stability  Description: INTERVENTIONS  - Assess for signs and symptoms of bleeding or hemorrhage  - Monitor labs  - Administer supportive blood products/factors as ordered and appropriate  Outcome: Progressing     Problem: MUSCULOSKELETAL - ADULT  Goal: Maintain or return mobility to safest level of function  Description: INTERVENTIONS:  - Assess patient's ability to carry out ADLs; assess patient's baseline for ADL function and identify physical deficits which impact ability to perform ADLs (bathing, care of mouth/teeth, toileting, grooming, dressing, etc.)  - Assess/evaluate cause of self-care deficits   - Assess range of motion  - Assess patient's mobility  - Assess patient's need for assistive devices and provide as appropriate  - Encourage maximum independence but intervene and supervise when necessary  - Involve family in performance of ADLs  - Assess for home care needs following discharge   - Consider OT consult to assist with ADL evaluation and planning for discharge  - Provide patient education as appropriate  Outcome: Progressing  Goal: Maintain proper alignment of affected body part  Description: INTERVENTIONS:  - Support, maintain and protect limb and body alignment  -  Provide patient/ family with appropriate education  Outcome: Progressing     Problem: Activity Intolerance/Impaired Mobility  Goal: Mobility/activity is maintained at optimum level for patient  Description: Interventions:  - Assess and monitor patient  barriers to mobility and need for assistive/adaptive devices.  - Assess patient's emotional response to limitations.  - Collaborate with interdisciplinary team and initiate plans and interventions as ordered.  - Encourage independent activity per ability.  - Maintain proper body alignment.  - Perform active/passive rom as tolerated/ordered.  - Plan activities to conserve energy.  - Turn patient as appropriate  Outcome: Progressing     Problem: Communication Impairment  Goal: Ability to express needs and understand communication  Description: Assess patient's communication skills and ability to understand information.  Patient will demonstrate use of effective communication techniques, alternative methods of communication and understanding even if not able to speak.     - Encourage communication and provide alternate methods of communication as needed.  - Collaborate with case management/ for discharge needs.  - Include patient/family/caregiver in decisions related to communication.  Outcome: Progressing     Problem: Potential for Aspiration  Goal: Non-ventilated patient's risk of aspiration is minimized  Description: Assess and monitor vital signs, respiratory status, and labs (WBC).  Monitor for signs of aspiration (tachypnea, cough, rales, wheezing, cyanosis, fever).    - Assess and monitor patient's ability to swallow.  - Place patient up in chair to eat if possible.  - HOB up at 90 degrees to eat if unable to get patient up into chair.  - Supervise patient during oral intake.   - Instruct patient/ family to take small bites.  - Instruct patient/ family to take small single sips when taking liquids.  - Follow patient-specific strategies generated by  speech pathologist.  Outcome: Progressing     Problem: Nutrition  Goal: Nutrition/Hydration status is improving  Description: Monitor and assess patient's nutrition/hydration status for malnutrition (ex- brittle hair, bruises, dry skin, pale skin and conjunctiva, muscle wasting, smooth red tongue, and disorientation). Collaborate with interdisciplinary team and initiate plan and interventions as ordered.  Monitor patient's weight and dietary intake as ordered or per policy. Utilize nutrition screening tool and intervene per policy. Determine patient's food preferences and provide high-protein, high-caloric foods as appropriate.     - Assist patient with eating.  - Allow adequate time for meals.  - Encourage patient to take dietary supplement as ordered.  - Collaborate with clinical nutritionist.  - Include patient/family/caregiver in decisions related to nutrition.  Outcome: Progressing

## 2024-12-20 NOTE — PROCEDURES
POC Cardiac US    Date/Time: 12/20/2024 3:32 PM    Performed by: Khloe Szymanski MD  Authorized by: Khloe Szymanski MD    Patient location:  ICU  Procedure details:     Exam Type:  Diagnostic    Indications: suspected volume depletion and cardiac arrest      Assessment / Evaluation for: inferior vena cava for fluid responsiveness and intravascular volume status      Exam Type: initial exam      Image quality: limited diagnostic      Image availability:  Images available in PACS, video obtained and still images obtained  Patient Details:     Cardiac Rhythm:  Regular    Medications: norepinephrine infusion, vasopressin and epinephrine      Mechanical ventilation: Yes    Cardiac findings:     Echo technique: limited 2D      Views obtained: parasternal long axis, parasternal short axis, subcostal and apical      Pericardial effusion: absent      Tamponade physiology: absent      Wall motion: normal      LV systolic function: normal      RV dilation: none    IVC findings:     Maximum IVC Diameter (cm):  2.42    Minimum IVC Diameter (cm):  1.19    IVC Variability (%):  51  Interpretation:      Mildly hypovolemic  Normal squeeze  Grossly normal valve function  No pericardial effusion

## 2024-12-21 NOTE — PLAN OF CARE
Problem: PAIN - ADULT  Goal: Verbalizes/displays adequate comfort level or baseline comfort level  Description: Interventions:  - Encourage patient to monitor pain and request assistance  - Assess pain using appropriate pain scale  - Administer analgesics based on type and severity of pain and evaluate response  - Implement non-pharmacological measures as appropriate and evaluate response  - Consider cultural and social influences on pain and pain management  - Notify physician/advanced practitioner if interventions unsuccessful or patient reports new pain  Outcome: Progressing     Problem: INFECTION - ADULT  Goal: Absence or prevention of progression during hospitalization  Description: INTERVENTIONS:  - Assess and monitor for signs and symptoms of infection  - Monitor lab/diagnostic results  - Monitor all insertion sites, i.e. indwelling lines, tubes, and drains  - Monitor endotracheal if appropriate and nasal secretions for changes in amount and color  - [place] appropriate cooling/warming therapies per order  - Administer medications as ordered  - Instruct and encourage patient and family to use good hand hygiene technique  - Identify and instruct in appropriate isolation precautions for identified infection/condition  Outcome: Progressing  Goal: Absence of fever/infection during neutropenic period  Description: INTERVENTIONS:  - Monitor WBC    Outcome: Progressing

## 2024-12-21 NOTE — PROGRESS NOTES
Bren Traylor is a 69 y.o. female who is currently ordered Vancomycin IV with management by the Pharmacy Consult service.  Relevant clinical data and objective / subjective history reviewed.  Vancomycin Assessment:  Indication and Goal AUC/Trough: Pneumonia (goal -600, trough >10)  Clinical Status:  critically ill, leukocytosis, hypothermic  Micro:    Strep pneumoniae, urine: negative   Sputum culture: no bacteria seen   Blood cultures x 2: no growth at 24 hours   Sputum culture: 4+ MSSA   Renal Function:  SCr: 1.13 mg/dL (from 1.21, baseline 0.4-0.6)  CrCl: 38 mL/min  Renal replacement: CYNTHIA, Not on dialysis  Days of Therapy: 4  Current Dose: 750 mg IV daily PRN when random level is less than 15  Vancomycin Plan:  Dosin mg IV daily PRN when random level is less than 15 - Give one time dose of vanco 750 mg today due to level of 9.2  Next Level: Random  at 0600  Renal Function Monitoring: Daily BMP and UOP  Pharmacy will continue to follow closely for s/sx of nephrotoxicity, infusion reactions and appropriateness of therapy.  BMP and CBC will be ordered per protocol. We will continue to follow the patient’s culture results and clinical progress daily.

## 2024-12-21 NOTE — RESPIRATORY THERAPY NOTE
RT Ventilator Management Note  Bren Traylor 69 y.o. female MRN: [id number]  Unit/Bed#: ICU 11 Encounter: [id number]      Daily Screen         12/19/2024  0806 12/20/2024  0724          Patient safety screen outcome:: Failed Failed      Not Ready for Weaning due to:: Underline problem not resolved Underline problem not resolved                Physical Exam:   Assessment Type: (P) Assess only  General Appearance: (P) Sedated  Respiratory Pattern: (P) Assisted  Chest Assessment: (P) Chest expansion symmetrical  Bilateral Breath Sounds: (P) Diminished, Clear  Cough: (P) None  Suction: (P) ET Tube  O2 Device: (P) Ventilator      Resp Comments: (P) Pt. remains on CMV/PC mode.  No changes at this time.  Will continue to monitor pt per protocol.

## 2024-12-21 NOTE — PROGRESS NOTES
Progress Note - Critical Care/ICU   Name: Bren Traylor 69 y.o. female I MRN: [id number]  Unit/Bed#: ICU 11 I Date of Admission: 12/6/2024   Date of Service: 12/21/2024 I Hospital Day: 14       Assessment & Plan   Neuro:   Right MCA M1 occlusion s/p thrombectomy TICI 3, R carotid stenting c/b hemorrhagic conversion  SBP goal 110/140  Neurochecks q1  Hold Hep gtt and aspirin 81 mg daily until CT head scan is stable  Home meds  Hold Provigil 100 mg daily,   Continue sertraline 50mg  Continue keppra 1000mg BID  Generalized myoclonus like jerking/twitching  S/p cardiac arrest w likely anoxic brain injury  12/18 CT head - Diminished size of the known large right basal ganglia parenchymal hemorrhage. Increased edema and mass effect. Increased midline shift. No hydrocephalus.   12/20 CT head: Similar size of R basal ganglia IPH, similar mass effect w 8mm shift. Global edema/ loss of architecture.  EEG monitoring with significant muscle artifact. When paralyzed showing some temporal spikes correlating with stroke region.  Sedation  Propofol gtt stopped  Precedex gtt currently at 0.2  Plan: Back on precedex after sig tachypnea and shivering yesterday evening. No longer has gag or corneal reflexes. No clinical improvement after hypertonic saline 23% bolus last night, and Na at goal at 151 today. MRI tonight as planned to evaluate interval changes, inform GOC discussion with family. Will need MRI-incompatible EEG leads taken off for scan, resume vEEG afterward.     CV:   Cardiac arrest   ROSC achieved after 10mins of ACLS protocol  Unknown downtime   TTM, rewarming 12/19 PM  Central fevers  Bath temp low, require ice, tylenol IV  Cardiology on board  Meds  ASA 81mg   Epi gtt off  Vaso at .4  Levo at 15  Echo 12/10 EF 50-55%.  Hypokinetic inferior segments.  (Please refer to report for details)  Repeat ECHO 12/18 EF 55%.  Systolic function normal.  LV size, wall thickness normal.  Mildly abnormal diastolic function.   Basal inferior, basal anterolateral, mid inferior, mid anterolateral hypokinesis.  Basal inferolateral and mid inferolateral dyskinesia.  RV size, function, wall thickness normal.  V tach arrest  Amiodarone gtt off  Has been in NSR s/p cardiac arrest  Elevated troponin   NSTEMI v. Post arrest v. Cardiomyopathy   Toponins peaked 12/19  Hx of HTN, HLD  Meds  atorvastatin 20 mg daily  Plan: Had decreasing levo requirements yesterday but back up again this AM. On POCUS yesterday had 51% IVC variability, likely needs volume and given Hb 7.0 today will give pRBC first and continue to re-evaluate    Pulm:  Mechanical Ventilation  PC 12/12/8/30%   Pulmonary edema  Thick yellow secretions BL on bronch 12/20  40 lasix IV given 12/20 at midnight and also 12/18  Multi lobar PNA  See ID plan below  Acute PE  Meds  Hep gtt as below  Albuterol nebs     GI:   Diarrhea  DC bowel regimen  Elevated transaminases/shock liver  Peaked and trending down, recheck values tomorrow     :   Decreased urinary output post cardiac arrest  Improved w lasix-- given 12/18 and 12/20 40mg lasix IV both times   Further evaluate volume status today  F/E/N:   No MIVF  Replete electrolytes as needed  Tube feeds to goal of 40     Heme/Onc:   Anemia  Hb 7.0 today and yesterday. Could be hemodilutional vs ABLA in setting of hep gtt anticoagulation. Give 1u pRBC today, recheck after finished. If continuing to drop c/f active bleeding and would repeat CT imaging.     Endo:   Glucose has been stable, no insulin     ID:   Multi lobar pna  Meds  Vancomycin and Zosyn Day 4  Studies  12/13 sputum cx: 4+ MSSA  12/18 Bcx NG x 72h  12/18 MRSA pending   12/19 sputum cx: few coloines candida albicans  12/19 urine strep antigen neg  Previously tx MSSA PNA. Now empirically on vanc/zosyn but with uptrending WBC and requiring progressively lower bath temps in cyclic fashion. Unclear if CNS fever vs ID. Consider possible inadequate abx coverage, repeat UA and Bcx today.  Consider stool studies if still having diarrhea off of BR x 48h.       MSK/Skin:   Frequent turning     Tubes: sierra, ETT  Lines: R fem A line, L IJ CVC, PIV  Drains: none    Disposition: Critical care    ICU Core Measures     Vented Patient  VAP Bundle  VAP bundle ordered     A: Assess, Prevent, and Manage Pain Has pain been assessed? NA  Need for changes to pain regimen? NA   B: Both Spontaneous Awakening Trials (SATs) and Spontaneous Breathing Trials (SBTs) Plan to perform spontaneous awakening trial today? No secondary to targeted temperature management  Plan to perform spontaneous breathing trial today? No secondary to targeted temperature management  Obvious barriers to extubation? Yes   C: Choice of Sedation RASS Goal: -3 Moderate Sedation or 0 Alert and Calm  Need for changes to sedation or analgesia regimen? NA   D: Delirium CAM-ICU: Negative   E: Early Mobility  Plan for early mobility? No   F: Family Engagement Plan for family engagement today? Yes       Antibiotic Review: Continue broad spectrum secondary to severity of illness.     Review of Invasive Devices:    Sierra Plan: Continue for accurate I/O monitoring for 48 hours  Central access plan: Medications requiring central line Hemodynamic monitoring  [place] Plan: Keep arterial line for hemodynamic monitoring    Prophylaxis:  VTE Contraindicated secondary to: Cardiac arrest, TTM   Stress Ulcer  not ordered         24 Hour Events :    Increasing pressor requirement again  Bath temps lower to meet normothermia goal    Subjective   Review of Systems: Review of Systems not obtainable due to Clinical Condition, Altered mental status    Objective :                   Vitals I/O      Most Recent Min/Max in 24hrs   Temp 97.8 °F (36.6 °C) Temp  Min: 96.8 °F (36 °C)  Max: 99.3 °F (37.4 °C)   Pulse 93 Pulse  Min: 64  Max: 114   Resp 20 Resp  Min: 12  Max: 45   BP (!) 118/49 BP  Min: 98/40  Max: 118/49   O2 Sat 100 % SpO2  Min: 91 %  Max: 100 %      Intake/Output  Summary (Last 24 hours) at 12/21/2024 0936  Last data filed at 12/21/2024 0800  Gross per 24 hour   Intake 2623.95 ml   Output 825 ml   Net 1798.95 ml       Diet Enteral/Parenteral; Tube Feeding No Oral Diet; Jevity 1.2 Navarro; Continuous; 40; Prosource Protein Liquid - One Packet; BID; 100; Water; Every 6 hours    Invasive Monitoring   Arterial Line  Qi /46  Arterial Line BP  Min: 102/36  Max: 138/68   MAP 70 mmHg  Arterial Line MAP (mmHg)  Min: 58 mmHg  Max: 90 mmHg           Physical Exam   Physical Exam  Vitals and nursing note reviewed.   Eyes:      Pupils: Pupils are equal.      Comments: Pupils brisk, reactive, 3 cm   Skin:     General: Skin is warm and dry.   HENT:      Head: Normocephalic and atraumatic. No right periorbital erythema or left periorbital erythema.      Mouth/Throat:      Mouth: Mucous membranes are moist.   Cardiovascular:      Rate and Rhythm: Normal rate and regular rhythm.      Pulses: No decreased pulses.           Carotid pulses are 2+ on the right side and 2+ on the left side.       Posterior tibial pulses are 2+ on the right side and 2+ on the left side.      Heart sounds: Normal heart sounds.   Musculoskeletal:      Right lower leg: No edema.      Left lower leg: No edema.   Abdominal:      Palpations: Abdomen is soft.   Constitutional:       General: She is in acute distress.      Appearance: She is ill-appearing.      Interventions: She is sedated and intubated.   Pulmonary:      Effort: Tachypnea present. She is intubated.      Breath sounds: No stridor or decreased air movement. No decreased breath sounds, wheezing, rhonchi or rales.      Comments: On ventilator  Neurological:      Mental Status: She is unresponsive.      GCS: GCS eye subscore is 1. GCS verbal subscore is 1. GCS motor subscore is 1.        Corneal reflex present, cough reflex and gag reflex not intact.      Comments: Intermittent jerking c/w myoclonus, intact cough, corneals, no gag  GCS 3T, no grimace or  posturing in response to pain  2mm minimally reactive pupils          Diagnostic Studies        Lab Results: I have reviewed the following results:     Medications:  Scheduled PRN   acetaminophen, 650 mg, Q6H  artificial tear, , HS  aspirin, 81 mg, Daily  atorvastatin, 20 mg, QPM  chlorhexidine, 15 mL, Q12H DANIS  levETIRAcetam, 500 mg, Q12H DANSI  piperacillin-tazobactam, 4.5 g, Q8H  polyethylene glycol, 17 g, Daily  senna-docusate sodium, 2 tablet, BID  sertraline, 50 mg, Daily  sevelamer, 800 mg, Q8H      albuterol, 2.5 mg, Q4H PRN  bisacodyl, 10 mg, Daily PRN  [START ON 12/22/2024] vancomycin, 15 mg/kg, Daily PRN       Continuous    dexmedetomidine, 0.1-0.7 mcg/kg/hr, Last Rate: 0.2 mcg/kg/hr (12/21/24 0826)  heparin (porcine), 3-24 Units/kg/hr (Order-Specific), Last Rate: 11 Units/kg/hr (12/21/24 0608)  norepinephrine, 1-30 mcg/min, Last Rate: 15 mcg/min (12/21/24 0912)  sodium chloride, 30 mL/hr, Last Rate: 30 mL/hr (12/20/24 2001)  vasopressin, 0.04 Units/min, Last Rate: 0.04 Units/min (12/21/24 0112)         Labs:   CBC    Recent Labs     12/20/24  2351 12/21/24  0910   WBC 30.02* 33.78*   HGB 7.0* 7.0*   HCT 22.5* 22.6*    278     BMP    Recent Labs     12/20/24  2351 12/21/24  0519   SODIUM 151* 151*   K 4.0 3.5   * 117*   CO2 22 25   AGAP 12 9   BUN 61* 65*   CREATININE 1.17 1.13   CALCIUM 8.0* 8.0*       Coags    Recent Labs     12/19/24  1810 12/19/24  2257 12/20/24  1343 12/21/24  0337   INR 1.66*  --   --   --    PTT 41*   < > 61* 51*    < > = values in this interval not displayed.        Additional Electrolytes  Recent Labs     12/20/24  1123 12/20/24  2351 12/21/24  0714   MG 2.5 2.8*  --    PHOS 5.3* 6.4*  --    CAIONIZED 1.01* 1.12 1.07*          Blood Gas    Recent Labs     12/21/24  0518   PHART 7.448   [id number] 32.6*   PO2ART 143.9*   [id number] 22.0   BEART -1.7   SOURCE Line, Arterial     Recent Labs     12/20/24  1149 12/20/24  1343 12/21/24  0518   PHVEN 7.448*  --   --    [id number]  40.7*  --   --    PO2VEN 36.0  --   --    [id number] 27.5  --   --    BEVEN 3.2  --   --    [id number] 63.5  --   --    SOURCE  --    < > Line, Arterial    < > = values in this interval not displayed.    LFTs  Recent Labs     12/20/24  0538   *   *   ALKPHOS 97   ALB 2.8*   TBILI 0.40       Infectious  No recent results    Glucose  Recent Labs     12/20/24  1123 12/20/24  1829 12/20/24  2351 12/21/24  0519   GLUC 173* 126 146* 159*

## 2024-12-21 NOTE — PLAN OF CARE
Problem: PAIN - ADULT  Goal: Verbalizes/displays adequate comfort level or baseline comfort level  Description: Interventions:  - Encourage patient to monitor pain and request assistance  - Assess pain using appropriate pain scale  - Administer analgesics based on type and severity of pain and evaluate response  - Implement non-pharmacological measures as appropriate and evaluate response  - Consider cultural and social influences on pain and pain management  - Notify physician/advanced practitioner if interventions unsuccessful or patient reports new pain  Outcome: Progressing     Problem: INFECTION - ADULT  Goal: Absence or prevention of progression during hospitalization  Description: INTERVENTIONS:  - Assess and monitor for signs and symptoms of infection  - Monitor lab/diagnostic results  - Monitor all insertion sites, i.e. indwelling lines, tubes, and drains  - Monitor endotracheal if appropriate and nasal secretions for changes in amount and color  - Canvas appropriate cooling/warming therapies per order  - Administer medications as ordered  - Instruct and encourage patient and family to use good hand hygiene technique  - Identify and instruct in appropriate isolation precautions for identified infection/condition  Outcome: Progressing  Goal: Absence of fever/infection during neutropenic period  Description: INTERVENTIONS:  - Monitor WBC    Outcome: Progressing     Problem: SAFETY ADULT  Goal: Patient will remain free of falls  Description: INTERVENTIONS:  - Educate patient/family on patient safety including physical limitations  - Instruct patient to call for assistance with activity   - Consult OT/PT to assist with strengthening/mobility   - Keep Call bell within reach  - Keep bed low and locked with side rails adjusted as appropriate  - Keep care items and personal belongings within reach  - Initiate and maintain comfort rounds  - Make Fall Risk Sign visible to staff  - Offer Toileting every 2 Hours,  in advance of need  - Initiate/Maintain bed alarm  - Obtain necessary fall risk management equipment: non skid footwear  - Apply yellow socks and bracelet for high fall risk patients  - Consider moving patient to room near nurses station  Outcome: Progressing  Goal: Maintain or return to baseline ADL function  Description: INTERVENTIONS:  -  Assess patient's ability to carry out ADLs; assess patient's baseline for ADL function and identify physical deficits which impact ability to perform ADLs (bathing, care of mouth/teeth, toileting, grooming, dressing, etc.)  - Assess/evaluate cause of self-care deficits   - Assess range of motion  - Assess patient's mobility; develop plan if impaired  - Assess patient's need for assistive devices and provide as appropriate  - Encourage maximum independence but intervene and supervise when necessary  - Involve family in performance of ADLs  - Assess for home care needs following discharge   - Consider OT consult to assist with ADL evaluation and planning for discharge  - Provide patient education as appropriate  Outcome: Progressing  Goal: Maintains/Returns to pre admission functional level  Description: INTERVENTIONS:  - Perform AM-PAC 6 Click Basic Mobility/ Daily Activity assessment daily.  - Set and communicate daily mobility goal to care team and patient/family/caregiver.   - Collaborate with rehabilitation services on mobility goals if consulted  - Perform Range of Motion 3 times a day.  - Reposition patient every 2 hours.  - Dangle patient 3 times a day  - Record patient progress and toleration of activity level   Outcome: Progressing     Problem: DISCHARGE PLANNING  Goal: Discharge to home or other facility with appropriate resources  Description: INTERVENTIONS:  - Identify barriers to discharge w/patient and caregiver  - Arrange for needed discharge resources and transportation as appropriate  - Identify discharge learning needs (meds, wound care, etc.)  - Arrange for  interpretive services to assist at discharge as needed  - Refer to Case Management Department for coordinating discharge planning if the patient needs post-hospital services based on physician/advanced practitioner order or complex needs related to functional status, cognitive ability, or social support system  Outcome: Progressing     Problem: Knowledge Deficit  Goal: Patient/family/caregiver demonstrates understanding of disease process, treatment plan, medications, and discharge instructions  Description: Complete learning assessment and assess knowledge base.  Interventions:  - Provide teaching at level of understanding  - Provide teaching via preferred learning methods  Outcome: Progressing     Problem: Prexisting or High Potential for Compromised Skin Integrity  Goal: Skin integrity is maintained or improved  Description: INTERVENTIONS:  - Identify patients at risk for skin breakdown  - Assess and monitor skin integrity  - Assess and monitor nutrition and hydration status  - Monitor labs   - Assess for incontinence   - Turn and reposition patient  - Assist with mobility/ambulation  - Relieve pressure over bony prominences  - Avoid friction and shearing  - Provide appropriate hygiene as needed including keeping skin clean and dry  - Evaluate need for skin moisturizer/barrier cream  - Collaborate with interdisciplinary team   - Patient/family teaching  - Consider wound care consult   Outcome: Progressing     Problem: Potential for Falls  Goal: Patient will remain free of falls  Description: INTERVENTIONS:  - Educate patient/family on patient safety including physical limitations  - Instruct patient to call for assistance with activity   - Consult OT/PT to assist with strengthening/mobility   - Keep Call bell within reach  - Keep bed low and locked with side rails adjusted as appropriate  - Keep care items and personal belongings within reach  - Initiate and maintain comfort rounds  - Make Fall Risk Sign visible  to staff  - Offer Toileting every 2 Hours, in advance of need  - Initiate/Maintain bed alarm  - Obtain necessary fall risk management equipment: non skid footwear  - Apply yellow socks and bracelet for high fall risk patients  - Consider moving patient to room near nurses station  Outcome: Progressing     Problem: COPING  Goal: Pt/Family able to verbalize concerns and demonstrate effective coping strategies  Description: INTERVENTIONS:  - Assist patient/family to identify coping skills, available support systems and cultural and spiritual values  - Provide emotional support, including active listening and acknowledgement of concerns of patient and caregivers  - Reduce environmental stimuli, as able  - Provide patient education  - Assess for spiritual pain/suffering and initiate spiritual care, including notification of Pastoral Care or april based community as needed  - Assess effectiveness of coping strategies  Outcome: Progressing  Goal: Will report anxiety at manageable levels  Description: INTERVENTIONS:  - Administer medication as ordered  - Teach and encourage coping skills  - Provide emotional support  - Assess patient/family for anxiety and ability to cope  Outcome: Progressing     Problem: Nutrition/Hydration-ADULT  Goal: Nutrient/Hydration intake appropriate for improving, restoring or maintaining nutritional needs  Description: Monitor and assess patient's nutrition/hydration status for malnutrition. Collaborate with interdisciplinary team and initiate plan and interventions as ordered.  Monitor patient's weight and dietary intake as ordered or per policy. Utilize nutrition screening tool and intervene as necessary. Determine patient's food preferences and provide high-protein, high-caloric foods as appropriate.     INTERVENTIONS:  - Monitor oral intake, urinary output, labs, and treatment plans  - Assess nutrition and hydration status and recommend course of action  - Evaluate amount of meals eaten  -  Assist patient with eating if necessary   - Allow adequate time for meals  - Recommend/ encourage appropriate diets, oral nutritional supplements, and vitamin/mineral supplements  - Order, calculate, and assess calorie counts as needed  - Recommend, monitor, and adjust tube feedings and TPN/PPN based on assessed needs  - Assess need for intravenous fluids  - Provide specific nutrition/hydration education as appropriate  - Include patient/family/caregiver in decisions related to nutrition  Outcome: Progressing     Problem: NEUROSENSORY - ADULT  Goal: Achieves stable or improved neurological status  Description: INTERVENTIONS  - Monitor and report changes in neurological status  - Monitor vital signs such as temperature, blood pressure, glucose, and any other labs ordered   - Initiate measures to prevent increased intracranial pressure  - Monitor for seizure activity and implement precautions if appropriate      Outcome: Progressing  Goal: Achieves maximal functionality and self care  Description: INTERVENTIONS  - Monitor swallowing and airway patency with patient fatigue and changes in neurological status  - Encourage and assist patient to increase activity and self care.   - Encourage visually impaired, hearing impaired and aphasic patients to use assistive/communication devices  Outcome: Progressing     Problem: CARDIOVASCULAR - ADULT  Goal: Maintains optimal cardiac output and hemodynamic stability  Description: INTERVENTIONS:  - Monitor I/O, vital signs and rhythm  - Monitor for S/S and trends of decreased cardiac output  - Administer and titrate ordered vasoactive medications to optimize hemodynamic stability  - Assess quality of pulses, skin color and temperature  - Assess for signs of decreased coronary artery perfusion  - Instruct patient to report change in severity of symptoms  Outcome: Progressing  Goal: Absence of cardiac dysrhythmias or at baseline rhythm  Description: INTERVENTIONS:  - Continuous  cardiac monitoring, vital signs, obtain 12 lead EKG if ordered  - Administer antiarrhythmic and heart rate control medications as ordered  - Monitor electrolytes and administer replacement therapy as ordered  Outcome: Progressing     Problem: RESPIRATORY - ADULT  Goal: Achieves optimal ventilation and oxygenation  Description: INTERVENTIONS:  - Assess for changes in respiratory status  - Assess for changes in mentation and behavior  - Position to facilitate oxygenation and minimize respiratory effort  - Oxygen administered by appropriate delivery if ordered  - Initiate smoking cessation education as indicated  - Encourage broncho-pulmonary hygiene including cough, deep breathe, Incentive Spirometry  - Assess the need for suctioning and aspirate as needed  - Assess and instruct to report SOB or any respiratory difficulty  - Respiratory Therapy support as indicated  Outcome: Progressing     Problem: GASTROINTESTINAL - ADULT  Goal: Maintains or returns to baseline bowel function  Description: INTERVENTIONS:  - Assess bowel function  - Encourage oral fluids to ensure adequate hydration  - Administer IV fluids if ordered to ensure adequate hydration  - Administer ordered medications as needed  - Encourage mobilization and activity  - Consider nutritional services referral to assist patient with adequate nutrition and appropriate food choices  Outcome: Progressing  Goal: Maintains adequate nutritional intake  Description: INTERVENTIONS:  - Monitor percentage of each meal consumed  - Identify factors contributing to decreased intake, treat as appropriate  - Assist with meals as needed  - Monitor I&O, weight, and lab values if indicated  - Obtain nutrition services referral as needed  Outcome: Progressing     Problem: GENITOURINARY - ADULT  Goal: Maintains or returns to baseline urinary function  Description: INTERVENTIONS:  - Assess urinary function  - Encourage oral fluids to ensure adequate hydration if ordered  -  Administer IV fluids as ordered to ensure adequate hydration  - Administer ordered medications as needed  - Offer frequent toileting  - Follow urinary retention protocol if ordered  Outcome: Progressing  Goal: Absence of urinary retention  Description: INTERVENTIONS:  - Assess patient's ability to void and empty bladder  - Monitor I/O  - Bladder scan as needed  - Discuss with physician/AP medications to alleviate retention as needed  - Discuss catheterization for long term situations as appropriate  Outcome: Progressing     Problem: METABOLIC, FLUID AND ELECTROLYTES - ADULT  Goal: Electrolytes maintained within normal limits  Description: INTERVENTIONS:  - Monitor labs and assess patient for signs and symptoms of electrolyte imbalances  - Administer electrolyte replacement as ordered  - Monitor response to electrolyte replacements, including repeat lab results as appropriate  - Instruct patient on fluid and nutrition as appropriate  Outcome: Progressing  Goal: Fluid balance maintained  Description: INTERVENTIONS:  - Monitor labs   - Monitor I/O and WT  - Instruct patient on fluid and nutrition as appropriate  - Assess for signs & symptoms of volume excess or deficit  Outcome: Progressing  Goal: Glucose maintained within target range  Description: INTERVENTIONS:  - Monitor Blood Glucose as ordered  - Assess for signs and symptoms of hyperglycemia and hypoglycemia  - Administer ordered medications to maintain glucose within target range  - Assess nutritional intake and initiate nutrition service referral as needed  Outcome: Progressing     Problem: SKIN/TISSUE INTEGRITY - ADULT  Goal: Skin Integrity remains intact(Skin Breakdown Prevention)  Description: Assess:  -Perform Geovani assessment every shift   -Clean and moisturize skin every shift   -Inspect skin when repositioning, toileting, and assisting with ADLS  -Assess under medical devices such as paula every hour  -Assess extremities for adequate circulation and  sensation     Bed Management:  -Have minimal linens on bed & keep smooth, unwrinkled  -Change linens as needed when moist or perspiring  -Avoid sitting or lying in one position for more than 45 hours while in bed  -Keep HOB at 45degrees     Toileting:  -Offer bedside commode  -Assess for incontinence every hour  -Use incontinent care products after each incontinent episode such as moisture barrier     Activity:  --Encourage or provide ROM exercises   -Turn and reposition patient every 2 Hours  -Use appropriate equipment to lift or move patient in bed  -Instruct/ Assist with weight shifting every hour when out of bed in chair  -Consider limitation of chair time 2 hour intervals    Skin Care:  -Avoid use of baby powder, tape, friction and shearing, hot water or constrictive clothing  -Relieve pressure over bony prominences using mepilx  -Do not massage red bony areas    Next Steps:  -Teach patient strategies to minimize risks such as weight shifting    -Consider consults to  interdisciplinary teams such as PT/OT  Outcome: Progressing  Goal: Incision(s), wounds(s) or drain site(s) healing without S/S of infection  Description: INTERVENTIONS  - Assess and document dressing, incision, wound bed, drain sites and surrounding tissue  - Provide patient and family education  - Perform skin care/dressing changes every shift  Outcome: Progressing     Problem: HEMATOLOGIC - ADULT  Goal: Maintains hematologic stability  Description: INTERVENTIONS  - Assess for signs and symptoms of bleeding or hemorrhage  - Monitor labs  - Administer supportive blood products/factors as ordered and appropriate  Outcome: Progressing     Problem: MUSCULOSKELETAL - ADULT  Goal: Maintain or return mobility to safest level of function  Description: INTERVENTIONS:  - Assess patient's ability to carry out ADLs; assess patient's baseline for ADL function and identify physical deficits which impact ability to perform ADLs (bathing, care of mouth/teeth,  toileting, grooming, dressing, etc.)  - Assess/evaluate cause of self-care deficits   - Assess range of motion  - Assess patient's mobility  - Assess patient's need for assistive devices and provide as appropriate  - Encourage maximum independence but intervene and supervise when necessary  - Involve family in performance of ADLs  - Assess for home care needs following discharge   - Consider OT consult to assist with ADL evaluation and planning for discharge  - Provide patient education as appropriate  Outcome: Progressing  Goal: Maintain proper alignment of affected body part  Description: INTERVENTIONS:  - Support, maintain and protect limb and body alignment  - Provide patient/ family with appropriate education  Outcome: Progressing     Problem: Neurological Deficit  Goal: Neurological status is stable or improving  Description: Interventions:  - Monitor and assess patient's level of consciousness, motor function, sensory function, and level of assistance needed for ADLs.   - Monitor and report changes from baseline. Collaborate with interdisciplinary team to initiate plan and implement interventions as ordered.   - Provide and maintain a safe environment.  - Consider seizure precautions.  - Consider fall precautions.  - Consider aspiration precautions.  - Consider bleeding precautions.  Outcome: Progressing     Problem: Activity Intolerance/Impaired Mobility  Goal: Mobility/activity is maintained at optimum level for patient  Description: Interventions:  - Assess and monitor patient  barriers to mobility and need for assistive/adaptive devices.  - Assess patient's emotional response to limitations.  - Collaborate with interdisciplinary team and initiate plans and interventions as ordered.  - Encourage independent activity per ability.  - Maintain proper body alignment.  - Perform active/passive rom as tolerated/ordered.  - Plan activities to conserve energy.  - Turn patient as appropriate  Outcome: Progressing      Problem: Potential for Aspiration  Goal: Non-ventilated patient's risk of aspiration is minimized  Description: Assess and monitor vital signs, respiratory status, and labs (WBC).  Monitor for signs of aspiration (tachypnea, cough, rales, wheezing, cyanosis, fever).    - Assess and monitor patient's ability to swallow.  - Place patient up in chair to eat if possible.  - HOB up at 90 degrees to eat if unable to get patient up into chair.  - Supervise patient during oral intake.   - Instruct patient/ family to take small bites.  - Instruct patient/ family to take small single sips when taking liquids.  - Follow patient-specific strategies generated by speech pathologist.  Outcome: Progressing     Problem: Nutrition  Goal: Nutrition/Hydration status is improving  Description: Monitor and assess patient's nutrition/hydration status for malnutrition (ex- brittle hair, bruises, dry skin, pale skin and conjunctiva, muscle wasting, smooth red tongue, and disorientation). Collaborate with interdisciplinary team and initiate plan and interventions as ordered.  Monitor patient's weight and dietary intake as ordered or per policy. Utilize nutrition screening tool and intervene per policy. Determine patient's food preferences and provide high-protein, high-caloric foods as appropriate.     - Assist patient with eating.  - Allow adequate time for meals.  - Encourage patient to take dietary supplement as ordered.  - Collaborate with clinical nutritionist.  - Include patient/family/caregiver in decisions related to nutrition.  Outcome: Progressing

## 2024-12-21 NOTE — PROGRESS NOTES
I have personally seen and examined patient and reviewed all data with resident. Agree with note, assessment and plan. Critical care time 51 minutes. Please refer to attending comments below. Critical care time does not include procedures, family meeting or teaching.     Cardiac arrest event likely respiratory arrest that progressed to cardiac arrest- VT with 1 shock  Myoclonic jerking vs seizure event  Acute hypoxic respiratory failure  Encephalopathy- anoxia vs seizure vs sedation  Shock - hypotension vasoplegia vs   Pulmonary edema status post cardiac arrest  Multilobar pneumonia vs atelectasis  PE  Right M1 occlusion status post thrombectomy and right carotid stenting with TICI 3 flow  Ischemic stroke with hemorrhagic conversion  Cerebral edema with brain compression and mass effect  Atrial fibrillation  NSTEMI  Hypertension  Hyperlipidemia  Transaminitis-post arrest likely secondary to hypoperfusion  Anemia  Leukocytosis  CYNTHIA      Exam:  Endotracheal tube in place, OG tube in place, patient's eyes are open, she does not appear to have corneal reflexes today, left eye is deviated upward and laterally, right eye with upward gaze, no cough or gag reflex appreciated on exam, no withdrawal to noxious stimuli.  Patient has moderate amount of secretions that are thick in consistency, coarse breath sounds bilateral, regular rate and rhythm.    Goal systolic blood pressure:  Normotensive with mean arterial pressure greater than 65  Norepinephrine 15 mcg/min  Vasopressin 0.04 units/min     Respiratory support:  Assist-control rate 12, , FiO2 50%, PEEP 8     I/O +1.5 L   mL  BM 3     Micro:  12/20/2024 bronchial culture-no organisms on Gram stain  12/19/2024 strep pneumonia urine antigen-negative  12/19/2024 sputum culture-pending  12/18/2024 blood culture-no growth  12/13/2024 sputum culture-Staph aureus  12/11/2024 culture right pleural fluid-negative  12/9/2024 MRSA culture-negative  12/9/2024 blood  culture-negative    Antimicrobials:  Zosyn 4.5 g IV every 8 hours initiated 12/19/2024  Vancomycin 750 mg IV daily-pharmacy consult    Cerebral edema interventions:  3% saline 30 mL/h- held  23% saline 12/20/2024  Serum osmolarity 340  Serum sodium 151    Normothermia interventions:  Arctic sun with goal temperature 37 degrees  Acetaminophen 650 mg every 6 hours    Stroke intervention:  Aspirin 81 mg daily  Lipitor 20 mg daily    Seizure interventions:  Keppra 500 mg IV every 12    Sedation interventions:  Precedex 0.2 mcg/kg/h    Pulmonary embolism intervention:  Heparin infusion no bolus stroke protocol    Family meeting occurred yesterday with nurse managers from the ICU as well as P7 and risk management to discuss events prior to patient's transfer back to the ICU.  Patient was administered 23% saline to achieve [place] goals.  Infusion was not initiated as patient was meeting her goals.  EEG reported to be difficult to read secondary to muscle artifact.  Consider dose of paralytic today and reassessment.  Patient also had bronchoscopy as she had an episode of low oxygen saturations when she had a decrease in her water temperature on her Arctic sun.  Likely patient was having slurring and clenched on her endotracheal tube resulting in hypoxia.  Patient did have some thick secretions that were aspirated and sent for culture.  She is on antibiotics.    Vital signs reviewed from overnight patient had tachycardia this morning.  She also had tachypnea.  Hemodynamics remained appropriate with mean arterial blood pressure goals.  No new imaging to review.  Laboratory data reviewed with elevated serum osmolarity after administration of 23% saline.  Sodium was 151.  Potassium 3.5.  Creatinine is 1.13.  Vancomycin level is 9.2.  PTT 51.  Hemoglobin 7.0 from yesterday.  Creatinine appears to be consistent with yesterday.  All blood glucoses less than 180.    Continue to monitor patient's neurologic exam.  Plan for MRI to  assess for any new stroke events that may have occurred with patient's cardiac arrest event.  She was administered 23% saline yesterday to achieve osmotic goals.  Currently on 3% saline infusion.  Will hold infusion to allow patient's osmotic goals to return to therapeutic parameters.  Goal serum osmolarity 310-320 and goal serum sodium 145-155.    Patient was monitored on video EEG.  She did have spike waves at 1 point earlier in monitoring.  Due to muscle artifact is difficult to assess if having underlying seizure events.  She has low amplitude EEG waveforms.  Will plan to admit dose paralytic today to assess for underlying seizure events.  Continue with Keppra.    Continue with ventilatory support.  Patient is not appropriate for spontaneous breathing trial.    Chest imaging as bilateral infiltrates concerning for aspiration event.  Patient is maintained currently on antibiotics.  Continue with antibiotic regimen.  Cultures to date have not been positive.  Patient was treated for a prior pulmonary infectious event earlier in her hospital stay.  She had a short course of antibiotics at that time.  Is unclear if patient had resolution of pulmonary infection at that time.  Continue with current antibiotic regimen.    On evaluation yesterday patient had norepinephrine requirements of 6 mcg/min on rounds.  She is escalated to 10 mcg/min.  This morning she is on 15 mcg/min.  In reviewing laboratory data hemoglobin was 7.0 yesterday.  Will reassess patient's hemoglobin today and consider possibility of transfusion.    Update 1232pm    Started on precedex overnight. Water temp  on arctic sun decreased.     HR 100s on rounds    NE 13mcg/ min    WBC elevated to 33.78    Replete potassium    3% saline not infusing    Water temp arctic sun 24 degrees    EEG artifact on EEG, plan for rocuronium 50mg x 1     Transfuse 1 rbc, will obtain consent from patient son.     If patient has subsequent decline in Hb consider CT abd/  pelvis    Add 3% saline q 6 hrs 4 ml inhaled. Bronch cx pending    Check venous duplex as presently on stroke protocol heparin with goal PTT 50-70 and having fevers with low water temp.    CYNTHIA likely hypoperfusion, plan for RBC. Monitor uo and BUN/Cr    Check UA and blood cultures with water temp decreasing and increased WBC    Update 6:24 PM    Patient was administered paralytic to assess EEG for possible seizure activity.  Patient had right frontal spike waves but resolution of temporal spike waves.  No seizure activity appreciated.  During the time of monitoring with patient had a paralytic she had a decline in her oxygen saturation which required ventilator adjustments.  Ventilator was adjusted to inspiratory pressure of 18 and a rate of 18 with FiO2 of 75% which is currently being weaned back to prior settings.

## 2024-12-22 NOTE — PLAN OF CARE
Problem: NEUROSENSORY - ADULT  Goal: Achieves stable or improved neurological status  Description: INTERVENTIONS  - Monitor and report changes in neurological status  - Monitor vital signs such as temperature, blood pressure, glucose, and any other labs ordered   - Initiate measures to prevent increased intracranial pressure  - Monitor for seizure activity and implement precautions if appropriate      Outcome: Not Progressing     Problem: NEUROSENSORY - ADULT  Goal: Achieves maximal functionality and self care  Description: INTERVENTIONS  - Monitor swallowing and airway patency with patient fatigue and changes in neurological status  - Encourage and assist patient to increase activity and self care.   - Encourage visually impaired, hearing impaired and aphasic patients to use assistive/communication devices  Outcome: Not Progressing

## 2024-12-22 NOTE — PLAN OF CARE
Problem: PAIN - ADULT  Goal: Verbalizes/displays adequate comfort level or baseline comfort level  Description: Interventions:  - Encourage patient to monitor pain and request assistance  - Assess pain using appropriate pain scale  - Administer analgesics based on type and severity of pain and evaluate response  - Implement non-pharmacological measures as appropriate and evaluate response  - Consider cultural and social influences on pain and pain management  - Notify physician/advanced practitioner if interventions unsuccessful or patient reports new pain  Outcome: Progressing     Problem: INFECTION - ADULT  Goal: Absence or prevention of progression during hospitalization  Description: INTERVENTIONS:  - Assess and monitor for signs and symptoms of infection  - Monitor lab/diagnostic results  - Monitor all insertion sites, i.e. indwelling lines, tubes, and drains  - Monitor endotracheal if appropriate and nasal secretions for changes in amount and color  - Justice appropriate cooling/warming therapies per order  - Administer medications as ordered  - Instruct and encourage patient and family to use good hand hygiene technique  - Identify and instruct in appropriate isolation precautions for identified infection/condition  Outcome: Progressing  Goal: Absence of fever/infection during neutropenic period  Description: INTERVENTIONS:  - Monitor WBC    Outcome: Progressing     Problem: Prexisting or High Potential for Compromised Skin Integrity  Goal: Skin integrity is maintained or improved  Description: INTERVENTIONS:  - Identify patients at risk for skin breakdown  - Assess and monitor skin integrity  - Assess and monitor nutrition and hydration status  - Monitor labs   - Assess for incontinence   - Turn and reposition patient  - Assist with mobility/ambulation  - Relieve pressure over bony prominences  - Avoid friction and shearing  - Provide appropriate hygiene as needed including keeping skin clean and dry  -  Evaluate need for skin moisturizer/barrier cream  - Collaborate with interdisciplinary team   - Patient/family teaching  - Consider wound care consult   Outcome: Progressing

## 2024-12-22 NOTE — PROGRESS NOTES
Bren Traylor is a 69 y.o. female who is currently ordered Vancomycin IV with management by the Pharmacy Consult service.  Relevant clinical data and objective / subjective history reviewed.  Vancomycin Assessment:  Indication and Goal AUC/Trough: Pneumonia (goal -600, trough >10), MRSA, -600, trough >10  Clinical Status:  critically ill   Micro:     Renal Function:  SCr: 0.81 mg/dL  CrCl: 53.1 mL/min  Renal replacement: Not on dialysis  Days of Therapy: 5  Current Dose: 750 mg IV daily PRN when random level is less than 15  Vancomycin Plan:  New Dosin mg IV daily PRN when random level is less than 15- 750mg iv x 1 dose scheduled to be given today for a level of 9.9  Estimated AUC: na mcg*hr/mL  Estimated Trough: na mcg/mL  Next Level: 24  Renal Function Monitoring: Daily BMP and UOP  Pharmacy will continue to follow closely for s/sx of nephrotoxicity, infusion reactions and appropriateness of therapy.  BMP and CBC will be ordered per protocol. We will continue to follow the patient’s culture results and clinical progress daily.    Saleem Hendrickson, Pharmacist

## 2024-12-22 NOTE — RESPIRATORY THERAPY NOTE
RT Ventilator Management Note  Bren Traylor 69 y.o. female MRN: [id number]  Unit/Bed#: ICU 11 Encounter: [id number]      Daily Screen         12/21/2024  0809 12/21/2024  6958          Patient safety screen outcome:: Failed Failed      Not Ready for Weaning due to:: Underline problem not resolved Underline problem not resolved                Physical Exam:   Assessment Type: (P) Assess only  General Appearance: (P) Sedated  Respiratory Pattern: (P) Assisted  Chest Assessment: (P) Chest expansion symmetrical  Bilateral Breath Sounds: (P) Diminished, Clear  O2 Device: vent      Resp Comments: (P) Pt has been stable overnight on current PC settings. No changes made at this time

## 2024-12-22 NOTE — PROGRESS NOTES
I have personally seen and examined patient and reviewed all data with resident. Agree with note, assessment and plan. Critical care time 76 minutes. Please refer to attending comments below. Critical care time does not include procedures, family meeting or teaching.      Cardiac arrest event likely respiratory arrest that progressed to cardiac arrest- VT with 1 shock  Myoclonic jerking vs seizure event  Acute hypoxic respiratory failure  Encephalopathy- anoxia vs seizure vs sedation  Shock - hypotension vasoplegia vs   Pulmonary edema status post cardiac arrest  Multilobar pneumonia vs atelectasis  PE  Right M1 occlusion status post thrombectomy and right carotid stenting with TICI 3 flow  Ischemic stroke with hemorrhagic conversion  Cerebral edema with brain compression and mass effect  Atrial fibrillation  NSTEMI  Hypertension  Hyperlipidemia  Transaminitis-post arrest likely secondary to hypoperfusion  Anemia  Leukocytosis  CYNTHIA   Trismus with biting tongue injury-oral care consider Dysport versus Botox injection  Dental carries  Hypokalemia-replete and recheck     Exam:  ETT in place, left eye gaze lateral and up no pupil response, right eye gaze up no pupil response, slight corneal reflex bilateral, no w/d to noxious stimuli in any extremity, with suctioning patient had rhythmic jerking of right le, with oral suctioning patient opened mouth in yawn like motion and grimaced. Same movement observed with every suction attempt. Tongue injury right lateral from biting with white patches , missing dentition and dental carries. Brown watery output in rectal tube.     Goal systolic blood pressure:  Normotensive with mean arterial pressure greater than 65  Norepinephrine-held  Vasopressin-held    Respiratory support:  Assist-control rate 12, , FiO2 50%, PEEP 8  Hypertonic saline 3% 4 mL inhaled every 6 hours     I/O +2.3 L   mL  BM rectal tube     Micro:  12/20/2024 bronchial culture-no organisms on Gram  stain  12/19/2024 strep pneumonia urine antigen-negative  12/19/2024 sputum culture-pending  12/18/2024 blood culture-no growth  12/13/2024 sputum culture-Staph aureus  12/11/2024 culture right pleural fluid-negative  12/9/2024 MRSA culture-negative  12/9/2024 blood culture-negative     Antimicrobials:  Zosyn 4.5 g IV every 8 hours initiated 12/19/2024  Vancomycin 750 mg IV daily-pharmacy consult     Cerebral edema interventions:  3% saline 30 mL/h- held  23% saline 12/20/2024  Serum osmolarity 338  Serum sodium 155     Normothermia interventions:  Arctic sun with goal temperature 37 degrees  Acetaminophen 650 mg every 6 hours     Stroke intervention:  Aspirin 81 mg daily  Lipitor 20 mg daily     Seizure interventions:  Keppra 500 mg IV every 12     Sedation interventions:  Precedex 0.2 mcg/kg/h     Pulmonary embolism intervention:  Heparin infusion no bolus stroke protocol  PTT 49      Transfused prbc 1 unit yesterday for low HB and tachycardia.  No other acute events overnight.  Vital signs reviewed overnight patient remained tachypneic and tachycardic.    Patient serum osmolarity has been therapeutic.      Update: 227pm    On exam patient had myoclonic jerking right le after suctioning. Ativan 2mg given and EMU contacted. Remained low amplitude. Cannot identify if seizure from deep focus vs myclonus.     MRI unable to be completed last night    Change neuro checks q 2 hrs    Check keppra level and consider reloading if subtherapeutic    Continue EEG    Repeat EKG and check troponin    Discontinue statin and Tylenol with LFT elevation    Check ammonia level     Bowel regimen discontinued yesterday for diarrhea    Add banana flakes    Check lipase, RUQ US, GGT, tylenol level    Check CPK    Replete potassium    Abx plan for 7 days    CTH and CT CAP with low HB requiring rbc    Discontinue arctic sun    Consider dust port versus Botox injection for tongue biting with injury/trismus.      Update 5:57 PM    EKG  reviewed and concern for ST elevation MI.  Cardiology was contacted.  Cardiology reviewed and had entertained the possibility of cardiac catheterization but considering patient's poor neurologic status as well as ICH a decision was made to continue with medical management.  Plan to add aspirin to patient's current regimen and increase heparin to ACS protocol no bolus.  Will initiate low-dose beta-blocker patient's blood pressure progress.    EEG update-diffusely extremely low background activity with bilateral frontal sharp waves independent. right leg twitching activity had no correlation on EEG but focal motor seizure cannot be excluded.  Keppra level is 19.8.  Will plan to increase dosing of Keppra with the possibility of underlying electrical activity that may have contributed to patient's leg movement.    CT chest abdomen pelvis completed with unchanged lateral segment right middle lobe consolidation with areas of bilateral groundglass opacity suspicious for pneumonia.  Residual left basilar consolidation now with associated mucous plugging.  Unchanged nondisplaced sternal and mildly displaced rib fractures.  No acute abdominal findings.    CT head with stable appearing 5.6 x 2.1 cm intraparenchymal hemorrhage with evidence of brain compression and mass effect with 8 mm shift.    Lipase is 71.  Acetaminophen level is less than 4.  Total CPK is 698.  Keppra level 19.8.  Ammonia level 33.  Serum osmolarity 338.    Update: 8:38 PM    Both patient's sons are at bedside around 7 PM and a conversation was had regarding events of today.  Both sons were updated as to all events.  They asked questions regarding patient's neurologic prognosis as well as to discuss MRI findings.  They were informed that there was multiple attempts to obtain MRI throughout the day as well as overnight last night but unfortunately MRI was not available until 945 tonight.  Plan is to obtain MRI and have further discussions regarding  neurologic prognostication.  Patient sons did ask my opinion in regards to neurologic recovery.  I did explain to them that it is now day 5 postarrest event and patient is not improving neurologically and had what was concerning for myoclonic movements today of her right lower extremity and that this was concerning that patient would not have any meaningful neurologic recovery.  At this point I would hope that she would have had small signs of neurologic improvement but she has not.  They expressed that they wanted to know if there was any hope for her to have some improvement.  I explained that I cannot say she will not have any neurologic improvement but more pressing lately need to consider what the patient would consider meaningful neurologic improvement and what she would consider is a good quality of life with neurologic restrictions.  They both stated they understood that she would not return to her previous baseline independent state but wanted more details regarding how much assistance she would need.  I explained that I could not give them a prediction of how she would do neurologically in the future but my concern and expectation is that she is not having any neurologic improvement 5 days postcardiac arrest is that she will not have a meaningful neurologic recovery where she will be able to perform her ADLs and she will need a nursing facility.  The conversation with the patient's sons lasted approximately 50 minutes, all of their questions were answered.  They would like an update tomorrow regarding MRI and then they would like to plan further discussions regarding next steps in the patient's care.

## 2024-12-22 NOTE — PROGRESS NOTES
Progress Note - Critical Care/ICU   Name: Bren Traylor 69 y.o. female I MRN: [id number]  Unit/Bed#: ICU 11 I Date of Admission: 12/6/2024   Date of Service: 12/22/2024 I Hospital Day: 15       Assessment & Plan   Neuro:   Diagnosis: R MCA M1 occlusion s/p thrombectomy TICI 3, R carotid stenting complicated with hemorrhagic conversion.  Plan: SBP goal 110-140  Aspirin 81mg daily and Hep gtt  Q1H neuro checks  Home meds: continue Sertraline 50mg daily, Keppra 500mg BID  Tylenol 650mg Q6Hrs  Of peroxidase, norepinephrine, vasopressin    CV:   Diagnosis: cardiac arrest  ROSC after 10mins ACLS protoccol. Unknown downtime  TTM rewarming 12/19 p.m.  HLD, HTN  Elevated troponin: Peaked on 12/19  Plan: Of norepinephrine and vasopressin    Pulm:  Diagnosis: Mechanical ventilation  Acute PE  Multilobar pneumonia  Pulmonary edema  Thick yellow secretion bilateral on [facility] 12/20  Plan: Heparin drip  Albuterol nebs  Respiratory protocol    GI:   Diagnosis: Diarrhea  Elevated transaminases/shock liver  Plan: DC bowel regimen  Trending upward, monitor      :   Diagnosis: Decreased urine output s/p cardiac arrest  Improving with Lasix  Urine output 1 L, net I/L: +2.3 L  Plan: Monitor    F/E/N:   Replete electrolytes as needed  Tube feeds: Goal 40 mL/h    Heme/Onc:   Diagnosis: Anemia  , Globin improving 9.2 (8.8)  Plan: monitor    Endo:   No active issues    ID:   Diagnosis: Multilobar pneumonia  12/21 sputum culture: Candida albicans, strep pneumonia urine culture: Negative  Plan: On Zosyn  Pending blood cultures, [facility] cultures    MSK/Skin:   Frequent turning    Tubes: sierra, ETT  Lines: R fem A line, L IJ CVC, PIV  Drains: none    Disposition: Critical care    ICU Core Measures     Vented Patient  VAP Bundle  VAP bundle ordered     A: Assess, Prevent, and Manage Pain Has pain been assessed? Yes  Need for changes to pain regimen? No   B: Both Spontaneous Awakening Trials (SATs) and Spontaneous Breathing Trials (SBTs)  Plan to perform spontaneous awakening trial today? No secondary to targeted temperature management  Plan to perform spontaneous breathing trial today? No secondary to targeted temperature management  Obvious barriers to extubation? Yes   C: Choice of Sedation RASS Goal: 0 Alert and Calm  Need for changes to sedation or analgesia regimen? NA   D: Delirium CAM-ICU: Negative   E: Early Mobility  Plan for early mobility? No   F: Family Engagement Plan for family engagement today? Yes       Antibiotic Review: Patient on appropriate coverage based on culture data.     Review of Invasive Devices:    Silvestre Plan: Continue for accurate I/O monitoring for 48 hours  Central access plan: Medications requiring central line Hemodynamic monitoring  [place] Plan: Keep arterial line for hemodynamic monitoring, frequent ABGs, and frequent labs    Prophylaxis:  VTE VTE covered by:  heparin (porcine), Intravenous, 13 Units/kg/hr at 12/22/24 0742       Stress Ulcer  not ordered         24 Hour Events :     WBC trending down.  Sodium at goal.    VEEG update: background remains extremely low voltage, there are frequent low amplitude right frontal sharps; strangely, around 6:30AM she started having independent low amplitude left frontal sharps. around 10PM last night the nurses pressed the event button to report right foot movements, more lateralized inversion/eversion movements. No electrographic seizures, did not look to be clonic jerking.   Subjective   Review of Systems: Review of Systems not obtainable due to Clinical Condition    Objective :                   Vitals I/O      Most Recent Min/Max in 24hrs   Temp 98.4 °F (36.9 °C) Temp  Min: 96.1 °F (35.6 °C)  Max: 99 °F (37.2 °C)   Pulse (!) 116 Pulse  Min: 84  Max: 118   Resp 18 Resp  Min: 0  Max: 33   /62 BP  Min: 117/62  Max: 140/61   O2 Sat 100 % SpO2  Min: 84 %  Max: 100 %      Intake/Output Summary (Last 24 hours) at 12/22/2024 0941  Last data filed at 12/22/2024 0800  Gross  per 24 hour   Intake 3204.7 ml   Output 1226 ml   Net 1978.7 ml       Diet Enteral/Parenteral; Tube Feeding No Oral Diet; Jevity 1.2 Navarro; Continuous; 40; Prosource Protein Liquid - One Packet; BID; 100; Water; Every 6 hours    Invasive Monitoring   Arterial Line  [place] /54  Arterial Line BP  Min: 106/44  Max: 164/76   MAP 80 mmHg  Arterial Line MAP (mmHg)  Min: 68 mmHg  Max: 108 mmHg     Neuro Invasive Monitoring   Most Recent  Min/Max in 24hrs    GCS 6  [place] Coma Scale Score  Min: 6  Max: 6    ICP     No data recorded    CPP (cuff)    No data recorded    CPP(tushar)    No data recorded   CVP   No data recorded           Physical Exam   Physical Exam  HENT:      Head: Normocephalic.      Mouth/Throat:      Mouth: Mucous membranes are moist.   Cardiovascular:      Rate and Rhythm: Tachycardia present.   Constitutional:       Appearance: She is ill-appearing.   Pulmonary:      Comments: intubated  Neurological:      Comments: Very limited neuro exam: no pupillary reflexes, corneal, cough/gag, extensor posturing with deep cough  No withdrawal.          Diagnostic Studies        Lab Results: I have reviewed the following results:     Medications:  Scheduled PRN   acetaminophen, 650 mg, Q6H  artificial tear, , HS  aspirin, 81 mg, Daily  atorvastatin, 20 mg, QPM  chlorhexidine, 15 mL, Q12H DANIS  levETIRAcetam, 500 mg, Q12H DANIS  piperacillin-tazobactam, 4.5 g, Q8H  [Held by provider] polyethylene glycol, 17 g, Daily  [Held by provider] senna-docusate sodium, 2 tablet, BID  sertraline, 50 mg, Daily  sodium chloride, 4 mL, Q6H  vancomycin, 750 mg, Once      albuterol, 2.5 mg, Q4H PRN  [Held by provider] bisacodyl, 10 mg, Daily PRN  vancomycin, 15 mg/kg, Daily PRN       Continuous    dexmedetomidine, 0.1-0.7 mcg/kg/hr, Last Rate: Stopped (12/21/24 2300)  heparin (porcine), 3-24 Units/kg/hr (Order-Specific), Last Rate: 13 Units/kg/hr (12/22/24 0742)  norepinephrine, 1-30 mcg/min, Last Rate: Stopped (12/21/24  1930)  vasopressin, 0.04 Units/min, Last Rate: Stopped (12/22/24 0201)         Labs:   CBC    Recent Labs     12/21/24  1849 12/22/24  0605   WBC 24.75* 23.49*   HGB 8.8* 9.2*   HCT 27.5* 29.0*    210     BMP    Recent Labs     12/22/24  0042 12/22/24  0605   SODIUM 153* 155*   K 3.4* 3.7   * 124*   CO2 23 23   AGAP 9 8   BUN 51* 45*   CREATININE 0.86 0.81   CALCIUM 8.3* 8.7       Coags    Recent Labs     12/21/24  0337 12/22/24  0605   PTT 51* 45*        Additional Electrolytes  Recent Labs     12/21/24  1849 12/22/24  0605   MG 2.1 2.1   PHOS 3.3 1.8*   CAIONIZED 1.15 1.15          Blood Gas    Recent Labs     12/21/24  0518   PHART 7.448   LJE2BNV 32.6*   PO2ART 143.9*   HDW0YQZ 22.0   BEART -1.7   SOURCE Line, Arterial     Recent Labs     12/20/24  1149 12/20/24  1343 12/21/24  0518   PHVEN 7.448*  --   --    DSX2RSE 40.7*  --   --    PO2VEN 36.0  --   --    ZLF1IRP 27.5  --   --    BEVEN 3.2  --   --    H4ETVCA 63.5  --   --    SOURCE  --    < > Line, Arterial    < > = values in this interval not displayed.    LFTs  Recent Labs     12/22/24  0605   *   *   ALKPHOS 113*   ALB 2.8*   TBILI 0.34       Infectious  No recent results  Glucose  Recent Labs     12/21/24  0910 12/21/24  1849 12/22/24  0042 12/22/24  0605   GLUC 223* 178* 222* 106

## 2024-12-22 NOTE — QUICK NOTE
69F, history of hypertension and hyperlipidemia, initially presented 12/6 with concerns for stroke, found to have a right M1 occlusion, now comatose and unresponsive but remains level 1 full code.     Course has been complicated by pulmonary embolism, hemorrhagic conversion of her ischemic stroke, cardiac arrest with VT and subsequent ROSC s/p 1 shock, thought to be PEA. Unfortunately poor neurological prognosis I am told.    Cardiology was informed at around 3pm of EKG changes. EKGs done today 6AM show ST depressions in V4/5 and elevations in III and borderline AVF. EKG done 3PM is similar.     At beside, she's intubated, non responsive.  She is on aspirin 81 mg daily and tolerated ischemic stroke heparin for the last 4 days.    I did discussion with primary team and interventional cardiology.  Primary team does not believe DAPT and heparin combined are safe in this patient with hemorrhagic conversion.  They are okay with aspirin and to increase heparin drip to therapeutic.  Interventional is recommending medical management at this time given that her comorbidities preclude DAPT with heparin therapy. Okay with checking troponin and trending.

## 2024-12-22 NOTE — RESPIRATORY THERAPY NOTE
RT Ventilator Management Note  Bren Traylor 69 y.o. female MRN: [id number]  Unit/Bed#: ICU 11 Encounter: [id number]      Daily Screen         12/21/2024  0809 12/21/2024  2945          Patient safety screen outcome:: Failed Failed      Not Ready for Weaning due to:: Underline problem not resolved Underline problem not resolved                Physical Exam:   Assessment Type: Pre-treatment  General Appearance: Sedated  Respiratory Pattern: Assisted  Chest Assessment: Chest expansion symmetrical  Bilateral Breath Sounds: Diminished, Coarse  Suction: ET Tube      Resp Comments: Pt recieved on PCV vent settings as documented.  Tollerating well @ this time, udn given, cpt via the bed done.

## 2024-12-23 PROBLEM — Z71.89 GOALS OF CARE, COUNSELING/DISCUSSION: Status: ACTIVE | Noted: 2024-12-23

## 2024-12-23 PROBLEM — Z51.5 PALLIATIVE CARE ENCOUNTER: Status: ACTIVE | Noted: 2024-12-23

## 2024-12-23 NOTE — PLAN OF CARE
Problem: PAIN - ADULT  Goal: Verbalizes/displays adequate comfort level or baseline comfort level  Description: Interventions:  - Encourage patient to monitor pain and request assistance  - Assess pain using appropriate pain scale  - Administer analgesics based on type and severity of pain and evaluate response  - Implement non-pharmacological measures as appropriate and evaluate response  - Consider cultural and social influences on pain and pain management  - Notify physician/advanced practitioner if interventions unsuccessful or patient reports new pain  Outcome: Progressing     Problem: INFECTION - ADULT  Goal: Absence or prevention of progression during hospitalization  Description: INTERVENTIONS:  - Assess and monitor for signs and symptoms of infection  - Monitor lab/diagnostic results  - Monitor all insertion sites, i.e. indwelling lines, tubes, and drains  - Monitor endotracheal if appropriate and nasal secretions for changes in amount and color  - [place] appropriate cooling/warming therapies per order  - Administer medications as ordered  - Instruct and encourage patient and family to use good hand hygiene technique  - Identify and instruct in appropriate isolation precautions for identified infection/condition  Outcome: Progressing  Goal: Absence of fever/infection during neutropenic period  Description: INTERVENTIONS:  - Monitor WBC    Outcome: Progressing     Problem: SAFETY ADULT  Goal: Patient will remain free of falls  Description: INTERVENTIONS:  - Educate patient/family on patient safety including physical limitations  - Instruct patient to call for assistance with activity   - Consult OT/PT to assist with strengthening/mobility   - Keep Call bell within reach  - Keep bed low and locked with side rails adjusted as appropriate  - Keep care items and personal belongings within reach  - Initiate and maintain comfort rounds  - Make Fall Risk Sign visible to staff  - Offer Toileting every 2 Hours,  in advance of need  - Initiate/Maintain bed alarm  - Obtain necessary fall risk management equipment: non skid footwear  - Apply yellow socks and bracelet for high fall risk patients  - Consider moving patient to room near nurses station  Outcome: Progressing  Goal: Maintain or return to baseline ADL function  Description: INTERVENTIONS:  -  Assess patient's ability to carry out ADLs; assess patient's baseline for ADL function and identify physical deficits which impact ability to perform ADLs (bathing, care of mouth/teeth, toileting, grooming, dressing, etc.)  - Assess/evaluate cause of self-care deficits   - Assess range of motion  - Assess patient's mobility; develop plan if impaired  - Assess patient's need for assistive devices and provide as appropriate  - Encourage maximum independence but intervene and supervise when necessary  - Involve family in performance of ADLs  - Assess for home care needs following discharge   - Consider OT consult to assist with ADL evaluation and planning for discharge  - Provide patient education as appropriate  Outcome: Progressing  Goal: Maintains/Returns to pre admission functional level  Description: INTERVENTIONS:  - Perform AM-PAC 6 Click Basic Mobility/ Daily Activity assessment daily.  - Set and communicate daily mobility goal to care team and patient/family/caregiver.   - Collaborate with rehabilitation services on mobility goals if consulted  - Perform Range of Motion 3 times a day.  - Reposition patient every 2 hours.  - Dangle patient 3 times a day  - Record patient progress and toleration of activity level   Outcome: Progressing     Problem: DISCHARGE PLANNING  Goal: Discharge to home or other facility with appropriate resources  Description: INTERVENTIONS:  - Identify barriers to discharge w/patient and caregiver  - Arrange for needed discharge resources and transportation as appropriate  - Identify discharge learning needs (meds, wound care, etc.)  - Arrange for  interpretive services to assist at discharge as needed  - Refer to Case Management Department for coordinating discharge planning if the patient needs post-hospital services based on physician/advanced practitioner order or complex needs related to functional status, cognitive ability, or social support system  Outcome: Progressing     Problem: Knowledge Deficit  Goal: Patient/family/caregiver demonstrates understanding of disease process, treatment plan, medications, and discharge instructions  Description: Complete learning assessment and assess knowledge base.  Interventions:  - Provide teaching at level of understanding  - Provide teaching via preferred learning methods  Outcome: Progressing     Problem: Prexisting or High Potential for Compromised Skin Integrity  Goal: Skin integrity is maintained or improved  Description: INTERVENTIONS:  - Identify patients at risk for skin breakdown  - Assess and monitor skin integrity  - Assess and monitor nutrition and hydration status  - Monitor labs   - Assess for incontinence   - Turn and reposition patient  - Assist with mobility/ambulation  - Relieve pressure over bony prominences  - Avoid friction and shearing  - Provide appropriate hygiene as needed including keeping skin clean and dry  - Evaluate need for skin moisturizer/barrier cream  - Collaborate with interdisciplinary team   - Patient/family teaching  - Consider wound care consult   Outcome: Progressing     Problem: Potential for Falls  Goal: Patient will remain free of falls  Description: INTERVENTIONS:  - Educate patient/family on patient safety including physical limitations  - Instruct patient to call for assistance with activity   - Consult OT/PT to assist with strengthening/mobility   - Keep Call bell within reach  - Keep bed low and locked with side rails adjusted as appropriate  - Keep care items and personal belongings within reach  - Initiate and maintain comfort rounds  - Make Fall Risk Sign visible  to staff  - Offer Toileting every 2 Hours, in advance of need  - Initiate/Maintain bed alarm  - Obtain necessary fall risk management equipment: non skid footwear  - Apply yellow socks and bracelet for high fall risk patients  - Consider moving patient to room near nurses station  Outcome: Progressing     Problem: COPING  Goal: Pt/Family able to verbalize concerns and demonstrate effective coping strategies  Description: INTERVENTIONS:  - Assist patient/family to identify coping skills, available support systems and cultural and spiritual values  - Provide emotional support, including active listening and acknowledgement of concerns of patient and caregivers  - Reduce environmental stimuli, as able  - Provide patient education  - Assess for spiritual pain/suffering and initiate spiritual care, including notification of Pastoral Care or april based community as needed  - Assess effectiveness of coping strategies  Outcome: Progressing  Goal: Will report anxiety at manageable levels  Description: INTERVENTIONS:  - Administer medication as ordered  - Teach and encourage coping skills  - Provide emotional support  - Assess patient/family for anxiety and ability to cope  Outcome: Progressing     Problem: Nutrition/Hydration-ADULT  Goal: Nutrient/Hydration intake appropriate for improving, restoring or maintaining nutritional needs  Description: Monitor and assess patient's nutrition/hydration status for malnutrition. Collaborate with interdisciplinary team and initiate plan and interventions as ordered.  Monitor patient's weight and dietary intake as ordered or per policy. Utilize nutrition screening tool and intervene as necessary. Determine patient's food preferences and provide high-protein, high-caloric foods as appropriate.     INTERVENTIONS:  - Monitor oral intake, urinary output, labs, and treatment plans  - Assess nutrition and hydration status and recommend course of action  - Evaluate amount of meals eaten  -  Assist patient with eating if necessary   - Allow adequate time for meals  - Recommend/ encourage appropriate diets, oral nutritional supplements, and vitamin/mineral supplements  - Order, calculate, and assess calorie counts as needed  - Recommend, monitor, and adjust tube feedings and TPN/PPN based on assessed needs  - Assess need for intravenous fluids  - Provide specific nutrition/hydration education as appropriate  - Include patient/family/caregiver in decisions related to nutrition  Outcome: Progressing     Problem: NEUROSENSORY - ADULT  Goal: Achieves stable or improved neurological status  Description: INTERVENTIONS  - Monitor and report changes in neurological status  - Monitor vital signs such as temperature, blood pressure, glucose, and any other labs ordered   - Initiate measures to prevent increased intracranial pressure  - Monitor for seizure activity and implement precautions if appropriate      Outcome: Progressing  Goal: Achieves maximal functionality and self care  Description: INTERVENTIONS  - Monitor swallowing and airway patency with patient fatigue and changes in neurological status  - Encourage and assist patient to increase activity and self care.   - Encourage visually impaired, hearing impaired and aphasic patients to use assistive/communication devices  Outcome: Progressing     Problem: CARDIOVASCULAR - ADULT  Goal: Maintains optimal cardiac output and hemodynamic stability  Description: INTERVENTIONS:  - Monitor I/O, vital signs and rhythm  - Monitor for S/S and trends of decreased cardiac output  - Administer and titrate ordered vasoactive medications to optimize hemodynamic stability  - Assess quality of pulses, skin color and temperature  - Assess for signs of decreased coronary artery perfusion  - Instruct patient to report change in severity of symptoms  Outcome: Progressing  Goal: Absence of cardiac dysrhythmias or at baseline rhythm  Description: INTERVENTIONS:  - Continuous  cardiac monitoring, vital signs, obtain 12 lead EKG if ordered  - Administer antiarrhythmic and heart rate control medications as ordered  - Monitor electrolytes and administer replacement therapy as ordered  Outcome: Progressing     Problem: RESPIRATORY - ADULT  Goal: Achieves optimal ventilation and oxygenation  Description: INTERVENTIONS:  - Assess for changes in respiratory status  - Assess for changes in mentation and behavior  - Position to facilitate oxygenation and minimize respiratory effort  - Oxygen administered by appropriate delivery if ordered  - Initiate smoking cessation education as indicated  - Encourage broncho-pulmonary hygiene including cough, deep breathe, Incentive Spirometry  - Assess the need for suctioning and aspirate as needed  - Assess and instruct to report SOB or any respiratory difficulty  - Respiratory Therapy support as indicated  Outcome: Progressing     Problem: GASTROINTESTINAL - ADULT  Goal: Maintains or returns to baseline bowel function  Description: INTERVENTIONS:  - Assess bowel function  - Encourage oral fluids to ensure adequate hydration  - Administer IV fluids if ordered to ensure adequate hydration  - Administer ordered medications as needed  - Encourage mobilization and activity  - Consider nutritional services referral to assist patient with adequate nutrition and appropriate food choices  Outcome: Progressing  Goal: Maintains adequate nutritional intake  Description: INTERVENTIONS:  - Monitor percentage of each meal consumed  - Identify factors contributing to decreased intake, treat as appropriate  - Assist with meals as needed  - Monitor I&O, weight, and lab values if indicated  - Obtain nutrition services referral as needed  Outcome: Progressing     Problem: GENITOURINARY - ADULT  Goal: Maintains or returns to baseline urinary function  Description: INTERVENTIONS:  - Assess urinary function  - Encourage oral fluids to ensure adequate hydration if ordered  -  Administer IV fluids as ordered to ensure adequate hydration  - Administer ordered medications as needed  - Offer frequent toileting  - Follow urinary retention protocol if ordered  Outcome: Progressing  Goal: Absence of urinary retention  Description: INTERVENTIONS:  - Assess patient's ability to void and empty bladder  - Monitor I/O  - Bladder scan as needed  - Discuss with physician/AP medications to alleviate retention as needed  - Discuss catheterization for long term situations as appropriate  Outcome: Progressing

## 2024-12-23 NOTE — PLAN OF CARE
Problem: PAIN - ADULT  Goal: Verbalizes/displays adequate comfort level or baseline comfort level  Description: Interventions:  - Encourage patient to monitor pain and request assistance  - Assess pain using appropriate pain scale  - Administer analgesics based on type and severity of pain and evaluate response  - Implement non-pharmacological measures as appropriate and evaluate response  - Consider cultural and social influences on pain and pain management  - Notify physician/advanced practitioner if interventions unsuccessful or patient reports new pain  Outcome: Progressing     Problem: INFECTION - ADULT  Goal: Absence or prevention of progression during hospitalization  Description: INTERVENTIONS:  - Assess and monitor for signs and symptoms of infection  - Monitor lab/diagnostic results  - Monitor all insertion sites, i.e. indwelling lines, tubes, and drains  - Monitor endotracheal if appropriate and nasal secretions for changes in amount and color  - [place] appropriate cooling/warming therapies per order  - Administer medications as ordered  - Instruct and encourage patient and family to use good hand hygiene technique  - Identify and instruct in appropriate isolation precautions for identified infection/condition  Outcome: Progressing

## 2024-12-23 NOTE — ASSESSMENT & PLAN NOTE
Unclear etiology.  Unfortunately, patient was not on telemetry at time of cardiac arrest.  Did require defibrillation during code.  Further interrogation of [place] showed that the patient bradycardia down due to hypoxia.  Patient also had PEA arrest and VT arrest during CODE BLUE.  VT was not the cause of initial cardiac arrest.    No significant change on postarrest echocardiogram.    Limited with ACS treatment due to bleeding concerns.   Can resume statin once shock liver resolves  Agree with resuming aspirin  Once able to, can restart beta-blocker  Continue telemetry  On heparin drip for anticoagulation

## 2024-12-23 NOTE — CONSULTS
Vancomycin IV Pharmacy-to-Dose Consultation    Bren Traylor is a 69 y.o. female who was receiving Vancomycin IV with management by the Pharmacy Consult service for treatment of Pneumonia (goal -600, trough >10).    The patient’s Vancomycin therapy will be completed through tomorrow with no further level monitoring needed. Thank you for allowing us to take part in this patient's care. Pharmacy will sign-off now; please call or re-consult if there are any questions.      Kaela Mendez, PharmD, [facility]  Critical Care Clinical Pharmacist  [phone]

## 2024-12-23 NOTE — ASSESSMENT & PLAN NOTE
12/18/2024 likely in the setting of respiratory arrest, progressed to V. Tach  S/p targeted temperature management  VEEG with low amplitude waveforms, unclear if underlying seizures given myoclonus  Repeat MRI and CT brain with evidence of diffuse hypoxic ischemia  Neuroexam remains poor, left upward gaze preference, sluggish pupillary responses, no withdraw to noxious stimuli in bilateral upper and lower extremities  Ongoing management per critical care team

## 2024-12-23 NOTE — PHYSICAL THERAPY NOTE
Physical Therapy Cancellation Note    PT orders received chart review completed. Pt is currently intubated/sedated and not appropriate to participate in skilled PT at this time. PT will follow and re-eval as medically appropriate.     12/23/24 0979   Note Type   Note type Cancelled Session   Cancel Reasons Intubated/sedated       Nori Denton, PT

## 2024-12-23 NOTE — ASSESSMENT & PLAN NOTE
Goals:  Level 1 code status  Disease focused care without limits placed  Introduced palliative care services  Family has requested formal goc not be addressed until after the holiday  Discussed that critical care may need to address sooner if she deteriorates, they are understanding  Will continue discussions regarding GOC as patient's clinical presentation evolves.    Decisional apparatus:  Patient does not have capacity on exam today.  If capacity is lost, patient's substitute decision maker would default to adult children by [place] Act 169.  ER contacts:    Bialasiewicz,Severin (Son)  [phone] (Mobile)     Advance Directive/Living Will/POLST: None on file

## 2024-12-23 NOTE — ASSESSMENT & PLAN NOTE
Currently in normal sinus rhythm  Continue with heparin drip, can transition to oral AC when able to.

## 2024-12-23 NOTE — RESPIRATORY THERAPY NOTE
RT Ventilator Management Note  Bren Traylor 69 y.o. female MRN: [id number]  Unit/Bed#: ICU 11 Encounter: [id number]      Daily Screen         12/21/2024  1357 12/22/2024  0813          Patient safety screen outcome:: Failed Failed      Not Ready for Weaning due to:: Underline problem not resolved Underline problem not resolved                Physical Exam:   Assessment Type: Assess only  General Appearance: Unresponsive  Respiratory Pattern: Assisted  Chest Assessment: Chest expansion symmetrical  Bilateral Breath Sounds: Diminished, Coarse  R Breath Sounds: Coarse, Diminished  L Breath Sounds: Diminished, Coarse  Cough: None  O2 Device: vent      Resp Comments: Pt has been stable on current PC settings. No changes made overnight. Will cont to monitor pt per protocol

## 2024-12-23 NOTE — PROGRESS NOTES
Progress Note - Critical Care/ICU   Name: Bren Traylor 69 y.o. female I MRN: [id number]  Unit/Bed#: ICU 11 I Date of Admission: 12/6/2024   Date of Service: 12/23/2024 I Hospital Day: 16       Assessment & Plan   Neuro:   Right MCA M1 occlusion s/p thrombectomy TICI 3, R carotid stenting c/b hemorrhagic conversion  Generalized myoclonus like jerking/twitching  SBP goal 110-140  Neurochecks q1  No clinical improvement after 12/20 hypertonic saline 23% bolus  24 hours x R foot jerking w new EEG findings as below  Home meds  Hold Provigil 100 mg daily,   Continue sertraline 50mg  Continue keppra 1000mg BID  Studies  S/p cardiac arrest w likely anoxic brain injury  12/18 CT head - Diminished size of the known large right basal ganglia parenchymal hemorrhage. Increased edema and mass effect. Increased midline shift. No hydrocephalus.   12/20 CT head: Similar size of R basal ganglia IPH, similar mass effect w 8mm shift. Global edema/ loss of architecture.  12/21: EEG monitoring with significant muscle artifact. When paralyzed showing some temporal spikes correlating with stroke region.  12/22 MRI brain: new bilateral cerebellar infarctions, new diffusion signal changes along superior frontal and parietal lobes which could be due to ischemia/hypoxic ischemic injury or braydon-ictal changes. No new hemorrhage--stable R basal ganglia hematoma.  12/22-23 EEG: intermittent subtle EMG artifact with a left frontotemporal sharp wave which may be subtle myoclonic focal seizure discharges. Similar, but less dramatic than what was seen around 0850. This subtle pattern has been on and off since 2-3 AM this morning.   12/23 CTH once heparin gtt at goal: stable    Sedation  Propofol gtt remains off  Precedex gtt currently at 0.2  Demand Ativan doses for RLE twitching  Plan: Again no gag or corneal reflexes. For the last 24 hours has been having right leg twitching concerning for seizure, response to Ativan. MRI overnight  demonstrates BL cerebellar infarcts and additional frontal/parietal areas of likely ischemia. Extensive GOC held with sons given information from MRI, palliative also consulted--they are not ready to make a decision at this time.     CV:   Cardiac arrest   ROSC achieved after 10mins of ACLS protocol  Unknown downtime   TTM, rewarming  as of12/19 PM  Central fevers  Bath temp low, require ice, tylenol IV  Cardiology on board  Echo 12/10 EF 50-55%.  Hypokinetic inferior segments.  (Please refer to report for details)  Repeat ECHO 12/18 EF 55%.  Systolic function normal.  LV size, wall thickness normal.  Mildly abnormal diastolic function.  Basal inferior, basal anterolateral, mid inferior, mid anterolateral hypokinesis.  Basal inferolateral and mid inferolateral dyskinesia.  RV size, function, wall thickness normal.  Elevated troponins, 12/22 NSTEMI, hx HTN, HLD  Troponin up to 9740 yesterday   Meds  Heparin gtt, ACS protocol  ASA 81  atorvastatin 20 mg daily  Plan: Off of pressors after getting blood 2 days ago. Will continue to trend troponins. Heparin adjusted ACS protocol, repeat CT head at therapeutic goal. Stable. Continues to maintain blood pressures off pressors.    Pulm:  Mechanical Ventilation  PC 20/18/8/30%   Pulmonary edema  Thick yellow secretions BL on bronch 12/20  40 lasix IV given 12/20 at midnight and also 12/18  Multi lobar PNA  Has completed course of antibiotics as per below  Acute PE  Meds  Hep gtt now ACS protocol  Albuterol nebs  Plan: Not appropriate for SBT this time. Intermittently becoming tachypneic on the vent and responsive to fentanyl.     GI:   Diarrhea  Bowel regimen has been off for 48 hours, continues to have diarrhea, managed with Actal tube  Elevated transaminases/shock liver   from 419,  from 705  T. bili normal at 0.35  Plan: Continue to monitor, CMP tomorrow morning. Likely shock liver physiology improving. Added banana flakes for diarrhea, if continues will  proceed with C. difficile testing possibly tomorrow.     :   Decreased urinary output post cardiac arrest  Improved w lasix-- given 12/18 and 12/20 40mg lasix IV both times   Currently making about 40 cc urine per hour. Continue Sierra.  F/E/N:   No MIVF  Replete electrolytes as needed  Tube feeds to goal of 40     Heme/Onc:   Anemia  Hb 7.0 today and yesterday. Could be hemodilutional vs ABLA in setting of hep gtt anticoagulation. Give 1u pRBC today, recheck after finished. If continuing to drop c/f active bleeding and would repeat CT imaging.     Endo:   Glucose has been stable, no insulin     ID:   Multi lobar pna  Meds  Vancomycin and Zosyn Day 6  Studies  12/13 sputum cx: 4+ MSSA  12/18 Bcx NG x 72h  12/18 MRSA pending   12/19 sputum cx: few coloines candida albicans  12/19 urine strep antigen neg  Previously tx MSSA PNA. Now has completed 6 days of empiric vanc/zosyn, decreasing white count of 18.7 from 23.5. Remains intermittently febrile with Tmax one 1.5 yesterday. Have discontinued temperature management and current goals normothermia. Have been reducing Tylenol use given abnormal LFTs, however Tylenol level yesterday 4.       MSK/Skin:   Frequent turning     Tubes: sierra, ETT  Lines: R fem A line, L IJ CVC, PIV  Drains: none    Disposition: Critical care    ICU Core Measures     Vented Patient  VAP Bundle  VAP bundle ordered     A: Assess, Prevent, and Manage Pain Has pain been assessed? NA  Need for changes to pain regimen? NA   B: Both Spontaneous Awakening Trials (SATs) and Spontaneous Breathing Trials (SBTs) Plan to perform spontaneous awakening trial today? No secondary to targeted temperature management  Plan to perform spontaneous breathing trial today? No secondary to targeted temperature management  Obvious barriers to extubation? Yes   C: Choice of Sedation RASS Goal: -3 Moderate Sedation or 0 Alert and Calm  Need for changes to sedation or analgesia regimen? NA   D: Delirium CAM-ICU: Negative    E: Early Mobility  Plan for early mobility? No   F: Family Engagement Plan for family engagement today? Yes       Antibiotic Review: Continue broad spectrum secondary to severity of illness.     Review of Invasive Devices:    [place] Plan: Continue for accurate I/O monitoring for 48 hours  Central access plan: Medications requiring central line Hemodynamic monitoring  [place] Plan: Keep arterial line for hemodynamic monitoring    Prophylaxis:  VTE Contraindicated secondary to: Cardiac arrest, TTM   Stress Ulcer  not ordered         24 Hour Events :    Increasing pressor requirement again  Bath temps lower to meet normothermia goal    Subjective   Review of Systems: Review of Systems not obtainable due to Clinical Condition, Altered mental status    Objective :                   Vitals I/O      Most Recent Min/Max in 24hrs   Temp 100.2 °F (37.9 °C) Temp  Min: 99.6 °F (37.6 °C)  Max: 101.5 °F (38.6 °C)   Pulse 102 Pulse  Min: 98  Max: 120   Resp 22 Resp  Min: 17  Max: 28   /53 BP  Min: 100/53  Max: 105/53   O2 Sat 100 % SpO2  Min: 94 %  Max: 100 %      Intake/Output Summary (Last 24 hours) at 12/23/2024 1900  Last data filed at 12/23/2024 1800  Gross per 24 hour   Intake 1430.65 ml   Output 810 ml   Net 620.65 ml       Diet Enteral/Parenteral; Tube Feeding No Oral Diet; Jevity 1.2 Navarro; Continuous; 40; Prosource Protein Liquid - One Packet; BID; Banatrol Plus Banana Flakes - One Packet; 100; Water; Every 6 hours    Invasive Monitoring   Arterial Line  Qi /52  Arterial Line BP  Min: 112/50  Max: 144/56   MAP 74 mmHg  Arterial Line MAP (mmHg)  Min: 70 mmHg  Max: 86 mmHg           Physical Exam   Physical Exam  Vitals and nursing note reviewed.   Eyes:      Pupils: Pupils are equal.      Comments: Pupils brisk, reactive, 3 cm   Skin:     General: Skin is warm and dry.   HENT:      Head: Normocephalic and atraumatic. No right periorbital erythema or left periorbital erythema.      Mouth/Throat:      Mouth:  Mucous membranes are moist.   Cardiovascular:      Rate and Rhythm: Normal rate and regular rhythm.      Pulses: No decreased pulses.           Carotid pulses are 2+ on the right side and 2+ on the left side.       Posterior tibial pulses are 2+ on the right side and 2+ on the left side.      Heart sounds: Normal heart sounds.   Musculoskeletal:      Right lower leg: No edema.      Left lower leg: No edema.   Abdominal:      Palpations: Abdomen is soft.   Constitutional:       General: She is in acute distress.      Appearance: She is ill-appearing.      Interventions: She is sedated and intubated.   Pulmonary:      Effort: Tachypnea present. She is intubated.      Breath sounds: No stridor or decreased air movement. No decreased breath sounds, wheezing, rhonchi or rales.      Comments: On ventilator  Neurological:      Mental Status: She is unresponsive.      GCS: GCS eye subscore is 1. GCS verbal subscore is 1. GCS motor subscore is 1.        Corneal reflex present, cough reflex and gag reflex not intact.      Comments: Intermittent jerking c/w myoclonus, intact cough, corneals, no gag  GCS 3T, no grimace or posturing in response to pain  2mm minimally reactive pupils          Diagnostic Studies        Lab Results: I have reviewed the following results:     Medications:  Scheduled PRN   artificial tear, , HS  aspirin, 81 mg, Daily  chlorhexidine, 15 mL, Q12H DANIS  levETIRAcetam, 1,000 mg, Q12H DANIS  piperacillin-tazobactam, 4.5 g, Q8H  [Held by provider] polyethylene glycol, 17 g, Daily  [Held by provider] senna-docusate sodium, 2 tablet, BID  sertraline, 50 mg, Daily  sodium chloride, 4 mL, Q6H  vancomycin, 1,250 mg, Q24H      albuterol, 2.5 mg, Q4H PRN       Continuous    heparin (porcine), 3-20 Units/kg/hr (Order-Specific), Last Rate: 19 Units/kg/hr (12/23/24 1520)         Labs:   CBC    Recent Labs     12/22/24  0605 12/23/24  0553   WBC 23.49* 18.66*   HGB 9.2* 8.3*   HCT 29.0* 26.5*    194      BMP    Recent Labs     12/22/24  2326 12/23/24  0553   SODIUM 155* 156*   K 4.3 4.0   * 125*   CO2 22 24   AGAP 7 7   BUN 35* 32*   CREATININE 0.77 0.76   CALCIUM 8.2* 8.1*       Coags    Recent Labs     12/22/24  1707 12/22/24  2326 12/23/24  0830 12/23/24  1502   INR 1.20*  --   --   --    PTT 49*   < > 83* 72*    < > = values in this interval not displayed.        Additional Electrolytes  Recent Labs     12/22/24  0605 12/23/24  0553   MG 2.1 2.1   PHOS 1.8* 2.3   CAIONIZED 1.15  --           Blood Gas    Recent Labs     12/23/24  0958   PHART 7.434   [id number] 34.0*   PO2ART 135.5*   CLK6QNB 22.3   BEART -1.6   SOURCE Line, Arterial       Recent Labs     12/23/24  0958   SOURCE Line, Arterial      LFTs  Recent Labs     12/22/24  0605 12/23/24  0553   * 511*   * 232*   ALKPHOS 113* 93   ALB 2.8* 2.6*   TBILI 0.34 0.35       Infectious  No recent results    Glucose  Recent Labs     12/22/24  1141 12/22/24  1740 12/22/24 2326 12/23/24  0553   GLUC 150* 121 126 144*

## 2024-12-23 NOTE — CONSULTS
Consultation - Palliative and Supportive Care   Bren Traylor 69 y.o. female [id number]    Palliative care encounter  Assessment & Plan  Palliative diagnosis: CVA    Social support:  Supportive listening provided  Normalized experience of patient/family  Provided anticipatory guidance  Spiritual cares: Adventism  Advocated for patient/family with interdisciplinary care team    Coordination of care:  Reviewed case with RN, critical care    Follow up  Palliative Care will continue to follow and goals of care discussions will be ongoing.   Please reach out via [employer] secure chat if questions or concerns arise.    We appreciate the invitation to be involved in this patient's care. Please do not hesitate to reach our on call provider through our clinic answering service at [phone] should you have acute symptom control concerns.    Goals of care, counseling/discussion  Assessment & Plan  Goals:  Level 1 code status  Disease focused care without limits placed  Introduced palliative care services  Family has requested formal goc not be addressed until after the holiday  Discussed that critical care may need to address sooner if she deteriorates, they are understanding  Will continue discussions regarding GOC as patient's clinical presentation evolves.    Decisional apparatus:  Patient does not have capacity on exam today.  If capacity is lost, patient's substitute decision maker would default to adult children by [place] Act 169.  ER contacts:    Bialasiewicz,Severin (Son)  [phone] (Mobile)     Advance Directive/Living Will/POLST: None on file      Cardiac arrest (HCC)  Assessment & Plan  12/18/2024 likely in the setting of respiratory arrest, progressed to V. Tach  S/p targeted temperature management  VEEG with low amplitude waveforms, unclear if underlying seizures given myoclonus  Repeat MRI and CT brain with evidence of diffuse hypoxic ischemia  Neuroexam remains poor, left upward gaze preference, sluggish  pupillary responses, no withdraw to noxious stimuli in bilateral upper and lower extremities  Ongoing management per critical care team      * Cerebrovascular accident (CVA) due to embolism of right middle cerebral artery (HCC)  Assessment & Plan  Prehospital stroke alert for headache evening of 12 5 and left-sided deficits day of 12/6  On neurology evaluation in the ED NIHSS 7  CT head with hypodensity in the right basal ganglia, suspect mild cytotoxic edema small hyperdensity of the right M1 segment suspect thrombus.  CTA head and neck demonstrated acute thrombus in the right M1 segment severe near occlusive stenosis of the right ICA within the neck and severe tandem stenosis intracranially, as well as high-grade stenosis of the distal left common carotid. Patient was out of the window for TNK and was transferred to [facility] for neurosurgical evaluation.  S/p cerebral angiogram with thrombectomy and carotid artery stent placement 12/6.   In the setting of neuroexam changes 12/7 repeat CTA head and neck demonstrated evolution of the acute infarct in the right MCA territory with right basal ganglia hemorrhage with mild midline shift  Course further complicated by cardiac arrest 12/18 with subsequent intubation, repeat imaging now demonstrates diffuse hypoxic ischemia and new bilateral cerebellar infarcts  Ongoing management per critical care        IDENTIFICATION:  Inpatient consult to Palliative Care  Consult performed by: JORDI Plummer  Consult ordered by: Khloe Szymanski MD          History of Present Illness:  Bren Traylor is a 69 y.o. female who originally presented 12/6/2024 to [facility] with a headache the night prior he may fall that morning with progressive left-sided deficits.  Stroke alert called and neurology evaluated in the ED, initial NIHSS 7.  CT head with hypodensity in the right basal ganglia, suspect mild cytotoxic edema small hyperdensity of the right M1 segment  suspect thrombus.  CTA head and neck demonstrated acute thrombus in the right M1 segment severe near occlusive stenosis of the right ICA within the neck and severe tandem stenosis intracranially, as well as high-grade stenosis of the distal left common carotid. Patient was out of the window for TNK and was transferred to [facility] for neurosurgical evaluation.  S/p cerebral angiogram with thrombectomy and carotid artery stent placement 12/6. 12/7 with neuro exam changes, repeat CTA head and neck demonstrated evolution of the acute infarct in the right MCA territory with right basal ganglia hemorrhage with mild midline shift.  Course further complicated by cardiac arrest 12/18 with subsequent intubation and transfer to the ICU.  Furthermore had 2 additional brief arrests and required high-dose vasopressor therapy. EEG with low amplitude waveforms, unclear if underlying seizures given myoclonus. Several family meetings held per the critical care team with family. Repeat MRI 12/22 with scattered acute/subacute bilateral cerebellar infarcts - new since prior, bilateral cortical diffusion restriction concerning for hypoxic ischemia, no new hemorrhage and repeat CT head 12/23 with sequela of diffuse hypoxic ischemia and bilateral cerebellar infarcts redemonstrated. Palliative care consulted for goals of care discussion.    Spoke with patient's son Severin via telephone. Introduced palliative care services. He has not had the opportunity to speak with his brother today regarding goals of care following their discussion with the critical care team. They are planning on potentially presenting to the hospital this evening versus tomorrow and he will plan to call me this afternoon to discuss further and arrange a time to discuss goals tomorrow.    Addendum: [id number]  Spoke with sons Severin and Yosi via telephone.  Reintroduced palliative care services. Reviewed Bren's current status and hospital course.  Assessed family's understanding of patient's current condition and prognosis. They state they are understanding of her current condition and that they have known since her initial stroke that she was not going to return to her PTA baseline. Discussed the further impact on her neurologic function as a result of the cardiac arrest (12/18) and reviewed most recent imaging (MRI brain and CT head 12/22 & 12/23) which demonstrates diffuse hypoxic ischemic and new bilateral cerebellar infarcts.  Discussed that in the 5 days since her cardiac arrest, despite medical interventions, her neurologic function has not improved. They had questions regarding what that means for her overall functional recovery. Discussed the likely outcomes, including bed bound status, need for placement in long term care facility with 24 hour cares to be provided by skilled personnel, likely need for feeding tube placement, possible need for tracheostomy if unable to successfully wean from the ventilator (today is MV day 5). They express feeling very overwhelmed by this information, they had thought she would be able to return home with their support at some point, and feel pressure to make a decision regarding her cares in the next 24 hours. They expressed feeling hopeful that with more time she will make some improvements. They would like to discuss further with additional family members and have requested not to readdress this conversation until after the holiday. I did relay that if she was clinically deteriorating that the critical care team would provide updates and may need to address sooner, to which they were agreeable. They plan to visit tomorrow evening, around 5-6 pm. Offered no additional questions at this time.      Past Medical History:   Diagnosis Date    High cholesterol     Hypertension      Past Surgical History:   Procedure Laterality Date    IR PICC REPOSITION  12/10/2024    IR STROKE ALERT  12/7/2024     Social History      Socioeconomic History    Marital status:      Spouse name: Not on file    Number of children: Not on file    Years of education: Not on file    Highest education level: Not on file   Occupational History    Not on file   Tobacco Use    Smoking status: Every Day     Current packs/day: 1.00     Average packs/day: 1 pack/day for 46.8 years (46.8 ttl pk-yrs)     Types: Cigarettes     Start date: 2/28/1978    Smokeless tobacco: Never   Vaping Use    Vaping status: Never Used   Substance and Sexual Activity    Alcohol use: Not Currently    Drug use: Not Currently    Sexual activity: Not on file   Other Topics Concern    Not on file   Social History Narrative    Not on file     Social Drivers of Health     Financial Resource Strain: Low Risk  (2/28/2024)    Overall Financial Resource Strain (CARDIA)     Difficulty of Paying Living Expenses: Not hard at all   Food Insecurity: Not on file   Transportation Needs: Unmet Transportation Needs (2/28/2024)    PRAPARE - Transportation     Lack of Transportation (Medical): Yes     Lack of Transportation (Non-Medical): No   Physical Activity: Not on file   Stress: Not on file   Social Connections: Not on file   Intimate Partner Violence: Not on file   Housing Stability: Not on file     No family history on file.    Medications:  all current active meds have been reviewed, current meds:   Current Facility-Administered Medications:     albuterol inhalation solution 2.5 mg, Q4H PRN    artificial tear ophthalmic ointment, HS    aspirin chewable tablet 81 mg, Daily    chlorhexidine (PERIDEX) 0.12 % oral rinse 15 mL, Q12H DANIS    heparin (porcine) 25,000 units in 0.45% NaCl 250 mL infusion (premix), Titrated, Last Rate: 19 Units/kg/hr (12/23/24 1520)    levETIRAcetam (KEPPRA) injection 1,000 mg, Q12H DANIS    piperacillin-tazobactam (ZOSYN) 4.5 g in sodium chloride 0.9 % 100 mL IVPB (EXTENDED INFUSION), Q8H, Last Rate: 4.5 g (12/24/24 0216)    [Held by provider] polyethylene  "glycol (MIRALAX) packet 17 g, Daily    [Held by provider] senna-docusate sodium (SENOKOT S) 8.6-50 mg per tablet 2 tablet, BID    sertraline (ZOLOFT) oral concentrated solution 50 mg, Daily    sodium chloride 3 % inhalation solution 4 mL, Q6H    vancomycin (VANCOCIN) 1,250 mg in sodium chloride 0.9 % 250 mL IVPB, Q24H, Last Rate: 1,250 mg (12/23/24 0834), and PTA meds:   Prior to Admission Medications   Prescriptions Last Dose Informant Patient Reported? Taking?   atorvastatin (LIPITOR) 20 mg tablet   No No   Sig: TAKE 1 TABLET (20 MG TOTAL) BY MOUTH DAILY   busPIRone (BUSPAR) 7.5 mg tablet   No No   Sig: TAKE 1 TABLET (7.5 MG TOTAL) BY MOUTH 2 (TWO) TIMES A DAY AS NEEDED (PANIC ATTACL)   clonazePAM (KlonoPIN) 0.5 mg tablet   No No   Sig: Take 1 tablet (0.5 mg total) by mouth daily as needed for seizures   losartan (COZAAR) 50 mg tablet   No No   Sig: Take 1 tablet (50 mg total) by mouth daily   sertraline (ZOLOFT) 100 mg tablet   No No   Sig: Take 0.5 tablets (50 mg total) by mouth daily      Facility-Administered Medications: None       No Known Allergies    Objective:  /53   Pulse (!) 112   Temp 99.7 °F (37.6 °C) (Oral)   Resp 21   Ht 5' 4\" (1.626 m)   Wt 49.9 kg (110 lb)   SpO2 100%   BMI 18.88 kg/m²     Physical Exam  Vitals and nursing note reviewed.   Constitutional:       Appearance: She is ill-appearing.      Interventions: She is intubated.   HENT:      Mouth/Throat:      Mouth: Mucous membranes are moist.   Cardiovascular:      Rate and Rhythm: Tachycardia present.   Pulmonary:      Effort: No respiratory distress. She is intubated.   Abdominal:      General: There is no distension.   Skin:     General: Skin is dry.   Neurological:      Mental Status: She is unresponsive.      GCS: GCS eye subscore is 1. GCS verbal subscore is 1. GCS motor subscore is 1.       Lab Results: I have personally reviewed pertinent labs.  Imaging Studies: I have personally reviewed pertinent reports.  EKG, " "Pathology, and Other Studies: I have personally reviewed pertinent reports.    Counseling / Coordination of Care  Total floor / unit time spent today 70+ minutes. Greater than 50% of total time was spent with the patient and / or family counseling and / or coordination of care. A description of the counseling / coordination of care: Reviewed chart, provided medical updates, determined goals of care, discussed palliative care and symptom management, dprovided anticipatory guidance, determined competency and POA/HCA, determined social/family support, provided psychosocial support.     Portions of this document may have been created using dictation software and as such some \"sound alike\" terms may have been generated by the system. Do not hesitate to contact me with any questions or clarifications.   "

## 2024-12-23 NOTE — ASSESSMENT & PLAN NOTE
Initial troponin 140--> 213--> 250--> 169--> 1164--> 5343--> 5149--> 6084--.[id number] in the setting of acute CVA and bilateral PEs.     Possible ACS, vs secondary to extensive medical issues. CTA with significant diffuse coronary calcifications    Had ST elevation with reciprocal depressions overnight.  However inferior ST elevations also had significant Q waves.   Troponins were redrawn, up to 9740  Not a candidate for PCI, given inability to tolerate DAPT in the setting of intracranial hemorrhage.  Last echo shows preserved ejection fraction.

## 2024-12-23 NOTE — RESPIRATORY THERAPY NOTE
RT Ventilator Management Note  Bren Traylor 69 y.o. female MRN: [id number]  Unit/Bed#: ICU 11 Encounter: [id number]      Daily Screen         12/22/2024  0813 12/23/2024  0754          Patient safety screen outcome:: Failed Failed      Not Ready for Weaning due to:: Underline problem not resolved Underline problem not resolved                Physical Exam:   Assessment Type: Assess only  General Appearance: Unresponsive  Respiratory Pattern: Assisted  Chest Assessment: Chest expansion symmetrical  Bilateral Breath Sounds: Diminished, Coarse  R Breath Sounds: Coarse, Diminished  L Breath Sounds: Diminished, Coarse  Cough: None  O2 Device: vent      Resp Comments: (P) pt remains on pcv+ at this time. no changes made will continue to monitor per protocol. pt suctioned and vent disinfected.

## 2024-12-23 NOTE — RESPIRATORY THERAPY NOTE
RT Ventilator Management Note  Bren Traylor 69 y.o. female MRN: [id number]  Unit/Bed#: ICU 11 Encounter: [id number]      Daily Screen         12/22/2024  0813 12/23/2024  0754          Patient safety screen outcome:: Failed Failed      Not Ready for Weaning due to:: Underline problem not resolved Underline problem not resolved                Physical Exam:   Assessment Type: Assess only  General Appearance: Unresponsive  Respiratory Pattern: (P) Assisted  Chest Assessment: (P) Chest expansion symmetrical  Bilateral Breath Sounds: (P) Diminished, Coarse  Cough: (P) None  O2 Device: vent      Resp Comments: pt transported to and from ct scan on transport vent.  pt placed back on c3 ventilator on current vent settings.

## 2024-12-23 NOTE — PROGRESS NOTES
Progress Note - Heart Failure   Name: Bren Traylor 69 y.o. female I MRN: [id number]  Unit/Bed#: ICU 11 I Date of Admission: 12/6/2024   Date of Service: 12/23/2024 I Hospital Day: 16    Assessment & Plan  Elevated troponin  Initial troponin 140--> 213--> 250--> 169--> 1164--> 5343--> 5149--> 6084--.15875 in the setting of acute CVA and bilateral PEs.     Possible ACS, vs secondary to extensive medical issues. CTA with significant diffuse coronary calcifications    Had ST elevation with reciprocal depressions overnight.  However inferior ST elevations also had significant Q waves.   Troponins were redrawn, up to 9740  Not a candidate for PCI, given inability to tolerate DAPT in the setting of intracranial hemorrhage.  Last echo shows preserved ejection fraction.  Cardiac arrest (HCC)  Unclear etiology.  Unfortunately, patient was not on telemetry at time of cardiac arrest.  Did require defibrillation during code.  Further interrogation of [place] showed that the patient bradycardia down due to hypoxia.  Patient also had PEA arrest and VT arrest during CODE BLUE.  VT was not the cause of initial cardiac arrest.    No significant change on postarrest echocardiogram.    Limited with ACS treatment due to bleeding concerns.   Can resume statin once shock liver resolves  Agree with resuming aspirin  Once able to, can restart beta-blocker  Continue telemetry  On heparin drip for anticoagulation  Paroxysmal atrial fibrillation (HCC)  Currently in normal sinus rhythm  Continue with heparin drip, can transition to oral AC when able to.  Cerebrovascular accident (CVA) due to embolism of right middle cerebral artery (HCC)  Presented 12/6 with left-sided weakness, slurred speech and facial droop  CTH revealed infarction in the right lentiform nucleus with mild cytotoxic edema with hyperdensity in the right M1 segment suggesting M1 thrombosis.    CTA of the head revealed acute thrombosis in the mid-distal right M1 segment  with severe near occlusion of the proximal BILL with severe tandem stenosis intracranially and high-grade stenosis of the last distal common carotid artery, moderate high-grade stenosis of the right subclavian origin.   Underwent successful mechanical thrombectomy and right carotid angioplasty/stenting and placed on Integrilin.   12/7 CTA with hemorrhagic conversion  On aspirin, low-dose ECMO heparin initially started and switched to stroke protocol on 12/14  CT of the head shows increased edema and mass effect due to hemorrhagic conversion of ischemic stroke.  MRI brain done yesterday shows changes concerning for hypoxic injury.  Carotid artery stenosis  CTA of the head revealed acute thrombosis in the mid-distal right M1 segment with severe near occlusion of the proximal BILL with severe tandem stenosis intracranially and high-grade stenosis of the last distal common carotid artery, moderate high-grade stenosis of the right subclavian origin.   12/6 s/p emergent right carotid angioplasty/stenting and placed on Integrilin  Repeat imaging 12/7 revealed hemorrhagic conversion and patient was given DDAVP administered  CTA 12/10 revealed BILL stent occlusion  Aspirin per NG tube when able, statin  Acute pulmonary embolism (HCC)  CTA Chest 12/10 revealed bilateral PEs with developing pulmonary infarction  Venous duplex negative for DVT  Heparin when able  Encephalopathy  In the context of CVA with hemorrhagic conversion  Hyperlipidemia  Continue atorvastatin when able to  Hypotension  Resolved  Cerebrovascular accident (CVA) (HCC)      Subjective     Patient is sedated and nonresponsive.    Objective :  Temp:  [98.2 °F (36.8 °C)-101.5 °F (38.6 °C)] 99.6 °F (37.6 °C)  HR:  [] 98  BP: (100-126)/(53-70) 105/53  Resp:  [17-28] 18  SpO2:  [97 %-100 %] 97 %  O2 Device: Ventilator  Orthostatic Blood Pressures      Flowsheet Row Most Recent Value   Blood Pressure 105/53 filed at 12/22/2024 2100   Patient Position -  Orthostatic VS Lying filed at 12/22/2024 1800          First Weight: Weight - Scale: 56.2 kg (123 lb 14.4 oz) (12/08/24 0200)  Vitals:    12/18/24 0600 12/18/24 1521   Weight: 49.9 kg (110 lb 0.2 oz) 49.9 kg (110 lb)     Physical Exam  Constitutional:       Appearance: She is ill-appearing and toxic-appearing.   Cardiovascular:      Rate and Rhythm: Regular rhythm. Tachycardia present.      Heart sounds: No murmur heard.     No gallop.   Pulmonary:      Comments: Intubated  Musculoskeletal:         General: Swelling present.   Neurological:      Mental Status: She is alert.           Lab Results: I have reviewed the following results:CBC/BMP:   .     12/23/24  0553   WBC 18.66*   HGB 8.3*   HCT 26.5*      SODIUM 156*   K 4.0   *   CO2 24   BUN 32*   CREATININE 0.76   GLUC 144*   MG 2.1   PHOS 2.3    , Creatinine Clearance: Estimated Creatinine Clearance: 56.6 mL/min (by C-G formula based on SCr of 0.76 mg/dL)., LFTs:   .     12/23/24  0553   *   *   ALB 2.6*   TBILI 0.35   ALKPHOS 93      Results from last 7 days   Lab Units 12/23/24  0553 12/22/24  0605 12/21/24  1849   WBC Thousand/uL 18.66* 23.49* 24.75*   HEMOGLOBIN g/dL 8.3* 9.2* 8.8*   HEMATOCRIT % 26.5* 29.0* 27.5*   PLATELETS Thousands/uL 194 210 220     Results from last 7 days   Lab Units 12/23/24  0553 12/22/24  2326 12/22/24  1740 12/18/24  1456 12/18/24  1414   POTASSIUM mmol/L 4.0 4.3 5.4*   < >  --    CHLORIDE mmol/L 125* 126* 127*   < >  --    CO2 mmol/L 24 22 24   < >  --    CO2, I-STAT mmol/L  --   --   --   --  32   BUN mg/dL 32* 35* 38*   < >  --    CREATININE mg/dL 0.76 0.77 0.72   < >  --    GLUCOSE, ISTAT mg/dl  --   --   --   --  498*   CALCIUM mg/dL 8.1* 8.2* 8.1*   < >  --     < > = values in this interval not displayed.     Results from last 7 days   Lab Units 12/23/24  0830 12/22/24  2326 12/22/24  1707 12/19/24  2257 12/19/24  1810 12/19/24  1135 12/18/24  1420   INR   --   --  1.20*  --  1.66*  --  1.63*   PTT  seconds 83* 54* 49*   < > 41*   < > 33    < > = values in this interval not displayed.     Lab Results   Component Value Date    HGBA1C 5.3 12/07/2024     Lab Results   Component Value Date    CKTOTAL 698 (H) 12/22/2024         Other Study Results Review: EKG was reviewed.     VTE Pharmacologic Prophylaxis: VTE covered by:  heparin (porcine), Intravenous, 19 Units/kg/hr at 12/23/24 0121     VTE Mechanical Prophylaxis: sequential compression device

## 2024-12-23 NOTE — ASSESSMENT & PLAN NOTE
Prehospital stroke alert for headache evening of 12 5 and left-sided deficits day of 12/6  On neurology evaluation in the ED NIHSS 7  CT head with hypodensity in the right basal ganglia, suspect mild cytotoxic edema small hyperdensity of the right M1 segment suspect thrombus.  CTA head and neck demonstrated acute thrombus in the right M1 segment severe near occlusive stenosis of the right ICA within the neck and severe tandem stenosis intracranially, as well as high-grade stenosis of the distal left common carotid. Patient was out of the window for TNK and was transferred to [facility] for neurosurgical evaluation.  S/p cerebral angiogram with thrombectomy and carotid artery stent placement 12/6.   In the setting of neuroexam changes 12/7 repeat CTA head and neck demonstrated evolution of the acute infarct in the right MCA territory with right basal ganglia hemorrhage with mild midline shift  Course further complicated by cardiac arrest 12/18 with subsequent intubation, repeat imaging now demonstrates diffuse hypoxic ischemia and new bilateral cerebellar infarcts  Ongoing management per critical care

## 2024-12-23 NOTE — ASSESSMENT & PLAN NOTE
Palliative diagnosis: CVA    Social support:  Supportive listening provided  Normalized experience of patient/family  Provided anticipatory guidance  Spiritual cares: Adventism  Advocated for patient/family with interdisciplinary care team    Coordination of care:  Reviewed case with RN, critical care    Follow up  Palliative Care will continue to follow and goals of care discussions will be ongoing.   Please reach out via [employer] secure chat if questions or concerns arise.    We appreciate the invitation to be involved in this patient's care. Please do not hesitate to reach our on call provider through our clinic answering service at [phone] should you have acute symptom control concerns.

## 2024-12-23 NOTE — ASSESSMENT & PLAN NOTE
Presented 12/6 with left-sided weakness, slurred speech and facial droop  CTH revealed infarction in the right lentiform nucleus with mild cytotoxic edema with hyperdensity in the right M1 segment suggesting M1 thrombosis.    CTA of the head revealed acute thrombosis in the mid-distal right M1 segment with severe near occlusion of the proximal BILL with severe tandem stenosis intracranially and high-grade stenosis of the last distal common carotid artery, moderate high-grade stenosis of the right subclavian origin.   Underwent successful mechanical thrombectomy and right carotid angioplasty/stenting and placed on Integrilin.   12/7 CTA with hemorrhagic conversion  On aspirin, low-dose ECMO heparin initially started and switched to stroke protocol on 12/14  CT of the head shows increased edema and mass effect due to hemorrhagic conversion of ischemic stroke.  MRI brain done yesterday shows changes concerning for hypoxic injury.

## 2024-12-24 NOTE — RESPIRATORY THERAPY NOTE
RT Ventilator Management Note  Bren Traylor 69 y.o. female MRN: [id number]  Unit/Bed#: ICU 11 Encounter: [id number]      Daily Screen         12/23/2024  0754 12/24/2024  0902          Patient safety screen outcome:: Failed Failed      Not Ready for Weaning due to:: Underline problem not resolved Underline problem not resolved                Physical Exam:   Respiratory Pattern: Assisted  Chest Assessment: Chest expansion symmetrical  Bilateral Breath Sounds: Diminished, Coarse      Resp Comments: (P) changes made by md.

## 2024-12-24 NOTE — PHYSICAL THERAPY NOTE
Physical Therapy Cancellation Note    PT orders received chart review completed. Pt is currently intubated/sedated and not appropriate to participate in skilled PT at this time. PT will sign off at this time, please re-consult as medically appropriate.     12/24/24 0900   Note Type   Note type Cancelled Session;Re-Evaluation   Cancel Reasons Intubated/sedated       Nori Denton, PT

## 2024-12-24 NOTE — OCCUPATIONAL THERAPY NOTE
Occupational Therapy         Patient Name: Bren Traylor  Today's Date: 12/24/2024 12/24/24 1300   OT Last Visit   OT Visit Date 12/24/24   Note Type   Note Type Cancelled Session   Cancel Reasons Medical status  (remains intubated and not medically appropriate to participate in therapy - will sign off at this time- reconsult if pt's medical status improves and pt able to actively participate in therapy)         Denise Bearden

## 2024-12-24 NOTE — PROGRESS NOTES
Progress Note - Critical Care/ICU   Name: Bren Traylor 69 y.o. female I MRN: [id number]  Unit/Bed#: ICU 11 I Date of Admission: 12/6/2024   Date of Service: 12/24/2024 I Hospital Day: 17       Assessment & Plan   Neuro:   Right MCA M1 occlusion s/p thrombectomy TICI 3, R carotid stenting c/b hemorrhagic conversion  Generalized myoclonus like jerking/twitching  SBP goal 110-140  Neurochecks q1  No clinical improvement after 12/20 hypertonic saline 23% bolus  24 hours x R foot jerking w new EEG findings as below  Home meds  Hold Provigil 100 mg daily,   Continue sertraline 50mg  Continue keppra 1000mg BID  Studies  S/p cardiac arrest w likely anoxic brain injury  12/18 CT head - Diminished size of the known large right basal ganglia parenchymal hemorrhage. Increased edema and mass effect. Increased midline shift. No hydrocephalus.   12/20 CT head: Similar size of R basal ganglia IPH, similar mass effect w 8mm shift. Global edema/ loss of architecture.  12/21: EEG monitoring with significant muscle artifact. When paralyzed showing some temporal spikes correlating with stroke region.  12/22 MRI brain: new bilateral cerebellar infarctions, new diffusion signal changes along superior frontal and parietal lobes which could be due to ischemia/hypoxic ischemic injury or braydon-ictal changes. No new hemorrhage--stable R basal ganglia hematoma.  12/22-23 EEG: intermittent subtle EMG artifact with a left frontotemporal sharp wave which may be subtle myoclonic focal seizure discharges. Similar, but less dramatic than what was seen around 0850. This subtle pattern has been on and off since 2-3 AM this morning.   12/23 CTH once heparin gtt at goal: stable  Sedation  Propofol gtt off  Precedex gtt off  Demand Ativan doses for RLE twitching  Plan: Again no gag, cough or corneal reflexes. Continued right leg twitching concerning for seizure, responsive to Ativan. Palliative consulted yesterday--family are not ready to make a  decision at this time, planning to visit pt today     CV:   Cardiac arrest   ROSC achieved after 10mins of ACLS protocol  Unknown downtime   TTM, rewarming  as of12/19 PM  Central fevers  Bath temp low, require ice, tylenol IV  Cardiology on board  Echo 12/10 EF 50-55%.  Hypokinetic inferior segments.  (Please refer to report for details)  Repeat ECHO 12/18 EF 55%.  Systolic function normal.  LV size, wall thickness normal.  Mildly abnormal diastolic function.  Basal inferior, basal anterolateral, mid inferior, mid anterolateral hypokinesis.  Basal inferolateral and mid inferolateral dyskinesia.  RV size, function, wall thickness normal.  Elevated troponins, 12/22 NSTEMI, hx HTN, HLD  Troponin up to 9740   Meds  Heparin gtt, ACS protocol  ASA 81  atorvastatin 20 mg daily  Plan: Off of pressors after getting blood 12/21. Heparin adjusted ACS protocol, repeat CT head at therapeutic goal. Stable. Continues to maintain blood pressures off pressors.    Pulm:  Mechanical Ventilation  PC 20/18/8/45%   Pulmonary edema  Thick yellow secretions BL on bronch 12/20  40 lasix IV given 12/20 at midnight and also 12/18  Multi lobar PNA  Has completed course of antibiotics as per below  Acute PE  Meds  Hep gtt ACS protocol  Albuterol nebs  Plan: Overbreathes vent. Not appropriate for SBT this time. Intermittently tachypneic on the vent and responsive to fentanyl.     GI:   Diarrhea  Bowel regimen has been off for 72 hours, continues to have diarrhea, managed with rectal tube  Elevated transaminases/shock liver   < 232 < 419,  < 511 < 705  T. bili normal at 0.44 < 0.35  Plan: Continue to monitor, CMP in AM. Continue banana flakes for diarrhea.     :   Decreased urinary output post cardiac arrest  Improved w lasix-- given 12/18 and 12/20 40mg lasix IV both times   1325cc in last 24h  Plan: UOP acceptable. Continue Silvestre.  F/E/N:   No MIVF  Replete electrolytes as needed  Tube feeds to goal of 40     Heme/Onc:    Anemia  12/12 1u prBC for Hb 7.0  Plan: Hb 7.9 and increasingly tachycardic, could consider another 1u pRBC    Endo:   Glucose has been stable overall, no insulin, but slightly elevated today--continue to monitor for now     ID:   Multi lobar pna  Meds  Vancomycin day 7  Zosyn day 7  Studies  12/13 sputum cx: 4+ MSSA  12/18 Bcx NG x 5d  12/19 sputum cx: few coloines candida albicans  12/19 urine strep antigen neg  12/21 Bcx NG x48h  Previously tx MSSA PNA. Now has completed 6 days of empiric vanc/zosyn, decreasing white count  Remains intermittently febrile, Tmax 100.4yesterday. Have discontinued temperature management and current goals normothermia. Would stop abx after today's dose to complete 7d coverage.       MSK/Skin:   Frequent turning     Tubes: sierra, ETT  Lines: R fem A line, L IJ CVC, PIV  Drains: none    Disposition: Critical care, continue to address GOC     ICU Core Measures     Vented Patient  VAP Bundle  VAP bundle ordered     A: Assess, Prevent, and Manage Pain Has pain been assessed? NA  Need for changes to pain regimen? NA   B: Both Spontaneous Awakening Trials (SATs) and Spontaneous Breathing Trials (SBTs) Plan to perform spontaneous awakening trial today? No secondary to targeted temperature management  Plan to perform spontaneous breathing trial today? No secondary to targeted temperature management  Obvious barriers to extubation? Yes   C: Choice of Sedation RASS Goal: -3 Moderate Sedation or 0 Alert and Calm  Need for changes to sedation or analgesia regimen? NA   D: Delirium CAM-ICU: Negative   E: Early Mobility  Plan for early mobility? No   F: Family Engagement Plan for family engagement today? Yes       Antibiotic Review: Continue broad spectrum secondary to severity of illness.     Review of Invasive Devices:    Sierra Plan: Continue for accurate I/O monitoring for 48 hours  Central access plan: Medications requiring central line Hemodynamic monitoring  Qi Plan: Keep arterial line  for hemodynamic monitoring    Prophylaxis:  VTE Contraindicated secondary to: Cardiac arrest, TTM   Stress Ulcer  not ordered         24 Hour Events :    Increasing pressor requirement again  Bath temps lower to meet normothermia goal    Subjective   Review of Systems: Review of Systems not obtainable due to Clinical Condition, Altered mental status    Objective :                   Vitals I/O      Most Recent Min/Max in 24hrs   Temp 99.7 °F (37.6 °C) Temp  Min: 99.1 °F (37.3 °C)  Max: 100.4 °F (38 °C)   Pulse (!) 112 Pulse  Min: 98  Max: 122   Resp 21 Resp  Min: 17  Max: 24   /53 No data recorded   O2 Sat 100 % SpO2  Min: 94 %  Max: 100 %      Intake/Output Summary (Last 24 hours) at 12/24/2024 0637  Last data filed at 12/24/2024 0600  Gross per 24 hour   Intake 2127.34 ml   Output 1625 ml   Net 502.34 ml       Diet Enteral/Parenteral; Tube Feeding No Oral Diet; Jevity 1.2 Navarro; Continuous; 40; Prosource Protein Liquid - One Packet; BID; Banatrol Plus Banana Flakes - One Packet; 100; Water; Every 6 hours    Invasive Monitoring   Arterial Line  [place]oder /60  Arterial Line BP  Min: 110/48  Max: 144/56   MAP 88 mmHg  Arterial Line MAP (mmHg)  Min: 70 mmHg  Max: 88 mmHg           Physical Exam   Physical Exam  Vitals and nursing note reviewed.   Eyes:      Pupils: Pupils are equal.      Comments: Pupils brisk, reactive, 3 cm   Skin:     General: Skin is warm and dry.   HENT:      Head: Normocephalic and atraumatic. No right periorbital erythema or left periorbital erythema.      Mouth/Throat:      Mouth: Mucous membranes are moist.   Cardiovascular:      Rate and Rhythm: Normal rate and regular rhythm.      Pulses: No decreased pulses.           Carotid pulses are 2+ on the right side and 2+ on the left side.       Posterior tibial pulses are 2+ on the right side and 2+ on the left side.      Heart sounds: Normal heart sounds.   Musculoskeletal:      Right lower leg: No edema.      Left lower leg: No edema.    Abdominal:      Palpations: Abdomen is soft.   Constitutional:       General: She is in acute distress.      Appearance: She is ill-appearing.      Interventions: She is sedated and intubated.   Pulmonary:      Effort: Tachypnea present. She is intubated.      Breath sounds: No stridor or decreased air movement. No decreased breath sounds, wheezing, rhonchi or rales.      Comments: On ventilator  Neurological:      Mental Status: She is unresponsive.      GCS: GCS eye subscore is 1. GCS verbal subscore is 1. GCS motor subscore is 1.        Corneal reflex present, cough reflex and gag reflex not intact.      Comments: Intermittent jerking c/w myoclonus, no cough, no corneals, no gag  GCS 3T, no grimace or posturing in response to pain  2mm minimally reactive pupils          Diagnostic Studies        Lab Results: I have reviewed the following results:     Medications:  Scheduled PRN   artificial tear, , HS  aspirin, 81 mg, Daily  chlorhexidine, 15 mL, Q12H DANIS  levETIRAcetam, 1,000 mg, Q12H DANIS  piperacillin-tazobactam, 4.5 g, Q8H  [Held by provider] polyethylene glycol, 17 g, Daily  [Held by provider] senna-docusate sodium, 2 tablet, BID  sertraline, 50 mg, Daily  sodium chloride, 4 mL, Q6H  vancomycin, 1,250 mg, Q24H      albuterol, 2.5 mg, Q4H PRN       Continuous    heparin (porcine), 3-20 Units/kg/hr (Order-Specific), Last Rate: 19 Units/kg/hr (12/23/24 1520)         Labs:   CBC    Recent Labs     12/23/24  0553 12/24/24  0549   WBC 18.66* 19.51*   HGB 8.3* 7.9*   HCT 26.5* 25.6*    183     BMP    Recent Labs     12/23/24  0553 12/24/24  0549   SODIUM 156* 157*   K 4.0 3.5   * 126*   CO2 24 23   AGAP 7 8   BUN 32* 26*   CREATININE 0.76 0.75   CALCIUM 8.1* 8.1*       Coags    Recent Labs     12/22/24  1707 12/22/24  2326 12/23/24  1502 12/24/24  0549   INR 1.20*  --   --   --    PTT 49*   < > 72* 82*    < > = values in this interval not displayed.        Additional Electrolytes  Recent Labs      12/23/24  0553 12/24/24  0549   MG 2.1 2.0   PHOS 2.3 2.7          Blood Gas    Recent Labs     12/24/24  0549   PHART 7.565*   [id number] 24.2*   PO2ART 115.9   [id number] 21.4*   BEART -0.1   SOURCE Line, Arterial       Recent Labs     12/24/24  0549   SOURCE Line, Arterial      LFTs  Recent Labs     12/23/24  0553 12/24/24  0549   * 363*   * 130*   ALKPHOS 93 91   ALB 2.6* 2.7*   TBILI 0.35 0.44       Infectious  No recent results    Glucose  Recent Labs     12/22/24  1740 12/22/24  2326 12/23/24  0553 12/24/24  0549   GLUC 121 126 144* 154*

## 2024-12-24 NOTE — PLAN OF CARE
Problem: PAIN - ADULT  Goal: Verbalizes/displays adequate comfort level or baseline comfort level  Description: Interventions:  - Encourage patient to monitor pain and request assistance  - Assess pain using appropriate pain scale  - Administer analgesics based on type and severity of pain and evaluate response  - Implement non-pharmacological measures as appropriate and evaluate response  - Consider cultural and social influences on pain and pain management  - Notify physician/advanced practitioner if interventions unsuccessful or patient reports new pain  Outcome: Progressing     Problem: INFECTION - ADULT  Goal: Absence or prevention of progression during hospitalization  Description: INTERVENTIONS:  - Assess and monitor for signs and symptoms of infection  - Monitor lab/diagnostic results  - Monitor all insertion sites, i.e. indwelling lines, tubes, and drains  - Monitor endotracheal if appropriate and nasal secretions for changes in amount and color  - [place] appropriate cooling/warming therapies per order  - Administer medications as ordered  - Instruct and encourage patient and family to use good hand hygiene technique  - Identify and instruct in appropriate isolation precautions for identified infection/condition  Outcome: Progressing  Goal: Absence of fever/infection during neutropenic period  Description: INTERVENTIONS:  - Monitor WBC    Outcome: Progressing     Problem: SAFETY ADULT  Goal: Patient will remain free of falls  Description: INTERVENTIONS:  - Educate patient/family on patient safety including physical limitations  - Instruct patient to call for assistance with activity   - Consult OT/PT to assist with strengthening/mobility   - Keep Call bell within reach  - Keep bed low and locked with side rails adjusted as appropriate  - Keep care items and personal belongings within reach  - Initiate and maintain comfort rounds  - Make Fall Risk Sign visible to staff  - Offer Toileting every 2 Hours,  in advance of need  - Initiate/Maintain bed alarm  - Obtain necessary fall risk management equipment: non skid footwear  - Apply yellow socks and bracelet for high fall risk patients  - Consider moving patient to room near nurses station  Outcome: Progressing  Goal: Maintain or return to baseline ADL function  Description: INTERVENTIONS:  -  Assess patient's ability to carry out ADLs; assess patient's baseline for ADL function and identify physical deficits which impact ability to perform ADLs (bathing, care of mouth/teeth, toileting, grooming, dressing, etc.)  - Assess/evaluate cause of self-care deficits   - Assess range of motion  - Assess patient's mobility; develop plan if impaired  - Assess patient's need for assistive devices and provide as appropriate  - Encourage maximum independence but intervene and supervise when necessary  - Involve family in performance of ADLs  - Assess for home care needs following discharge   - Consider OT consult to assist with ADL evaluation and planning for discharge  - Provide patient education as appropriate  Outcome: Progressing  Goal: Maintains/Returns to pre admission functional level  Description: INTERVENTIONS:  - Perform AM-PAC 6 Click Basic Mobility/ Daily Activity assessment daily.  - Set and communicate daily mobility goal to care team and patient/family/caregiver.   - Collaborate with rehabilitation services on mobility goals if consulted  - Perform Range of Motion 3 times a day.  - Reposition patient every 2 hours.  - Dangle patient 3 times a day  - Record patient progress and toleration of activity level   Outcome: Progressing     Problem: DISCHARGE PLANNING  Goal: Discharge to home or other facility with appropriate resources  Description: INTERVENTIONS:  - Identify barriers to discharge w/patient and caregiver  - Arrange for needed discharge resources and transportation as appropriate  - Identify discharge learning needs (meds, wound care, etc.)  - Arrange for  interpretive services to assist at discharge as needed  - Refer to Case Management Department for coordinating discharge planning if the patient needs post-hospital services based on physician/advanced practitioner order or complex needs related to functional status, cognitive ability, or social support system  Outcome: Progressing     Problem: Knowledge Deficit  Goal: Patient/family/caregiver demonstrates understanding of disease process, treatment plan, medications, and discharge instructions  Description: Complete learning assessment and assess knowledge base.  Interventions:  - Provide teaching at level of understanding  - Provide teaching via preferred learning methods  Outcome: Progressing     Problem: Prexisting or High Potential for Compromised Skin Integrity  Goal: Skin integrity is maintained or improved  Description: INTERVENTIONS:  - Identify patients at risk for skin breakdown  - Assess and monitor skin integrity  - Assess and monitor nutrition and hydration status  - Monitor labs   - Assess for incontinence   - Turn and reposition patient  - Assist with mobility/ambulation  - Relieve pressure over bony prominences  - Avoid friction and shearing  - Provide appropriate hygiene as needed including keeping skin clean and dry  - Evaluate need for skin moisturizer/barrier cream  - Collaborate with interdisciplinary team   - Patient/family teaching  - Consider wound care consult   Outcome: Progressing     Problem: Potential for Falls  Goal: Patient will remain free of falls  Description: INTERVENTIONS:  - Educate patient/family on patient safety including physical limitations  - Instruct patient to call for assistance with activity   - Consult OT/PT to assist with strengthening/mobility   - Keep Call bell within reach  - Keep bed low and locked with side rails adjusted as appropriate  - Keep care items and personal belongings within reach  - Initiate and maintain comfort rounds  - Make Fall Risk Sign visible  to staff  - Offer Toileting every 2 Hours, in advance of need  - Initiate/Maintain bed alarm  - Obtain necessary fall risk management equipment: non skid footwear  - Apply yellow socks and bracelet for high fall risk patients  - Consider moving patient to room near nurses station  Outcome: Progressing     Problem: COPING  Goal: Pt/Family able to verbalize concerns and demonstrate effective coping strategies  Description: INTERVENTIONS:  - Assist patient/family to identify coping skills, available support systems and cultural and spiritual values  - Provide emotional support, including active listening and acknowledgement of concerns of patient and caregivers  - Reduce environmental stimuli, as able  - Provide patient education  - Assess for spiritual pain/suffering and initiate spiritual care, including notification of Pastoral Care or [facility] based community as needed  - Assess effectiveness of coping strategies  Outcome: Progressing  Goal: Will report anxiety at manageable levels  Description: INTERVENTIONS:  - Administer medication as ordered  - Teach and encourage coping skills  - Provide emotional support  - Assess patient/family for anxiety and ability to cope  Outcome: Progressing     Problem: Nutrition/Hydration-ADULT  Goal: Nutrient/Hydration intake appropriate for improving, restoring or maintaining nutritional needs  Description: Monitor and assess patient's nutrition/hydration status for malnutrition. Collaborate with interdisciplinary team and initiate plan and interventions as ordered.  Monitor patient's weight and dietary intake as ordered or per policy. Utilize nutrition screening tool and intervene as necessary. Determine patient's food preferences and provide high-protein, high-caloric foods as appropriate.     INTERVENTIONS:  - Monitor oral intake, urinary output, labs, and treatment plans  - Assess nutrition and hydration status and recommend course of action  - Evaluate amount of meals eaten  -  Assist patient with eating if necessary   - Allow adequate time for meals  - Recommend/ encourage appropriate diets, oral nutritional supplements, and vitamin/mineral supplements  - Order, calculate, and assess calorie counts as needed  - Recommend, monitor, and adjust tube feedings and TPN/PPN based on assessed needs  - Assess need for intravenous fluids  - Provide specific nutrition/hydration education as appropriate  - Include patient/family/caregiver in decisions related to nutrition  Outcome: Progressing     Problem: NEUROSENSORY - ADULT  Goal: Achieves stable or improved neurological status  Description: INTERVENTIONS  - Monitor and report changes in neurological status  - Monitor vital signs such as temperature, blood pressure, glucose, and any other labs ordered   - Initiate measures to prevent increased intracranial pressure  - Monitor for seizure activity and implement precautions if appropriate      Outcome: Progressing  Goal: Achieves maximal functionality and self care  Description: INTERVENTIONS  - Monitor swallowing and airway patency with patient fatigue and changes in neurological status  - Encourage and assist patient to increase activity and self care.   - Encourage visually impaired, hearing impaired and aphasic patients to use assistive/communication devices  Outcome: Progressing     Problem: CARDIOVASCULAR - ADULT  Goal: Maintains optimal cardiac output and hemodynamic stability  Description: INTERVENTIONS:  - Monitor I/O, vital signs and rhythm  - Monitor for S/S and trends of decreased cardiac output  - Administer and titrate ordered vasoactive medications to optimize hemodynamic stability  - Assess quality of pulses, skin color and temperature  - Assess for signs of decreased coronary artery perfusion  - Instruct patient to report change in severity of symptoms  Outcome: Progressing  Goal: Absence of cardiac dysrhythmias or at baseline rhythm  Description: INTERVENTIONS:  - Continuous  cardiac monitoring, vital signs, obtain 12 lead EKG if ordered  - Administer antiarrhythmic and heart rate control medications as ordered  - Monitor electrolytes and administer replacement therapy as ordered  Outcome: Progressing     Problem: RESPIRATORY - ADULT  Goal: Achieves optimal ventilation and oxygenation  Description: INTERVENTIONS:  - Assess for changes in respiratory status  - Assess for changes in mentation and behavior  - Position to facilitate oxygenation and minimize respiratory effort  - Oxygen administered by appropriate delivery if ordered  - Initiate smoking cessation education as indicated  - Encourage broncho-pulmonary hygiene including cough, deep breathe, Incentive Spirometry  - Assess the need for suctioning and aspirate as needed  - Assess and instruct to report SOB or any respiratory difficulty  - Respiratory Therapy support as indicated  Outcome: Progressing     Problem: GASTROINTESTINAL - ADULT  Goal: Maintains or returns to baseline bowel function  Description: INTERVENTIONS:  - Assess bowel function  - Encourage oral fluids to ensure adequate hydration  - Administer IV fluids if ordered to ensure adequate hydration  - Administer ordered medications as needed  - Encourage mobilization and activity  - Consider nutritional services referral to assist patient with adequate nutrition and appropriate food choices  Outcome: Progressing  Goal: Maintains adequate nutritional intake  Description: INTERVENTIONS:  - Monitor percentage of each meal consumed  - Identify factors contributing to decreased intake, treat as appropriate  - Assist with meals as needed  - Monitor I&O, weight, and lab values if indicated  - Obtain nutrition services referral as needed  Outcome: Progressing     Problem: GENITOURINARY - ADULT  Goal: Maintains or returns to baseline urinary function  Description: INTERVENTIONS:  - Assess urinary function  - Encourage oral fluids to ensure adequate hydration if ordered  -  Administer IV fluids as ordered to ensure adequate hydration  - Administer ordered medications as needed  - Offer frequent toileting  - Follow urinary retention protocol if ordered  Outcome: Progressing  Goal: Absence of urinary retention  Description: INTERVENTIONS:  - Assess patient's ability to void and empty bladder  - Monitor I/O  - Bladder scan as needed  - Discuss with physician/AP medications to alleviate retention as needed  - Discuss catheterization for long term situations as appropriate  Outcome: Progressing     Problem: METABOLIC, FLUID AND ELECTROLYTES - ADULT  Goal: Electrolytes maintained within normal limits  Description: INTERVENTIONS:  - Monitor labs and assess patient for signs and symptoms of electrolyte imbalances  - Administer electrolyte replacement as ordered  - Monitor response to electrolyte replacements, including repeat lab results as appropriate  - Instruct patient on fluid and nutrition as appropriate  Outcome: Progressing  Goal: Fluid balance maintained  Description: INTERVENTIONS:  - Monitor labs   - Monitor I/O and WT  - Instruct patient on fluid and nutrition as appropriate  - Assess for signs & symptoms of volume excess or deficit  Outcome: Progressing  Goal: Glucose maintained within target range  Description: INTERVENTIONS:  - Monitor Blood Glucose as ordered  - Assess for signs and symptoms of hyperglycemia and hypoglycemia  - Administer ordered medications to maintain glucose within target range  - Assess nutritional intake and initiate nutrition service referral as needed  Outcome: Progressing     Problem: SKIN/TISSUE INTEGRITY - ADULT  Goal: Skin Integrity remains intact(Skin Breakdown Prevention)  Description: Assess:  -Perform Geovani assessment every shift   -Clean and moisturize skin every shift   -Inspect skin when repositioning, toileting, and assisting with ADLS  -Assess under medical devices such as paula every hour  -Assess extremities for adequate circulation and  sensation     Bed Management:  -Have minimal linens on bed & keep smooth, unwrinkled  -Change linens as needed when moist or perspiring  -Avoid sitting or lying in one position for more than 45 hours while in bed  -Keep HOB at 45degrees     Toileting:  -Offer bedside commode  -Assess for incontinence every hour  -Use incontinent care products after each incontinent episode such as moisture barrier     Activity:  --Encourage or provide ROM exercises   -Turn and reposition patient every 2 Hours  -Use appropriate equipment to lift or move patient in bed  -Instruct/ Assist with weight shifting every hour when out of bed in chair  -Consider limitation of chair time 2 hour intervals    Skin Care:  -Avoid use of baby powder, tape, friction and shearing, hot water or constrictive clothing  -Relieve pressure over bony prominences using mepilx  -Do not massage red bony areas    Next Steps:  -Teach patient strategies to minimize risks such as weight shifting    -Consider consults to  interdisciplinary teams such as PT/OT  Outcome: Progressing  Goal: Incision(s), wounds(s) or drain site(s) healing without S/S of infection  Description: INTERVENTIONS  - Assess and document dressing, incision, wound bed, drain sites and surrounding tissue  - Provide patient and family education  - Perform skin care/dressing changes every shift  Outcome: Progressing     Problem: HEMATOLOGIC - ADULT  Goal: Maintains hematologic stability  Description: INTERVENTIONS  - Assess for signs and symptoms of bleeding or hemorrhage  - Monitor labs  - Administer supportive blood products/factors as ordered and appropriate  Outcome: Progressing     Problem: MUSCULOSKELETAL - ADULT  Goal: Maintain or return mobility to safest level of function  Description: INTERVENTIONS:  - Assess patient's ability to carry out ADLs; assess patient's baseline for ADL function and identify physical deficits which impact ability to perform ADLs (bathing, care of mouth/teeth,  toileting, grooming, dressing, etc.)  - Assess/evaluate cause of self-care deficits   - Assess range of motion  - Assess patient's mobility  - Assess patient's need for assistive devices and provide as appropriate  - Encourage maximum independence but intervene and supervise when necessary  - Involve family in performance of ADLs  - Assess for home care needs following discharge   - Consider OT consult to assist with ADL evaluation and planning for discharge  - Provide patient education as appropriate  Outcome: Progressing  Goal: Maintain proper alignment of affected body part  Description: INTERVENTIONS:  - Support, maintain and protect limb and body alignment  - Provide patient/ family with appropriate education  Outcome: Progressing     Problem: Neurological Deficit  Goal: Neurological status is stable or improving  Description: Interventions:  - Monitor and assess patient's level of consciousness, motor function, sensory function, and level of assistance needed for ADLs.   - Monitor and report changes from baseline. Collaborate with interdisciplinary team to initiate plan and implement interventions as ordered.   - Provide and maintain a safe environment.  - Consider seizure precautions.  - Consider fall precautions.  - Consider aspiration precautions.  - Consider bleeding precautions.  Outcome: Progressing     Problem: Activity Intolerance/Impaired Mobility  Goal: Mobility/activity is maintained at optimum level for patient  Description: Interventions:  - Assess and monitor patient  barriers to mobility and need for assistive/adaptive devices.  - Assess patient's emotional response to limitations.  - Collaborate with interdisciplinary team and initiate plans and interventions as ordered.  - Encourage independent activity per ability.  - Maintain proper body alignment.  - Perform active/passive rom as tolerated/ordered.  - Plan activities to conserve energy.  - Turn patient as appropriate  Outcome: Progressing      Problem: Communication Impairment  Goal: Ability to express needs and understand communication  Description: Assess patient's communication skills and ability to understand information.  Patient will demonstrate use of effective communication techniques, alternative methods of communication and understanding even if not able to speak.     - Encourage communication and provide alternate methods of communication as needed.  - Collaborate with case management/ for discharge needs.  - Include patient/family/caregiver in decisions related to communication.  Outcome: Progressing     Problem: Potential for Aspiration  Goal: Non-ventilated patient's risk of aspiration is minimized  Description: Assess and monitor vital signs, respiratory status, and labs (WBC).  Monitor for signs of aspiration (tachypnea, cough, rales, wheezing, cyanosis, fever).    - Assess and monitor patient's ability to swallow.  - Place patient up in chair to eat if possible.  - HOB up at 90 degrees to eat if unable to get patient up into chair.  - Supervise patient during oral intake.   - Instruct patient/ family to take small bites.  - Instruct patient/ family to take small single sips when taking liquids.  - Follow patient-specific strategies generated by speech pathologist.  Outcome: Progressing     Problem: Nutrition  Goal: Nutrition/Hydration status is improving  Description: Monitor and assess patient's nutrition/hydration status for malnutrition (ex- brittle hair, bruises, dry skin, pale skin and conjunctiva, muscle wasting, smooth red tongue, and disorientation). Collaborate with interdisciplinary team and initiate plan and interventions as ordered.  Monitor patient's weight and dietary intake as ordered or per policy. Utilize nutrition screening tool and intervene per policy. Determine patient's food preferences and provide high-protein, high-caloric foods as appropriate.     - Assist patient with eating.  - Allow adequate  time for meals.  - Encourage patient to take dietary supplement as ordered.  - Collaborate with clinical nutritionist.  - Include patient/family/caregiver in decisions related to nutrition.  Outcome: Progressing

## 2024-12-24 NOTE — PLAN OF CARE
Will continue to monitor TF tolerance with banatrol, if continues with water diarrhea may need to consider adjusting formula can consider Vital 1.2@48mL/hr provides total volume 1152mL, 1382mL, 86g pro, 934mL, water flushes per critical care.       Problem: Nutrition/Hydration-ADULT  Goal: Nutrient/Hydration intake appropriate for improving, restoring or maintaining nutritional needs  Description: Monitor and assess patient's nutrition/hydration status for malnutrition. Collaborate with interdisciplinary team and initiate plan and interventions as ordered.  Monitor patient's weight and dietary intake as ordered or per policy. Utilize nutrition screening tool and intervene as necessary. Determine patient's food preferences and provide high-protein, high-caloric foods as appropriate.     INTERVENTIONS:  - Monitor oral intake, urinary output, labs, and treatment plans  - Assess nutrition and hydration status and recommend course of action  - Evaluate amount of meals eaten  - Assist patient with eating if necessary   - Allow adequate time for meals  - Recommend/ encourage appropriate diets, oral nutritional supplements, and vitamin/mineral supplements  - Order, calculate, and assess calorie counts as needed  - Recommend, monitor, and adjust tube feedings and TPN/PPN based on assessed needs  - Assess need for intravenous fluids  - Provide specific nutrition/hydration education as appropriate  - Include patient/family/caregiver in decisions related to nutrition  Outcome: Progressing

## 2024-12-25 NOTE — RESPIRATORY THERAPY NOTE
12/25/24 8413   Respiratory Assessment   Assessment Type Assess only   Resp Comments No vent changes. Will continue to monitor pt. Expiratory filter changed   Vent Information   Vent ID [id number]   Vent type Brant C3   $ Pulse Oximetry Spot Check Charge Completed   SpO2 100 %   (S)CMV Settings   Resp Rate (BPM) 22 BPM   VT (mL) 410 mL   FIO2 (%) 45 %   PEEP (cmH2O) 8 cmH2O   I:E Ratio 1:1.7   Insp Time (%) 1 %   Flow Trigger (LPM) 2   Humidification Heater   Heater Temperature (Set) 95 °F (35 °C)   (S)CMV Actuals   Resp Rate (BPM) 22 BPM   VT (mL) 509   MV 9.1   MAP (cmH2O) 13 cmH2O   Peak Pressure (cmH2O) 34 cmH2O   I:E Ratio (Obs) 1:1.7   Insp Resistance 12   Heater Temperature (Obs) 98.6 °F (37 °C)   (S)CMV Alarms   High Peak Pressure (cmH2O) 40   Low Pressure (cmH2O) 5 cm H2O   High Resp Rate (BPM) 40 BPM   Low Resp Rate (BPM) 8 BPM   High MV (L/min) 25 L/min   Low MV (L/min) 3.5 L/min   High VT (mL) 800 mL   Low VT (mL) 200 mL   Apnea Time (s) 20 S   Maintenance   Alarm (pink) cable attached No   Resuscitation bag with peep valve at bedside Yes   Water bag changed No   Circuit changed No

## 2024-12-25 NOTE — PROGRESS NOTES
Pastoral Care Progress Note             12/25/24 1300   Clinical Encounter Type   Visited With Patient  (Father Ernesto)   Hindu Encounters   Hindu Needs Prayer  (Father Ernesto offered the patient a blessing.)

## 2024-12-25 NOTE — ACP (ADVANCE CARE PLANNING)
Discussion with two sons at bedside. Sons would like to change code status to level 2, no CPR. Both sons stated they have strong family traditions and do not want to make any changes to care during the holiday. They will come Thursday to spend the day with her and  transition to comfort care. Sons understand prognosis and that she would not want long term care. They want to treat any discomfort until Thursday. All questions answered at bedside. Code status changed to level 2.     JORDI Bonilla  12/25/24  12:30 AM

## 2024-12-25 NOTE — PROGRESS NOTES
Progress Note - Critical Care/ICU   Name: Bren Traylor 69 y.o. female I MRN: [id number]  Unit/Bed#: ICU 11 I Date of Admission: 12/6/2024   Date of Service: 12/25/2024 I Hospital Day: 18       Interval Events:   Fever 103, worsening hypotension, given albumin and started on levo, pupil change     Pertinent New Data:   blood pressure, pulse, temperature, respirations, and pulse oximetry      I have personally reviewed pertinent lab results.      Assessment and Plan  Diagnosis: hypotension, pupil change   Plan: albumin   Levo gtt   Continue zosyn and vanco   Blood cultures negative 72 hours   F/u STAT [facility]       JORDI Bonilla

## 2024-12-25 NOTE — RESPIRATORY THERAPY NOTE
12/25/24 0818   Respiratory Assessment   Assessment Type Assess only   General Appearance Unresponsive   Chest Assessment Chest expansion symmetrical   Bilateral Breath Sounds Diminished   Resp Comments Pt unresponsive. BS decreased. Pt tongue bleeding. Bite block tube [place] applied. No wean due to pts. condition. Will continue to monitor pt.   Vent Information   Vent ID [id number]   Vent type Valderrama C3   Valderrama Vent Mode (S)CMV   $ Pulse Oximetry Spot Check Charge Completed   SpO2 100 %   (S)CMV Settings   Resp Rate (BPM) 22 BPM   VT (mL) 410 mL   FIO2 (%) 45 %   PEEP (cmH2O) 8 cmH2O   I:E Ratio 1:1.4   Insp Time (%) 1 %   Flow Trigger (LPM) 2   Humidification Heater   Heater Temperature (Set) 95 °F (35 °C)   (S)CMV Actuals   Resp Rate (BPM) 22 BPM   VT (mL) 527   MV 10.9   MAP (cmH2O) 12 cmH2O   Peak Pressure (cmH2O) 26 cmH2O   I:E Ratio (Obs) 1:1   Insp Resistance 12   Heater Temperature (Obs) 98.6 °F (37 °C)   Static Compliance (mL/cmH20) 29 mL/cmH2O   Plateau Pressure (cm H2O) 20 cm H2O   (S)CMV Alarms   High Peak Pressure (cmH2O) 40   Low Pressure (cmH2O) 5 cm H2O   High Resp Rate (BPM) 40 BPM   Low Resp Rate (BPM) 8 BPM   High MV (L/min) 25 L/min   Low MV (L/min) 3.5 L/min   High VT (mL) 800 mL   Low VT (mL) 200 mL   Apnea Time (s) 20 S   Maintenance   Alarm (pink) cable attached No   Resuscitation bag with peep valve at bedside Yes   Water bag changed No   Circuit changed No   Daily Screen   Patient safety screen outcome: Failed   Not Ready for Weaning due to: Underline problem not resolved   IHI Ventilator Associated Pneumonia Bundle   Daily Awakening Trials Performed Not applicable (Comment)   Daily Assessment of Readiness to Extubate Yes

## 2024-12-25 NOTE — PROGRESS NOTES
Progress Note - Critical Care/ICU   Name: Bren Traylor 69 y.o. 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 Increased edema and mass effect. Increased midline shift. No hydrocephalus.   12/20 CT head: Similar size of R basal ganglia IPH, similar mass effect w 8mm shift. Global edema/ loss of architecture.  12/21: EEG monitoring with significant muscle artifact. When paralyzed showing some temporal spikes correlating with stroke region.  12/22 MRI brain: new bilateral cerebellar infarctions, new diffusion signal changes along superior frontal and parietal lobes which could be due to ischemia/hypoxic ischemic injury or braydon-ictal changes. No new hemorrhage--stable R basal ganglia hematoma.  12/22-23 EEG: intermittent subtle EMG artifact with a left frontotemporal sharp wave which may be subtle myoclonic focal seizure discharges. Similar, but less dramatic than what was seen around 0850. This subtle pattern has been on and off since 2-3 AM this morning.   12/23 CTH once heparin gtt at goal: stable  12/25 CTH for pupil change: stable. Evolving large recent intraparenchymal hemorrhage centered in the right gangliocapsular region, with increasing mass effect on the adjacent sulci and lateral ventricles, noting increased leftward midline shift of approximately 11 mm. New loss of gray-white matter differentiation in the right greater than left occipital  lobes, which is concerning for recent infarct/evolving sequela of diffuse hypoxic ischemic injury. Progressive sulcal and basilar cistern effacement, in keeping with sequela of diffuse hypoxic ischemic injury. Evolving recent bilateral cerebellar as well as right parietal infarcts.   12/25 EEG: Episodes of right sided face>arm twitching continuing overnight, being seen at 0216, 0247, 0309, 0329, 0341, 0407, 0414, and 0458   Meds  Continue keppra 1000mg BID  Demand dose ativan for seizure like activity  Fentanyl 50 mg q1h PRN  Bromocriptine 5mg q6  12/20 hypertonic saline 23% bolus  Plan: Started bromocriptine for persistent fevers yesterday. R pupil became fixed and dilated last night, moderate interval worsening of most findings on repeat CTH. Continued right leg and R facial twitching concerning for seizure, EEG responsive to Ativan; will discuss longer acting seizure tx. Family plans to proceed w comfort care tomorrow.  BP goal MAPs normal/>65  Neurochecks q1  Na goal 145-155     CV:   Cardiac arrest   1st ROSC achieved after 10mins of ACLS protocol, lost pulses 2x again, ROSC again  Studies  12/10 Echo  EF 50-55%.  Hypokinetic inferior segments.    12/18 Echo  EF 55%.Normal LV and RV and systolic function. Mildly abnormal diastolic function.  Basal inferior, basal anterolateral, mid inferior, mid anterolateral hypokinesis.  Basal inferolateral and mid inferolateral dyskinesia.   12/22 NSTEMI, hx HTN, HLD  Persistent ST abnormalities on repeat EKGs  Troponins have peaked  Meds  Levophed gtt, now at 7 mcg/min  Heparin gtt, ACS protocol  ASA 81  Plan: Initiated on pressors overnight. Heparin drip on ACS protocol. Continue with same.    Pulm:  Mechanical Ventilation  PC 20/18/8/45%   Pulmonary edema, thick secretions  12/20 bronchoscopy thick yellow secretions BL  12/18 and 12/20 IV Lasix   Multi lobar PNA  Completed 7 days of antibiotics but febrile again  Acute PE  Meds  Hep gtt ACS protocol  Albuterol nebs  PRN  Plan: Overbreathes vent with irregularity and air hunger. Intermittently becomes very tachypneic on the vent and responsive to fentanyl.     GI:   Diarrhea  Bowel regimen has been off for 72 hours, continues to have diarrhea, managed with rectal tube  Elevated transaminases/shock liver  AST 70 < 130 < 232 < 419,    < 363 < 511 < 705  T. bili normal at 0.56  Plan: Continue to monitor, CMPs in AM. Continue increased banana flakes for diarrhea.     :   Sodium goal 145-155  NA today 152 < 157 < 156  UOP 1325cc in last 24h  Plan: Decrease free water flushes with tube feeds to avoid further drop in sodium. UOP acceptable. Continue Sierra.    F/E/N:   No MIVF  Replete electrolytes as needed  Tube feeds @ goal of 40     Heme/Onc:   Anemia  12/12 1u prBC for Hb 7.0  Hb 7.7 < 7.9  Plan: Continue to monitor.    Endo:   Glucose   185 on BMP  POCT 143 < 151 < 178  Plan: Continue to monitor, goal range 120-180.     ID:   Multilobar MSSA PNA, persistent fever (possibly central vs ID)  Meds  Vancomycin day 8  Zosyn day 8  Studies  12/13 sputum cx: 4+ MSSA  12/18 Bcx NG x 5d, final  12/19 sputum cx: few coloines candida albicans  12/19 urine strep antigen neg  12/20 sputum cx: few coloines candida albicans  12/21 Bcx NG x72h  Plan: Reinitiated Vanco and Zosyn last night after patient became hypotensive and increasingly febrile, continue empiric treatment for now. Most recent blood cultures no growth x 72 hours.       MSK/Skin:   Frequent turning     Tubes: sierra, ETT  Lines: R fem A line, L IJ CVC, PIV  Drains: none    Disposition: Critical care, possible transition to comfort care tomorrow    ICU Core Measures     Vented Patient  VAP Bundle  VAP bundle ordered     A: Assess, Prevent, and Manage Pain Has pain been assessed? NA  Need for changes to pain regimen? NA   B: Both Spontaneous Awakening Trials (SATs) and Spontaneous Breathing Trials (SBTs) Plan to perform spontaneous awakening trial today? No secondary to  targeted temperature management  Plan to perform spontaneous breathing trial today? No secondary to targeted temperature management  Obvious barriers to extubation? Yes   C: Choice of Sedation RASS Goal: -3 Moderate Sedation or 0 Alert and Calm  Need for changes to sedation or analgesia regimen? NA   D: Delirium CAM-ICU: Negative   E: Early Mobility  Plan for early mobility? No   F: Family Engagement Plan for family engagement today? Yes       Antibiotic Review: Continue broad spectrum secondary to severity of illness.     Review of Invasive Devices:    Silvestre Plan: Continue for accurate I/O monitoring for 48 hours  Central access plan: Medications requiring central line Hemodynamic monitoring  Qi Plan: Keep arterial line for hemodynamic monitoring    Prophylaxis:  VTE Contraindicated secondary to: Cardiac arrest, TTM   Stress Ulcer  not ordered         24 Hour Events :    Became hypotensive and reinitiated pressors, albumin bolus. R pupil now fixed/dilated, repeat CTH w interval progression of mass effect, shift, and loss of grey/white matter differentiation. Made Level 2 by lydia last night. Plan to transition to comfort care 12/26.    Subjective   Review of Systems: Review of Systems not obtainable due to Clinical Condition, Altered mental status    Objective :                   Vitals I/O      Most Recent Min/Max in 24hrs   Temp 98.4 °F (36.9 °C) Temp  Min: 98.4 °F (36.9 °C)  Max: 103.2 °F (39.6 °C)   Pulse 94 Pulse  Min: 88  Max: 122   Resp (!) 24 Resp  Min: 20  Max: 25   /69 BP  Min: 98/58  Max: 116/69   O2 Sat 98 % SpO2  Min: 94 %  Max: 100 %      Intake/Output Summary (Last 24 hours) at 12/25/2024 0848  Last data filed at 12/25/2024 0600  Gross per 24 hour   Intake 3190.04 ml   Output 1925 ml   Net 1265.04 ml       Diet Enteral/Parenteral; Tube Feeding No Oral Diet; Jevity 1.2 Navarro; Continuous; 40; Prosource Protein Liquid - One Packet; BID; Banatrol Plus Banana Flakes - Two Packets; BID; 200; Water;  Every 4 hours    Invasive Monitoring   Arterial Line  Qi /48  Arterial Line BP  Min: 88/40  Max: 130/60   MAP 70 mmHg  Arterial Line MAP (mmHg)  Min: 58 mmHg  Max: 86 mmHg           Physical Exam   Physical Exam  Vitals and nursing note reviewed.   Eyes:      General:         Right eye: Discharge present.      Pupils: Pupils are equal.      Comments: Pupils brisk, reactive, 3 cm   Skin:     General: Skin is warm and dry.   HENT:      Head: Normocephalic and atraumatic. No right periorbital erythema or left periorbital erythema.   Cardiovascular:      Rate and Rhythm: Normal rate and regular rhythm.      Pulses:           Carotid pulses are 2+ on the right side and 2+ on the left side.       Posterior tibial pulses are 2+ on the right side and 2+ on the left side.      Heart sounds: Normal heart sounds.   Abdominal: General: There is no distension.      Palpations: Abdomen is soft.   Constitutional:       Appearance: She is ill-appearing.      Interventions: She is intubated.   Pulmonary:      Effort: Tachypnea present. She is intubated.      Breath sounds: No decreased air movement. Rhonchi present. No decreased breath sounds.      Comments: On ventilator  Neurological:      Mental Status: She is unresponsive.      GCS: GCS eye subscore is 1. GCS verbal subscore is 1. GCS motor subscore is 1.        Corneal reflex present, cough reflex and gag reflex not intact.      Comments: Rhythmic twitching R nasolabial fold  GCS 3T, no grimace  Minimal extensor posturing in LUE, no response in other limbs  Cough reflex present, corneal reflex present  Left pupil 2mm minimally reactive; R pupil 5mm and fixed   Genitourinary/Anorectal:  Silvestre present.        Diagnostic Studies        Lab Results: I have reviewed the following results:     Medications:  Scheduled PRN   artificial tear, , HS  aspirin, 81 mg, Daily  bromocriptine, 5 mg, Q6H  calcium gluconate, 2 g, Once  chlorhexidine, 15 mL, Q12H DANIS  levETIRAcetam, 1,500  mg, Q12H DANIS  norepinephrine, ,   piperacillin-tazobactam, 4.5 g, Q8H  sodium chloride, 4 mL, Q6H      albuterol, 2.5 mg, Q4H PRN  fentaNYL, 50 mcg, Q1H PRN  norepinephrine, ,        Continuous    heparin (porcine), 3-20 Units/kg/hr (Order-Specific), Last Rate: Stopped (12/25/24 0847)  norepinephrine, 1-30 mcg/min, Last Rate: 5 mcg/min (12/25/24 0810)         Labs:   CBC    Recent Labs     12/24/24  0549 12/25/24  0545   WBC 19.51* 24.57*   HGB 7.9* 7.7*   HCT 25.6* 26.1*    221     BMP    Recent Labs     12/24/24  0549 12/25/24  0545   SODIUM 157* 152*   K 3.5 3.6   * 121*   CO2 23 23   AGAP 8 8   BUN 26* 26*   CREATININE 0.75 0.63   CALCIUM 8.1* 7.8*       Coags    Recent Labs     12/24/24  0549 12/25/24  0655   PTT 82* 188*        Additional Electrolytes  Recent Labs     12/24/24  0549 12/25/24  0545   MG 2.0 2.1   PHOS 2.7 2.5          Blood Gas    Recent Labs     12/24/24  0549   PHART 7.565*   [id number] 24.2*   PO2ART 115.9   [id number] 21.4*   BEART -0.1   SOURCE Line, Arterial       Recent Labs     12/24/24  0549   SOURCE Line, Arterial      LFTs  Recent Labs     12/24/24  0549 12/25/24  0545   * 226*   * 70*   ALKPHOS 91 86   ALB 2.7* 2.9*   TBILI 0.44 0.56       Infectious  No recent results    Glucose  Recent Labs     12/24/24  0549 12/25/24  0545   GLUC 154* 185*

## 2024-12-25 NOTE — PROGRESS NOTES
Pastoral Care Progress Note            Pt presented as unresponsive and intubated and was accompanied by her two sons who presented as angry and frustrated concerning the pt's conditions.  provided Spiritual Care consultation through empathetic active listening, anticipatory grief counseling, validating their feelings, and prayer.     Fr Orozco anointed the pt on Dec. 20.    Chaplaincy Interventions Utilized:   Empowerment: Normalized experience of patient/family, Provided chaplaincy education, and Provided grief counseling    Exploration: Explored hope, Explored emotional needs & resources, Explored relational needs & resources, Explored spiritual needs & resources, Facilitated expression of regret, and Facilitated story telling    Collaboration: Consulted with interdisciplinary team     Relationship Building: Cultivated a relationship of care and support, Listened empathically, and Provided silent and supportive presence    Ritual: Provided prayer    Chaplaincy Outcomes Achieved:  Expressed gratitude, Identified priorities, Tearfully processed emotions, and Verbally processed emotions    Spiritual Coping Strategies Utilized:   Spiritual practices and Spiritual comfort       12/24/24 1900   Clinical Encounter Type   Visited With Patient and family together   Routine Visit Introduction   Consult Patient care   M[employer]on Encounters   M[employer]on Needs Sacred Text

## 2024-12-25 NOTE — PLAN OF CARE
Problem: PAIN - ADULT  Goal: Verbalizes/displays adequate comfort level or baseline comfort level  Description: Interventions:  - Encourage patient to monitor pain and request assistance  - Assess pain using appropriate pain scale  - Administer analgesics based on type and severity of pain and evaluate response  - Implement non-pharmacological measures as appropriate and evaluate response  - Consider cultural and social influences on pain and pain management  - Notify physician/advanced practitioner if interventions unsuccessful or patient reports new pain  Outcome: Progressing     Problem: INFECTION - ADULT  Goal: Absence or prevention of progression during hospitalization  Description: INTERVENTIONS:  - Assess and monitor for signs and symptoms of infection  - Monitor lab/diagnostic results  - Monitor all insertion sites, i.e. indwelling lines, tubes, and drains  - Monitor endotracheal if appropriate and nasal secretions for changes in amount and color  - [place] appropriate cooling/warming therapies per order  - Administer medications as ordered  - Instruct and encourage patient and family to use good hand hygiene technique  - Identify and instruct in appropriate isolation precautions for identified infection/condition  Outcome: Progressing  Goal: Absence of fever/infection during neutropenic period  Description: INTERVENTIONS:  - Monitor WBC    Outcome: Progressing     Problem: SAFETY ADULT  Goal: Patient will remain free of falls  Description: INTERVENTIONS:  - Educate patient/family on patient safety including physical limitations  - Instruct patient to call for assistance with activity   - Consult OT/PT to assist with strengthening/mobility   - Keep Call bell within reach  - Keep bed low and locked with side rails adjusted as appropriate  - Keep care items and personal belongings within reach  - Initiate and maintain comfort rounds  - Make Fall Risk Sign visible to staff  - Offer Toileting every 2 Hours,  in advance of need  - Initiate/Maintain bed alarm  - Obtain necessary fall risk management equipment: non skid footwear  - Apply yellow socks and bracelet for high fall risk patients  - Consider moving patient to room near nurses station  Outcome: Progressing  Goal: Maintain or return to baseline ADL function  Description: INTERVENTIONS:  -  Assess patient's ability to carry out ADLs; assess patient's baseline for ADL function and identify physical deficits which impact ability to perform ADLs (bathing, care of mouth/teeth, toileting, grooming, dressing, etc.)  - Assess/evaluate cause of self-care deficits   - Assess range of motion  - Assess patient's mobility; develop plan if impaired  - Assess patient's need for assistive devices and provide as appropriate  - Encourage maximum independence but intervene and supervise when necessary  - Involve family in performance of ADLs  - Assess for home care needs following discharge   - Consider OT consult to assist with ADL evaluation and planning for discharge  - Provide patient education as appropriate  Outcome: Progressing  Goal: Maintains/Returns to pre admission functional level  Description: INTERVENTIONS:  - Perform AM-PAC 6 Click Basic Mobility/ Daily Activity assessment daily.  - Set and communicate daily mobility goal to care team and patient/family/caregiver.   - Collaborate with rehabilitation services on mobility goals if consulted  - Perform Range of Motion 3 times a day.  - Reposition patient every 2 hours.  - Dangle patient 3 times a day  - Record patient progress and toleration of activity level   Outcome: Progressing     Problem: DISCHARGE PLANNING  Goal: Discharge to home or other facility with appropriate resources  Description: INTERVENTIONS:  - Identify barriers to discharge w/patient and caregiver  - Arrange for needed discharge resources and transportation as appropriate  - Identify discharge learning needs (meds, wound care, etc.)  - Arrange for  interpretive services to assist at discharge as needed  - Refer to Case Management Department for coordinating discharge planning if the patient needs post-hospital services based on physician/advanced practitioner order or complex needs related to functional status, cognitive ability, or social support system  Outcome: Progressing     Problem: Knowledge Deficit  Goal: Patient/family/caregiver demonstrates understanding of disease process, treatment plan, medications, and discharge instructions  Description: Complete learning assessment and assess knowledge base.  Interventions:  - Provide teaching at level of understanding  - Provide teaching via preferred learning methods  Outcome: Progressing     Problem: Prexisting or High Potential for Compromised Skin Integrity  Goal: Skin integrity is maintained or improved  Description: INTERVENTIONS:  - Identify patients at risk for skin breakdown  - Assess and monitor skin integrity  - Assess and monitor nutrition and hydration status  - Monitor labs   - Assess for incontinence   - Turn and reposition patient  - Assist with mobility/ambulation  - Relieve pressure over bony prominences  - Avoid friction and shearing  - Provide appropriate hygiene as needed including keeping skin clean and dry  - Evaluate need for skin moisturizer/barrier cream  - Collaborate with interdisciplinary team   - Patient/family teaching  - Consider wound care consult   Outcome: Progressing     Problem: Potential for Falls  Goal: Patient will remain free of falls  Description: INTERVENTIONS:  - Educate patient/family on patient safety including physical limitations  - Instruct patient to call for assistance with activity   - Consult OT/PT to assist with strengthening/mobility   - Keep Call bell within reach  - Keep bed low and locked with side rails adjusted as appropriate  - Keep care items and personal belongings within reach  - Initiate and maintain comfort rounds  - Make Fall Risk Sign visible  to staff  - Offer Toileting every 2 Hours, in advance of need  - Initiate/Maintain bed alarm  - Obtain necessary fall risk management equipment: non skid footwear  - Apply yellow socks and bracelet for high fall risk patients  - Consider moving patient to room near nurses station  Outcome: Progressing     Problem: COPING  Goal: Pt/Family able to verbalize concerns and demonstrate effective coping strategies  Description: INTERVENTIONS:  - Assist patient/family to identify coping skills, available support systems and cultural and spiritual values  - Provide emotional support, including active listening and acknowledgement of concerns of patient and caregivers  - Reduce environmental stimuli, as able  - Provide patient education  - Assess for spiritual pain/suffering and initiate spiritual care, including notification of Pastoral Care or [place] based community as needed  - Assess effectiveness of coping strategies  Outcome: Progressing  Goal: Will report anxiety at manageable levels  Description: INTERVENTIONS:  - Administer medication as ordered  - Teach and encourage coping skills  - Provide emotional support  - Assess patient/family for anxiety and ability to cope  Outcome: Progressing     Problem: Nutrition/Hydration-ADULT  Goal: Nutrient/Hydration intake appropriate for improving, restoring or maintaining nutritional needs  Description: Monitor and assess patient's nutrition/hydration status for malnutrition. Collaborate with interdisciplinary team and initiate plan and interventions as ordered.  Monitor patient's weight and dietary intake as ordered or per policy. Utilize nutrition screening tool and intervene as necessary. Determine patient's food preferences and provide high-protein, high-caloric foods as appropriate.     INTERVENTIONS:  - Monitor oral intake, urinary output, labs, and treatment plans  - Assess nutrition and hydration status and recommend course of action  - Evaluate amount of meals eaten  -  Assist patient with eating if necessary   - Allow adequate time for meals  - Recommend/ encourage appropriate diets, oral nutritional supplements, and vitamin/mineral supplements  - Order, calculate, and assess calorie counts as needed  - Recommend, monitor, and adjust tube feedings and TPN/PPN based on assessed needs  - Assess need for intravenous fluids  - Provide specific nutrition/hydration education as appropriate  - Include patient/family/caregiver in decisions related to nutrition  Outcome: Progressing     Problem: NEUROSENSORY - ADULT  Goal: Achieves stable or improved neurological status  Description: INTERVENTIONS  - Monitor and report changes in neurological status  - Monitor vital signs such as temperature, blood pressure, glucose, and any other labs ordered   - Initiate measures to prevent increased intracranial pressure  - Monitor for seizure activity and implement precautions if appropriate      Outcome: Progressing  Goal: Achieves maximal functionality and self care  Description: INTERVENTIONS  - Monitor swallowing and airway patency with patient fatigue and changes in neurological status  - Encourage and assist patient to increase activity and self care.   - Encourage visually impaired, hearing impaired and aphasic patients to use assistive/communication devices  Outcome: Progressing     Problem: CARDIOVASCULAR - ADULT  Goal: Maintains optimal cardiac output and hemodynamic stability  Description: INTERVENTIONS:  - Monitor I/O, vital signs and rhythm  - Monitor for S/S and trends of decreased cardiac output  - Administer and titrate ordered vasoactive medications to optimize hemodynamic stability  - Assess quality of pulses, skin color and temperature  - Assess for signs of decreased coronary artery perfusion  - Instruct patient to report change in severity of symptoms  Outcome: Progressing  Goal: Absence of cardiac dysrhythmias or at baseline rhythm  Description: INTERVENTIONS:  - Continuous  cardiac monitoring, vital signs, obtain 12 lead EKG if ordered  - Administer antiarrhythmic and heart rate control medications as ordered  - Monitor electrolytes and administer replacement therapy as ordered  Outcome: Progressing     Problem: RESPIRATORY - ADULT  Goal: Achieves optimal ventilation and oxygenation  Description: INTERVENTIONS:  - Assess for changes in respiratory status  - Assess for changes in mentation and behavior  - Position to facilitate oxygenation and minimize respiratory effort  - Oxygen administered by appropriate delivery if ordered  - Initiate smoking cessation education as indicated  - Encourage broncho-pulmonary hygiene including cough, deep breathe, Incentive Spirometry  - Assess the need for suctioning and aspirate as needed  - Assess and instruct to report SOB or any respiratory difficulty  - Respiratory Therapy support as indicated  Outcome: Progressing     Problem: GASTROINTESTINAL - ADULT  Goal: Maintains or returns to baseline bowel function  Description: INTERVENTIONS:  - Assess bowel function  - Encourage oral fluids to ensure adequate hydration  - Administer IV fluids if ordered to ensure adequate hydration  - Administer ordered medications as needed  - Encourage mobilization and activity  - Consider nutritional services referral to assist patient with adequate nutrition and appropriate food choices  Outcome: Progressing  Goal: Maintains adequate nutritional intake  Description: INTERVENTIONS:  - Monitor percentage of each meal consumed  - Identify factors contributing to decreased intake, treat as appropriate  - Assist with meals as needed  - Monitor I&O, weight, and lab values if indicated  - Obtain nutrition services referral as needed  Outcome: Progressing     Problem: GENITOURINARY - ADULT  Goal: Maintains or returns to baseline urinary function  Description: INTERVENTIONS:  - Assess urinary function  - Encourage oral fluids to ensure adequate hydration if ordered  -  Administer IV fluids as ordered to ensure adequate hydration  - Administer ordered medications as needed  - Offer frequent toileting  - Follow urinary retention protocol if ordered  Outcome: Progressing  Goal: Absence of urinary retention  Description: INTERVENTIONS:  - Assess patient's ability to void and empty bladder  - Monitor I/O  - Bladder scan as needed  - Discuss with physician/AP medications to alleviate retention as needed  - Discuss catheterization for long term situations as appropriate  Outcome: Progressing     Problem: METABOLIC, FLUID AND ELECTROLYTES - ADULT  Goal: Electrolytes maintained within normal limits  Description: INTERVENTIONS:  - Monitor labs and assess patient for signs and symptoms of electrolyte imbalances  - Administer electrolyte replacement as ordered  - Monitor response to electrolyte replacements, including repeat lab results as appropriate  - Instruct patient on fluid and nutrition as appropriate  Outcome: Progressing  Goal: Fluid balance maintained  Description: INTERVENTIONS:  - Monitor labs   - Monitor I/O and WT  - Instruct patient on fluid and nutrition as appropriate  - Assess for signs & symptoms of volume excess or deficit  Outcome: Progressing  Goal: Glucose maintained within target range  Description: INTERVENTIONS:  - Monitor Blood Glucose as ordered  - Assess for signs and symptoms of hyperglycemia and hypoglycemia  - Administer ordered medications to maintain glucose within target range  - Assess nutritional intake and initiate nutrition service referral as needed  Outcome: Progressing     Problem: SKIN/TISSUE INTEGRITY - ADULT  Goal: Skin Integrity remains intact(Skin Breakdown Prevention)  Description: Assess:  -Perform Geovani assessment every shift   -Clean and moisturize skin every shift   -Inspect skin when repositioning, toileting, and assisting with ADLS  -Assess under medical devices such as paula every hour  -Assess extremities for adequate circulation and  sensation     Bed Management:  -Have minimal linens on bed & keep smooth, unwrinkled  -Change linens as needed when moist or perspiring  -Avoid sitting or lying in one position for more than 45 hours while in bed  -Keep HOB at 45degrees     Toileting:  -Offer bedside commode  -Assess for incontinence every hour  -Use incontinent care products after each incontinent episode such as moisture barrier     Activity:  --Encourage or provide ROM exercises   -Turn and reposition patient every 2 Hours  -Use appropriate equipment to lift or move patient in bed  -Instruct/ Assist with weight shifting every hour when out of bed in chair  -Consider limitation of chair time 2 hour intervals    Skin Care:  -Avoid use of baby powder, tape, friction and shearing, hot water or constrictive clothing  -Relieve pressure over bony prominences using mepilx  -Do not massage red bony areas    Next Steps:  -Teach patient strategies to minimize risks such as weight shifting    -Consider consults to  interdisciplinary teams such as PT/OT  Outcome: Progressing  Goal: Incision(s), wounds(s) or drain site(s) healing without S/S of infection  Description: INTERVENTIONS  - Assess and document dressing, incision, wound bed, drain sites and surrounding tissue  - Provide patient and family education  - Perform skin care/dressing changes every shift  Outcome: Progressing     Problem: HEMATOLOGIC - ADULT  Goal: Maintains hematologic stability  Description: INTERVENTIONS  - Assess for signs and symptoms of bleeding or hemorrhage  - Monitor labs  - Administer supportive blood products/factors as ordered and appropriate  Outcome: Progressing     Problem: MUSCULOSKELETAL - ADULT  Goal: Maintain or return mobility to safest level of function  Description: INTERVENTIONS:  - Assess patient's ability to carry out ADLs; assess patient's baseline for ADL function and identify physical deficits which impact ability to perform ADLs (bathing, care of mouth/teeth,  toileting, grooming, dressing, etc.)  - Assess/evaluate cause of self-care deficits   - Assess range of motion  - Assess patient's mobility  - Assess patient's need for assistive devices and provide as appropriate  - Encourage maximum independence but intervene and supervise when necessary  - Involve family in performance of ADLs  - Assess for home care needs following discharge   - Consider OT consult to assist with ADL evaluation and planning for discharge  - Provide patient education as appropriate  Outcome: Progressing  Goal: Maintain proper alignment of affected body part  Description: INTERVENTIONS:  - Support, maintain and protect limb and body alignment  - Provide patient/ family with appropriate education  Outcome: Progressing     Problem: Neurological Deficit  Goal: Neurological status is stable or improving  Description: Interventions:  - Monitor and assess patient's level of consciousness, motor function, sensory function, and level of assistance needed for ADLs.   - Monitor and report changes from baseline. Collaborate with interdisciplinary team to initiate plan and implement interventions as ordered.   - Provide and maintain a safe environment.  - Consider seizure precautions.  - Consider fall precautions.  - Consider aspiration precautions.  - Consider bleeding precautions.  Outcome: Progressing     Problem: Activity Intolerance/Impaired Mobility  Goal: Mobility/activity is maintained at optimum level for patient  Description: Interventions:  - Assess and monitor patient  barriers to mobility and need for assistive/adaptive devices.  - Assess patient's emotional response to limitations.  - Collaborate with interdisciplinary team and initiate plans and interventions as ordered.  - Encourage independent activity per ability.  - Maintain proper body alignment.  - Perform active/passive rom as tolerated/ordered.  - Plan activities to conserve energy.  - Turn patient as appropriate  Outcome: Progressing      Problem: Communication Impairment  Goal: Ability to express needs and understand communication  Description: Assess patient's communication skills and ability to understand information.  Patient will demonstrate use of effective communication techniques, alternative methods of communication and understanding even if not able to speak.     - Encourage communication and provide alternate methods of communication as needed.  - Collaborate with case management/ for discharge needs.  - Include patient/family/caregiver in decisions related to communication.  Outcome: Progressing     Problem: Potential for Aspiration  Goal: Non-ventilated patient's risk of aspiration is minimized  Description: Assess and monitor vital signs, respiratory status, and labs (WBC).  Monitor for signs of aspiration (tachypnea, cough, rales, wheezing, cyanosis, fever).    - Assess and monitor patient's ability to swallow.  - Place patient up in chair to eat if possible.  - HOB up at 90 degrees to eat if unable to get patient up into chair.  - Supervise patient during oral intake.   - Instruct patient/ family to take small bites.  - Instruct patient/ family to take small single sips when taking liquids.  - Follow patient-specific strategies generated by speech pathologist.  Outcome: Progressing     Problem: Nutrition  Goal: Nutrition/Hydration status is improving  Description: Monitor and assess patient's nutrition/hydration status for malnutrition (ex- brittle hair, bruises, dry skin, pale skin and conjunctiva, muscle wasting, smooth red tongue, and disorientation). Collaborate with interdisciplinary team and initiate plan and interventions as ordered.  Monitor patient's weight and dietary intake as ordered or per policy. Utilize nutrition screening tool and intervene per policy. Determine patient's food preferences and provide high-protein, high-caloric foods as appropriate.     - Assist patient with eating.  - Allow adequate  time for meals.  - Encourage patient to take dietary supplement as ordered.  - Collaborate with clinical nutritionist.  - Include patient/family/caregiver in decisions related to nutrition.  Outcome: Progressing

## 2024-12-25 NOTE — PLAN OF CARE
Problem: PAIN - ADULT  Goal: Verbalizes/displays adequate comfort level or baseline comfort level  Description: Interventions:  - Encourage patient to monitor pain and request assistance  - Assess pain using appropriate pain scale  - Administer analgesics based on type and severity of pain and evaluate response  - Implement non-pharmacological measures as appropriate and evaluate response  - Consider cultural and social influences on pain and pain management  - Notify physician/advanced practitioner if interventions unsuccessful or patient reports new pain  Outcome: Progressing     Problem: INFECTION - ADULT  Goal: Absence or prevention of progression during hospitalization  Description: INTERVENTIONS:  - Assess and monitor for signs and symptoms of infection  - Monitor lab/diagnostic results  - Monitor all insertion sites, i.e. indwelling lines, tubes, and drains  - Monitor endotracheal if appropriate and nasal secretions for changes in amount and color  - [place] appropriate cooling/warming therapies per order  - Administer medications as ordered  - Instruct and encourage patient and family to use good hand hygiene technique  - Identify and instruct in appropriate isolation precautions for identified infection/condition  Outcome: Progressing  Goal: Absence of fever/infection during neutropenic period  Description: INTERVENTIONS:  - Monitor WBC    Outcome: Progressing     Problem: SAFETY ADULT  Goal: Patient will remain free of falls  Description: INTERVENTIONS:  - Educate patient/family on patient safety including physical limitations  - Instruct patient to call for assistance with activity   - Consult OT/PT to assist with strengthening/mobility   - Keep Call bell within reach  - Keep bed low and locked with side rails adjusted as appropriate  - Keep care items and personal belongings within reach  - Initiate and maintain comfort rounds  - Make Fall Risk Sign visible to staff  - Offer Toileting every 2 Hours, in  advance of need  - Initiate/Maintain bed alarm  - Obtain necessary fall risk management equipment: non skid footwear  - Apply yellow socks and bracelet for high fall risk patients  - Consider moving patient to room near nurses station  Outcome: Progressing  Goal: Maintain or return to baseline ADL function  Description: INTERVENTIONS:  -  Assess patient's ability to carry out ADLs; assess patient's baseline for ADL function and identify physical deficits which impact ability to perform ADLs (bathing, care of mouth/teeth, toileting, grooming, dressing, etc.)  - Assess/evaluate cause of self-care deficits   - Assess range of motion  - Assess patient's mobility; develop plan if impaired  - Assess patient's need for assistive devices and provide as appropriate  - Encourage maximum independence but intervene and supervise when necessary  - Involve family in performance of ADLs  - Assess for home care needs following discharge   - Consider OT consult to assist with ADL evaluation and planning for discharge  - Provide patient education as appropriate  Outcome: Progressing  Goal: Maintains/Returns to pre admission functional level  Description: INTERVENTIONS:  - Perform AM-PAC 6 Click Basic Mobility/ Daily Activity assessment daily.  - Set and communicate daily mobility goal to care team and patient/family/caregiver.   - Collaborate with rehabilitation services on mobility goals if consulted  - Perform Range of Motion 3 times a day.  - Reposition patient every 2 hours.  - Dangle patient 3 times a day  - Record patient progress and toleration of activity level   Outcome: Progressing     Problem: DISCHARGE PLANNING  Goal: Discharge to home or other facility with appropriate resources  Description: INTERVENTIONS:  - Identify barriers to discharge w/patient and caregiver  - Arrange for needed discharge resources and transportation as appropriate  - Identify discharge learning needs (meds, wound care, etc.)  - Arrange for  interpretive services to assist at discharge as needed  - Refer to Case Management Department for coordinating discharge planning if the patient needs post-hospital services based on physician/advanced practitioner order or complex needs related to functional status, cognitive ability, or social support system  Outcome: Progressing     Problem: Knowledge Deficit  Goal: Patient/family/caregiver demonstrates understanding of disease process, treatment plan, medications, and discharge instructions  Description: Complete learning assessment and assess knowledge base.  Interventions:  - Provide teaching at level of understanding  - Provide teaching via preferred learning methods  Outcome: Progressing     Problem: Prexisting or High Potential for Compromised Skin Integrity  Goal: Skin integrity is maintained or improved  Description: INTERVENTIONS:  - Identify patients at risk for skin breakdown  - Assess and monitor skin integrity  - Assess and monitor nutrition and hydration status  - Monitor labs   - Assess for incontinence   - Turn and reposition patient  - Assist with mobility/ambulation  - Relieve pressure over bony prominences  - Avoid friction and shearing  - Provide appropriate hygiene as needed including keeping skin clean and dry  - Evaluate need for skin moisturizer/barrier cream  - Collaborate with interdisciplinary team   - Patient/family teaching  - Consider wound care consult   Outcome: Progressing     Problem: Potential for Falls  Goal: Patient will remain free of falls  Description: INTERVENTIONS:  - Educate patient/family on patient safety including physical limitations  - Instruct patient to call for assistance with activity   - Consult OT/PT to assist with strengthening/mobility   - Keep Call bell within reach  - Keep bed low and locked with side rails adjusted as appropriate  - Keep care items and personal belongings within reach  - Initiate and maintain comfort rounds  - Make Fall Risk Sign visible  to staff  - Offer Toileting every 2 Hours, in advance of need  - Initiate/Maintain bed alarm  - Obtain necessary fall risk management equipment: non skid footwear  - Apply yellow socks and bracelet for high fall risk patients  - Consider moving patient to room near nurses station  Outcome: Progressing     Problem: COPING  Goal: Pt/Family able to verbalize concerns and demonstrate effective coping strategies  Description: INTERVENTIONS:  - Assist patient/family to identify coping skills, available support systems and cultural and spiritual values  - Provide emotional support, including active listening and acknowledgement of concerns of patient and caregivers  - Reduce environmental stimuli, as able  - Provide patient education  - Assess for spiritual pain/suffering and initiate spiritual care, including notification of Pastoral Care or [place] based community as needed  - Assess effectiveness of coping strategies  Outcome: Progressing  Goal: Will report anxiety at manageable levels  Description: INTERVENTIONS:  - Administer medication as ordered  - Teach and encourage coping skills  - Provide emotional support  - Assess patient/family for anxiety and ability to cope  Outcome: Progressing     Problem: Nutrition/Hydration-ADULT  Goal: Nutrient/Hydration intake appropriate for improving, restoring or maintaining nutritional needs  Description: Monitor and assess patient's nutrition/hydration status for malnutrition. Collaborate with interdisciplinary team and initiate plan and interventions as ordered.  Monitor patient's weight and dietary intake as ordered or per policy. Utilize nutrition screening tool and intervene as necessary. Determine patient's food preferences and provide high-protein, high-caloric foods as appropriate.     INTERVENTIONS:  - Monitor oral intake, urinary output, labs, and treatment plans  - Assess nutrition and hydration status and recommend course of action  - Evaluate amount of meals eaten  -  Assist patient with eating if necessary   - Allow adequate time for meals  - Recommend/ encourage appropriate diets, oral nutritional supplements, and vitamin/mineral supplements  - Order, calculate, and assess calorie counts as needed  - Recommend, monitor, and adjust tube feedings and TPN/PPN based on assessed needs  - Assess need for intravenous fluids  - Provide specific nutrition/hydration education as appropriate  - Include patient/family/caregiver in decisions related to nutrition  Outcome: Progressing     Problem: NEUROSENSORY - ADULT  Goal: Achieves stable or improved neurological status  Description: INTERVENTIONS  - Monitor and report changes in neurological status  - Monitor vital signs such as temperature, blood pressure, glucose, and any other labs ordered   - Initiate measures to prevent increased intracranial pressure  - Monitor for seizure activity and implement precautions if appropriate      Outcome: Progressing  Goal: Achieves maximal functionality and self care  Description: INTERVENTIONS  - Monitor swallowing and airway patency with patient fatigue and changes in neurological status  - Encourage and assist patient to increase activity and self care.   - Encourage visually impaired, hearing impaired and aphasic patients to use assistive/communication devices  Outcome: Progressing     Problem: CARDIOVASCULAR - ADULT  Goal: Maintains optimal cardiac output and hemodynamic stability  Description: INTERVENTIONS:  - Monitor I/O, vital signs and rhythm  - Monitor for S/S and trends of decreased cardiac output  - Administer and titrate ordered vasoactive medications to optimize hemodynamic stability  - Assess quality of pulses, skin color and temperature  - Assess for signs of decreased coronary artery perfusion  - Instruct patient to report change in severity of symptoms  Outcome: Progressing  Goal: Absence of cardiac dysrhythmias or at baseline rhythm  Description: INTERVENTIONS:  - Continuous  cardiac monitoring, vital signs, obtain 12 lead EKG if ordered  - Administer antiarrhythmic and heart rate control medications as ordered  - Monitor electrolytes and administer replacement therapy as ordered  Outcome: Progressing     Problem: RESPIRATORY - ADULT  Goal: Achieves optimal ventilation and oxygenation  Description: INTERVENTIONS:  - Assess for changes in respiratory status  - Assess for changes in mentation and behavior  - Position to facilitate oxygenation and minimize respiratory effort  - Oxygen administered by appropriate delivery if ordered  - Initiate smoking cessation education as indicated  - Encourage broncho-pulmonary hygiene including cough, deep breathe, Incentive Spirometry  - Assess the need for suctioning and aspirate as needed  - Assess and instruct to report SOB or any respiratory difficulty  - Respiratory Therapy support as indicated  Outcome: Progressing     Problem: GASTROINTESTINAL - ADULT  Goal: Maintains or returns to baseline bowel function  Description: INTERVENTIONS:  - Assess bowel function  - Encourage oral fluids to ensure adequate hydration  - Administer IV fluids if ordered to ensure adequate hydration  - Administer ordered medications as needed  - Encourage mobilization and activity  - Consider nutritional services referral to assist patient with adequate nutrition and appropriate food choices  Outcome: Progressing  Goal: Maintains adequate nutritional intake  Description: INTERVENTIONS:  - Monitor percentage of each meal consumed  - Identify factors contributing to decreased intake, treat as appropriate  - Assist with meals as needed  - Monitor I&O, weight, and lab values if indicated  - Obtain nutrition services referral as needed  Outcome: Progressing     Problem: GENITOURINARY - ADULT  Goal: Maintains or returns to baseline urinary function  Description: INTERVENTIONS:  - Assess urinary function  - Encourage oral fluids to ensure adequate hydration if ordered  -  Administer IV fluids as ordered to ensure adequate hydration  - Administer ordered medications as needed  - Offer frequent toileting  - Follow urinary retention protocol if ordered  Outcome: Progressing  Goal: Absence of urinary retention  Description: INTERVENTIONS:  - Assess patient's ability to void and empty bladder  - Monitor I/O  - Bladder scan as needed  - Discuss with physician/AP medications to alleviate retention as needed  - Discuss catheterization for long term situations as appropriate  Outcome: Progressing     Problem: METABOLIC, FLUID AND ELECTROLYTES - ADULT  Goal: Electrolytes maintained within normal limits  Description: INTERVENTIONS:  - Monitor labs and assess patient for signs and symptoms of electrolyte imbalances  - Administer electrolyte replacement as ordered  - Monitor response to electrolyte replacements, including repeat lab results as appropriate  - Instruct patient on fluid and nutrition as appropriate  Outcome: Progressing  Goal: Fluid balance maintained  Description: INTERVENTIONS:  - Monitor labs   - Monitor I/O and WT  - Instruct patient on fluid and nutrition as appropriate  - Assess for signs & symptoms of volume excess or deficit  Outcome: Progressing  Goal: Glucose maintained within target range  Description: INTERVENTIONS:  - Monitor Blood Glucose as ordered  - Assess for signs and symptoms of hyperglycemia and hypoglycemia  - Administer ordered medications to maintain glucose within target range  - Assess nutritional intake and initiate nutrition service referral as needed  Outcome: Progressing     Problem: SKIN/TISSUE INTEGRITY - ADULT  Goal: Skin Integrity remains intact(Skin Breakdown Prevention)  Description: Assess:  -Perform Geovani assessment every shift   -Clean and moisturize skin every shift   -Inspect skin when repositioning, toileting, and assisting with ADLS  -Assess under medical devices such as paula every hour  -Assess extremities for adequate circulation and  sensation     Bed Management:  -Have minimal linens on bed & keep smooth, unwrinkled  -Change linens as needed when moist or perspiring  -Avoid sitting or lying in one position for more than 45 hours while in bed  -Keep HOB at 45degrees     Toileting:  -Offer bedside commode  -Assess for incontinence every hour  -Use incontinent care products after each incontinent episode such as moisture barrier     Activity:  --Encourage or provide ROM exercises   -Turn and reposition patient every 2 Hours  -Use appropriate equipment to lift or move patient in bed  -Instruct/ Assist with weight shifting every hour when out of bed in chair  -Consider limitation of chair time 2 hour intervals    Skin Care:  -Avoid use of baby powder, tape, friction and shearing, hot water or constrictive clothing  -Relieve pressure over bony prominences using mepilx  -Do not massage red bony areas    Next Steps:  -Teach patient strategies to minimize risks such as weight shifting    -Consider consults to  interdisciplinary teams such as PT/OT  Outcome: Progressing  Goal: Incision(s), wounds(s) or drain site(s) healing without S/S of infection  Description: INTERVENTIONS  - Assess and document dressing, incision, wound bed, drain sites and surrounding tissue  - Provide patient and family education  - Perform skin care/dressing changes every shift  Outcome: Progressing     Problem: HEMATOLOGIC - ADULT  Goal: Maintains hematologic stability  Description: INTERVENTIONS  - Assess for signs and symptoms of bleeding or hemorrhage  - Monitor labs  - Administer supportive blood products/factors as ordered and appropriate  Outcome: Progressing     Problem: MUSCULOSKELETAL - ADULT  Goal: Maintain or return mobility to safest level of function  Description: INTERVENTIONS:  - Assess patient's ability to carry out ADLs; assess patient's baseline for ADL function and identify physical deficits which impact ability to perform ADLs (bathing, care of mouth/teeth,  toileting, grooming, dressing, etc.)  - Assess/evaluate cause of self-care deficits   - Assess range of motion  - Assess patient's mobility  - Assess patient's need for assistive devices and provide as appropriate  - Encourage maximum independence but intervene and supervise when necessary  - Involve family in performance of ADLs  - Assess for home care needs following discharge   - Consider OT consult to assist with ADL evaluation and planning for discharge  - Provide patient education as appropriate  Outcome: Progressing  Goal: Maintain proper alignment of affected body part  Description: INTERVENTIONS:  - Support, maintain and protect limb and body alignment  - Provide patient/ family with appropriate education  Outcome: Progressing     Problem: Neurological Deficit  Goal: Neurological status is stable or improving  Description: Interventions:  - Monitor and assess patient's level of consciousness, motor function, sensory function, and level of assistance needed for ADLs.   - Monitor and report changes from baseline. Collaborate with interdisciplinary team to initiate plan and implement interventions as ordered.   - Provide and maintain a safe environment.  - Consider seizure precautions.  - Consider fall precautions.  - Consider aspiration precautions.  - Consider bleeding precautions.  Outcome: Progressing     Problem: Activity Intolerance/Impaired Mobility  Goal: Mobility/activity is maintained at optimum level for patient  Description: Interventions:  - Assess and monitor patient  barriers to mobility and need for assistive/adaptive devices.  - Assess patient's emotional response to limitations.  - Collaborate with interdisciplinary team and initiate plans and interventions as ordered.  - Encourage independent activity per ability.  - Maintain proper body alignment.  - Perform active/passive rom as tolerated/ordered.  - Plan activities to conserve energy.  - Turn patient as appropriate  Outcome: Progressing      Problem: Communication Impairment  Goal: Ability to express needs and understand communication  Description: Assess patient's communication skills and ability to understand information.  Patient will demonstrate use of effective communication techniques, alternative methods of communication and understanding even if not able to speak.     - Encourage communication and provide alternate methods of communication as needed.  - Collaborate with case management/ for discharge needs.  - Include patient/family/caregiver in decisions related to communication.  Outcome: Progressing     Problem: Potential for Aspiration  Goal: Non-ventilated patient's risk of aspiration is minimized  Description: Assess and monitor vital signs, respiratory status, and labs (WBC).  Monitor for signs of aspiration (tachypnea, cough, rales, wheezing, cyanosis, fever).    - Assess and monitor patient's ability to swallow.  - Place patient up in chair to eat if possible.  - HOB up at 90 degrees to eat if unable to get patient up into chair.  - Supervise patient during oral intake.   - Instruct patient/ family to take small bites.  - Instruct patient/ family to take small single sips when taking liquids.  - Follow patient-specific strategies generated by speech pathologist.  Outcome: Progressing     Problem: Nutrition  Goal: Nutrition/Hydration status is improving  Description: Monitor and assess patient's nutrition/hydration status for malnutrition (ex- brittle hair, bruises, dry skin, pale skin and conjunctiva, muscle wasting, smooth red tongue, and disorientation). Collaborate with interdisciplinary team and initiate plan and interventions as ordered.  Monitor patient's weight and dietary intake as ordered or per policy. Utilize nutrition screening tool and intervene per policy. Determine patient's food preferences and provide high-protein, high-caloric foods as appropriate.     - Assist patient with eating.  - Allow adequate  time for meals.  - Encourage patient to take dietary supplement as ordered.  - Collaborate with clinical nutritionist.  - Include patient/family/caregiver in decisions related to nutrition.  Outcome: Progressing

## 2024-12-25 NOTE — RESPIRATORY THERAPY NOTE
RT Ventilator Management Note  Bren Traylor 69 y.o. female MRN: [id number]  Unit/Bed#: ICU 11 Encounter: [id number]      Daily Screen         12/23/2024  0754 12/24/2024  0902          Patient safety screen outcome:: Failed Failed      Not Ready for Weaning due to:: Underline problem not resolved Underline problem not resolved                Physical Exam:   Assessment Type: (P) Assess only  General Appearance: (P) Unresponsive  Respiratory Pattern: (P) Assisted  Chest Assessment: (P) Chest expansion symmetrical  Bilateral Breath Sounds: (P) Diminished, Coarse  O2 Device: (P) vent      Resp Comments: (P) No vent changes made overnight to the Current CMV settings pt is on. WIll cont to monitor pt per protocol while on vent.

## 2024-12-26 NOTE — CASE MANAGEMENT
Case Management Discharge Planning Note    Patient name Bren Traylor  Location ICU 11/ICU 11 MRN [id number]  : 1955 Date 2024       Current Admission Date: 2024  Current Admission Diagnosis:Cerebrovascular accident (CVA) due to embolism of right middle cerebral artery (HCC)   Patient Active Problem List    Diagnosis Date Noted Date Diagnosed    Goals of care, counseling/discussion 2024     Palliative care encounter 2024     Cardiac arrest (HCC) 2024     Cerebrovascular accident (CVA) (HCC) 2024     Slow transit constipation 2024     Acute respiratory failure (HCC) 2024     Leukocytosis 2024     Encephalopathy 2024     Hypotension 2024     Cardiomyopathy (HCC) 2024     Hyperlipidemia 2024     Paroxysmal atrial fibrillation (HCC) 2024     Carotid artery stenosis 2024     Elevated troponin 2024     Acute pulmonary embolism (HCC) 12/10/2024     Cerebrovascular accident (CVA) due to embolism of right middle cerebral artery (HCC) 2024       LOS (days): 19  Geometric Mean LOS (GMLOS) (days):   Days to GMLOS:     OBJECTIVE:  Risk of Unplanned Readmission Score: 17.4         Current admission status: Inpatient   Preferred Pharmacy:   [id number] [place] - [address] [address]  Phone: [phone] Fax: [phone]    Primary Care Provider: Adrián Lin MD    Primary Insurance: [facility]  Secondary Insurance:     DISCHARGE DETAILS:                                          Other Referral/Resources/Interventions Provided:  Referral Comments: Cm following for care coordination.Had LV [place] acute reserved.

## 2024-12-26 NOTE — PROCEDURES
POC Cardiac US    Date/Time: 12/26/2024 12:32 PM    Performed by: Richi Cain DO  Authorized by: Richi Cain DO    Patient location:  ICU  Procedure details:     Exam Type:  Diagnostic    Indications: suspected volume depletion      Assessment / Evaluation for: pericardial effusion and intravascular volume status      Exam Type: initial exam      Image quality: diagnostic      Image availability:  Images available in PACS  Patient Details:     Cardiac Rhythm:  Regular    Mechanical ventilation: Yes    Cardiac findings:     Echo technique: limited 2D      Views obtained: subcostal      Pericardial effusion: trace      Tamponade physiology: absent      Wall motion: normal      LV systolic function: normal      RV dilation: none    IVC findings:     IVC Size: dilated      IVC Inspiratory Collapse: absent    Interpretation:     Fluid Status:  Euvolemic

## 2024-12-26 NOTE — RESPIRATORY THERAPY NOTE
RT Ventilator Management Note  Bren Traylor 69 y.o. female MRN: [id number]  Unit/Bed#: ICU 11 Encounter: [id number]      Daily Screen         12/25/2024  0818 12/26/2024  0836          Patient safety screen outcome:: Failed Failed (P)       Not Ready for Weaning due to:: Underline problem not resolved Underline problem not resolved (P)                 Physical Exam:   Assessment Type: (P) Post-treatment  General Appearance: (P) Unresponsive  Respiratory Pattern: (P) Assisted  Chest Assessment: (P) Chest expansion symmetrical  Bilateral Breath Sounds: (P) Diminished, Coarse  Suction: ET Tube      Resp Comments: (P) pt currently on cmv settings no sbt performed at this time will continue to monitor

## 2024-12-26 NOTE — PROGRESS NOTES
Progress Note - Critical Care/ICU   Name: Bren Traylor 69 y.o. 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 Increased edema and mass effect. Increased midline shift. No hydrocephalus.   12/20 CT head: Similar size of R basal ganglia IPH, similar mass effect w 8mm shift. Global edema/ loss of architecture.  12/21: EEG monitoring with significant muscle artifact. When paralyzed showing some temporal spikes correlating with stroke region.  12/22 MRI brain: new bilateral cerebellar infarctions, new diffusion signal changes along superior frontal and parietal lobes which could be due to ischemia/hypoxic ischemic injury or braydon-ictal changes. No new hemorrhage--stable R basal ganglia hematoma.  12/22-23 EEG: intermittent subtle EMG artifact with a left frontotemporal sharp wave which may be subtle myoclonic focal seizure discharges. Similar, but less dramatic than what was seen around 0850. This subtle pattern has been on and off since 2-3 AM this morning.   12/23 CTH once heparin gtt at goal: stable  12/25 CTH for pupil change: stable. Evolving large recent IPH centered in the R gangliocapsular region, increasing mass effect on adjacent sulci and lat ventricles, 11 mm shift. New R>L occipital loss of gray-white differentiation c/f recent infarct/evolving sequela of diffuse hypoxic ischemic injury. Progressive sulcal and basilar cistern effacement, e/o  diffuse hypoxic ischemic injury. Evolving BL cerebellar and R parietal infarcts.   12/25 EEG: Episodes of right sided face>arm twitching continuing overnight, being seen at 0216, 0247, 0309, 0329, 0341, 0407, 0414, and 0458   12/26 EEG: Right facial focal motor seizures returned around 415 this AM. Current she has been having 13 minutes of continuous right facial twitching consistent with epilepsia partialis continua.   Meds  Continue keppra 1000mg BID  Demand dose ativan for seizure like activity  Fentanyl 50 mg q1h PRN  Bromocriptine 5mg q6  12/20 hypertonic saline 23% bolus  Plan: Despite starting versed gtt yesterday this AM EEG with continuous right facial motor seizures. Giving 2mg ativan now and will increase versed gtt to 2mg/hr, re-evaluate in an hour. Family plan has been to proceed w comfort care today.  BP goal MAPs normal/>65  Neurochecks q1  Na goal 145-155     CV:   Cardiac arrest   1st ROSC achieved after 10mins of ACLS protocol, lost pulses 2x again, ROSC again  Studies  12/10 Echo  EF 50-55%.  Hypokinetic inferior segments.    12/18 Echo  EF 55%.Normal LV and RV and systolic function. Mildly abnormal diastolic function.  Basal inferior, basal anterolateral, mid inferior, mid anterolateral hypokinesis.  Basal inferolateral and mid inferolateral dyskinesia.   12/22 NSTEMI, hx HTN, HLD  Persistent ST abnormalities on repeat EKGs  Troponins have peaked  Meds  Levophed gtt, now at 5 mcg/min  Heparin gtt, ACS protocol  ASA 81  Plan: Slightly lower pressor requirement from yesterday. Persistently tachycardic again, similar to before initiiating bromocriptine and versed. Remains on heparin drip on ACS-low protocol. Continue with same.    Pulm:  Mechanical Ventilation  PC 22/410/8/40%   Pulmonary edema, thick secretions  12/20 bronchoscopy thick yellow secretions BL  12/18 and 12/20 IV Lasix   Multi lobar PNA  Completed 7 days of antibiotics but febrile again 12/25 so kept abx on  Acute PE  Meds  Hep gtt  ACS protocol  Albuterol nebs PRN  Plan: Overbreathes vent with irregularity and air hunger. Intermittently very tachypneic and responsive to fentanyl. Would use fentanyl in comfort measures.     GI:   Diarrhea  Bowel regimen off  Banana flakes w Tfs  0cc output recorded over last day but with min amt liquid stool in bag/tubing  Elevated transaminases/shock liver  AST 50 < 70 < 130 < 232 < 419,    < 226 < 363 < 511 < 705  T. bili normal at 0.56  Plan: Continue to monitor, CMPs in AM. Continue current banana flakes for diarrhea.     :   Sodium goal 145-155  NA today 152 >152 < 157 < 156  FWF 200cc q4 w TFs  UOP 2540cc in last 24h  Plan: Na at goal with current FWF, keep same. UOP good. Continue Sierra.    F/E/N:   No MIVF  Replete electrolytes as needed  Tube feeds @ goal of 40     Heme/Onc:   Anemia  12/12 1u prBC for Hb 7.0  Hb 8.7 < 7.7 < 7.9  Plan: Continue to monitor.    Endo:   Glucose   148 on BMP  POCT 145 < 110 < 128  Plan: Continue to monitor, goal range 120-180.     ID:   Multilobar MSSA PNA, persistent fever (possibly central vs ID)  Meds  Vancomycin day 9  Zosyn day 9  Studies  12/13 sputum cx: 4+ MSSA  12/18 Bcx NG x 5d, final  12/19 sputum cx: few coloines candida albicans  12/19 urine strep antigen neg  12/20 sputum cx: few coloines candida albicans  12/21 Bcx NG x 4d  Plan: Tmax 100 after starting Continue Vanco and Zosyn for now.       MSK/Skin:   Frequent turning     Tubes: sierra, ETT  Lines: R fem A line, L IJ CVC, PIV  Drains: none    Disposition: Critical care, possible transition to comfort care     ICU Core Measures     Vented Patient  VAP Bundle  VAP bundle ordered     A: Assess, Prevent, and Manage Pain Has pain been assessed? NA  Need for changes to pain regimen? NA   B: Both Spontaneous Awakening Trials (SATs) and Spontaneous Breathing Trials (SBTs) Plan to perform spontaneous awakening trial today? No secondary to targeted temperature management  Plan to perform spontaneous  breathing trial today? No secondary to targeted temperature management  Obvious barriers to extubation? Yes   C: Choice of Sedation RASS Goal: -3 Moderate Sedation or 0 Alert and Calm  Need for changes to sedation or analgesia regimen? NA   D: Delirium CAM-ICU: Negative   E: Early Mobility  Plan for early mobility? No   F: Family Engagement Plan for family engagement today? Yes       Antibiotic Review: Continue broad spectrum secondary to severity of illness.     Review of Invasive Devices:    Silvestre Plan: Continue for accurate I/O monitoring for 48 hours  Central access plan: Medications requiring central line Hemodynamic monitoring  Qi Plan: Keep arterial line for hemodynamic monitoring    Prophylaxis:  VTE Contraindicated secondary to: Cardiac arrest, TTM   Stress Ulcer  not ordered         24 Hour Events :    Became hypotensive and reinitiated pressors, albumin bolus. R pupil now fixed/dilated, repeat CTH w interval progression of mass effect, shift, and loss of grey/white matter differentiation. Made Level 2 by lydia last night. Plan to transition to comfort care 12/26.    Subjective   Review of Systems: Review of Systems not obtainable due to Clinical Condition, Altered mental status    Objective :                   Vitals I/O      Most Recent Min/Max in 24hrs   Temp 99.2 °F (37.3 °C) Temp  Min: 96.3 °F (35.7 °C)  Max: 100 °F (37.8 °C)   Pulse (!) 124 Pulse  Min: 88  Max: 126   Resp (!) 23 Resp  Min: 15  Max: 25   /67 BP  Min: 93/53  Max: 117/67   O2 Sat 98 % SpO2  Min: 94 %  Max: 100 %      Intake/Output Summary (Last 24 hours) at 12/26/2024 0707  Last data filed at 12/26/2024 0600  Gross per 24 hour   Intake 3189.93 ml   Output 2540 ml   Net 649.93 ml       Diet Enteral/Parenteral; Tube Feeding No Oral Diet; Jevity 1.2 Navarro; Continuous; 40; Prosource Protein Liquid - One Packet; BID; Banatrol Plus Banana Flakes - Two Packets; BID; 200; Water; Every 4 hours    Invasive Monitoring   Arterial Line  Qi  /56  Arterial Line BP  Min: 98/46  Max: 136/66   MAP 82 mmHg  Arterial Line MAP (mmHg)  Min: 66 mmHg  Max: 94 mmHg           Physical Exam   Physical Exam  Vitals and nursing note reviewed.   Eyes:      General:         Right eye: Discharge present.      Pupils: Pupils are equal.      Comments: Pupils brisk, reactive, 3 cm   Skin:     General: Skin is warm and dry.   HENT:      Head: Normocephalic and atraumatic. No right periorbital erythema or left periorbital erythema.   Cardiovascular:      Rate and Rhythm: Normal rate and regular rhythm.      Pulses:           Carotid pulses are 2+ on the right side and 2+ on the left side.       Posterior tibial pulses are 2+ on the right side and 2+ on the left side.      Heart sounds: Normal heart sounds.   Abdominal: General: There is no distension.      Palpations: Abdomen is soft.   Constitutional:       Appearance: She is ill-appearing.      Interventions: She is intubated.   Pulmonary:      Effort: Tachypnea present. She is intubated.      Breath sounds: No decreased air movement. Rhonchi present. No decreased breath sounds.      Comments: On ventilator  Neurological:      Mental Status: She is unresponsive.      GCS: GCS eye subscore is 1. GCS verbal subscore is 1. GCS motor subscore is 1.        Corneal reflex present, cough reflex and gag reflex not intact.      Comments: Rhythmic twitching entire R face, R hand, RLE  GCS 3T, no grimace  Minimal extensor posturing in LUE, no response in other limbs  Cough reflex absent, corneal reflex equivocal on R, absent on L  BL pupil 5mm and fixed, possibly minimal reactivity on L   Genitourinary/Anorectal:  Silvestre present.        Diagnostic Studies        Lab Results: I have reviewed the following results:     Medications:  Scheduled PRN   artificial tear, , HS  aspirin, 81 mg, Daily  bromocriptine, 5 mg, Q6H  chlorhexidine, 15 mL, Q12H DANIS  levETIRAcetam, 1,500 mg, Q12H DANIS  sodium chloride, 4 mL, Q6H      albuterol, 2.5  mg, Q4H PRN  fentaNYL, 50 mcg, Q1H PRN       Continuous    heparin (porcine), 3-20 Units/kg/hr (Order-Specific), Last Rate: 18 Units/kg/hr (12/25/24 2148)  midazolam, 2 mg/hr, Last Rate: 1 mg/hr (12/25/24 0955)  norepinephrine, 1-30 mcg/min, Last Rate: 5 mcg/min (12/25/24 2223)         Labs:   CBC    Recent Labs     12/25/24  0545 12/26/24  0431   WBC 24.57* 23.27*   HGB 7.7* 8.7*   HCT 26.1* 28.6*    232     BMP    Recent Labs     12/25/24  0545 12/26/24  0431   SODIUM 152* 152*   K 3.6 4.2   * 119*   CO2 23 24   AGAP 8 9   BUN 26* 33*   CREATININE 0.63 0.63   CALCIUM 7.8* 8.5       Coags    Recent Labs     12/25/24 2053 12/26/24  0343   PTT 58* 62*        Additional Electrolytes  Recent Labs     12/25/24  0545 12/26/24  0431   MG 2.1 1.9   PHOS 2.5 3.7          Blood Gas    No recent results      No recent results     LFTs  Recent Labs     12/25/24  0545 12/26/24  0431   * 163*   AST 70* 50*   ALKPHOS 86 81   ALB 2.9* 2.9*   TBILI 0.56 0.38       Infectious  No recent results    Glucose  Recent Labs     12/25/24  0545 12/26/24  0431   GLUC 185* 148*

## 2024-12-27 NOTE — QUICK NOTE
Daily update provided to family at bedside, all questions answered.     JORDI Bonilla  12/26/24  8:48 PM

## 2024-12-27 NOTE — PROGRESS NOTES
Progress Note - Critical Care/ICU   Name: Bren Traylor 69 y.o. female I MRN: [id number]  Unit/Bed#: ICU 11 I Date of Admission: 12/6/2024   Date of Service: 12/27/2024 I Hospital Day: 20       Assessment & Plan   Neuro:   Right MCA M1 occlusion s/p thrombectomy TICI 3, R carotid stenting c/b hemorrhagic conversion  Generalized myoclonus like jerking/twitching s/p cardiac arrest c/b anoxic brain injury, cerebral edema  GCS 3T   loss of brainstem reflexes off of sedation  Bilateral pupil fixed and dilated  No corneals  No gag  No cough  Overbreathing the vent, irregular  12/25 CTH for pupil change: stable.   Evolving large recent IPH centered in the R gangliocapsular region, increasing mass effect on adjacent sulci and lat ventricles, 11 mm shift.   New R>L occipital loss of gray-white differentiation c/f recent infarct/evolving sequela of diffuse hypoxic ischemic injury.   Progressive sulcal and basilar cistern effacement, e/o diffuse hypoxic ischemic injury.   Evolving BL cerebellar and R parietal infarcts.   12/25 EEG: Episodes of right sided face>arm twitching continuing overnight, no electrographic seizures.   12/26 EEG: Right facial focal motor seizures. No electrographic seizures  12/27 EEG: Right facial focal motor seizures on observation on. No electric seizures.  Plan  BP goal MAPs >65  Neurochecks q2  AED:  wean keppra 1000mg BID  Ativan 2mg prn seizure  DC vEEG, will tx based on clinical seizure like movement  Fentanyl 50 mg q1h prn pain agitation  Wean Midazolam  gtt  Continued goals of care conversation with family. They are requesting daily updates and are not ready for comfort care at this time.      CV:   Cardiac arrest 12/18/24  1st ROSC achieved after 10mins of ACLS protocol, lost pulses 2x again, ROSC again  12/10 Echo  EF 50-55%.  Hypokinetic inferior segments.   12/18 Echo  EF 55%.Normal LV and RV and systolic function. Mildly abnormal diastolic function.  Basal inferior, basal  anterolateral, mid inferior, mid anterolateral hypokinesis.  Basal inferolateral and mid inferolateral dyskinesia.   12/22 NSTEMI, hx HTN, HLD, elevated troponin  Persistent ST abnormalities on repeat EKGs  Troponins have peaked  Plan  Levophed gtt, wean as tolerate  Heparin gtt, ACS protocol  ASA 81     Pulm:  Mechanical Ventilation  PC 22/410/8/40%   Overbreathes vent with irregularity and air hunger. Intermittently very tachypneic and responsive to fentanyl.   Albuterol nebs prn  Pulmonary edema, resolved  Multi lobar PNA  Completed 7 days zosyn  Acute PE  Hep gtt ACS protocol    GI:   Diarrhea  Decreased number of stools over past 24h  Banana flakes decreased to 1packet BID  Discontinued rectal tube  Elevated transaminases  Continue to improve  Continue to monitor, CMPs in AM.    :   Sodium goal 145-155  NA today 155  FWF 300cc q4 w Tfs  Net I/O -1750cc  UOP 4850cc in last 24h  Plan: Na at goal with current FWF, keep same. UOP good. Continue Sierra.     F/E/N:   No maintenance fluids  Replete electrolytes as needed  Tube feeds @ goal of 40     Heme/Onc:   Anemia, hgb 8.0  Monitor     Endo:   Glucose WNL  SSI     ID:   Multilobar MSSA PNA  Completed abx  Blood cx, urine cx negative  Sputum cx few candida colonies  Leukocytosis, improving  persistent fever, now lowgrade  central v. Infection  Bromocriptine 5mg     MSK/Skin:   Frequent turning     Tubes: sierra, ETT  Lines: R fem A line, L IJ CVC, PIV  Drains: none     Disposition: Critical care, possible transition to comfort care   Disposition: Critical care    ICU Core Measures     Vented Patient  VAP Bundle  VAP bundle ordered     A: Assess, Prevent, and Manage Pain Has pain been assessed? Yes  Need for changes to pain regimen? No   B: Both Spontaneous Awakening Trials (SATs) and Spontaneous Breathing Trials (SBTs) Plan to perform spontaneous awakening trial today? Yes   Plan to perform spontaneous breathing trial today? Yes   Obvious barriers to extubation?  Yes   C: Choice of Sedation RASS Goal: 0 Alert and Calm or N/A patient not on sedation  Need for changes to sedation or analgesia regimen? NA   D: Delirium CAM-ICU: Unable to perform secondary to Acute cognitive dysfunction   E: Early Mobility  Plan for early mobility? No   F: Family Engagement Plan for family engagement today? Yes       Review of Invasive Devices:    Silvestre Plan: Continue for accurate I/O monitoring for 48 hours  Central access plan: Medications requiring central line  [place] Plan: Keep arterial line for hemodynamic monitoring, frequent ABGs, and frequent labs    Prophylaxis:  VTE VTE covered by:  heparin (porcine), Intravenous, 18 Units/kg/hr at 12/26/24 2120       Stress Ulcer  not ordered         24 Hour Events : Family discussion 1226, they are requesting family update, however are not ready to transition to comfort care.  Neurologic exam is unchanged.  Subjective   Review of Systems: Review of Systems not obtainable due to Clinical Condition, intubation    Objective :                   Vitals I/O      Most Recent Min/Max in 24hrs   Temp 99.3 °F (37.4 °C) Temp  Min: 98.2 °F (36.8 °C)  Max: 101.9 °F (38.8 °C)   Pulse 100 Pulse  Min: 92  Max: 124   Resp 21 Resp  Min: 21  Max: 30   /67 No data recorded   O2 Sat 97 % SpO2  Min: 93 %  Max: 100 %      Intake/Output Summary (Last 24 hours) at 12/27/2024 0635  Last data filed at 12/27/2024 0622  Gross per 24 hour   Intake 3100.3 ml   Output 4850 ml   Net -1749.7 ml       Diet Enteral/Parenteral; Tube Feeding No Oral Diet; Jevity 1.2 Navarro; Continuous; 40; Prosource Protein Liquid - One Packet; BID; Banatrol Plus Banana Flakes - One Packet; BID; 300; Water; Every 4 hours    Invasive Monitoring   Arterial Line  Qi /50  Arterial Line BP  Min: 96/42  Max: 122/52   MAP 72 mmHg  Arterial Line MAP (mmHg)  Min: 62 mmHg  Max: 80 mmHg           Physical Exam   Physical Exam  Vitals and nursing note reviewed.   Eyes:      Pupils: Pupils are equal.       Right eye: Pupil is not reactive. Pupil is round.      Left eye: Pupil is not reactive. Pupil is round.      Comments: Bilateral pupils 4 mm fixed, nonreactive   Skin:     General: Skin is warm and dry.   HENT:      Head: Normocephalic and atraumatic. No right periorbital erythema or left periorbital erythema.      Comments: Left IJ triple-lumen CVC     Mouth/Throat:      Mouth: Mucous membranes are moist.   Cardiovascular:      Rate and Rhythm: Regular rhythm. Tachycardia present.      Pulses: No decreased pulses.           Carotid pulses are 2+ on the right side and 2+ on the left side.       Posterior tibial pulses are 2+ on the right side and 2+ on the left side.      Heart sounds: Normal heart sounds.   Musculoskeletal:      Right lower leg: No edema.      Left lower leg: No edema.   Abdominal:      Palpations: Abdomen is soft.      Tenderness: There is no abdominal tenderness.   Constitutional:       General: She is not in acute distress.     Appearance: She is ill-appearing.      Interventions: She is intubated. She is not sedated.  Pulmonary:      Effort: No accessory muscle usage, respiratory distress, retractions or accessory muscle usage. She is intubated.      Breath sounds: No stridor or decreased air movement. No decreased breath sounds, wheezing, rhonchi or rales.   Neurological:      Mental Status: She is unresponsive.      GCS: GCS eye subscore is 1. GCS verbal subscore is 1. GCS motor subscore is 1.      Comments: Rhythmic twitching R nasolabial fold  GCS 3T, no grimace  Absent cough reflex, absent corneal reflex, absent gag reflex  Unable to follow commands, off sedation.   No spontaneous movements  Does not withdraw to pain  No posturing evident.    Genitourinary/Anorectal:     Comments: Silvestre catheter in place  Right femoral A-line in place         Diagnostic Studies        Lab Results: I have reviewed the following results:     Medications:  Scheduled PRN   artificial tear, , HS  aspirin, 81  mg, Daily  bromocriptine, 5 mg, Q6H  chlorhexidine, 15 mL, Q12H DANIS  levETIRAcetam, 1,500 mg, Q12H DANIS  sodium chloride, 4 mL, Q6H      albuterol, 2.5 mg, Q4H PRN  fentaNYL, 50 mcg, Q1H PRN       Continuous    heparin (porcine), 3-20 Units/kg/hr (Order-Specific), Last Rate: 18 Units/kg/hr (12/26/24 2120)  midazolam, 4 mg/hr, Last Rate: 4 mg/hr (12/27/24 0306)  norepinephrine, 1-30 mcg/min, Last Rate: 5 mcg/min (12/26/24 2155)         Labs:   CBC    Recent Labs     12/26/24 0431 12/27/24  0452   WBC 23.27* 15.41*   HGB 8.7* 8.0*   HCT 28.6* 27.1*    193     BMP    Recent Labs     12/26/24 0431 12/27/24  0452   SODIUM 152* 155*   K 4.2 3.9   * 123*   CO2 24 25   AGAP 9 7   BUN 33* 25   CREATININE 0.63 0.58*   CALCIUM 8.5 8.3*       Coags    Recent Labs     12/26/24  1635 12/27/24  0452   PTT 75* 75*        Additional Electrolytes  Recent Labs     12/26/24 0431 12/27/24  0452   MG 1.9 1.9   PHOS 3.7 3.8          Blood Gas    No recent results  No recent results LFTs  Recent Labs     12/26/24 0431   *   AST 50*   ALKPHOS 81   ALB 2.9*   TBILI 0.38       Infectious  No recent results  Glucose  Recent Labs     12/26/24 0431 12/27/24  0452   GLUC 148* 137

## 2024-12-27 NOTE — PROGRESS NOTES
Pastoral Care Progress Note             12/27/24 1100   Clinical Encounter Type   Visited With Patient   Routine Visit Follow-up   Referral From    [facility] Encounters   [facility] Needs Prayer      met with pt who may be transitioned off life support. No family was present at that time so  supported pt with silence and prayer.  remains available.

## 2024-12-27 NOTE — QUICK NOTE
Palliative medicine team spoke with primary intensivist service (Dr. Cain) this morning, 12/27.  We have been consulted earlier in the week to assist with goals of care discussion, but patient's sons (Severin and Yosi) preferred to reengage after Sanya.    On discussion this morning with intensivist team, they plan for afternoon medical update conversation with the sons.  Of note, plan is for discontinuation of video EEG and benzodiazepine seizure prophylactic medications as, per neurology, the patient is lacking brain activity to be perceived on EEG as well as to mount a true seizure event.    Intensivist service is aware palliative medicine is available to collaborate and assist in family meetings/GOC discussion and will be accessible through the weekend.    Of note, patient was receiving a blessing from Father Ernesto at bedside this morning when our team was on the floor.

## 2024-12-27 NOTE — PLAN OF CARE
TF goal Jevity 1.2@50mL/hr, add 1 pack of prosource (flush 30mL before and after), provides total volume 1200mL, 1500cal, 82g pro, 1028mL, water flushes per critical care.   Problem: Nutrition/Hydration-ADULT  Goal: Nutrient/Hydration intake appropriate for improving, restoring or maintaining nutritional needs  Description: Monitor and assess patient's nutrition/hydration status for malnutrition. Collaborate with interdisciplinary team and initiate plan and interventions as ordered.  Monitor patient's weight and dietary intake as ordered or per policy. Utilize nutrition screening tool and intervene as necessary. Determine patient's food preferences and provide high-protein, high-caloric foods as appropriate.     INTERVENTIONS:  - Monitor oral intake, urinary output, labs, and treatment plans  - Assess nutrition and hydration status and recommend course of action  - Evaluate amount of meals eaten  - Assist patient with eating if necessary   - Allow adequate time for meals  - Recommend/ encourage appropriate diets, oral nutritional supplements, and vitamin/mineral supplements  - Order, calculate, and assess calorie counts as needed  - Recommend, monitor, and adjust tube feedings and TPN/PPN based on assessed needs  - Assess need for intravenous fluids  - Provide specific nutrition/hydration education as appropriate  - Include patient/family/caregiver in decisions related to nutrition  Outcome: Progressing

## 2024-12-27 NOTE — QUICK NOTE
Called patient's son Severin, to give daily update.  He stated he and his brother Yosi will be at the ICU between 3 or 4 PM today to receive daily update.

## 2024-12-27 NOTE — RESPIRATORY THERAPY NOTE
12/27/24 0731   Respiratory Assessment   Assessment Type Pre-treatment   Bilateral Breath Sounds Diminished   Resp Comments Pt unresponsive. No changes this am. No wean this am due to pts condition. Will continue to monitor pt.   Vent Information   Vent ID [id number]   Vent type Valderrama C3   Valderrama Vent Mode (S)CMV   $ Pulse Oximetry Spot Check Charge Completed   SpO2 99 %   (S)CMV Settings   Resp Rate (BPM) 22 BPM   VT (mL) 400 mL   FIO2 (%) 40 %   PEEP (cmH2O) 8 cmH2O   I:E Ratio 1:1.7   Insp Time (%) 0.9 %   Flow Trigger (LPM) 3   Humidification Heater   Heater Temperature (Set) 95 °F (35 °C)   (S)CMV Actuals   Resp Rate (BPM) 22 BPM   VT (mL) 390   MV 8.8   MAP (cmH2O) 12 cmH2O   Peak Pressure (cmH2O) 25 cmH2O   I:E Ratio (Obs) 1:1.7   Insp Resistance 12   Heater Temperature (Obs) 98.6 °F (37 °C)   (S)CMV Alarms   High Peak Pressure (cmH2O) 40   Low Pressure (cmH2O) 5 cm H2O   High Resp Rate (BPM) 40 BPM   Low Resp Rate (BPM) 8 BPM   High MV (L/min) 20 L/min   Low MV (L/min) 3.5 L/min   High VT (mL) 800 mL   Low VT (mL) 200 mL   Apnea Time (s) 20 S   Maintenance   Alarm (pink) cable attached No   Resuscitation bag with peep valve at bedside Yes   Water bag changed No   Circuit changed No   Daily Screen   Patient safety screen outcome: Failed   Not Ready for Weaning due to: Underline problem not resolved   IHI Ventilator Associated Pneumonia Bundle   Daily Awakening Trials Performed Not applicable (Comment)  (underlying problem unresolved)   Daily Assessment of Readiness to Extubate Yes   ETT  Cuffed 8 mm   Placement Date/Time: 12/18/24 c) 5918   Type: Cuffed  Tube Size: 8 mm  Location: Oral  Insertion attempts: 1  Placement Verification: End tidal CO2;Symmetrical chest wall movement  Secured at (cm): 21   Secured at (cm) 23   Measured from Lips   Secured Location Right   Repositioned Center to Right   Secured by Commercial tube [place]   Site Condition Dry   Cuff Pressure (color) Green   HI-LO Suction   Intermittent suction   HI-LO Secretions Small;Yellow   HI-LO Intervention Patent

## 2024-12-28 NOTE — QUICK NOTE
Palliative medicine stopped by the neurologic ICU floor this morning to check on patient and discuss her case with the primary intensivist service.    Per discussion with intensivist team (Dr. Szymanski), despite the patient's sons Yosi and Severin being provided daily updates, pt's sons remain precontemplative on any decisions regarding goals and focus of pt's care.  Per intensivist team, the sons are quite distraught over pt's 12/18 pulmonary-cardiac arrest and continue to perseverate on said event, deferring any further goals discussion.    The patient is noted to have no gag nor cough reflex this morning.  She remains intubated but benzodiazepine seizure dosing has been discontinued as neurology service felt patient was unable to produce brain activity capable of a seizure event; video EEG monitoring has also been discontinued.    Palliative service once again offered collaborative help and support to primary team in re-presenting goals of care to the patient's sons.  The intensivist service is aware of our availability through the weekend and will reach out should family be amenable to further discussions.

## 2024-12-28 NOTE — NURSING NOTE
Temperature increased to 103.5. AP/NP Farhat dwyer. Tylenol suppository ordered. Patient has a cooled towel across her torso.   Patient tolerated interventions, temperature has decreased to 100.

## 2024-12-28 NOTE — NURSING NOTE
Patient's tmax was 103.5 and post tylenol suppository, patient's temperature decreased to tlow of 95.    Kiki hugger placed on patient

## 2024-12-28 NOTE — PROGRESS NOTES
Progress Note - Critical Care/ICU   Name: Bren Traylor 69 y.o. female I MRN: [id number]  Unit/Bed#: ICU 11 I Date of Admission: 12/6/2024   Date of Service: 12/28/2024 I Hospital Day: 21       Assessment & Plan   Neuro:   Right MCA M1 occlusion s/p thrombectomy TICI 3, R carotid stenting c/b hemorrhagic conversion  Generalized myoclonus like jerking/twitching s/p cardiac arrest c/b anoxic brain injury, cerebral edema  GCS 3T   loss of brainstem reflexes off of sedation  Bilateral pupil fixed and dilated  No corneals  No gag  No cough  Overbreathing the vent, irregular  12/25 CTH for pupil change: stable.   Evolving large recent IPH centered in the R gangliocapsular region, increasing mass effect on adjacent sulci and lat ventricles, 11 mm shift.   New R>L occipital loss of gray-white differentiation c/f recent infarct/evolving sequela of diffuse hypoxic ischemic injury.   Progressive sulcal and basilar cistern effacement, e/o diffuse hypoxic ischemic injury.   Evolving BL cerebellar and R parietal infarcts.   12/25 EEG: Episodes of right sided face>arm twitching continuing overnight, no electrographic seizures.   12/26 EEG: Right facial focal motor seizures. No electrographic seizures  12/27 EEG: Right facial focal motor seizures on observation on. No electric seizures.  Meds  Keppra 1g BID  Bromocriptine 7.5mg q6  Off Midazolam gtt  Giving ativan demand doses PRN clinical seizure  Giving fentanyl demand dose PRN agitation/discomfort  Plan  BP goal MAPs >65  EEG discontinued; Neurochecks q2  Continued goals of care conversation with family. They are requesting daily updates and are not ready for comfort care at this time.      CV:   Cardiac arrest 12/18/24  1st ROSC achieved after 10mins of ACLS protocol, lost pulses 2x again, ROSC again  Studies/events  12/10 Echo  EF 50-55%.  Hypokinetic inferior segments.   12/18 Echo  EF 55%.Normal LV and RV and systolic function. Mildly abnormal diastolic function.   Basal inferior, basal anterolateral, mid inferior, mid anterolateral hypokinesis.  Basal inferolateral and mid inferolateral dyskinesia.   12/22 NSTEMI, hx HTN, HLD, elevated troponin  Persistent ST abnormalities on repeat EKGs  Troponins have peaked  Meds  Levophed gtt: currently off  Heparin gtt, ACS protocol  ASA 81mg  Plan: Tachycardia improved. Continue current management.      Pulm:  Mechanical Ventilation  PC 22/400/8/40%   Overbreathes vent with irregularity and air hunger. Intermittently very tachypneic and responsive to fentanyl.   Meds   Albuterol nebs prn  Pulmonary edema, resolved  Multi lobar PNA s/p 7d zosyn  Acute PE  Hep gtt ACS protocol    GI:   Diarrhea  Decreased number of stools over past 24h  Banana flakes decreased to 1packet BID  Discontinued rectal tube  Elevated transaminases  Continue to improve  Continue to monitor, CMPs in AM.    :   Sodium goal 145-155  NA today 155  FWF 300cc q4 w Tfs  Net I/O -1750cc  UOP 4850cc in last 24h  Plan: Na at goal with current FWF, keep same. UOP good. Continue Sierra.     F/E/N:   No maintenance fluids  Replete electrolytes as needed  Tube feeds @ goal of 40     Heme/Onc:   Anemia, hgb 8.0  Plan: Monitor. Lab holiday today.     Endo:   Glucose WNL  SSI     ID:   Multilobar MSSA PNA  Completed abx  Blood cx, urine cx negative  Sputum cx few candida colonies  Leukocytosis, improving  persistent fever, now lowgrade  central v. Infection  Bromocriptine 5mg     MSK/Skin:   Frequent turning     Tubes: sierra, ETT  Lines: R fem A line, L IJ CVC, PIV  Drains: none     Disposition: Critical care    ICU Core Measures     Vented Patient  VAP Bundle  VAP bundle ordered     A: Assess, Prevent, and Manage Pain Has pain been assessed? Yes  Need for changes to pain regimen? No   B: Both Spontaneous Awakening Trials (SATs) and Spontaneous Breathing Trials (SBTs) Plan to perform spontaneous awakening trial today? Yes   Plan to perform spontaneous breathing trial today? Yes    Obvious barriers to extubation? Yes   C: Choice of Sedation RASS Goal: 0 Alert and Calm or N/A patient not on sedation  Need for changes to sedation or analgesia regimen? NA   D: Delirium CAM-ICU: Unable to perform secondary to Acute cognitive dysfunction   E: Early Mobility  Plan for early mobility? No   F: Family Engagement Plan for family engagement today? Yes       Review of Invasive Devices:    Silvestre Plan: Continue for accurate I/O monitoring for 48 hours  Central access plan: Medications requiring central line  [place] Plan: Keep arterial line for hemodynamic monitoring, frequent ABGs, and frequent labs    Prophylaxis:  VTE VTE covered by:  heparin (porcine), Intravenous, 18 Units/kg/hr at 12/28/24 0302       Stress Ulcer  not ordered         24 Hour Events : DENIA    Subjective   Review of Systems: Review of Systems not obtainable due to Clinical Condition, intubation    Objective :                   Vitals I/O      Most Recent Min/Max in 24hrs   Temp (!) 95.7 °F (35.4 °C) Temp  Min: 95.4 °F (35.2 °C)  Max: 101.8 °F (38.8 °C)   Pulse (!) 108 Pulse  Min: 80  Max: 128   Resp 20 Resp  Min: 18  Max: 27   /67 No data recorded   O2 Sat 100 % SpO2  Min: 97 %  Max: 100 %      Intake/Output Summary (Last 24 hours) at 12/28/2024 0731  Last data filed at 12/28/2024 0601  Gross per 24 hour   Intake 3228.24 ml   Output 4030 ml   Net -801.76 ml       Diet Enteral/Parenteral; Tube Feeding No Oral Diet; Jevity 1.2 Navarro; Continuous; 40; Prosource Protein Liquid - One Packet; BID; 300; Water; Every 4 hours    Invasive Monitoring   Arterial Line  Qi /58  Arterial Line BP  Min: 94/46  Max: 118/58   MAP 82 mmHg  Arterial Line MAP (mmHg)  Min: 64 mmHg  Max: 82 mmHg           Physical Exam   Physical Exam  Vitals and nursing note reviewed.   Eyes:      Pupils: Pupils are equal.      Right eye: Pupil is not reactive. Pupil is round.      Left eye: Pupil is not reactive. Pupil is round.      Comments: Bilateral pupils 4  mm fixed, nonreactive   Skin:     General: Skin is warm and dry.   HENT:      Head: Normocephalic and atraumatic. No right periorbital erythema or left periorbital erythema.      Comments: Left IJ triple-lumen CVC     Mouth/Throat:      Mouth: Mucous membranes are moist.   Cardiovascular:      Rate and Rhythm: Regular rhythm. Tachycardia present.      Pulses: No decreased pulses.           Carotid pulses are 2+ on the right side and 2+ on the left side.       Posterior tibial pulses are 2+ on the right side and 2+ on the left side.      Heart sounds: Normal heart sounds.   Musculoskeletal:      Right lower leg: No edema.      Left lower leg: No edema.   Abdominal:      Palpations: Abdomen is soft.      Tenderness: There is no abdominal tenderness.   Constitutional:       General: She is not in acute distress.     Appearance: She is ill-appearing.      Interventions: She is intubated. She is not sedated.  Pulmonary:      Effort: No accessory muscle usage, respiratory distress, retractions or accessory muscle usage. She is intubated.      Breath sounds: No stridor or decreased air movement. No decreased breath sounds, wheezing, rhonchi or rales.   Neurological:      Mental Status: She is unresponsive.      GCS: GCS eye subscore is 1. GCS verbal subscore is 1. GCS motor subscore is 1.      Comments: Rhythmic twitching R nasolabial fold  GCS 3T, no grimace  Absent cough reflex, absent corneal reflex, absent gag reflex  Unable to follow commands, off sedation.   No spontaneous movements  Does not withdraw to pain  No posturing evident.    Genitourinary/Anorectal:     Comments: Silvestre catheter in place  Right femoral A-line in place         Diagnostic Studies        Lab Results: I have reviewed the following results:     Medications:  Scheduled PRN   artificial tear, , HS  aspirin, 81 mg, Daily  bromocriptine, 7.5 mg, Q6H  chlorhexidine, 15 mL, Q12H DANIS  levETIRAcetam, 1,000 mg, Q12H DANIS  potassium chloride, 40 mEq,  Q6H  sodium chloride, 4 mL, Q6H      albuterol, 2.5 mg, Q4H PRN       Continuous    heparin (porcine), 3-20 Units/kg/hr (Order-Specific), Last Rate: 18 Units/kg/hr (12/28/24 0302)  norepinephrine, 1-30 mcg/min, Last Rate: Stopped (12/27/24 1419)         Labs:   CBC    Recent Labs     12/27/24 0452   WBC 15.41*   HGB 8.0*   HCT 27.1*        BMP    Recent Labs     12/27/24 0452 12/28/24  0512   SODIUM 155* 158*   K 3.9 3.4*   * 126*   CO2 25 23   AGAP 7 9   BUN 25 22   CREATININE 0.58* 0.60   CALCIUM 8.3* 8.3*       Coags    Recent Labs     12/27/24 0452 12/28/24  0512   PTT 75* 87*        Additional Electrolytes  Recent Labs     12/27/24 0452 12/28/24  0512   MG 1.9 2.3   PHOS 3.8 3.5          Blood Gas    No recent results  No recent results LFTs  Recent Labs     12/27/24 0452 12/28/24  0512   ALT 99* 76*   AST 39 49*   ALKPHOS 67 80   ALB 2.8* 2.8*   TBILI 0.38 0.39       Infectious  No recent results  Glucose  Recent Labs     12/27/24 0452 12/28/24  0512   GLUC 137 160*

## 2024-12-28 NOTE — PROGRESS NOTES
[employer] provided silent prayer and an abiding presence. Played Polish Bearchn.       12/28/24 1100   Clinical Encounter Type   Visited With Patient   Gnosticist Encounters   Gnosticist Needs Prayer

## 2024-12-28 NOTE — RESPIRATORY THERAPY NOTE
RT Ventilator Management Note  Bren Traylor 69 y.o. female MRN: [id number]  Unit/Bed#: ICU 11 Encounter: [id number]      Daily Screen         12/27/2024 0731 12/28/2024 0724          Patient safety screen outcome:: Failed Failed (P)       Not Ready for Weaning due to:: Underline problem not resolved --                Physical Exam:   Assessment Type: (P) Pre-treatment  General Appearance: (P) Unresponsive  Respiratory Pattern: (P) Assisted  Chest Assessment: (P) Chest expansion symmetrical  Bilateral Breath Sounds: (P) Diminished  Cough: (P) None  Suction: (P) ET Tube  O2 Device: vent      Resp Comments: (P) pt received on SCMV settings.no vent changes needed at this time,alarms on vent cleaned,will continue monitor the pt.

## 2024-12-29 NOTE — PROGRESS NOTES
Progress Note - Critical Care/ICU   Name: Bren Traylor 69 y.o. female I MRN: [id number]  Unit/Bed#: ICU 11 I Date of Admission: 12/6/2024   Date of Service: 12/29/2024 I Hospital Day: 22       Assessment & Plan   Neuro:   Diagnosis: Acute encephalopathy, anoxic brain injury, ischemic stroke with hemorrhagic conversion, s/p thrombectomy and right carotid stenting  Plan: Frequent neuro checks, hold sedating medications, continue Keppra and bromocriptine, ongoing goals of care discussions with family  CV:   Diagnosis: Cardiac arrest, pulmonary embolism, NSTEMO, hyperlipidemia, pulmonary embolism, worsening shock  Plan: Can continue aspirin for now, norepinephrine for goal MAP>65   Pulm:  Diagnosis: Acute hypoxic respiratory failure  Plan: Continue mechanical ventilation as neuro status precludes liberation, respiratory protocol  GI:   Plan: Holding bowel regimen due to diarrhea  :   Diagnosis: Possible diabetes insipidus, hypernatremia  Plan: Urine output significantly decreased over past 24 hours, consider discontinuing sierra catheter unless electing for comfort-focused care, follow sodium levels closely  F/E/N:   Plan: Continue enteral nutrition, replete electrolytes as needed, continue D5W for now  Heme/Onc:   Diagnosis: Anemia  Plan: Follow blood counts as needed, continue heparin infusion  Endo:   Diagnosis: Hyperglycemia  Plan: Continue sliding scale while on D5W  ID:   Diagnosis: Leukocytosis, fever  Plan: Continue to trend off antibiotics, last infectious work-up on 12/21  MSK/Skin:   Plan: Frequent tunring and repositioning    Disposition: Critical care    ICU Core Measures     Vented Patient  VAP Bundle  VAP bundle ordered     A: Assess, Prevent, and Manage Pain Has pain been assessed? Yes  Need for changes to pain regimen? No   B: Both Spontaneous Awakening Trials (SATs) and Spontaneous Breathing Trials (SBTs) Plan to perform spontaneous awakening trial today? No secondary to not on sedation  Plan  to perform spontaneous breathing trial today? No secondary to poor neurologic function  Obvious barriers to extubation? Yes   C: Choice of Sedation RASS Goal: N/A patient not on sedation  Need for changes to sedation or analgesia regimen? NA   D: Delirium CAM-ICU: Unable to perform secondary to Acute cognitive dysfunction   E: Early Mobility  Plan for early mobility? No   F: Family Engagement Plan for family engagement today? Yes       Review of Invasive Devices:    Silvestre Plan:  consider discontinuing unless transition to comfort  Central access plan: Medications requiring central line  [place] Plan: Keep arterial line for hemodynamic monitoring and frequent ABGs    Prophylaxis:  VTE VTE covered by:  heparin (porcine), Intravenous, 18 Units/kg/hr at 12/28/24 2200       Stress Ulcer  not ordered         24 Hour Events : Patient with abrupt hypotension yesterday evening requiring norepinephrine, no longer over-breathing vent, overnight vent rate turned down to 4 for 3 minutes, patient did not overbreath    Subjective   Review of Systems: Review of Systems   Unable to perform ROS: Mental status change       Objective :                   Vitals I/O      Most Recent Min/Max in 24hrs   Temp 99.7 °F (37.6 °C) Temp  Min: 95.4 °F (35.2 °C)  Max: 102.9 °F (39.4 °C)   Pulse 88 Pulse  Min: 80  Max: 132   Resp 16 Resp  Min: 10  Max: 30   /67 No data recorded   O2 Sat 100 % SpO2  Min: 95 %  Max: 100 %      Intake/Output Summary (Last 24 hours) at 12/29/2024 0005  Last data filed at 12/28/2024 2200  Gross per 24 hour   Intake 5097.78 ml   Output 1913 ml   Net 3184.78 ml       Diet Enteral/Parenteral; Tube Feeding No Oral Diet; Jevity 1.2 Navarro; Continuous; 40; Prosource Protein Liquid - One Packet; BID; 300; Water; Every 4 hours    Invasive Monitoring   Arterial Line  [place] /54  Arterial Line BP  Min: 76/36  Max: 132/60   MAP 76 mmHg  Arterial Line MAP (mmHg)  Min: 50 mmHg  Max: 88 mmHg           Physical Exam   Physical  Exam  Eyes:      Comments: Pupils dilated, not responsive   Skin:     General: Skin is warm and dry.   HENT:      Head: Normocephalic and atraumatic.      Mouth/Throat:      Mouth: Mucous membranes are dry.   Cardiovascular:      Rate and Rhythm: Normal rate and regular rhythm.      Pulses: Normal pulses.   Musculoskeletal:         General: Swelling present. No deformity or signs of injury.      Right lower leg: Trace Edema present.      Left lower leg: Trace Edema present.   Abdominal: General: Bowel sounds are normal. There is no distension.      Palpations: Abdomen is soft.   Constitutional:       General: She is not in acute distress.     Appearance: She is ill-appearing.      Interventions: She is intubated and restrained.   Pulmonary:      Effort: Pulmonary effort is normal. She is intubated.      Breath sounds: Normal breath sounds.      Comments: Scant clear secretions inline suction  Neurological:      Mental Status: She is unresponsive.      GCS: GCS eye subscore is 1. GCS verbal subscore is 1. GCS motor subscore is 1.      Comments: No cough/gag/corneal reflex  Patient does not over-breath set ventilator rate   Genitourinary/Anorectal:     Comments: Silvestre in place with moderate amount of yellow urine  Silvestre present.        Diagnostic Studies        Lab Results: I have reviewed the following results:     Medications:  Scheduled PRN   artificial tear, , HS  aspirin, 81 mg, Daily  bromocriptine, 7.5 mg, Q6H  chlorhexidine, 15 mL, Q12H DANIS  insulin lispro, 1-5 Units, Q6H DANIS  levETIRAcetam, 1,000 mg, Q12H DANIS  norepinephrine, ,   sodium chloride, 4 mL, Q6H      albuterol, 2.5 mg, Q4H PRN  norepinephrine, ,        Continuous    dextrose, 100 mL/hr, Last Rate: 100 mL/hr (12/28/24 2200)  heparin (porcine), 3-20 Units/kg/hr (Order-Specific), Last Rate: 18 Units/kg/hr (12/28/24 2200)  norepinephrine, 1-30 mcg/min, Last Rate: 10 mcg/min (12/28/24 2253)         Labs:   CBC    Recent Labs     12/27/24  0452   WBC  15.41*   HGB 8.0*   HCT 27.1*        BMP    Recent Labs     12/28/24  0512 12/28/24  1745   SODIUM 158* 152*   K 3.4* 5.2   * 125*   CO2 23 22   AGAP 9 5   BUN 22 25   CREATININE 0.60 0.60   CALCIUM 8.3* 8.1*       Coags    Recent Labs     12/27/24  0452 12/28/24  0512   PTT 75* 87*        Additional Electrolytes  Recent Labs     12/27/24  0452 12/28/24  0512   MG 1.9 2.3   PHOS 3.8 3.5          Blood Gas    Recent Labs     12/28/24  1909   PHART 7.402   IFS1XLR 37.5   PO2ART 139.8*   [id number] 22.8   BEART -1.7   SOURCE Line, Arterial     Recent Labs     12/28/24  1909   SOURCE Line, Arterial    LFTs  Recent Labs     12/27/24  0452 12/28/24  0512   ALT 99* 76*   AST 39 49*   ALKPHOS 67 80   ALB 2.8* 2.8*   TBILI 0.38 0.39       Infectious  No recent results  Glucose  Recent Labs     12/27/24  0452 12/28/24  0512 12/28/24  1745   GLUC 137 160* 180*

## 2024-12-29 NOTE — RESPIRATORY THERAPY NOTE
RT Ventilator Management Note  Bren Traylor 69 y.o. female MRN: [id number]  Unit/Bed#: ICU 11 Encounter: [id number]      Daily Screen         12/28/2024 0724 12/29/2024 0721          Patient safety screen outcome:: Failed Failed      Not Ready for Weaning due to:: -- Underline problem not resolved                Physical Exam:   Assessment Type: Assess only  General Appearance: Unresponsive  Respiratory Pattern: Assisted  Chest Assessment: Chest expansion symmetrical  Bilateral Breath Sounds: Diminished  R Breath Sounds: Coarse, Diminished  Cough: None  Suction: ET Tube, Oral  O2 Device: vent      Resp Comments: pt received on scmv settings.no vent changes.tolerating well.alarms on and vent cleaned.will continue monitor the pt.

## 2024-12-29 NOTE — PROGRESS NOTES
Brain Death Exam  Bren Traylor 69 y.o. female MRN: [id number]  Unit/Bed#: ICU 11 Encounter: [id number]    Medical prerequisites:    Nuclear cerebral blood flow scan pending      All prerequisites met and sufficient monitoring time has been observed: Yes   The approximate cause of neurologic injury is known and known to be irreversible and permanent: Yes     Core temperature >=36C (96.8F)    Temp (24hrs), Av.6 °F (37 °C), Min:95.7 °F (35.4 °C), Max:102.9 °F (39.4 °C)  Current: Temperature: 98.2 °F (36.8 °C)    Stable systolic BP >= 100 or MAP >75    Vitals:    24 1355 24 1400 24 1404 24 1500   BP:       BP Location:       Pulse:  96  102   Resp:  13  13   Temp:  98.6 °F (37 °C)  98.2 °F (36.8 °C)   TempSrc:       SpO2: 91% (!) 86% 91% 99%   Weight:       Height:         Arterial Line BP: 112/54  Arterial Line MAP (mmHg): 76 mmHg    Clinical Exam:    GCS: Eye None = 1, Verbal None = 1, Motor None = 1  Coma (total absence of awareness of self and environment even when patient is externally stimulated) observed: Yes     Motor response to pain:   No purposeful response to painful stimuli in face and limbs  Pupils:   No ipsilateral and contralateral pupillary response to bright light   Pupils 6 mm in size   Ocular Movement:    No oculovestibular reflex present (caloric testing)   No oculocephalic reflex present (doll's eyes)  Corneal reflex:   No corneal reflex present  Pharyngeal and tracheal reflexes:   No response to stimulation of posterior pharynx with tongue blade   No response to bronchial stimulation   No sucking and rooting reflex (for pediatric patients for patients <3 months)  Apnea Testing (patient must be at baseline CO2 to perform this exam)   **If there is no known baseline CO2 in a patient with suspected preexisting retention, then ancillary testing must be performed after the apnea test   Patient was placed on 100% FIO2 for 10 minutes prior to the test initiation with a  goal PaO2 >200, if possible    Baseline ABG pH 7.305, pCO2 39, PaO2 332, 100%   ABG at 4 minutes pH 7.153, pCO2 65.6, PaO2 60%, 82%    Ancillary test required: Yes     Ancillary Tests to support Brain Death Diagnosis:  Nuclear flow study: Pending          SIGNATURE: Vanda Villafuerte DO

## 2024-12-29 NOTE — PROGRESS NOTES
"Progress Note - Critical Care/ICU   Name: Bren Traylor 69 y.o. female I MRN: [id number]  Unit/Bed#: ICU 11 I Date of Admission: 12/6/2024   Date of Service: 12/29/2024 I Hospital Day: 22       During the week patient was being treated for possible seizure events.  All sedating medications/benzodiazepines were discontinued over the period of time from 12/26/2024 to 12/27/2024.  Yesterday on evaluation patient appeared to be calm on the ventilator breathing approximately 20 breaths/min.  Her neurologic exam remained poor.  Conversation was had with patient's sons regarding goals of care and tracheostomy and feeding tube versus comfort measures with the request that some direction be provided by Monday to initiate planning if we wish to proceed with tracheostomy and feeding tube.  Shortly after this conversation patient had an abrupt decline in her blood pressure, fever as high as 103 and she became synchronous with the ventilator which had a backup rate set at 10 breaths/min.  Vasopressors, IV fluids and ventilator adjustments were made to support patient.  Patient's son was present at the bedside when the patient had a decline.  At this time he was readdressed and it was explained that despite receiving an update an hour ago patients are very dynamic in the ICU and this is a concerning change in patient's clinical status from prior conversation.  Overnight the patient was supported.  Her respiratory status was reevaluated and she was noted to not be overbreathing the ventilator despite having adequate hemodynamic support.  During the day yesterday patient did have an increase in her sodium which was concerning for onset of diabetes insipidus.  1 dose of DDAVP was administered.  Interventions to correct sodium were continued from the day before.    Visit Vitals  /67   Pulse 92   Temp (!) 96.1 °F (35.6 °C)   Resp 16   Ht 5' 4\" (1.626 m)   Wt 49.9 kg (110 lb)   SpO2 100%   BMI 18.88 kg/m²   OB Status " Postmenopausal   Smoking Status Every Day   BSA 1.52 m²     GEN: Unresponsive on ventilator  HEENT: Endotracheal tube in place, OG tube in place, injury to the tongue appreciated likely from biting, pupils are 6 mm bilaterally and not reactive  CV: RRR, no murmur, diffuse edema in all extremities, right femoral line and triple-lumen catheter in place  Resp:  CTA, no R/R/W  GI: soft,NT/ND  : urinary catheter in place  Neuro: GCS of 3T, no withdraw to noxious stimuli in any extremity  Skin: warm, dry    All laboratory data and imaging were reviewed    Goal systolic blood pressure:  Normotensive with mean arterial pressure greater than 65  D5W 100 mL/h  Norepinephrine-15 mcg/min  Vasopressin-held     Respiratory support:  Assist-control rate 16, , PEEP 8, FiO2 40%  Hypertonic saline 3% 4 mL inhaled every 6 hours  Albuterol 2.5 mg inhaled every 4 hours as needed     I/O +4.3 L  UO 1.3 L  BM 2     Devices:  12/21/2024 OG tube  12/19/2024 central venous access  12/18/2024 femoral arterial line  12/18/2024 endotracheal tube  12/18/2024 urinary catheter     Micro:  12/21/2024-blood culture-negative  12/20/2024 bronchial culture-no organisms on Gram stain  12/19/2024 strep pneumonia urine antigen-negative  12/19/2024 sputum culture-few colonies Audrey albicans  12/18/2024 blood culture-no growth  12/13/2024 sputum culture-Staph aureus  12/11/2024 culture right pleural fluid-negative  12/9/2024 MRSA culture-negative  12/9/2024 blood culture-negative     Antimicrobials:  Completed     Cerebral edema interventions:  3% saline 30 mL/h- held  23% saline 12/20/2024     Normothermia interventions:  Bromocriptine 7.5 mg NG every 6 hours  Acetaminophen 650 mg every 6 hours as needed     Stroke intervention:  Aspirin 81 mg daily  Lipitor 20 mg daily-held secondary to elevated LFTs     Seizure interventions:  Keppra 1000 mg IV every 12     Sedation interventions:  None    Pulmonary embolism intervention:  Heparin infusion no  bolus ACS  PTT pending    Patient did not have any acute events overnight.  She remained on vasopressors.  Norepinephrine escalated to 15 mcg/min for hemodynamic support.  Temperature this morning 96.1.  No new imaging to review.  Laboratory data reviewed with phosphorus of 2.2.  Potassium of 3.4.  Chloride elevated at 119.  Sodium is corrected to 147.  All blood glucoses less than 180.    Continue to monitor patient's neurologic exam.  If patient's has lost all of her brainstem reflexes will assess for possible progression to brain death.  When patient had acute changes yesterday evening while the son was present the plan was to continue with supportive measures and reassess patient's neurological examination today.  Plan to send for CT head today if neurologic exam is not improved with hemodynamic stability.    Continue with ventilatory support.  Patient was evaluated overnight and noted to not be breathing over the set ventilatory rate of 16.  Adjust ventilator as most recent ABG with pCO2 of 29.  Plan to decrease rate and maintain at current tidal volume and PEEP and FiO2.  Patient is not appropriate for spontaneous breathing trial with change in neurologic exam.    Patient had a febrile event overnight.  She was treated with a course of antibiotics which is recently completed.  She is continued on bromocriptine.  Tylenol was transition to as needed.  Continue to monitor patient's fever curve.  Maintain off antibiotics at this time.  If patient has another febrile event we will plan to reculture as well as initiate empiric antibiotics.    Update: 1051am    CTH obtained this am with sequela of diffuse anoxic injury again seen. Complete effacement of the basal cisterns has progressed.  New more pronounced focal edema in the right parieto-occipital region with petechial hemorrhage suggesting PCA distribution infarct, likely result of herniation.  Stable large evolving right basal ganglia hematoma with surrounding  edema and mass effect.    Vent adjusted rate 12, recheck abg    Update: 1:08 PM    ABG returned after adjusting ventilator with pCO2 of 50.  Ventilator rate adjusted to 14, will recheck ABG and when normalized will assess for appropriateness of brain death exam.    Update 3:39 PM    Repeat BMP with sodium of 139.  D5W to be discontinued.  Will decrease free water flushes as well.  Patient has a nongap metabolic acidosis with an elevated chloride.  Patient is not receiving normal saline.  ABG is improved with a pCO2 of 46.5 and a pH of 7.27.    Patient was reevaluated she was found to have a decrease in her oxygen saturation to 90% on an FiO2 of 40% with a PEEP of 8.  PEEP was increased to 10 and FiO2 increased to 60%.    Will plan to evaluate patient for possible determination of brain death.    Updated 5 PM    Clinical neurologic exam consistent with brain death.    Attempted apnea test, patient had episode of hypoxia which required termination of testing at 4 minutes and 5 seconds.  Please refer to apnea test as documentation in separate progress note.    Prior to performing apnea test patient's electrolytes were reviewed as well as core temperature which returned to normal at 98.6.  Repeat ABG was also within appropriate range.    Radiology contacted due to inability to complete apnea test.  Plan for nuclear flow scan.    Care time does not include procedures or family update.  Critical care time78 minutes

## 2024-12-30 NOTE — DEATH NOTE
INPATIENT DEATH NOTE  Bren Traylor 69 y.o. female MRN: [id number]  Unit/Bed#: ICU 11 Encounter: [id number]    Date, Time and Cause of Death    Date of Death: 24  Time of Death: 12:52 AM  Preliminary Cause of Death: Anoxic brain injury (HCC)  Entered by: Farhat BACON[PG1.1]       Attribution       PG1.1 JORDI Tiwari 24 02:49             Patient's Information  Pronounced by: Dr. Meza  Did the patient's death occur in the ED?: No  Did the patient's death occur in the OR?: No  Did the patient's death occur less than 10 days post-op?: No  Did the patient's death occur within 24 hours of admission?: No  Was code status DNR at the time of death?: Yes    PHYSICAL EXAM:  Unresponsive to noxious stimuli, Spontaneous respirations absent, Pupillary light reflex absent, and Corneal blink reflex absent    Medical Examiner notification criteria:  NONE APPLICABLE   Medical Examiner's office notified?:  Yes   Medical Examiner accepted case?:  Pending  Name of Medical Examiner: Pending    Family Notification  Was the family notified?: Yes  Date Notified: 24  Time Notified: 0246  Notified by: Dr. Meza  Name of Family Notified of Death: Severin   Relationship to Patient: Son  Family Notification Route: Telephone  Was the family told to contact a  home?: Yes    Autopsy Options:  Decision for post-mortem examination not yet made by next of kin.    Primary Service Attending Physician notified?:  yes - Attending:  Vanda Villafuerte, DO    Physician/Resident responsible for completing Discharge Summary:  Farhat BACON

## 2024-12-30 NOTE — DISCHARGE SUMMARY
Discharge Summary - Critical Care/ICU   Name: Bren Traylor 69 y.o. female I MRN: [id number]  Unit/Bed#: ICU 11 I Date of Admission: 12/6/2024   Date of Service: 12/30/2024 I Hospital Day: 23    Admission Date: 12/6/2024   Admitting Diagnosis: Cerebrovascular accident (CVA), unspecified mechanism (HCC) [I63.9]  Discharge Date: 12/30/24    HPI: Patient is a 69 year old female presenting on 12/6 to the [facility] with a complaint of left-sided weakness. She has a past medical history of hypertension, hyperlipidemia, and anxiety. Her work-up in the emergency room was concerning for a right MCA M1 occlusion and she was transferred to the [facility] to facilitate evaluation by the neuro-interventional radiology team.    Procedures Performed:   Orders Placed This Encounter   Procedures    Central Line    Central Line    Intubation    POC Cardiac US    POC Cardiac US    POC Ocular US       Summary of Hospital Course:   She underwent mechanical thrombectomy with right carotid angioplasty and stent placement. She was referred to the critical care unit post-procedure. Her course was complicated by hemorrhagic conversion. She had development of bilateral pulmonary emboli and new onset atrial fibrillation. She underwent thoracentesis for pleural effusions. After multi-disciplinary discussions, the decision was made to begin ECMO protocol heparin infusion with plan to transition to stroke protocol if she had neurologic stability. She was able to transition without further worsening and was transferred to the internal medicine service on 12/14. She was found unresponsive on 12/18 and underwent 10 minutes of high quality cardiopulmonary resuscitation with return of spontaneous circulation. She was endotracheally intubated, started on vasopressors, and transferred to the critical care unit. Her work-up at that time was concerning for myoclonic jerks, worsening cytotoxic edema on CT head, and shock-state. She was  started on targeted temperature management and video EEG. She had a waxing/waning neuro exam with intermittent biting on tube, blinking, and gaze deviations. On 12/22, she had changes in her telemetry concerning for ST elevations. Cardiology was contacted and recommended medical management given her profound encephalopathy. She completed a course of antibiotics for possible pneumonia. Her neurologic exam remained poor with intermittent tremoring and no evidence of electrographic seizures. After discussion with her family, her code status was transition to do not resuscitate based on her continued poor neurologic exam. On 12/28, the patient had acute onset of hypotension, loss of all reflexes, and no longer had spontaneous respirations. She was unable to tolerate apnea testing due to hypoxia on 12/29. She underwent a nuclear medicine brain flow study that demonstrated lack of cerebral/cerebellar blood flow. She was pronounced at the time of the study resulting. Her family was contacted and condolences offered.         Significant Findings, Care, Treatment and Services Provided: 12/30 Brain flow study- no evidence of blood flow to the brain  12/29 CT Head- sequela of diffuse anoxic injury seen again, complete effacement of the basal cisterns has progressed, more pronounced focal edema in the right parieto-occipital region with petechial hemorrhage likely result of herniation, stable large evolving right basal ganglia hematoma with surrounding edema and mass effect.   12/23 MRI Brain- scattered acute or subacute bilateral cerebellar infractions, bilateral cortical diffusion restriction, no substantial interval change in right basal ganglia hematoma, abnormal right ICA flow void  12/22 CT Chest/Abdomen/Pelvis- unchanged lateral segment right middle lobe consolidation with areas of bilateral groundglass opacity, reidentified left basilar consolidation, unchanged nondisplaced sternal and mildly diplaced/nondisplaced  bilateral rib fractures   Bronchoscopy   Central line, arterial line   Intubation   Cardiac arrest   Thoracentesis  - Endotracheal intubation   Mechanical thrombectomy, right carotid angioplasty and stenting    Complications: Hemorrhagic conversion, cardiac arrest    Disposition:      Final Diagnosis:   Anoxic brain injury  Ischemic stroke with hemorrhagic conversion status post mechanical thrombectomy and right carotid stenting  Acute encephalopathy  Cardiac arrest  NSTEMI  Hyperlipidemia  Pulmonary embolisn  Shock  Likely diabetes insipidus  Hypernatremia  Anemia    Medical Problems       Resolved Problems  Date Reviewed: 10/2/2024   None         Condition at Time of Death: Unresponsive, mechanically ventilated  Date, Time and Cause of Death    Date of Death: 24  Time of Death: 12:52 AM  Preliminary Cause of Death: Anoxic brain injury (HCC)  Entered by: Farhat BACNO[PG1.1]       Attribution       PG1.1 JORDI Tiwari 24 02:49            Death Note:  INPATIENT DEATH NOTE  Bren Traylor 69 y.o. female MRN: [id number]  Unit/Bed#: ICU 11 Encounter: [id number]    Date, Time and Cause of Death    Date of Death: 24  Time of Death: 12:52 AM  Preliminary Cause of Death: Anoxic brain injury (HCC)  Entered by: Farhat BACON[PG1.1]       Attribution       PG1.1 JORDI Tiwari 24 02:49             Patient's Information  Pronounced by: Dr. Mzea  Did the patient's death occur in the ED?: No  Did the patient's death occur in the OR?: No  Did the patient's death occur less than 10 days post-op?: No  Did the patient's death occur within 24 hours of admission?: No  Was code status DNR at the time of death?: Yes    PHYSICAL EXAM:  Unresponsive to noxious stimuli, Spontaneous respirations absent, Pupillary light reflex absent, and Corneal blink reflex absent    Medical Examiner notification criteria:  NONE  APPLICABLE   Medical Examiner's office notified?:  Yes   Medical Examiner accepted case?:  Pending  Name of Medical Examiner: Pending    Family Notification  Was the family notified?: Yes  Date Notified: 24  Time Notified: 246  Notified by: Dr. Meza  Name of Family Notified of Death: Severin   Relationship to Patient: Son  Family Notification Route: Telephone  Was the family told to contact a  home?: Yes    Autopsy Options:  Decision for post-mortem examination not yet made by next of kin.    Primary Service Attending Physician notified?:  yes - Attending:  Vanda Villafuerte DO    Physician/Resident responsible for completing Discharge Summary:  Farhat BACON

## 2024-12-30 NOTE — PROGRESS NOTES
Pastoral Care Progress Note             12/30/24 1000   Clinical Encounter Type   Visited With Patient     Father Ernesto provided [employer] [facility] to pt at request of staff. Chaplains remain available.

## 2024-12-30 NOTE — RESPIRATORY THERAPY NOTE
RT Ventilator Management Note  Bren Traylor 69 y.o. female MRN: [id number]  Unit/Bed#: ICU 11 Encounter: [id number]      Daily Screen         12/29/2024  0721 12/29/2024  1554          Patient safety screen outcome:: Failed Failed      Not Ready for Weaning due to:: Underline problem not resolved --                Physical Exam:   Assessment Type: (P) Assess only  General Appearance: (P) Unresponsive  Respiratory Pattern: (P) Assisted  Chest Assessment: (P) Chest expansion symmetrical  Bilateral Breath Sounds: (P) Diminished  R Breath Sounds: (P) Coarse, Diminished  Cough: (P) None  Suction: (P) ET Tube  O2 Device: (P) Ventilator      Resp Comments: (P) Pt. remains on CMV/VC mode.  No changes at this time.  Will continue to monitor pt.

## 2024-12-30 NOTE — ACP (ADVANCE CARE PLANNING)
Patient's sons at bedside. Patient was pronounced dead at time of brain flow scan final impression at 00:52 hours on 12/30/24. Sons were notified and wished to be at bedside. We will proceed with extubation.

## 2024-12-30 NOTE — PLAN OF CARE
Problem: PAIN - ADULT  Goal: Verbalizes/displays adequate comfort level or baseline comfort level  Description: Interventions:  - Encourage patient to monitor pain and request assistance  - Assess pain using appropriate pain scale  - Administer analgesics based on type and severity of pain and evaluate response  - Implement non-pharmacological measures as appropriate and evaluate response  - Consider cultural and social influences on pain and pain management  - Notify physician/advanced practitioner if interventions unsuccessful or patient reports new pain  Outcome: Progressing     Problem: INFECTION - ADULT  Goal: Absence or prevention of progression during hospitalization  Description: INTERVENTIONS:  - Assess and monitor for signs and symptoms of infection  - Monitor lab/diagnostic results  - Monitor all insertion sites, i.e. indwelling lines, tubes, and drains  - Monitor endotracheal if appropriate and nasal secretions for changes in amount and color  - [place] appropriate cooling/warming therapies per order  - Administer medications as ordered  - Instruct and encourage patient and family to use good hand hygiene technique  - Identify and instruct in appropriate isolation precautions for identified infection/condition  Outcome: Progressing  Goal: Absence of fever/infection during neutropenic period  Description: INTERVENTIONS:  - Monitor WBC    Outcome: Progressing     Problem: SAFETY ADULT  Goal: Patient will remain free of falls  Description: INTERVENTIONS:  - Educate patient/family on patient safety including physical limitations  - Instruct patient to call for assistance with activity   - Consult OT/PT to assist with strengthening/mobility   - Keep Call bell within reach  - Keep bed low and locked with side rails adjusted as appropriate  - Keep care items and personal belongings within reach  - Initiate and maintain comfort rounds  - Make Fall Risk Sign visible to staff  - Offer Toileting every 2 Hours,  in advance of need  - Initiate/Maintain bed alarm  - Obtain necessary fall risk management equipment: non skid footwear  - Apply yellow socks and bracelet for high fall risk patients  - Consider moving patient to room near nurses station  Outcome: Progressing  Goal: Maintain or return to baseline ADL function  Description: INTERVENTIONS:  -  Assess patient's ability to carry out ADLs; assess patient's baseline for ADL function and identify physical deficits which impact ability to perform ADLs (bathing, care of mouth/teeth, toileting, grooming, dressing, etc.)  - Assess/evaluate cause of self-care deficits   - Assess range of motion  - Assess patient's mobility; develop plan if impaired  - Assess patient's need for assistive devices and provide as appropriate  - Encourage maximum independence but intervene and supervise when necessary  - Involve family in performance of ADLs  - Assess for home care needs following discharge   - Consider OT consult to assist with ADL evaluation and planning for discharge  - Provide patient education as appropriate  Outcome: Progressing  Goal: Maintains/Returns to pre admission functional level  Description: INTERVENTIONS:  - Perform AM-PAC 6 Click Basic Mobility/ Daily Activity assessment daily.  - Set and communicate daily mobility goal to care team and patient/family/caregiver.   - Collaborate with rehabilitation services on mobility goals if consulted  - Perform Range of Motion 3 times a day.  - Reposition patient every 2 hours.  - Dangle patient 3 times a day  - Record patient progress and toleration of activity level   Outcome: Progressing     Problem: DISCHARGE PLANNING  Goal: Discharge to home or other facility with appropriate resources  Description: INTERVENTIONS:  - Identify barriers to discharge w/patient and caregiver  - Arrange for needed discharge resources and transportation as appropriate  - Identify discharge learning needs (meds, wound care, etc.)  - Arrange for  interpretive services to assist at discharge as needed  - Refer to Case Management Department for coordinating discharge planning if the patient needs post-hospital services based on physician/advanced practitioner order or complex needs related to functional status, cognitive ability, or social support system  Outcome: Progressing     Problem: Knowledge Deficit  Goal: Patient/family/caregiver demonstrates understanding of disease process, treatment plan, medications, and discharge instructions  Description: Complete learning assessment and assess knowledge base.  Interventions:  - Provide teaching at level of understanding  - Provide teaching via preferred learning methods  Outcome: Progressing     Problem: Prexisting or High Potential for Compromised Skin Integrity  Goal: Skin integrity is maintained or improved  Description: INTERVENTIONS:  - Identify patients at risk for skin breakdown  - Assess and monitor skin integrity  - Assess and monitor nutrition and hydration status  - Monitor labs   - Assess for incontinence   - Turn and reposition patient  - Assist with mobility/ambulation  - Relieve pressure over bony prominences  - Avoid friction and shearing  - Provide appropriate hygiene as needed including keeping skin clean and dry  - Evaluate need for skin moisturizer/barrier cream  - Collaborate with interdisciplinary team   - Patient/family teaching  - Consider wound care consult   Outcome: Progressing     Problem: Potential for Falls  Goal: Patient will remain free of falls  Description: INTERVENTIONS:  - Educate patient/family on patient safety including physical limitations  - Instruct patient to call for assistance with activity   - Consult OT/PT to assist with strengthening/mobility   - Keep Call bell within reach  - Keep bed low and locked with side rails adjusted as appropriate  - Keep care items and personal belongings within reach  - Initiate and maintain comfort rounds  - Make Fall Risk Sign visible  to staff  - Offer Toileting every 2 Hours, in advance of need  - Initiate/Maintain bed alarm  - Obtain necessary fall risk management equipment: non skid footwear  - Apply yellow socks and bracelet for high fall risk patients  - Consider moving patient to room near nurses station  Outcome: Progressing     Problem: COPING  Goal: Pt/Family able to verbalize concerns and demonstrate effective coping strategies  Description: INTERVENTIONS:  - Assist patient/family to identify coping skills, available support systems and cultural and spiritual values  - Provide emotional support, including active listening and acknowledgement of concerns of patient and caregivers  - Reduce environmental stimuli, as able  - Provide patient education  - Assess for spiritual pain/suffering and initiate spiritual care, including notification of Pastoral Care or ap[facility] based community as needed  - Assess effectiveness of coping strategies  Outcome: Progressing  Goal: Will report anxiety at manageable levels  Description: INTERVENTIONS:  - Administer medication as ordered  - Teach and encourage coping skills  - Provide emotional support  - Assess patient/family for anxiety and ability to cope  Outcome: Progressing     Problem: Nutrition/Hydration-ADULT  Goal: Nutrient/Hydration intake appropriate for improving, restoring or maintaining nutritional needs  Description: Monitor and assess patient's nutrition/hydration status for malnutrition. Collaborate with interdisciplinary team and initiate plan and interventions as ordered.  Monitor patient's weight and dietary intake as ordered or per policy. Utilize nutrition screening tool and intervene as necessary. Determine patient's food preferences and provide high-protein, high-caloric foods as appropriate.     INTERVENTIONS:  - Monitor oral intake, urinary output, labs, and treatment plans  - Assess nutrition and hydration status and recommend course of action  - Evaluate amount of meals eaten  -  Assist patient with eating if necessary   - Allow adequate time for meals  - Recommend/ encourage appropriate diets, oral nutritional supplements, and vitamin/mineral supplements  - Order, calculate, and assess calorie counts as needed  - Recommend, monitor, and adjust tube feedings and TPN/PPN based on assessed needs  - Assess need for intravenous fluids  - Provide specific nutrition/hydration education as appropriate  - Include patient/family/caregiver in decisions related to nutrition  Outcome: Progressing     Problem: NEUROSENSORY - ADULT  Goal: Achieves stable or improved neurological status  Description: INTERVENTIONS  - Monitor and report changes in neurological status  - Monitor vital signs such as temperature, blood pressure, glucose, and any other labs ordered   - Initiate measures to prevent increased intracranial pressure  - Monitor for seizure activity and implement precautions if appropriate      Outcome: Progressing  Goal: Achieves maximal functionality and self care  Description: INTERVENTIONS  - Monitor swallowing and airway patency with patient fatigue and changes in neurological status  - Encourage and assist patient to increase activity and self care.   - Encourage visually impaired, hearing impaired and aphasic patients to use assistive/communication devices  Outcome: Progressing     Problem: CARDIOVASCULAR - ADULT  Goal: Maintains optimal cardiac output and hemodynamic stability  Description: INTERVENTIONS:  - Monitor I/O, vital signs and rhythm  - Monitor for S/S and trends of decreased cardiac output  - Administer and titrate ordered vasoactive medications to optimize hemodynamic stability  - Assess quality of pulses, skin color and temperature  - Assess for signs of decreased coronary artery perfusion  - Instruct patient to report change in severity of symptoms  Outcome: Progressing  Goal: Absence of cardiac dysrhythmias or at baseline rhythm  Description: INTERVENTIONS:  - Continuous  cardiac monitoring, vital signs, obtain 12 lead EKG if ordered  - Administer antiarrhythmic and heart rate control medications as ordered  - Monitor electrolytes and administer replacement therapy as ordered  Outcome: Progressing     Problem: RESPIRATORY - ADULT  Goal: Achieves optimal ventilation and oxygenation  Description: INTERVENTIONS:  - Assess for changes in respiratory status  - Assess for changes in mentation and behavior  - Position to facilitate oxygenation and minimize respiratory effort  - Oxygen administered by appropriate delivery if ordered  - Initiate smoking cessation education as indicated  - Encourage broncho-pulmonary hygiene including cough, deep breathe, Incentive Spirometry  - Assess the need for suctioning and aspirate as needed  - Assess and instruct to report SOB or any respiratory difficulty  - Respiratory Therapy support as indicated  Outcome: Progressing     Problem: GASTROINTESTINAL - ADULT  Goal: Maintains or returns to baseline bowel function  Description: INTERVENTIONS:  - Assess bowel function  - Encourage oral fluids to ensure adequate hydration  - Administer IV fluids if ordered to ensure adequate hydration  - Administer ordered medications as needed  - Encourage mobilization and activity  - Consider nutritional services referral to assist patient with adequate nutrition and appropriate food choices  Outcome: Progressing  Goal: Maintains adequate nutritional intake  Description: INTERVENTIONS:  - Monitor percentage of each meal consumed  - Identify factors contributing to decreased intake, treat as appropriate  - Assist with meals as needed  - Monitor I&O, weight, and lab values if indicated  - Obtain nutrition services referral as needed  Outcome: Progressing     Problem: GENITOURINARY - ADULT  Goal: Maintains or returns to baseline urinary function  Description: INTERVENTIONS:  - Assess urinary function  - Encourage oral fluids to ensure adequate hydration if ordered  -  Administer IV fluids as ordered to ensure adequate hydration  - Administer ordered medications as needed  - Offer frequent toileting  - Follow urinary retention protocol if ordered  Outcome: Progressing  Goal: Absence of urinary retention  Description: INTERVENTIONS:  - Assess patient's ability to void and empty bladder  - Monitor I/O  - Bladder scan as needed  - Discuss with physician/AP medications to alleviate retention as needed  - Discuss catheterization for long term situations as appropriate  Outcome: Progressing     Problem: METABOLIC, FLUID AND ELECTROLYTES - ADULT  Goal: Electrolytes maintained within normal limits  Description: INTERVENTIONS:  - Monitor labs and assess patient for signs and symptoms of electrolyte imbalances  - Administer electrolyte replacement as ordered  - Monitor response to electrolyte replacements, including repeat lab results as appropriate  - Instruct patient on fluid and nutrition as appropriate  Outcome: Progressing  Goal: Fluid balance maintained  Description: INTERVENTIONS:  - Monitor labs   - Monitor I/O and WT  - Instruct patient on fluid and nutrition as appropriate  - Assess for signs & symptoms of volume excess or deficit  Outcome: Progressing  Goal: Glucose maintained within target range  Description: INTERVENTIONS:  - Monitor Blood Glucose as ordered  - Assess for signs and symptoms of hyperglycemia and hypoglycemia  - Administer ordered medications to maintain glucose within target range  - Assess nutritional intake and initiate nutrition service referral as needed  Outcome: Progressing     Problem: SKIN/TISSUE INTEGRITY - ADULT  Goal: Skin Integrity remains intact(Skin Breakdown Prevention)  Description: Assess:  -Perform Geovani assessment every shift   -Clean and moisturize skin every shift   -Inspect skin when repositioning, toileting, and assisting with ADLS  -Assess under medical devices such as paula every hour  -Assess extremities for adequate circulation and  sensation     Bed Management:  -Have minimal linens on bed & keep smooth, unwrinkled  -Change linens as needed when moist or perspiring  -Avoid sitting or lying in one position for more than 45 hours while in bed  -Keep HOB at 45degrees     Toileting:  -Offer bedside commode  -Assess for incontinence every hour  -Use incontinent care products after each incontinent episode such as moisture barrier     Activity:  --Encourage or provide ROM exercises   -Turn and reposition patient every 2 Hours  -Use appropriate equipment to lift or move patient in bed  -Instruct/ Assist with weight shifting every hour when out of bed in chair  -Consider limitation of chair time 2 hour intervals    Skin Care:  -Avoid use of baby powder, tape, friction and shearing, hot water or constrictive clothing  -Relieve pressure over bony prominences using mepilx  -Do not massage red bony areas    Next Steps:  -Teach patient strategies to minimize risks such as weight shifting    -Consider consults to  interdisciplinary teams such as PT/OT  Outcome: Progressing  Goal: Incision(s), wounds(s) or drain site(s) healing without S/S of infection  Description: INTERVENTIONS  - Assess and document dressing, incision, wound bed, drain sites and surrounding tissue  - Provide patient and family education  - Perform skin care/dressing changes every shift  Outcome: Progressing     Problem: HEMATOLOGIC - ADULT  Goal: Maintains hematologic stability  Description: INTERVENTIONS  - Assess for signs and symptoms of bleeding or hemorrhage  - Monitor labs  - Administer supportive blood products/factors as ordered and appropriate  Outcome: Progressing     Problem: MUSCULOSKELETAL - ADULT  Goal: Maintain or return mobility to safest level of function  Description: INTERVENTIONS:  - Assess patient's ability to carry out ADLs; assess patient's baseline for ADL function and identify physical deficits which impact ability to perform ADLs (bathing, care of mouth/teeth,  toileting, grooming, dressing, etc.)  - Assess/evaluate cause of self-care deficits   - Assess range of motion  - Assess patient's mobility  - Assess patient's need for assistive devices and provide as appropriate  - Encourage maximum independence but intervene and supervise when necessary  - Involve family in performance of ADLs  - Assess for home care needs following discharge   - Consider OT consult to assist with ADL evaluation and planning for discharge  - Provide patient education as appropriate  Outcome: Progressing  Goal: Maintain proper alignment of affected body part  Description: INTERVENTIONS:  - Support, maintain and protect limb and body alignment  - Provide patient/ family with appropriate education  Outcome: Progressing     Problem: Neurological Deficit  Goal: Neurological status is stable or improving  Description: Interventions:  - Monitor and assess patient's level of consciousness, motor function, sensory function, and level of assistance needed for ADLs.   - Monitor and report changes from baseline. Collaborate with interdisciplinary team to initiate plan and implement interventions as ordered.   - Provide and maintain a safe environment.  - Consider seizure precautions.  - Consider fall precautions.  - Consider aspiration precautions.  - Consider bleeding precautions.  Outcome: Progressing     Problem: Activity Intolerance/Impaired Mobility  Goal: Mobility/activity is maintained at optimum level for patient  Description: Interventions:  - Assess and monitor patient  barriers to mobility and need for assistive/adaptive devices.  - Assess patient's emotional response to limitations.  - Collaborate with interdisciplinary team and initiate plans and interventions as ordered.  - Encourage independent activity per ability.  - Maintain proper body alignment.  - Perform active/passive rom as tolerated/ordered.  - Plan activities to conserve energy.  - Turn patient as appropriate  Outcome: Progressing      Problem: Communication Impairment  Goal: Ability to express needs and understand communication  Description: Assess patient's communication skills and ability to understand information.  Patient will demonstrate use of effective communication techniques, alternative methods of communication and understanding even if not able to speak.     - Encourage communication and provide alternate methods of communication as needed.  - Collaborate with case management/ for discharge needs.  - Include patient/family/caregiver in decisions related to communication.  Outcome: Progressing     Problem: Potential for Aspiration  Goal: Non-ventilated patient's risk of aspiration is minimized  Description: Assess and monitor vital signs, respiratory status, and labs (WBC).  Monitor for signs of aspiration (tachypnea, cough, rales, wheezing, cyanosis, fever).    - Assess and monitor patient's ability to swallow.  - Place patient up in chair to eat if possible.  - HOB up at 90 degrees to eat if unable to get patient up into chair.  - Supervise patient during oral intake.   - Instruct patient/ family to take small bites.  - Instruct patient/ family to take small single sips when taking liquids.  - Follow patient-specific strategies generated by speech pathologist.  Outcome: Progressing     Problem: Nutrition  Goal: Nutrition/Hydration status is improving  Description: Monitor and assess patient's nutrition/hydration status for malnutrition (ex- brittle hair, bruises, dry skin, pale skin and conjunctiva, muscle wasting, smooth red tongue, and disorientation). Collaborate with interdisciplinary team and initiate plan and interventions as ordered.  Monitor patient's weight and dietary intake as ordered or per policy. Utilize nutrition screening tool and intervene per policy. Determine patient's food preferences and provide high-protein, high-caloric foods as appropriate.     - Assist patient with eating.  - Allow adequate  time for meals.  - Encourage patient to take dietary supplement as ordered.  - Collaborate with clinical nutritionist.  - Include patient/family/caregiver in decisions related to nutrition.  Outcome: Progressing

## 2024-12-31 LAB
ATRIAL RATE: 97 BPM
P AXIS: 74 DEGREES
PR INTERVAL: 146 MS
QRS AXIS: 31 DEGREES
QRSD INTERVAL: 83 MS
QT INTERVAL: 358 MS
QTC INTERVAL: 455 MS
T WAVE AXIS: 258 DEGREES
VENTRICULAR RATE: 97 BPM
